# Patient Record
Sex: FEMALE | Race: WHITE | Employment: PART TIME | URBAN - METROPOLITAN AREA
[De-identification: names, ages, dates, MRNs, and addresses within clinical notes are randomized per-mention and may not be internally consistent; named-entity substitution may affect disease eponyms.]

---

## 2017-01-23 ENCOUNTER — MYC MEDICAL ADVICE (OUTPATIENT)
Dept: FAMILY MEDICINE | Facility: CLINIC | Age: 42
End: 2017-01-23

## 2017-01-23 DIAGNOSIS — M54.2 NECK PAIN: Primary | ICD-10-CM

## 2017-01-23 RX ORDER — HYDROCODONE BITARTRATE AND ACETAMINOPHEN 5; 325 MG/1; MG/1
1-2 TABLET ORAL EVERY 4 HOURS PRN
Qty: 40 TABLET | Refills: 0 | Status: SHIPPED | OUTPATIENT
Start: 2017-01-23 | End: 2017-02-27
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
## 2017-02-27 ENCOUNTER — OFFICE VISIT (OUTPATIENT)
Dept: FAMILY MEDICINE | Facility: CLINIC | Age: 42
End: 2017-02-27
Payer: COMMERCIAL

## 2017-02-27 VITALS
WEIGHT: 195.2 LBS | SYSTOLIC BLOOD PRESSURE: 108 MMHG | DIASTOLIC BLOOD PRESSURE: 72 MMHG | BODY MASS INDEX: 31.37 KG/M2 | OXYGEN SATURATION: 98 % | TEMPERATURE: 98.1 F | HEIGHT: 66 IN | HEART RATE: 96 BPM

## 2017-02-27 DIAGNOSIS — M54.2 NECK PAIN: ICD-10-CM

## 2017-02-27 PROCEDURE — 99213 OFFICE O/P EST LOW 20 MIN: CPT | Performed by: FAMILY MEDICINE

## 2017-02-27 RX ORDER — HYDROCODONE BITARTRATE AND ACETAMINOPHEN 5; 325 MG/1; MG/1
1-2 TABLET ORAL EVERY 4 HOURS PRN
Qty: 40 TABLET | Refills: 0 | Status: SHIPPED | OUTPATIENT
Start: 2017-02-27 | End: 2017-02-27

## 2017-02-27 RX ORDER — HYDROCODONE BITARTRATE AND ACETAMINOPHEN 5; 325 MG/1; MG/1
1-2 TABLET ORAL EVERY 4 HOURS PRN
Qty: 40 TABLET | Refills: 0 | Status: SHIPPED | OUTPATIENT
Start: 2017-03-27 | End: 2017-04-25
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
## 2017-02-27 NOTE — MR AVS SNAPSHOT
After Visit Summary   2/27/2017    Noa Mcgill    MRN: [id number]           Patient Information     Date Of Birth          1975        Visit Information        Provider Department      2/27/2017 4:15 PM Sydnie He MD [facility]        Today's Diagnoses     Neck pain           Follow-ups after your visit        Your next 10 appointments already scheduled     Mar 21, 2017  7:45 AM CDT   MA SCREENING BILATERAL W/ ROMMEL with RHBCMA2   [facility]    [address] [facility], [address]  [facility] [id number] [phone]           Do not use any powder, lotion or deodorant under your arms or on your breast. If you do, we will ask you to remove it before your exam.  Wear comfortable, two-piece clothing.  If you have any allergies, tell your care team.  Bring any previous mammograms from other facilities or have them mailed to the breast center.              Who to contact     If you have questions or need follow up information about today's clinic visit or your schedule please contact [facility] directly at [phone].  Normal or non-critical lab and imaging results will be communicated to you by MyChart, letter or phone within 4 business days after the clinic has received the results. If you do not hear from us within 7 days, please contact the clinic through NeuroQuestt or phone. If you have a critical or abnormal lab result, we will notify you by phone as soon as possible.  Submit refill requests through APJeT or call your pharmacy and they will forward the refill request to us. Please allow 3 business days for your refill to be completed.          Additional Information About Your Visit        MyChart Information     APJeT gives you secure access to your electronic health record. If you see a primary care provider, you can also send messages to your care team and make appointments. If you have questions, please call  "your primary care clinic.  If you do not have a primary care provider, please call [phone] and they will assist you.        Care EveryWhere ID     This is your Care EveryWhere ID. This could be used by other organizations to access your [place] medical records  [id number]        Your Vitals Were     Pulse Temperature Height Pulse Oximetry BMI (Body Mass Index)       96 98.1  F (36.7  C) (Oral) 5' 6\" (1.676 m) 98% 31.51 kg/m2        Blood Pressure from Last 3 Encounters:   02/27/17 108/72   12/12/16 116/74   10/31/16 120/76    Weight from Last 3 Encounters:   02/27/17 195 lb 3.2 oz (88.5 kg)   12/12/16 198 lb (89.8 kg)   10/31/16 198 lb 11.2 oz (90.1 kg)              Today, you had the following     No orders found for display         Today's Medication Changes          These changes are accurate as of: 2/27/17  4:47 PM.  If you have any questions, ask your nurse or doctor.               Start taking these medicines.        Dose/Directions    HYDROcodone-acetaminophen 5-325 MG per tablet   Commonly known as:  NORCO   Used for:  Neck pain   Started by:  Sydnie He MD        Dose:  1-2 tablet   Start taking on:  3/27/2017   Take 1-2 tablets by mouth every 4 hours as needed for moderate to severe pain (maximum 2 tablet(s) per day)   Quantity:  40 tablet   Refills:  0            Where to get your medicines      Some of these will need a paper prescription and others can be bought over the counter.  Ask your nurse if you have questions.     Bring a paper prescription for each of these medications     HYDROcodone-acetaminophen 5-325 MG per tablet                Primary Care Provider Office Phone # Fax #    Sydnie He -[phone]       [facility] [address] [facility] [address]        Thank you!     Thank you for choosing [facility]  for your care. Our goal is always to provide you with excellent care. Hearing back from our patients is one way we can " continue to improve our services. Please take a few minutes to complete the written survey that you may receive in the mail after your visit with us. Thank you!             Your Updated Medication List - Protect others around you: Learn how to safely use, store and throw away your medicines at www.disposemymeds.org.          This list is accurate as of: 2/27/17  4:47 PM.  Always use your most recent med list.                   Brand Name Dispense Instructions for use    ALPRAZolam 0.5 MG tablet    XANAX    10 tablet    1 tab 30 minutes prior to air travel       amphetamine-dextroamphetamine 10 MG per tablet    ADDERALL    90 tablet    Take 1 tablet (10 mg) by mouth in AM and take 2 tablets (20 mg) by mouth in PM       aspirin 81 MG tablet      Take 81 mg by mouth daily       butalbital-acetaminophen-caffeine -40 MG per tablet    FIORICET/ESGIC    30 tablet    Take 1 tablet by mouth every 4 hours as needed       cyclobenzaprine 10 MG tablet    FLEXERIL     Take 10 mg by mouth       HYDROcodone-acetaminophen 5-325 MG per tablet   Start taking on:  3/27/2017    NORCO    40 tablet    Take 1-2 tablets by mouth every 4 hours as needed for moderate to severe pain (maximum 2 tablet(s) per day)       NUVARING 0.12-0.015 MG/24HR vaginal ring   Generic drug:  etonogestrel-ethinyl estradiol      Place 1 each vaginally every 28 days       rizatriptan 10 MG tablet    MAXALT     TAKE 1 TABLET BY MOUTH ONCE, MAY REPEAT IN 2 HRS. MAX 30MG/24HRS       VITAMIN D PO      Take 2,000 Units by mouth daily.       zolpidem 5 MG tablet    AMBIEN    30 tablet    Take 1 tablet (5 mg) by mouth nightly as needed for sleep

## 2017-03-08 PROBLEM — G89.4 CHRONIC PAIN SYNDROME: Status: ACTIVE | Noted: 2017-03-08

## 2017-03-21 ENCOUNTER — HOSPITAL ENCOUNTER (OUTPATIENT)
Dept: MAMMOGRAPHY | Facility: CLINIC | Age: 42
Discharge: HOME | End: 2017-03-21
Attending: OBSTETRICS & GYNECOLOGY | Admitting: OBSTETRICS & GYNECOLOGY
Payer: COMMERCIAL

## 2017-03-21 DIAGNOSIS — Z12.31 VISIT FOR SCREENING MAMMOGRAM: ICD-10-CM

## 2017-03-21 PROCEDURE — G0202 SCR MAMMO BI INCL CAD: HCPCS

## 2017-04-25 ENCOUNTER — MYC REFILL (OUTPATIENT)
Dept: FAMILY MEDICINE | Facility: CLINIC | Age: 42
End: 2017-04-25

## 2017-04-25 DIAGNOSIS — M54.2 NECK PAIN: ICD-10-CM

## 2017-04-26 RX ORDER — HYDROCODONE BITARTRATE AND ACETAMINOPHEN 5; 325 MG/1; MG/1
1-2 TABLET ORAL EVERY 4 HOURS PRN
Qty: 40 TABLET | Refills: 0 | Status: SHIPPED | OUTPATIENT
Start: 2017-04-27 | End: 2017-06-07

## 2017-04-26 NOTE — TELEPHONE ENCOUNTER
Message from [employer]:  Original authorizing provider: MD Noa Rodríguez KAROCristi Mcgill would like a refill of the following medications:  HYDROcodone-acetaminophen (NORCO) 5-325 MG per tablet [Sydnie He MD]    Preferred pharmacy: [facility] [id number]    Comment:  I am in the last 45 days of my therpay. My range of motion is much better but pain is a bit worse for a lot of things. My doctor says this is normal for the amount of weights they are using for my neck rotation. hoping to be done with tis soon. If not I will need to look at other options for just coping with what pain I have. I have also been using massage with a person who has worked in more orthopedic therapy hoping to resolve the inflamation. Thank you for all of your help.

## 2017-04-26 NOTE — TELEPHONE ENCOUNTER
States max 2/d Norco so not due for another month yet.  Please advise.  SERG Gustafson      Last Written Prescription Date: 2/27/17  Last Fill Quantity: 40,  # refills: 0   Last Office Visit with [facility] prescribing provider: 2/27/17-see below    Not PSO med.  Sent to provider.  Juanita Wallace RN                                           2/27/17  Assessment:  1. ADD - is starting back on her meds - has been off since concussion because they made her feel sick - just started back on one per day so does not need refill now - will gradually go up on dose as tolerated  2. Headaches and concussion - getting better - using less meds for this     Plan:  1. Refills per [facility]  2. 2 months worth  3. Recheck in 3 months unless off all pain meds then may go 6 months - call for refill of stimulant when back on normal dosing

## 2017-05-25 ENCOUNTER — TELEPHONE (OUTPATIENT)
Dept: FAMILY MEDICINE | Facility: CLINIC | Age: 42
End: 2017-05-25

## 2017-05-25 DIAGNOSIS — A09 TRAVELERS' DIARRHEA: Primary | ICD-10-CM

## 2017-05-25 NOTE — TELEPHONE ENCOUNTER
Pt is traveling to [place] for 2 weeks, would like to have travel medication and an antibiotic for an UTI.  She states she does not need to see travel clinic as she is UTD on vaccines    Lola Tanner/ARTHUR  [place]

## 2017-05-31 RX ORDER — CIPROFLOXACIN 500 MG/1
500 TABLET, FILM COATED ORAL 2 TIMES DAILY
Qty: 6 TABLET | Refills: 0 | Status: SHIPPED | OUTPATIENT
Start: 2017-05-31 | End: 2017-09-27

## 2017-05-31 NOTE — TELEPHONE ENCOUNTER
Pt calls, made appointment for this issue next week, informed staff sent message to [facility], accepts rx, will keep appointment next week for ambien and att def (6 month visit), [facility] TH[facility]ON, rx sent  Prescription approved per [facility] Refill Protocol.  Tanja Gaines RN, BSN  Message handled by Nurse Triage.

## 2017-05-31 NOTE — TELEPHONE ENCOUNTER
L/M to call.  Verify [facility] preferred pharmacies.  Which does she want RX at?  Juanita Wallace RN

## 2017-06-03 ENCOUNTER — HEALTH MAINTENANCE LETTER (OUTPATIENT)
Age: 42
End: 2017-06-03

## 2017-06-07 ENCOUNTER — OFFICE VISIT (OUTPATIENT)
Dept: FAMILY MEDICINE | Facility: CLINIC | Age: 42
End: 2017-06-07
Payer: COMMERCIAL

## 2017-06-07 VITALS
RESPIRATION RATE: 20 BRPM | TEMPERATURE: 98.2 F | SYSTOLIC BLOOD PRESSURE: 119 MMHG | BODY MASS INDEX: 32.17 KG/M2 | DIASTOLIC BLOOD PRESSURE: 78 MMHG | WEIGHT: 199.3 LBS | HEART RATE: 93 BPM

## 2017-06-07 DIAGNOSIS — F98.8 ATTENTION DEFICIT DISORDER: ICD-10-CM

## 2017-06-07 DIAGNOSIS — F40.243 PHOBIA, FLYING: ICD-10-CM

## 2017-06-07 DIAGNOSIS — G47.00 INSOMNIA, UNSPECIFIED TYPE: Primary | ICD-10-CM

## 2017-06-07 PROCEDURE — 99213 OFFICE O/P EST LOW 20 MIN: CPT | Performed by: FAMILY MEDICINE

## 2017-06-07 RX ORDER — ZOLPIDEM TARTRATE 5 MG/1
5 TABLET ORAL
Qty: 30 TABLET | Refills: 1 | Status: SHIPPED | OUTPATIENT
Start: 2017-06-07 | End: 2017-09-27

## 2017-06-07 RX ORDER — ALPRAZOLAM 0.5 MG
TABLET ORAL
Qty: 10 TABLET | Refills: 0 | Status: SHIPPED | OUTPATIENT
Start: 2017-06-07 | End: 2018-03-22

## 2017-06-07 RX ORDER — DEXTROAMPHETAMINE SACCHARATE, AMPHETAMINE ASPARTATE, DEXTROAMPHETAMINE SULFATE AND AMPHETAMINE SULFATE 2.5; 2.5; 2.5; 2.5 MG/1; MG/1; MG/1; MG/1
TABLET ORAL
Qty: 90 TABLET | Refills: 0 | Status: SHIPPED | OUTPATIENT
Start: 2017-06-07 | End: 2017-09-27

## 2017-06-07 NOTE — MR AVS SNAPSHOT
After Visit Summary   6/7/2017    Noa Mcgill    MRN: [id number]           Patient Information     Date Of Birth          1975        Visit Information        Provider Department      6/7/2017 3:00 PM Sydnie He MD [facility]        Today's Diagnoses     Insomnia, unspecified type    -  1    Attention deficit disorder        Phobia, flying           Follow-ups after your visit        Who to contact     If you have questions or need follow up information about today's clinic visit or your schedule please contact [facility] directly at [phone].  Normal or non-critical lab and imaging results will be communicated to you by Sienhart, letter or phone within 4 business days after the clinic has received the results. If you do not hear from us within 7 days, please contact the clinic through Sienhart or phone. If you have a critical or abnormal lab result, we will notify you by phone as soon as possible.  Submit refill requests through Veritract or call your pharmacy and they will forward the refill request to us. Please allow 3 business days for your refill to be completed.          Additional Information About Your Visit        MyChart Information     Veritract gives you secure access to your electronic health record. If you see a primary care provider, you can also send messages to your care team and make appointments. If you have questions, please call your primary care clinic.  If you do not have a primary care provider, please call [phone] and they will assist you.        Care EveryWhere ID     This is your Care EveryWhere ID. This could be used by other organizations to access your [place] medical records  [id number]        Your Vitals Were     Pulse Temperature Respirations Breastfeeding? BMI (Body Mass Index)       93 98.2  F (36.8  C) (Oral) 20 No 32.17 kg/m2        Blood Pressure from Last 3 Encounters:   06/07/17 119/78   02/27/17 108/72    12/12/16 116/74    Weight from Last 3 Encounters:   06/07/17 199 lb 4.8 oz (90.4 kg)   02/27/17 195 lb 3.2 oz (88.5 kg)   12/12/16 198 lb (89.8 kg)              Today, you had the following     No orders found for display         Where to get your medicines      Some of these will need a paper prescription and others can be bought over the counter.  Ask your nurse if you have questions.     Bring a paper prescription for each of these medications     ALPRAZolam 0.5 MG tablet    amphetamine-dextroamphetamine 10 MG per tablet    zolpidem 5 MG tablet          Primary Care Provider Office Phone # Fax #    Sydnie He -[phone]       [facility] [address] [facility] [address]        Thank you!     Thank you for choosing [facility]  for your care. Our goal is always to provide you with excellent care. Hearing back from our patients is one way we can continue to improve our services. Please take a few minutes to complete the written survey that you may receive in the mail after your visit with us. Thank you!             Your Updated Medication List - Protect others around you: Learn how to safely use, store and throw away your medicines at www.disposemymeds.org.          This list is accurate as of: 6/7/17  3:44 PM.  Always use your most recent med list.                   Brand Name Dispense Instructions for use    ALPRAZolam 0.5 MG tablet    XANAX    10 tablet    1 tab 30 minutes prior to air travel       amphetamine-dextroamphetamine 10 MG per tablet    ADDERALL    90 tablet    Take 1 tablet (10 mg) by mouth in AM and take 2 tablets (20 mg) by mouth in PM       aspirin 81 MG tablet      Take 81 mg by mouth daily       butalbital-acetaminophen-caffeine -40 MG per tablet    FIORICET/ESGIC    30 tablet    Take 1 tablet by mouth every 4 hours as needed       ciprofloxacin 500 MG tablet    CIPRO    6 tablet    Take 1 tablet (500 mg) by mouth 2 times daily        cyclobenzaprine 10 MG tablet    FLEXERIL     Take 10 mg by mouth       NUVARING 0.12-0.015 MG/24HR vaginal ring   Generic drug:  etonogestrel-ethinyl estradiol      Place 1 each vaginally every 28 days       rizatriptan 10 MG tablet    MAXALT     TAKE 1 TABLET BY MOUTH ONCE, MAY REPEAT IN 2 HRS. MAX 30MG/24HRS       VITAMIN D PO      Take 2,000 Units by mouth daily.       zolpidem 5 MG tablet    AMBIEN    30 tablet    Take 1 tablet (5 mg) by mouth nightly as needed for sleep

## 2017-06-07 NOTE — PROGRESS NOTES
SUBJECTIVE:                                                    Noa Mcgill is a 42 year old female who presents to clinic today for the following health issues:    She leaves Monday night and is gone for 2.5 weeks to [place] and [place].        ADHD - refill Adderall.  Had discontinued due to concussion.  Insomnia - wants refill on Ambien.  Will be traveling out of the country  She has flight anxiety and needs some xanax.   Last time given was last fall.        Amount of exercise or physical activity: 4-5 days/week for an average of 30-45 minutes    Problems taking medications regularly: No    Medication side effects: none    Diet: regular (no restrictions)        Past Medical History:   Diagnosis Date     Allergic rhinitis, cause unspecified      ASD (atrial septal defect) 2/15/13    ASD dx by echo, recheck and repair after done having children     Attention deficit disorder without mention of hyperactivity 2014     BRCA positive     1     Endometriosis of uterus      HERPES SIMPLEX NOS 2007    cold sores on remicade     Insomnia, unspecified 2/10/2005     Migraine headaches      Other chronic sinusitis      Psoriatic arthropathy (H) 2007     Recurrent UTI      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       Past Surgical History:   Procedure Laterality Date     APPENDECTOMY  2006      SECTION  2013    Procedure:  SECTION;  Primary  Section;  Surgeon: Chad Hughes MD;  Location: [facility] L+D     DILATION AND CURETTAGE SUCTION N/A 2014    Procedure: DILATION AND CURETTAGE SUCTION;  Surgeon: Srini Martinez MD;  Location: [facility] OR     DILATION AND CURETTAGE SUCTION N/A 2014    Procedure: DILATION AND CURETTAGE SUCTION;  Surgeon: Connor Kang MD;  Location: [facility] OR     HC DILATION/CURETTAGE DIAG/THER NON OB       HC DILATION/CURETTAGE DIAG/THER NON OB  2006     HYSTERECTOMY, CARLOS  2016    [facility]     SURGICAL HISTORY OF -   2006    left thumb fusion due to arthritis       MEDICATIONS:  Current Outpatient Prescriptions   Medication     rizatriptan (MAXALT) 10 MG tablet     cyclobenzaprine (FLEXERIL) 10 MG tablet     amphetamine-dextroamphetamine (ADDERALL) 10 MG tablet     butalbital-acetaminophen-caffeine (FIORICET, ESGIC) -40 MG per tablet     ALPRAZolam (XANAX) 0.5 MG tablet     etonogestrel-ethinyl estradiol (NUVARING) 0.12-0.015 MG/24HR vaginal ring     zolpidem (AMBIEN) 5 MG tablet     aspirin 81 MG tablet     VITAMIN D PO     ciprofloxacin (CIPRO) 500 MG tablet     No current facility-administered medications for this visit.        SOCIAL HISTORY:  Social History   Substance Use Topics     Smoking status: Never Smoker     Smokeless tobacco: Never Used     Alcohol use 0.0 oz/week     0 Standard drinks or equivalent per week      Comment: occasional       Family History   Problem Relation Age of Onset     C.A.D. Maternal Grandmother      Hypertension Maternal Grandmother      Psychotic Disorder Mother      Depression, alcoholism     DIABETES Mother      Breast Cancer Maternal Aunt      Breast Cancer Maternal Aunt      Breast Cancer Maternal Aunt      CANCER Maternal Aunt      ovary     CANCER Maternal Aunt      thyroid cancer       Objective:  Blood pressure 119/78, pulse 93, temperature 98.2  F (36.8  C), temperature source Oral, resp. rate 20, weight 199 lb 4.8 oz (90.4 kg), not currently breastfeeding.  HEENT:  TM's are clear bilaterally.  Oropharynx is clear without tonsillar hypertrophy or exudate.  Conjuctiva are clear.  Neck:  There is no lymphadenopathy or thyroid tenderness or enlargement  Chest: Clear to auscultation bilaterally.  No wheezes, rales or retractions.  CV: Regular rate and rhythm without murmurs, rubs or gallops.  ABDOMEN:  Positive bowel sounds, soft, nondistended and nontender.  No masses are palpated and there is no organomegaly or inguinal  lymphadenopathy.  Extremities: There is no clubbing, cyanosis or edema.  Peripheral pulses are present bilaterally in the radial and dorsalis pedis arteries.  Reflexes 1+ at patella    Assessment:  1. ADD - doing well now back on meds for a few weeks  2. Phobia of flying   3. Insomnia - stable - rarely uses ambien    Plan:  1. Refills per [facility]  2. Follow up in 3 months and as needed

## 2017-07-17 ENCOUNTER — MYC REFILL (OUTPATIENT)
Dept: FAMILY MEDICINE | Facility: CLINIC | Age: 42
End: 2017-07-17

## 2017-07-17 DIAGNOSIS — G43.909 MIGRAINE WITHOUT STATUS MIGRAINOSUS, NOT INTRACTABLE, UNSPECIFIED MIGRAINE TYPE: ICD-10-CM

## 2017-07-17 RX ORDER — BUTALBITAL, ACETAMINOPHEN AND CAFFEINE 50; 325; 40 MG/1; MG/1; MG/1
1 TABLET ORAL EVERY 4 HOURS PRN
Qty: 30 TABLET | Refills: 1 | Status: SHIPPED | OUTPATIENT
Start: 2017-07-17 | End: 2018-05-21

## 2017-07-17 NOTE — TELEPHONE ENCOUNTER
Disp Refills Start End MARYAM   butalbital-acetaminophen-caffeine (FIORICET, ESGIC) -40 MG per tablet 30 tablet 1 8/31/2016  No   Sig: Take 1 tablet by mouth every 4 hours as needed           Last Written Prescription Date:  8.31.16  Last Fill Quantity: 30,   # refills: 0  Last Office Visit with [facility] or [facility] prescribing provider: 6.7.17  Future Office visit:       Routing refill request to provider for review/approval because:  Drug not on the [facility], [facility] or [facility] refill protocol or controlled substance    .Nohemy Morris RN, BS  Clinical Nurse Triage.

## 2017-07-17 NOTE — TELEPHONE ENCOUNTER
Message from [employer]:  Original authorizing provider: MD Noa Rodríguez would like a refill of the following medications:  butalbital-acetaminophen-caffeine (FIORICET, ESGIC) -40 MG per tablet [Sydnie He MD]    Preferred pharmacy: [id number] - [facility] [address]    Comment:

## 2017-09-27 ENCOUNTER — OFFICE VISIT (OUTPATIENT)
Dept: FAMILY MEDICINE | Facility: CLINIC | Age: 42
End: 2017-09-27
Payer: COMMERCIAL

## 2017-09-27 VITALS
WEIGHT: 197.6 LBS | BODY MASS INDEX: 31.89 KG/M2 | DIASTOLIC BLOOD PRESSURE: 80 MMHG | TEMPERATURE: 98.5 F | RESPIRATION RATE: 14 BRPM | SYSTOLIC BLOOD PRESSURE: 112 MMHG | HEART RATE: 102 BPM

## 2017-09-27 DIAGNOSIS — F32.0 MILD MAJOR DEPRESSION (H): ICD-10-CM

## 2017-09-27 DIAGNOSIS — G47.00 INSOMNIA, UNSPECIFIED TYPE: ICD-10-CM

## 2017-09-27 DIAGNOSIS — F98.8 ATTENTION DEFICIT DISORDER (ADD) WITHOUT HYPERACTIVITY: ICD-10-CM

## 2017-09-27 PROCEDURE — 99213 OFFICE O/P EST LOW 20 MIN: CPT | Performed by: FAMILY MEDICINE

## 2017-09-27 RX ORDER — ZOLPIDEM TARTRATE 5 MG/1
5 TABLET ORAL
Qty: 30 TABLET | Refills: 3 | Status: SHIPPED | OUTPATIENT
Start: 2017-09-27 | End: 2017-11-06 | Stop reason: DRUGHIGH

## 2017-09-27 RX ORDER — TOPIRAMATE 25 MG/1
TABLET, FILM COATED ORAL
COMMUNITY
Start: 2017-09-26 | End: 2017-11-06

## 2017-09-27 RX ORDER — DEXTROAMPHETAMINE SACCHARATE, AMPHETAMINE ASPARTATE, DEXTROAMPHETAMINE SULFATE AND AMPHETAMINE SULFATE 2.5; 2.5; 2.5; 2.5 MG/1; MG/1; MG/1; MG/1
TABLET ORAL
Qty: 90 TABLET | Refills: 0 | Status: SHIPPED | OUTPATIENT
Start: 2017-09-27 | End: 2017-11-06

## 2017-09-27 RX ORDER — BUPROPION HYDROCHLORIDE 150 MG/1
150 TABLET ORAL EVERY MORNING
Qty: 30 TABLET | Refills: 6 | Status: SHIPPED | OUTPATIENT
Start: 2017-09-27 | End: 2017-11-06

## 2017-09-27 NOTE — MR AVS SNAPSHOT
After Visit Summary   9/27/2017    Noa Mcgill    MRN: [id number]           Patient Information     Date Of Birth          1975        Visit Information        Provider Department      9/27/2017 3:00 PM Sydnie He MD [facility]        Today's Diagnoses     Mild major depression (H)        Insomnia, unspecified type           Follow-ups after your visit        Who to contact     If you have questions or need follow up information about today's clinic visit or your schedule please contact [facility] directly at [phone].  Normal or non-critical lab and imaging results will be communicated to you by Active DSPhart, letter or phone within 4 business days after the clinic has received the results. If you do not hear from us within 7 days, please contact the clinic through [employer] or phone. If you have a critical or abnormal lab result, we will notify you by phone as soon as possible.  Submit refill requests through [employer] or call your pharmacy and they will forward the refill request to us. Please allow 3 business days for your refill to be completed.          Additional Information About Your Visit        MyChart Information     [employer] gives you secure access to your electronic health record. If you see a primary care provider, you can also send messages to your care team and make appointments. If you have questions, please call your primary care clinic.  If you do not have a primary care provider, please call [phone] and they will assist you.        Care EveryWhere ID     This is your Care EveryWhere ID. This could be used by other organizations to access your [place] medical records  [id number]        Your Vitals Were     Pulse Temperature Respirations BMI (Body Mass Index)          102 98.5  F (36.9  C) (Oral) 14 31.89 kg/m2         Blood Pressure from Last 3 Encounters:   09/27/17 112/80   06/07/17 119/78   02/27/17 108/72    Weight from Last 3  Encounters:   09/27/17 197 lb 9.6 oz (89.6 kg)   06/07/17 199 lb 4.8 oz (90.4 kg)   02/27/17 195 lb 3.2 oz (88.5 kg)              Today, you had the following     No orders found for display         Today's Medication Changes          These changes are accurate as of: 9/27/17  4:01 PM.  If you have any questions, ask your nurse or doctor.               Start taking these medicines.        Dose/Directions    buPROPion 150 MG 24 hr tablet   Commonly known as:  WELLBUTRIN XL   Used for:  Mild major depression (H)   Started by:  Sydnie He MD        Dose:  150 mg   Take 1 tablet (150 mg) by mouth every morning   Quantity:  30 tablet   Refills:  6            Where to get your medicines      These medications were sent to [facility] [id number] [place] [address]  [id number] [place] [id number]     Phone:  [phone]     buPROPion 150 MG 24 hr tablet         Some of these will need a paper prescription and others can be bought over the counter.  Ask your nurse if you have questions.     Bring a paper prescription for each of these medications     zolpidem 5 MG tablet                Primary Care Provider Office Phone # Fax #    Sydnie He -[phone] [address] [facility] [address]        Equal Access to Services     KARMEN LEMUS AH: Johan kirkland hadasho Sojosephali, waaxda luqadaha, qaybta kaalmada adeegyada, lisa cruz. So [facility] [phone].    ATENCIÓN: Si habla español, tiene a horton disposición servicios gratuitos de asistencia lingüística. Llame al [phone].    We comply with applicable federal civil rights laws and [place] laws. We do not discriminate on the basis of race, color, national origin, age, disability sex, sexual orientation or gender identity.            Thank you!     Thank you for choosing [facility]  for your care. Our goal is always to provide you with excellent care. Hearing back  from our patients is one way we can continue to improve our services. Please take a few minutes to complete the written survey that you may receive in the mail after your visit with us. Thank you!             Your Updated Medication List - Protect others around you: Learn how to safely use, store and throw away your medicines at www.disposemymeds.org.          This list is accurate as of: 9/27/17  4:01 PM.  Always use your most recent med list.                   Brand Name Dispense Instructions for use Diagnosis    ALPRAZolam 0.5 MG tablet    XANAX    10 tablet    1 tab 30 minutes prior to air travel    Phobia, flying       amphetamine-dextroamphetamine 10 MG per tablet    ADDERALL    90 tablet    Take 1 tablet (10 mg) by mouth in AM and take 2 tablets (20 mg) by mouth in PM    Attention deficit disorder       aspirin 81 MG tablet      Take 81 mg by mouth daily        buPROPion 150 MG 24 hr tablet    WELLBUTRIN XL    30 tablet    Take 1 tablet (150 mg) by mouth every morning    Mild major depression (H)       butalbital-acetaminophen-caffeine -40 MG per tablet    FIORICET/ESGIC    30 tablet    Take 1 tablet by mouth every 4 hours as needed    Migraine without status migrainosus, not intractable, unspecified migraine type       NUVARING 0.12-0.015 MG/24HR vaginal ring   Generic drug:  etonogestrel-ethinyl estradiol      Place 1 each vaginally every 28 days        rizatriptan 10 MG tablet    MAXALT     TAKE 1 TABLET BY MOUTH ONCE, MAY REPEAT IN 2 HRS. MAX 30MG/24HRS        topiramate 25 MG tablet    TOPAMAX          VITAMIN D PO      Take 2,000 Units by mouth daily.        zolpidem 5 MG tablet    AMBIEN    30 tablet    Take 1 tablet (5 mg) by mouth nightly as needed for sleep    Insomnia, unspecified type

## 2017-09-27 NOTE — NURSING NOTE
"Chief Complaint   Patient presents with     Depression     X 2 months     Anxiety       Initial /80 (BP Location: Right arm, Patient Position: Chair, Cuff Size: Adult Large)  Pulse 102  Temp 98.5  F (36.9  C) (Oral)  Resp 14  Wt 197 lb 9.6 oz (89.6 kg)  BMI 31.89 kg/m2 Estimated body mass index is 31.89 kg/(m^2) as calculated from the following:    Height as of 2/27/17: 5' 6\" (1.676 m).    Weight as of this encounter: 197 lb 9.6 oz (89.6 kg).  Medication Reconciliation: complete   Aly Arvizu MA      "
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
## 2017-09-28 ENCOUNTER — TRANSFERRED RECORDS (OUTPATIENT)
Dept: HEALTH INFORMATION MANAGEMENT | Facility: CLINIC | Age: 42
End: 2017-09-28

## 2017-11-06 ENCOUNTER — OFFICE VISIT (OUTPATIENT)
Dept: FAMILY MEDICINE | Facility: CLINIC | Age: 42
End: 2017-11-06
Payer: COMMERCIAL

## 2017-11-06 VITALS
DIASTOLIC BLOOD PRESSURE: 85 MMHG | OXYGEN SATURATION: 100 % | BODY MASS INDEX: 31.96 KG/M2 | WEIGHT: 198 LBS | SYSTOLIC BLOOD PRESSURE: 130 MMHG | TEMPERATURE: 98.5 F | HEART RATE: 104 BPM | RESPIRATION RATE: 14 BRPM

## 2017-11-06 DIAGNOSIS — G89.4 CHRONIC PAIN SYNDROME: ICD-10-CM

## 2017-11-06 DIAGNOSIS — G43.909 MIGRAINE WITHOUT STATUS MIGRAINOSUS, NOT INTRACTABLE, UNSPECIFIED MIGRAINE TYPE: ICD-10-CM

## 2017-11-06 DIAGNOSIS — E66.09 CLASS 1 OBESITY DUE TO EXCESS CALORIES WITHOUT SERIOUS COMORBIDITY WITH BODY MASS INDEX (BMI) OF 31.0 TO 31.9 IN ADULT: ICD-10-CM

## 2017-11-06 DIAGNOSIS — M54.2 NECK PAIN: ICD-10-CM

## 2017-11-06 DIAGNOSIS — F98.8 ATTENTION DEFICIT DISORDER (ADD) WITHOUT HYPERACTIVITY: ICD-10-CM

## 2017-11-06 DIAGNOSIS — G47.00 INSOMNIA, UNSPECIFIED TYPE: Primary | ICD-10-CM

## 2017-11-06 DIAGNOSIS — E66.811 CLASS 1 OBESITY DUE TO EXCESS CALORIES WITHOUT SERIOUS COMORBIDITY WITH BODY MASS INDEX (BMI) OF 31.0 TO 31.9 IN ADULT: ICD-10-CM

## 2017-11-06 PROCEDURE — 99214 OFFICE O/P EST MOD 30 MIN: CPT | Performed by: FAMILY MEDICINE

## 2017-11-06 RX ORDER — DULOXETIN HYDROCHLORIDE 30 MG/1
30 CAPSULE, DELAYED RELEASE ORAL DAILY
COMMUNITY
Start: 2017-09-28 | End: 2018-02-08

## 2017-11-06 RX ORDER — GABAPENTIN 300 MG/1
2 CAPSULE ORAL AT BEDTIME
COMMUNITY
Start: 2017-09-28 | End: 2018-05-21

## 2017-11-06 RX ORDER — ZOLPIDEM TARTRATE 10 MG/1
10 TABLET ORAL
Qty: 30 TABLET | Refills: 1 | Status: SHIPPED | OUTPATIENT
Start: 2017-11-06 | End: 2018-05-21

## 2017-11-06 RX ORDER — DEXTROAMPHETAMINE SACCHARATE, AMPHETAMINE ASPARTATE, DEXTROAMPHETAMINE SULFATE AND AMPHETAMINE SULFATE 2.5; 2.5; 2.5; 2.5 MG/1; MG/1; MG/1; MG/1
TABLET ORAL
Qty: 90 TABLET | Refills: 0 | Status: SHIPPED | OUTPATIENT
Start: 2017-11-06 | End: 2018-05-21

## 2017-11-06 ASSESSMENT — ANXIETY QUESTIONNAIRES
5. BEING SO RESTLESS THAT IT IS HARD TO SIT STILL: SEVERAL DAYS
7. FEELING AFRAID AS IF SOMETHING AWFUL MIGHT HAPPEN: NOT AT ALL
IF YOU CHECKED OFF ANY PROBLEMS ON THIS QUESTIONNAIRE, HOW DIFFICULT HAVE THESE PROBLEMS MADE IT FOR YOU TO DO YOUR WORK, TAKE CARE OF THINGS AT HOME, OR GET ALONG WITH OTHER PEOPLE: NOT DIFFICULT AT ALL
3. WORRYING TOO MUCH ABOUT DIFFERENT THINGS: NOT AT ALL
1. FEELING NERVOUS, ANXIOUS, OR ON EDGE: NOT AT ALL
2. NOT BEING ABLE TO STOP OR CONTROL WORRYING: NOT AT ALL
GAD7 TOTAL SCORE: 3
6. BECOMING EASILY ANNOYED OR IRRITABLE: SEVERAL DAYS

## 2017-11-06 ASSESSMENT — PATIENT HEALTH QUESTIONNAIRE - PHQ9
SUM OF ALL RESPONSES TO PHQ QUESTIONS 1-9: 11
5. POOR APPETITE OR OVEREATING: SEVERAL DAYS

## 2017-11-06 NOTE — MR AVS SNAPSHOT
After Visit Summary   11/6/2017    Noa Mcgill    MRN: [id number]           Patient Information     Date Of Birth          1975        Visit Information        Provider Department      11/6/2017 5:30 PM Sydnie He MD [facility]        Today's Diagnoses     Insomnia, unspecified type    -  1    Attention deficit disorder (ADD) without hyperactivity           Follow-ups after your visit        Who to contact     If you have questions or need follow up information about today's clinic visit or your schedule please contact [facility] directly at [phone].  Normal or non-critical lab and imaging results will be communicated to you by Pronutri[employer]art, letter or phone within 4 business days after the clinic has received the results. If you do not hear from us within 7 days, please contact the clinic through [employer] or phone. If you have a critical or abnormal lab result, we will notify you by phone as soon as possible.  Submit refill requests through [employer] or call your pharmacy and they will forward the refill request to us. Please allow 3 business days for your refill to be completed.          Additional Information About Your Visit        MyChart Information     [employer] gives you secure access to your electronic health record. If you see a primary care provider, you can also send messages to your care team and make appointments. If you have questions, please call your primary care clinic.  If you do not have a primary care provider, please call [phone] and they will assist you.        Care EveryWhere ID     This is your Care EveryWhere ID. This could be used by other organizations to access your [place] medical records  [id number]        Your Vitals Were     Pulse Temperature Respirations Pulse Oximetry BMI (Body Mass Index)       104 98.5  F (36.9  C) (Oral) 14 100% 31.96 kg/m2        Blood Pressure from Last 3 Encounters:   11/06/17 130/85   09/27/17  112/80   06/07/17 119/78    Weight from Last 3 Encounters:   11/06/17 198 lb (89.8 kg)   09/27/17 197 lb 9.6 oz (89.6 kg)   06/07/17 199 lb 4.8 oz (90.4 kg)              Today, you had the following     No orders found for display         Today's Medication Changes          These changes are accurate as of: 11/6/17  6:16 PM.  If you have any questions, ask your nurse or doctor.               These medicines have changed or have updated prescriptions.        Dose/Directions    zolpidem 10 MG tablet   Commonly known as:  AMBIEN   This may have changed:    - medication strength  - how much to take   Used for:  Insomnia, unspecified type   Changed by:  Sydnie He MD        Dose:  10 mg   Take 1 tablet (10 mg) by mouth nightly as needed for sleep   Quantity:  30 tablet   Refills:  1            Where to get your medicines      Some of these will need a paper prescription and others can be bought over the counter.  Ask your nurse if you have questions.     Bring a paper prescription for each of these medications     amphetamine-dextroamphetamine 10 MG per tablet    zolpidem 10 MG tablet                Primary Care Provider Office Phone # Fax #    Sydnie He -[phone] [address] [facility] [address]        Equal Access to Services     KARMEN LEMUS AH: Hadii marshall kirkland hadasho Soomaali, waaxda luqadaha, qaybta kaalmada adeegyada, waxay jesus cruz. So [facility] [phone].    ATENCIÓN: Si habla español, tiene a horton disposición servicios gratuitos de asistencia lingüística. Llame al [phone].    We comply with applicable federal civil rights laws and [place] laws. We do not discriminate on the basis of race, color, national origin, age, disability, sex, sexual orientation, or gender identity.            Thank you!     Thank you for choosing [facility]  for your care. Our goal is always to provide you with excellent care. Hearing back from our  patients is one way we can continue to improve our services. Please take a few minutes to complete the written survey that you may receive in the mail after your visit with us. Thank you!             Your Updated Medication List - Protect others around you: Learn how to safely use, store and throw away your medicines at www.disposemymeds.org.          This list is accurate as of: 11/6/17  6:16 PM.  Always use your most recent med list.                   Brand Name Dispense Instructions for use Diagnosis    ALPRAZolam 0.5 MG tablet    XANAX    10 tablet    1 tab 30 minutes prior to air travel    Phobia, flying       amphetamine-dextroamphetamine 10 MG per tablet    ADDERALL    90 tablet    Take 1 tablet (10 mg) by mouth in AM and take 2 tablets (20 mg) by mouth in PM    Attention deficit disorder (ADD) without hyperactivity       aspirin 81 MG tablet      Take 81 mg by mouth daily        butalbital-acetaminophen-caffeine -40 MG per tablet    FIORICET/ESGIC    30 tablet    Take 1 tablet by mouth every 4 hours as needed    Migraine without status migrainosus, not intractable, unspecified migraine type       DULoxetine 30 MG EC capsule    CYMBALTA     Take 30 mg by mouth daily        gabapentin 300 MG capsule    NEURONTIN     Take 2 capsules by mouth At Bedtime        NUVARING 0.12-0.015 MG/24HR vaginal ring   Generic drug:  etonogestrel-ethinyl estradiol      Place 1 each vaginally every 28 days        rizatriptan 10 MG tablet    MAXALT     TAKE 1 TABLET BY MOUTH ONCE, MAY REPEAT IN 2 HRS. MAX 30MG/24HRS        VITAMIN D PO      Take 2,000 Units by mouth daily.        zolpidem 10 MG tablet    AMBIEN    30 tablet    Take 1 tablet (10 mg) by mouth nightly as needed for sleep    Insomnia, unspecified type

## 2017-11-06 NOTE — PROGRESS NOTES
SUBJECTIVE:   Noa Mcgill is a 42 year old female who presents to clinic today for the following health issues:    About a month ago she switched from topamax to cymbalta and neurontin for her neck pain.   She is doing better but her sleeping is not doing well.   So she switched to 10 mg of ambien for sleep and it does work.    She has her diet changed due to recommendations as certain foods trigger migraines.        Anxiety Follow-Up    Status since last visit: No change    Other associated symptoms:None    Complicating factors:   Significant life event: No   Current substance abuse: None  Depression symptoms: Yes-  Will complete PHQ questionnaire   BASSAM-7 SCORE 3/26/2015   Total Score 5       GAD7          Amount of exercise or physical activity: 1 day/week for an average of 45-60 minutes    Problems taking medications regularly: No    Medication side effects: none    Diet: regular (no restrictions)      Past Medical History:   Diagnosis Date     Allergic rhinitis, cause unspecified      ASD (atrial septal defect) 2/15/13    ASD dx by echo, recheck and repair after done having children     Attention deficit disorder without mention of hyperactivity 2014     BRCA positive     1     Endometriosis of uterus      HERPES SIMPLEX NOS 2007    cold sores on remicade     Insomnia, unspecified 2/10/2005     Migraine headaches      Other chronic sinusitis      Psoriatic arthropathy (H) 2007     Recurrent UTI      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       Past Surgical History:   Procedure Laterality Date     APPENDECTOMY  2006      SECTION  2013    Procedure:  SECTION;  Primary  Section;  Surgeon: Chad Hughes MD;  Location: [facility] L+D     DILATION AND CURETTAGE SUCTION N/A 2014    Procedure: DILATION AND CURETTAGE SUCTION;  Surgeon: Srini Martinez MD;  Location:  OR     DILATION AND CURETTAGE SUCTION  N/A 12/20/2014    Procedure: DILATION AND CURETTAGE SUCTION;  Surgeon: Connor Kang MD;  Location: [facility] OR     HC DILATION/CURETTAGE DIAG/THER NON OB  1998     HC DILATION/CURETTAGE DIAG/THER NON OB  2006     HYSTERECTOMY, CARLOS  04/18/2016    [facility]     SURGICAL HISTORY OF -   2006    left thumb fusion due to arthritis       MEDICATIONS:  Current Outpatient Prescriptions   Medication     DULoxetine (CYMBALTA) 30 MG EC capsule     gabapentin (NEURONTIN) 300 MG capsule     zolpidem (AMBIEN) 5 MG tablet     amphetamine-dextroamphetamine (ADDERALL) 10 MG per tablet     butalbital-acetaminophen-caffeine (FIORICET/ESGIC) -40 MG per tablet     rizatriptan (MAXALT) 10 MG tablet     etonogestrel-ethinyl estradiol (NUVARING) 0.12-0.015 MG/24HR vaginal ring     aspirin 81 MG tablet     VITAMIN D PO     ALPRAZolam (XANAX) 0.5 MG tablet     No current facility-administered medications for this visit.        SOCIAL HISTORY:  Social History   Substance Use Topics     Smoking status: Never Smoker     Smokeless tobacco: Never Used     Alcohol use No      Comment: none       Family History   Problem Relation Age of Onset     C.A.D. Maternal Grandmother      Hypertension Maternal Grandmother      Psychotic Disorder Mother      Depression, alcoholism     DIABETES Mother      Hereditary Breast and Ovarian Cancer Syndrome Sister      Hereditary Breast and Ovarian Cancer Syndrome Daughter      Breast Cancer Maternal Aunt      Breast Cancer Maternal Aunt      Breast Cancer Maternal Aunt      CANCER Maternal Aunt      ovary     CANCER Maternal Aunt      thyroid cancer     Ovarian Cancer Maternal Aunt 69       Objective:  Blood pressure 130/85, pulse 104, temperature 98.5  F (36.9  C), temperature source Oral, resp. rate 14, weight 198 lb (89.8 kg), SpO2 100 %, not currently breastfeeding. Body mass index is 31.96 kg/(m^2).   Neck:  There is no lymphadenopathy or thyroid tenderness or enlargement  Chest: Clear to  auscultation bilaterally.  No wheezes, rales or retractions.  CV: Regular rate and rhythm without murmurs, rubs or gallops.  Affect:  full  Mood: good  The patient denies suidal thought    PHQ-9: 11    Assessment:  1. Neck pain and chronic pain - much better with cymbalta and neurontin  2. Migraines - still ok off topamax - other meds doing well  3. Obesity - weight stable  4. Insomnia - worse off topamax - did ok with 10 mg ambien  5. ADD - stable        Plan:  1. Refills per [facility]  2. Will increase ambien to 10 mg per day  3. Follow up with neurology   4. May end up taking off med management soon  5. Recheck in 3 months

## 2017-11-06 NOTE — NURSING NOTE
"Chief Complaint   Patient presents with     RECHECK     Anxiety and sleep        Initial /85 (BP Location: Right arm, Patient Position: Chair, Cuff Size: Adult Large)  Pulse 104  Temp 98.5  F (36.9  C) (Oral)  Resp 14  Wt 198 lb (89.8 kg)  SpO2 100%  BMI 31.96 kg/m2 Estimated body mass index is 31.96 kg/(m^2) as calculated from the following:    Height as of 2/27/17: 5' 6\" (1.676 m).    Weight as of this encounter: 198 lb (89.8 kg).  Medication Reconciliation: complete   Aly Arvizu MA      "

## 2017-11-07 ASSESSMENT — ANXIETY QUESTIONNAIRES: GAD7 TOTAL SCORE: 3

## 2017-11-09 ENCOUNTER — TRANSFERRED RECORDS (OUTPATIENT)
Dept: HEALTH INFORMATION MANAGEMENT | Facility: CLINIC | Age: 42
End: 2017-11-09

## 2018-01-09 ENCOUNTER — TRANSFERRED RECORDS (OUTPATIENT)
Dept: HEALTH INFORMATION MANAGEMENT | Facility: CLINIC | Age: 43
End: 2018-01-09

## 2018-01-23 ENCOUNTER — OFFICE VISIT (OUTPATIENT)
Dept: ONCOLOGY | Facility: CLINIC | Age: 43
End: 2018-01-23
Attending: GENETIC COUNSELOR, MS
Payer: COMMERCIAL

## 2018-01-23 DIAGNOSIS — Z80.3 FAMILY HISTORY OF MALIGNANT NEOPLASM OF BREAST: ICD-10-CM

## 2018-01-23 DIAGNOSIS — Z15.01 BRCA1 GENE MUTATION POSITIVE: Primary | ICD-10-CM

## 2018-01-23 DIAGNOSIS — Z80.41 FAMILY HISTORY OF MALIGNANT NEOPLASM OF OVARY: ICD-10-CM

## 2018-01-23 DIAGNOSIS — Z15.09 BRCA1 GENE MUTATION POSITIVE: Primary | ICD-10-CM

## 2018-01-23 PROCEDURE — 96040 ZZH GENETIC COUNSELING, EACH 30 MINUTES: CPT | Mod: ZF | Performed by: GENETIC COUNSELOR, MS

## 2018-01-23 NOTE — LETTER
1/23/2018       RE: Noa Mcgill  [address] [facility] [id number]     Dear Colleague,    Thank you for referring your patient, Noa Mcgill, to the [facility]. Please see a copy of my visit note below.    1/23/2018    Referring Provider: self-referred    Presenting Information:   I met with Noa Mcgill today for genetic counseling at the Cancer Risk Management Program at the [facility] to discuss her personal and family history of a known BRCA1 mutation. She is here today to review this history, cancer screening recommendations, and any additional genetic testing options.    Personal History:  Noa is a 42 year old female. She does not have any personal history of cancer.      She had her first menstrual period at age 15 and her first child at age 19. Noa pursued prophylactic surgery to remove her uterus, ovaries, and fallopian tubes at age 40. She reports using oral contraceptive pills for approximately 20 years and that she has used hormone replacement therapy for approximately two years.      Her most recent OB-GYN exam and Pap smear in January 2016 were normal. She has annual clinical breast exams and mammograms; her most recent mammogram in 2017 was normal. She has not had a breast MRI. She has not had a colonoscopy and she does not regularly do any other cancer screening at this time. Noa reported no current tobacco use and occasional alcohol use.    Of note, Noa reported that she pursued genetic testing approximately 17 years ago that identified a BRCA1 mutation. Noa stated that she does not have a copy of the report and can not recall the laboratory that completed her testing.    Family History: (Please see scanned pedigree for detailed family history information)    Noa has one daughter and two sons ranging in age from 4-23 years. Her daughter is 23 and reportedly carries a BRCA1 mutation.    Noa's mother is 61 and does not have a history of  cancer. She reportedly carries a BRCA1 mutation and recently pursued prophylactic surgery to remove her breasts, ovaries, and uterus.    Noa's maternal half sister is 35 and has not had cancer. She reportedly carries a BRCA1 mutation and had surgery to remove her fallopian tubes.     One maternal uncle is 68 and was diagnosed with papillary thyroid cancer at age 66.    One maternal aunt is 70 and was diagnosed with ovarian cancer at age 70; treatment has included surgery. She has also been diagnosed with medullarly thyroid cancer. She reportedly carries a BRCA1 mutation.    One maternal aunt is 67 and was diagnosed with breast cancer at age 40; treatment included a lumpectomy. She was then diagnosed with a separate primary breast cancer later in her 40's; treatment included a mastectomy. She reportedly carries a BRCA1 mutation.    One maternal great aunt (grandmother's sister) was diagnosed with breast cancer at age 41, ovarian cancer at age 45, and passed away in her 40's. Her daughter was diagnosed with breast cancer at age 45 and reportedly carries a BRCA1 mutation.    One maternal great aunt (grandmother's sister) was diagnosed with breast cancer at age 43.    Noa reports that she has limited information regarding her paternal family history, but she knows of no relatives with cancer.    Her maternal ethnicity is [place]ian and possibly Ashkenazi Confucianist. Her paternal ethnicity is . There is no reported consanguinity.    Discussion:    We very briefly reviewed the features of sporadic, familial, and hereditary cancers. Noa's maternal family history of cancer presents with multiple features consistent with a hereditary cancer syndrome, including several relatives in two generations diagnosed with related cancers under age 50. The identification of this BRCA1 mutation further supports this risk assessment. Of note, though there is limited information available regarding her paternal family history,  the available information is not consistent with a hereditary cancer syndrome.    We discussed the natural history and genetics of BRCA1 mutations and Hereditary Breast and Ovarian Cancer (HBOC) syndrome. A detailed handout regarding HBOC syndrome and the information we discussed was provided to Noa at the end of our appointment today and can be found in the after visit summary. Topics included: inheritance pattern, cancer risks, cancer screening recommendations, and also risks, benefits and limitations of testing.    We briefly reviewed the cancer risks associated with a BRCA1 mutation, as Noa expressed familiarity with this information:    50-85% lifetime risk of developing female breast cancer.     20-40% lifetime risk of developing ovarian cancer.     16-20% lifetime risk of developing prostate cancer.      1-2% lifetime risk of developing male breast cancer.     There may also be increased risks for pancreatic cancer and melanoma, as well as other cancers. Exact lifetime risks are not available at this time.    We also reviewed the cancer screening/management that is recommended for individuals who carry BRCA1 mutations, per current National Comprehensive Cancer Network (NCCN) guidelines:    Women:    Breast awareness starting at age 18    Clinical breast exams starting at age 25; repeated every 6-12 months    Annual breast MRI from age 25-29    Annual mammograms and breast MRI alternating every 6 months starting at age 30    Prophylactic mastectomy is a surgical option, which reduces breast cancer risk by 90%     Prophylactic removal of the ovaries and fallopian tubes is typically recommended between ages 35-40, which reduces ovarian cancer risk by 80%      Men:    Breast self-exam starting at age 35    Annual clinical breast exams starting at age 35    Consider annual prostate exams and PSA testing starting at age 45    Screening for pancreatic cancer is not offered on a routine basis, but may be  considered based on family history. Screening for melanoma may also be considered.    Of note, some chemotherapies for certain cancers may be more effective in individuals with BRCA mutations.    Noa explained that she would be interested in pursing a bilateral mastectomy in the near future. As such, we discussed having Noa meet with SOURAV Rios to discuss the screening and surgery recommendations associated with a BRCA1 mutation. A referral was placed.    We briefly reviewed the inheritance of this BRCA1 mutation. Each of her children have a 50% chance to carry the BRCA1 mutation; of note, her daughter is already known to carry the BRCA1 mutation. We reviewed that her sons, when over the age of 18, should consider meeting with a genetic counselor to discuss genetic testing. We also discussed the association between BRCA1 mutations and the rare autosomal recessive childhood condition Fanconi anemia (FA). For individuals of childbearing age with BRCA1 mutations, genetic counseling and genetic testing may be advised for their partners. Of note, Noa denied having any concerns regarding her children's health and the chance for FA at this time.     We then discussed the importance of obtaining a copy of Noa's genetic testing report documenting the BRCA1 mutation. Given the timeline, it was likely Noa had her testing completed at [facility]. As such, Noa signed a release of information form allowing me to request her test report from [facility].    Noa had several questions regarding any additional genetic testing that may be indicated for her. We discussed that it will be important to obtain a copy of her mother's test report, as well. This report would help us better understand the extent of the testing her mother pursued, particularly as to whether she had testing that included other genes associated with hereditary breast and ovarian cancer, as well as medullarly thyroid  cancer. This would allow us to feel more confident that Noa would only be expected to carry the BRCA1 mutation, given that her paternal family history is not currently suggestive of a hereditary cancer syndrome. Noa verbalized understanding.    We briefly reviewed that in the event we are not able to find a copy of her prior test report at [facility], we can readdress testing her again for the known BRCA1 mutation identified in her mother and daughter. Noa verbalized agreement with this plan.    Plan:  1) Today we reviewed the cancer risks and screening recommendations associated with a BRCA1 mutation.  2) A referral was made for Noa to meet with SOURAV Rios to discuss screening associated with a BRCA1 mutation, particularly high risk breast screening and prophylactic bilateral mastectomy.  3) Noa signed a release form allowing me to collect her test report at [facility]; she was also provided a release of information form to give to her mother so that I can access her test report.  4) Noa will be contacted once we collect these records, in the event any additional genetic testing is indicated. She was provided my contact information.    Face to face time: 45 minutes    Roseann Ridley MS, [facility]  Licensed Genetic Counselor  Office: [phone]  Pager: [phone]

## 2018-01-23 NOTE — PROGRESS NOTES
1/23/2018    Referring Provider: self-referred    Presenting Information:   I met with Noa Mcgill today for genetic counseling at the Cancer Risk Management Program at the [facility] to discuss her personal and family history of a known BRCA1 mutation. She is here today to review this history, cancer screening recommendations, and any additional genetic testing options.    Personal History:  Noa is a 42 year old female. She does not have any personal history of cancer.      She had her first menstrual period at age 15 and her first child at age 19. Noa pursued prophylactic surgery to remove her uterus, ovaries, and fallopian tubes at age 40. She reports using oral contraceptive pills for approximately 20 years and that she has used hormone replacement therapy for approximately two years.      Her most recent OB-GYN exam and Pap smear in January 2016 were normal. She has annual clinical breast exams and mammograms; her most recent mammogram in 2017 was normal. She has not had a breast MRI. She has not had a colonoscopy and she does not regularly do any other cancer screening at this time. Noa reported no current tobacco use and occasional alcohol use.    Of note, Noa reported that she pursued genetic testing approximately 17 years ago that identified a BRCA1 mutation. Noa stated that she does not have a copy of the report and can not recall the laboratory that completed her testing.    Family History: (Please see scanned pedigree for detailed family history information)    Noa has one daughter and two sons ranging in age from 4-23 years. Her daughter is 23 and reportedly carries a BRCA1 mutation.    Noa's mother is 61 and does not have a history of cancer. She reportedly carries a BRCA1 mutation and recently pursued prophylactic surgery to remove her breasts, ovaries, and uterus.    Noa's maternal half sister is 35 and has not had cancer. She reportedly carries a BRCA1 mutation  and had surgery to remove her fallopian tubes.     One maternal uncle is 68 and was diagnosed with papillary thyroid cancer at age 66.    One maternal aunt is 70 and was diagnosed with ovarian cancer at age 70; treatment has included surgery. She has also been diagnosed with medullarly thyroid cancer. She reportedly carries a BRCA1 mutation.    One maternal aunt is 67 and was diagnosed with breast cancer at age 40; treatment included a lumpectomy. She was then diagnosed with a separate primary breast cancer later in her 40's; treatment included a mastectomy. She reportedly carries a BRCA1 mutation.    One maternal great aunt (grandmother's sister) was diagnosed with breast cancer at age 41, ovarian cancer at age 45, and passed away in her 40's. Her daughter was diagnosed with breast cancer at age 45 and reportedly carries a BRCA1 mutation.    One maternal great aunt (grandmother's sister) was diagnosed with breast cancer at age 43.    Noa reports that she has limited information regarding her paternal family history, but she knows of no relatives with cancer.    Her maternal ethnicity is [place]ian and possibly Ashkenazi Bahai. Her paternal ethnicity is . There is no reported consanguinity.    Discussion:    We very briefly reviewed the features of sporadic, familial, and hereditary cancers. Noa's maternal family history of cancer presents with multiple features consistent with a hereditary cancer syndrome, including several relatives in two generations diagnosed with related cancers under age 50. The identification of this BRCA1 mutation further supports this risk assessment. Of note, though there is limited information available regarding her paternal family history, the available information is not consistent with a hereditary cancer syndrome.    We discussed the natural history and genetics of BRCA1 mutations and Hereditary Breast and Ovarian Cancer (HBOC) syndrome. A detailed handout regarding  HBOC syndrome and the information we discussed was provided to Noa at the end of our appointment today and can be found in the after visit summary. Topics included: inheritance pattern, cancer risks, cancer screening recommendations, and also risks, benefits and limitations of testing.    We briefly reviewed the cancer risks associated with a BRCA1 mutation, as Noa expressed familiarity with this information:    50-85% lifetime risk of developing female breast cancer.     20-40% lifetime risk of developing ovarian cancer.     16-20% lifetime risk of developing prostate cancer.      1-2% lifetime risk of developing male breast cancer.     There may also be increased risks for pancreatic cancer and melanoma, as well as other cancers. Exact lifetime risks are not available at this time.    We also reviewed the cancer screening/management that is recommended for individuals who carry BRCA1 mutations, per current National Comprehensive Cancer Network (NCCN) guidelines:    Women:    Breast awareness starting at age 18    Clinical breast exams starting at age 25; repeated every 6-12 months    Annual breast MRI from age 25-29    Annual mammograms and breast MRI alternating every 6 months starting at age 30    Prophylactic mastectomy is a surgical option, which reduces breast cancer risk by 90%     Prophylactic removal of the ovaries and fallopian tubes is typically recommended between ages 35-40, which reduces ovarian cancer risk by 80%      Men:    Breast self-exam starting at age 35    Annual clinical breast exams starting at age 35    Consider annual prostate exams and PSA testing starting at age 45    Screening for pancreatic cancer is not offered on a routine basis, but may be considered based on family history. Screening for melanoma may also be considered.    Of note, some chemotherapies for certain cancers may be more effective in individuals with BRCA mutations.    Noa explained that she would be  interested in pursing a bilateral mastectomy in the near future. As such, we discussed having Noa meet with SOURAV Rios to discuss the screening and surgery recommendations associated with a BRCA1 mutation. A referral was placed.    We briefly reviewed the inheritance of this BRCA1 mutation. Each of her children have a 50% chance to carry the BRCA1 mutation; of note, her daughter is already known to carry the BRCA1 mutation. We reviewed that her sons, when over the age of 18, should consider meeting with a genetic counselor to discuss genetic testing. We also discussed the association between BRCA1 mutations and the rare autosomal recessive childhood condition Fanconi anemia (FA). For individuals of childbearing age with BRCA1 mutations, genetic counseling and genetic testing may be advised for their partners. Of note, Noa denied having any concerns regarding her children's health and the chance for FA at this time.     We then discussed the importance of obtaining a copy of Noa's genetic testing report documenting the BRCA1 mutation. Given the timeline, it was likely Noa had her testing completed at [facility]. As such, Noa signed a release of information form allowing me to request her test report from [facility].    Noa had several questions regarding any additional genetic testing that may be indicated for her. We discussed that it will be important to obtain a copy of her mother's test report, as well. This report would help us better understand the extent of the testing her mother pursued, particularly as to whether she had testing that included other genes associated with hereditary breast and ovarian cancer, as well as medullarly thyroid cancer. This would allow us to feel more confident that Noa would only be expected to carry the BRCA1 mutation, given that her paternal family history is not currently suggestive of a hereditary cancer syndrome. Noa verbalized  understanding.    We briefly reviewed that in the event we are not able to find a copy of her prior test report at [facility], we can readdress testing her again for the known BRCA1 mutation identified in her mother and daughter. Noa verbalized agreement with this plan.    Plan:  1) Today we reviewed the cancer risks and screening recommendations associated with a BRCA1 mutation.  2) A referral was made for Noa to meet with SOURAV Rios to discuss screening associated with a BRCA1 mutation, particularly high risk breast screening and prophylactic bilateral mastectomy.  3) Noa signed a release form allowing me to collect her test report at [facility]; she was also provided a release of information form to give to her mother so that I can access her test report.  4) Noa will be contacted once we collect these records, in the event any additional genetic testing is indicated. She was provided my contact information.    Face to face time: 45 minutes    Roseann Ridley MS, [facility]  Licensed Genetic Counselor  Office: [phone]  Pager: [phone]

## 2018-01-23 NOTE — LETTER
Cancer Risk Management  Program Locations    [facility]  [facility]  [facility]  [facility]  Mailing Address  Cancer Risk Management Program  [facility]  [address]  [facility] [address]  [place] [address]    New patient appointments  [phone]  January 24, 2018    Noa Mcgill  [address]  [facility] [address]      Dear Noa,    It was a pleasure meeting with you at the [facility] on January 23, 2018. Here is a copy of the progress note from your recent genetic counseling visit to the Cancer Risk Management Program. If you have any additional questions, please feel free to call.    1/23/2018    Referring Provider: self-referred    Presenting Information:   I met with Onashonna Mcgill today for genetic counseling at the Cancer Risk Management Program at the [facility] to discuss her personal and family history of a known BRCA1 mutation. She is here today to review this history, cancer screening recommendations, and any additional genetic testing options.    Personal History:  Noa is a 42 year old female. She does not have any personal history of cancer.      She had her first menstrual period at age 15 and her first child at age 19. Noa pursued prophylactic surgery to remove her uterus, ovaries, and fallopian tubes at age 40. She reports using oral contraceptive pills for approximately 20 years and that she has used hormone replacement therapy for approximately two years.      Her most recent OB-GYN exam and Pap smear in January 2016 were normal. She has annual clinical breast exams and mammograms; her most recent mammogram in 2017 was normal. She has not had a breast MRI. She has not had a colonoscopy and she does not regularly do any other cancer screening at this time. Noa reported no current tobacco use and  occasional alcohol use.    Of note, Noa reported that she pursued genetic testing approximately 17 years ago that identified a BRCA1 mutation. Noa stated that she does not have a copy of the report and can not recall the laboratory that completed her testing.    Family History: (Please see scanned pedigree for detailed family history information)    Noa has one daughter and two sons ranging in age from 4-23 years. Her daughter is 23 and reportedly carries a BRCA1 mutation.    Noa's mother is 61 and does not have a history of cancer. She reportedly carries a BRCA1 mutation and recently pursued prophylactic surgery to remove her breasts, ovaries, and uterus.    Noa's maternal half sister is 35 and has not had cancer. She reportedly carries a BRCA1 mutation and had surgery to remove her fallopian tubes.     One maternal uncle is 68 and was diagnosed with papillary thyroid cancer at age 66.    One maternal aunt is 70 and was diagnosed with ovarian cancer at age 70; treatment has included surgery. She has also been diagnosed with medullarly thyroid cancer. She reportedly carries a BRCA1 mutation.    One maternal aunt is 67 and was diagnosed with breast cancer at age 40; treatment included a lumpectomy. She was then diagnosed with a separate primary breast cancer later in her 40's; treatment included a mastectomy. She reportedly carries a BRCA1 mutation.    One maternal great aunt (grandmother's sister) was diagnosed with breast cancer at age 41, ovarian cancer at age 45, and passed away in her 40's. Her daughter was diagnosed with breast cancer at age 45 and reportedly carries a BRCA1 mutation.    One maternal great aunt (grandmother's sister) was diagnosed with breast cancer at age 43.    Noa reports that she has limited information regarding her paternal family history, but she knows of no relatives with cancer.    Her maternal ethnicity is Slov[place]ian and possibly Ashkenazi Islam. Her paternal  ethnicity is . There is no reported consanguinity.    Discussion:    We very briefly reviewed the features of sporadic, familial, and hereditary cancers. Noa's maternal family history of cancer presents with multiple features consistent with a hereditary cancer syndrome, including several relatives in two generations diagnosed with related cancers under age 50. The identification of this BRCA1 mutation further supports this risk assessment. Of note, though there is limited information available regarding her paternal family history, the available information is not consistent with a hereditary cancer syndrome.    We discussed the natural history and genetics of BRCA1 mutations and Hereditary Breast and Ovarian Cancer (HBOC) syndrome. A detailed handout regarding HBOC syndrome and the information we discussed was provided to Noa at the end of our appointment today and can be found in the after visit summary. Topics included: inheritance pattern, cancer risks, cancer screening recommendations, and also risks, benefits and limitations of testing.    We briefly reviewed the cancer risks associated with a BRCA1 mutation, as Noa expressed familiarity with this information:    50-85% lifetime risk of developing female breast cancer.     20-40% lifetime risk of developing ovarian cancer.     16-20% lifetime risk of developing prostate cancer.      1-2% lifetime risk of developing male breast cancer.     There may also be increased risks for pancreatic cancer and melanoma, as well as other cancers. Exact lifetime risks are not available at this time.    We also reviewed the cancer screening/management that is recommended for individuals who carry BRCA1 mutations, per current National Comprehensive Cancer Network (NCCN) guidelines:    Women:    Breast awareness starting at age 18    Clinical breast exams starting at age 25; repeated every 6-12 months    Annual breast MRI from age 25-29    Annual mammograms  and breast MRI alternating every 6 months starting at age 30    Prophylactic mastectomy is a surgical option, which reduces breast cancer risk by 90%     Prophylactic removal of the ovaries and fallopian tubes is typically recommended between ages 35-40, which reduces ovarian cancer risk by 80%      Men:    Breast self-exam starting at age 35    Annual clinical breast exams starting at age 35    Consider annual prostate exams and PSA testing starting at age 45    Screening for pancreatic cancer is not offered on a routine basis, but may be considered based on family history. Screening for melanoma may also be considered.    Of note, some chemotherapies for certain cancers may be more effective in individuals with BRCA mutations.    Noa explained that she would be interested in pursing a bilateral mastectomy in the near future. As such, we discussed having Noa meet with SOURAV Rios to discuss the screening and surgery recommendations associated with a BRCA1 mutation. A referral was placed.    We briefly reviewed the inheritance of this BRCA1 mutation. Each of her children have a 50% chance to carry the BRCA1 mutation; of note, her daughter is already known to carry the BRCA1 mutation. We reviewed that her sons, when over the age of 18, should consider meeting with a genetic counselor to discuss genetic testing. We also discussed the association between BRCA1 mutations and the rare autosomal recessive childhood condition Fanconi anemia (FA). For individuals of childbearing age with BRCA1 mutations, genetic counseling and genetic testing may be advised for their partners. Of note, Noa denied having any concerns regarding her children's health and the chance for FA at this time.     We then discussed the importance of obtaining a copy of Noa's genetic testing report documenting the BRCA1 mutation. Given the timeline, it was likely Noa had her testing completed at [facility]. As such,  Noa signed a release of information form allowing me to request her test report from [facility].    Noa had several questions regarding any additional genetic testing that may be indicated for her. We discussed that it will be important to obtain a copy of her mother's test report, as well. This report would help us better understand the extent of the testing her mother pursued, particularly as to whether she had testing that included other genes associated with hereditary breast and ovarian cancer, as well as medullarly thyroid cancer. This would allow us to feel more confident that Noa would only be expected to carry the BRCA1 mutation, given that her paternal family history is not currently suggestive of a hereditary cancer syndrome. Noa verbalized understanding.    We briefly reviewed that in the event we are not able to find a copy of her prior test report at [facility], we can readdress testing her again for the known BRCA1 mutation identified in her mother and daughter. Noa verbalized agreement with this plan.    Plan:  1) Today we reviewed the cancer risks and screening recommendations associated with a BRCA1 mutation.  2) A referral was made for Noa to meet with SOURAV Rios to discuss screening associated with a BRCA1 mutation, particularly high risk breast screening and prophylactic bilateral mastectomy.  3) Noa signed a release form allowing me to collect her test report at [facility]; she was also provided a release of information form to give to her mother so that I can access her test report.  4) Noa will be contacted once we collect these records, in the event any additional genetic testing is indicated. She was provided my contact information.    Face to face time: 45 minutes    Roseann Ridley MS, [facility]  Licensed Genetic Counselor  Office: [phone]  Pager: [phone]

## 2018-01-23 NOTE — MR AVS SNAPSHOT
After Visit Summary   1/23/2018    Noa Mcgill    MRN: [id number]           Patient Information     Date Of Birth          1975        Visit Information        Provider Department      1/23/2018 12:00 PM Roseann Ridley GC;  [address] [facility] [facility]        Care Instructions        Assessing Cancer Risk  Only about 5-10% of cancers are thought to be due to an inherited cancer susceptibility gene.    These families often have:    Several people with the same or related types of cancer    Cancers diagnosed at a young age (before age 50)    Individuals with more than one primary cancer    Multiple generations of the family affected with cancer    BRCA1 and BRCA2 Genes  We each inherit two copies of every gene in our bodies: one from our mother, and one from our father.  Each gene has a specific job to do.  When a gene has a mistake or  mutation  in it, it does not work like it should.  Everyone has two copies of BRCA1 and two copies of BRCA2.  A single mutation in one of the copies of BRCA1 or BRCA2 increases the risk for breast and ovarian cancer, among others.  The risk for pancreatic cancer and melanoma may also be slightly increased in some families.  The tables below list the chance that someone with a BRCA mutation would develop cancer in his or her lifetime1,2,3,4.      Women   Men    General Population BRCA +   General Population BRCA +   Breast 12% 40-85%  Breast <1% ~7%   Ovarian 1-2% 10-40%  Prostate 16% 20%     Inheriting a mutation does not mean a person will develop cancer, but it does significantly increase his or her risk above the general population risk.    A person s ethnic background is also important to consider, as individuals of Ashkenazi Synagogue ancestry have a higher chance of having a BRCA gene mutation.  There are three BRCA mutations that occur more frequently in this population.     Genetic Testing  Genetic testing involves a simple  blood test and will look at the genetic information in the BRCA1 and BRCA2 genes for any harmful mutations that are associated with increased cancer risk.  If possible, it is recommended that the person(s) who has had cancer be tested before other family members.  That person will give us the most useful information about whether or not a specific gene is associated with the cancer in the family.     Results  There are three possible results of BRCA1 and BRCA2 genetic testing:    Positive--a harmful mutation was identified    Negative--no mutation was identified    Variant of unknown significance--a variation in one of the genes was identified, but it is unclear how this impacts cancer risk in the family  Advantages and Disadvantages  There are advantages and disadvantages to genetic testing of these genes.    Advantages    May clarify your cancer risk    Can help you make medical decisions    May explain the cancers in your family    May give useful information to your family members (if you share your results)    Disadvantages    Possible negative emotional impact of learning about inherited cancer risk    Uncertainty in interpreting a negative test result in some situations    Possible genetic discrimination concerns (see below)    Inheritance   BRCA1 and BRCA2 mutations are inherited in an autosomal dominant pattern.  This means that if a parent has a mutation, each of his or her children will have a 50% chance of inheriting that same mutation.  Therefore, each child--male or female--would have a 50% chance of being at increased risk for developing cancer.                                              Image obtained from Genetics Home Reference, 2013     Genetic Information Nondiscrimination Act (DYAN)  DYAN is a federal law that protects individuals from health insurance or employment discrimination based on a genetic test result alone.  Although rare, there are currently no legal protections in terms of life  insurance, long term care, or disability insurances.  Visit the National Human Genome Research [place] at Genome.gov/88211332 to learn more.    Reducing Cancer Risk  Current screening recommendations for women with a BRCA mutation include1:    Breast:  o Breast awareness starting at age 18  o Clinical breast exams starting at age 25; repeated every 6-12 months  o Annual breast MRI starting at age 25 (or possibly younger)  o Annual mammogram and breast MRI at age 30 (for women with and without a breast cancer history)  o Consideration of preventative mastectomy    Ovarian:   o Consideration of transvaginal ultrasound and CA-125 blood test starting at age 30 (or possibly younger); repeated every 6 months  o Recommendation for surgery to remove the ovaries and fallopian tubes after child bearing or by age 35-40     Current screening recommendations for men with a BRCA mutation include1:    Breast:  o Self-breast exams starting at age 35  o Annual clinical breast exams starting at age 35    Prostate:   o Recommend prostate cancer screening starting at age 45 for BRCA2 carriers  o Consider prostate cancer screening starting at age 45 for BRCA1 carriers    Both men and women with BRCA mutations should talk to their doctor or genetic counselor about screening for pancreatic cancer and melanoma.  In addition, the age at which to start screening may be modified based on family history of young cancer.    Questions to Think About Regarding Genetic Testing    What effect will the test result have on me and my relationship with my family members if I have an inherited gene mutation?  If I don t have a gene mutation?    Should I share my test results, and how will my family react to this news, which may also affect them?    Are my children ready to learn new information that may one day affect their own health?    Resources  FORCE: Facing Our Risk of Cancer Empowered facingMary Bird Perkins Cancer Centerrisk.org   Bright Pink bebrightpink.org   American  Cancer Society (ACS) cancer.org   National Cancer [place] (NCI) cancer.gov     Please call us if you have any questions or concerns.     Cancer Risk Management Program [phone] ([phone]  ? Tanja Aceves, MS, [facility] [place] [place]  [phone]  ? Bridget Squires, MS [facility] [place] [place]          [phone]  ? Roseann Ridley, MS, [facility] [place] [place]  [phone]  ? Dorys De Santiago, MS, [facility] [place] [place]  [phone]  ? Kristen Garcia, MS, [facility] [place] [place]  [phone]    References:  1. National Comprehensive Cancer Network. Clinical practice guidelines in oncology, colorectal cancer screening. Available online (registration required). 2013.    Resources for Hereditary Breast and Ovarian Cancer  Bright Pink    www.[employer].org  Bright Pink is the only national non-profit organization focused on prevention and early detection of breast and ovarian cancer in young women.  Bright Pink aims to reach the 52 million young women in the [place] between the ages of 18 and 45 with innovative, life-saving breast and ovarian health programs, thereby empowering this and future generations of women to live healthier, happier, and longer lives.  FORCE: Facing Our Risk of Cancer Empowered  www.[employer] Research.org  FORCE s mission is to improve the lives of individuals and families affected by hereditary breast, ovarian, and related cancers by creating awareness, supplying information and support to the community, advocating for and supporting research, and working with the research and medical communities.     Helpline: 1-102.351.2102    Message boards    Peer Navigation and local support groups  Talking About BRCA in Your Family Tree  http://www.facing[employer]araire [employer].org/understanding-brca-and-hboc/publications/documents/ghjvmvn-xwatjyf-mudan-brca-family.pdf  MOCA: [place] Ovarian Cancer [place] http://mnovarian.org/  MOCA was started in 1999 by a small group of ovarian cancer survivors who came together to fund ovarian cancer research, raise awareness of the disease, and provide support to women  with ovarian cancer and their families.  Firefly Sisterhood   www.fireflysisterhood.org  Based in the [facility], the Firefly Sisterhood connects women diagnosed with breast cancer with trained breast cancer survivors to offer support, guidance and hope.  [facility] [place] www.manishsclubtwincities.org  [facility] [place] is a 501(c)3 nonprofit and the local affiliate of the Cancer Support Community, a network of more than 54 supportive, free and welcoming  clubhouses  where everyone living with cancer can come for social, emotional and psychological support. Clubs are healing environments where individuals learn from each other with guidance from licensed professionals.  BRCA Information Support Group  People with BRCA1 or BRAC2 mutations face complex emotional challenges and complicated medical decisions due to high cancer risks and their impact on individuals and families. This group brings people with a BRAC1 or BRAC2 genetic diagnosis together with others facing similar challenges. The group meets nine times per year. For information, please contact Phu@[employer] or call [phone].  Books:  The Breast Reconstruction Guidebook: Issues and Answers from Research to John, Adriana Duron  Confronting Hereditary Breast and Ovarian Cancer: Identify Your Risk, Understand Your Options, Change Your [place], Galina Silva          Follow-ups after your visit        Your next 10 appointments already scheduled     Jan 23, 2018  1:15 PM [facility]   [facility] Lab Draw with  [facility] LAB DRAW   [facility] ([facility])    [address]  [address]  [facility] [address] [phone]              Who to contact     If you have questions or need follow up information about today's clinic visit or your schedule please contact [facility] directly at [phone].  Normal or non-critical lab and imaging results will be communicated  to you by YouScanhart, letter or phone within 4 business days after the clinic has received the results. If you do not hear from us within 7 days, please contact the clinic through [employer] or phone. If you have a critical or abnormal lab result, we will notify you by phone as soon as possible.  Submit refill requests through [employer] or call your pharmacy and they will forward the refill request to us. Please allow 3 business days for your refill to be completed.          Additional Information About Your Visit        YouScanharLolay Information     [employer] gives you secure access to your electronic health record. If you see a primary care provider, you can also send messages to your care team and make appointments. If you have questions, please call your primary care clinic.  If you do not have a primary care provider, please call [phone] and they will assist you.        Care EveryWhere ID     This is your Care EveryWhere ID. This could be used by other organizations to access your [facility] medical records  [id number]         Blood Pressure from Last 3 Encounters:   11/06/17 130/85   09/27/17 112/80   06/07/17 119/78    Weight from Last 3 Encounters:   11/06/17 89.8 kg (198 lb)   09/27/17 89.6 kg (197 lb 9.6 oz)   06/07/17 90.4 kg (199 lb 4.8 oz)              Today, you had the following     No orders found for display       Primary Care Provider Office Phone # Fax #    Sydnie He [phone] [address] [facility] [address]        Equal Access to Services     TIERNEY [facility] Hospital[facility] : Hadii aad ku hadasho Soomaali, waaxda luqadaha, qaybta kaalmada adeegyada, lisa rivera . So [facility] [phone].    ATENCIÓN: Si habla español, tiene a horton disposición servicios gratuitos de asistencia lingüística. Llame al [phone].    We comply with applicable federal civil rights laws and [place] laws. We do not discriminate on the basis of race, color, national origin, age,  disability, sex, sexual orientation, or gender identity.            Thank you!     Thank you for choosing [facility]  for your care. Our goal is always to provide you with excellent care. Hearing back from our patients is one way we can continue to improve our services. Please take a few minutes to complete the written survey that you may receive in the mail after your visit with us. Thank you!             Your Updated Medication List - Protect others around you: Learn how to safely use, store and throw away your medicines at www.disposemymeds.org.          This list is accurate as of: 1/23/18 12:47 PM.  Always use your most recent med list.                   Brand Name Dispense Instructions for use Diagnosis    ALPRAZolam 0.5 MG tablet    XANAX    10 tablet    1 tab 30 minutes prior to air travel    Phobia, flying       amphetamine-dextroamphetamine 10 MG per tablet    ADDERALL    90 tablet    Take 1 tablet (10 mg) by mouth in AM and take 2 tablets (20 mg) by mouth in PM    Attention deficit disorder (ADD) without hyperactivity       aspirin 81 MG tablet      Take 81 mg by mouth daily        butalbital-acetaminophen-caffeine -40 MG per tablet    FIORICET/ESGIC    30 tablet    Take 1 tablet by mouth every 4 hours as needed    Migraine without status migrainosus, not intractable, unspecified migraine type       DULoxetine 30 MG EC capsule    CYMBALTA     Take 30 mg by mouth daily        gabapentin 300 MG capsule    NEURONTIN     Take 2 capsules by mouth At Bedtime        NUVARING 0.12-0.015 MG/24HR vaginal ring   Generic drug:  etonogestrel-ethinyl estradiol      Place 1 each vaginally every 28 days        rizatriptan 10 MG tablet    MAXALT     TAKE 1 TABLET BY MOUTH ONCE, MAY REPEAT IN 2 HRS. MAX 30MG/24HRS        VITAMIN D PO      Take 2,000 Units by mouth daily.        zolpidem 10 MG tablet    AMBIEN    30 tablet    Take 1 tablet (10 mg) by mouth nightly as needed for sleep    Insomnia,  unspecified type

## 2018-01-23 NOTE — PATIENT INSTRUCTIONS
Assessing Cancer Risk  Only about 5-10% of cancers are thought to be due to an inherited cancer susceptibility gene.    These families often have:    Several people with the same or related types of cancer    Cancers diagnosed at a young age (before age 50)    Individuals with more than one primary cancer    Multiple generations of the family affected with cancer    BRCA1 and BRCA2 Genes  We each inherit two copies of every gene in our bodies: one from our mother, and one from our father.  Each gene has a specific job to do.  When a gene has a mistake or  mutation  in it, it does not work like it should.  Everyone has two copies of BRCA1 and two copies of BRCA2.  A single mutation in one of the copies of BRCA1 or BRCA2 increases the risk for breast and ovarian cancer, among others.  The risk for pancreatic cancer and melanoma may also be slightly increased in some families.  The tables below list the chance that someone with a BRCA mutation would develop cancer in his or her lifetime1,2,3,4.      Women   Men    General Population BRCA +   General Population BRCA +   Breast 12% 40-85%  Breast <1% ~7%   Ovarian 1-2% 10-40%  Prostate 16% 20%     Inheriting a mutation does not mean a person will develop cancer, but it does significantly increase his or her risk above the general population risk.    A person s ethnic background is also important to consider, as individuals of Ashkenazi [employer]regational ancestry have a higher chance of having a BRCA gene mutation.  There are three BRCA mutations that occur more frequently in this population.     Genetic Testing  Genetic testing involves a simple blood test and will look at the genetic information in the BRCA1 and BRCA2 genes for any harmful mutations that are associated with increased cancer risk.  If possible, it is recommended that the person(s) who has had cancer be tested before other family members.  That person will give us the most useful information about whether or not  a specific gene is associated with the cancer in the family.     Results  There are three possible results of BRCA1 and BRCA2 genetic testing:    Positive--a harmful mutation was identified    Negative--no mutation was identified    Variant of unknown significance--a variation in one of the genes was identified, but it is unclear how this impacts cancer risk in the family  Advantages and Disadvantages  There are advantages and disadvantages to genetic testing of these genes.    Advantages    May clarify your cancer risk    Can help you make medical decisions    May explain the cancers in your family    May give useful information to your family members (if you share your results)    Disadvantages    Possible negative emotional impact of learning about inherited cancer risk    Uncertainty in interpreting a negative test result in some situations    Possible genetic discrimination concerns (see below)    Inheritance   BRCA1 and BRCA2 mutations are inherited in an autosomal dominant pattern.  This means that if a parent has a mutation, each of his or her children will have a 50% chance of inheriting that same mutation.  Therefore, each child--male or female--would have a 50% chance of being at increased risk for developing cancer.                                              Image obtained from Genetics Home Reference, 2013     Genetic Information Nondiscrimination Act (DYAN)  DYAN is a federal law that protects individuals from health insurance or employment discrimination based on a genetic test result alone.  Although rare, there are currently no legal protections in terms of life insurance, long term care, or disability insurances.  Visit the National Human Genome Research [place] at Genome.gov/00837270 to learn more.    Reducing Cancer Risk  Current screening recommendations for women with a BRCA mutation include1:    Breast:  o Breast awareness starting at age 18  o Clinical breast exams starting at age 25;  repeated every 6-12 months  o Annual breast MRI starting at age 25 (or possibly younger)  o Annual mammogram and breast MRI at age 30 (for women with and without a breast cancer history)  o Consideration of preventative mastectomy    Ovarian:   o Consideration of transvaginal ultrasound and CA-125 blood test starting at age 30 (or possibly younger); repeated every 6 months  o Recommendation for surgery to remove the ovaries and fallopian tubes after child bearing or by age 35-40     Current screening recommendations for men with a BRCA mutation include1:    Breast:  o Self-breast exams starting at age 35  o Annual clinical breast exams starting at age 35    Prostate:   o Recommend prostate cancer screening starting at age 45 for BRCA2 carriers  o Consider prostate cancer screening starting at age 45 for BRCA1 carriers    Both men and women with BRCA mutations should talk to their doctor or genetic counselor about screening for pancreatic cancer and melanoma.  In addition, the age at which to start screening may be modified based on family history of young cancer.    Questions to Think About Regarding Genetic Testing    What effect will the test result have on me and my relationship with my family members if I have an inherited gene mutation?  If I don t have a gene mutation?    Should I share my test results, and how will my family react to this news, which may also affect them?    Are my children ready to learn new information that may one day affect their own health?    Resources  FORCE: Facing Our Risk of Cancer Empowered facingourrisk.org   Bright Pink bebrightpink.org   American Cancer Society (ACS) cancer.org   National Cancer [place] (NCI) cancer.gov     Please call us if you have any questions or concerns.     Cancer Risk Management Program [phone]  ? Tanja Aceves, MS, [facility] [place] [place]  [phone]  ? Bridget Squires, MS [facility] [place] [place]          [phone]  ? Roseann Ridley, MS, [facility] [place] [place]  [phone]  ? Dorys  MS Anyi, [facility] [place]  [phone]  ? Kristen Garcia MS, [facility] [place]  [phone]    References:  1. National Comprehensive Cancer Network. Clinical practice guidelines in oncology, colorectal cancer screening. Available online (registration required). 2013.    Resources for Hereditary Breast and Ovarian Cancer  Bright Pink    www.Lifeproof.org  Bright Pink is the only national non-profit organization focused on prevention and early detection of breast and ovarian cancer in young women.  Bright Pink aims to reach the 52 million young women in the [place] between the ages of 18 and 45 with innovative, life-saving breast and ovarian health programs, thereby empowering this and future generations of women to live healthier, happier, and longer lives.  FORCE: Facing Our Risk of Cancer Empowered  www.Forensic Logic.[employer]  FORCE s mission is to improve the lives of individuals and families affected by hereditary breast, ovarian, and related cancers by creating awareness, supplying information and support to the community, advocating for and supporting research, and working with the research and medical communities.     Helpline: 1-363.855.7671    Message boards    Peer Navigation and local support groups  Talking About BRCA in Your Family Tree  http://www.Forensic Logic.org/understanding-brca-and-hboc/publications/documents/nzgjypq-gccdmhc-uicie-brca-family.pdf  MOCA: [place] Ovarian Cancer [employer] http://mnovarian.org/  MOCA was started in 1999 by a small group of ovarian cancer survivors who came together to fund ovarian cancer research, raise awareness of the disease, and provide support to women with ovarian cancer and their families.  Firefly Sisterhood   www.fireflysisterhood.org  Based in the [facility], the Firefly Sisterhood connects women diagnosed with breast cancer with trained breast cancer survivors to offer support, guidance and hope.  [employer] [place] www.Talking Datadasclubtwincities.org  [employer] [facility] is  a 501(c)3 nonprofit and the local affiliate of the Cancer Support Community, a network of more than 54 supportive, free and welcoming  clubhouses  where everyone living with cancer can come for social, emotional and psychological support. Clubs are healing environments where individuals learn from each other with guidance from licensed professionals.  BRCA Information Support Group  People with BRCA1 or BRAC2 mutations face complex emotional challenges and complicated medical decisions due to high cancer risks and their impact on individuals and families. This group brings people with a BRAC1 or BRAC2 genetic diagnosis together with others facing similar challenges. The group meets nine times per year. For information, please contact [email or web address] or call [phone].  Books:  The Breast Reconstruction Guidebook: Issues and Answers from Research to Recovery, Adriana Duron  Confronting Hereditary Breast and Ovarian Cancer: Identify Your Risk, Understand Your Options, Change Your [place], Galina Silva

## 2018-01-24 PROBLEM — Z15.01 BRCA1 GENE MUTATION POSITIVE: Status: ACTIVE | Noted: 2018-01-24

## 2018-01-24 PROBLEM — Z15.09 BRCA1 GENE MUTATION POSITIVE: Status: ACTIVE | Noted: 2018-01-24

## 2018-02-01 ENCOUNTER — TELEPHONE (OUTPATIENT)
Dept: ONCOLOGY | Facility: CLINIC | Age: 43
End: 2018-02-01

## 2018-02-01 NOTE — TELEPHONE ENCOUNTER
2/1/2018    I called Noa today to discuss the new information I received from [employer] and the release of information form I received from her mother, but was unable to reach her. I left a non-detailed voicemail with my name and phone number.    Roseann Ridley MS, formerly [facility]  Licensed Genetic Counselor  Office: [phone]  Pager: [phone]

## 2018-02-01 NOTE — TELEPHONE ENCOUNTER
"2/1/2018    Noa returned my phone call today to review the information we received from [employer] and her mother. Per [employer], \"our records indicate that any testing on the above named patient [Noa] has been cancelled or was never ordered.\" Noa could not recall if she was tested at another laboratory, through a research study, anonymously, etc. As such, we discussed that it would be reasonable to consider repeating her testing of the BRCA1 gene. She was in agreement with this plan.    We then discussed the information I received regarding her mother, as she signed a release of information form for me. Her mother pursued genetic testing through [facility] of 25 genes associated with hereditary breast, ovarian, and medullarly thyroid cancer. This testing identified only one mutation in the BRCA1 gene called [id number]; the accession # is [id number]. This means no genetic testing for Noa is indicated beyond the one BRCA1 mutation, as Noa does not have a paternal family history of cancer suggestive of hereditary cancer. Noa verbalized understanding.     Noa explained that she is scheduled to meet with SOURAV Rios on 2/8 at 10am to review screening associated with a BRCA1 mutation. We discussed having her meet with one of my colleagues, Tanja Aceves or Dorys De Santiago, on 2/8 to sign the final paperwork and have her blood drawn for this testing. She expressed interest in this option and requested that I leave her a detailed voicemail message regarding the new appointment time. She confirmed that she has my contact information and denied having any other questions at this time.    Roseann Ridley MS, [facility]  Licensed Genetic Counselor  Office: [phone]  Pager: [phone]    Addendum: I called Noa again and left a detailed voicemail explaining that I spoke with Tanja and Dorys and one of them will be able to meet with her briefly after the 10am appointment with " Stacia to coordinate the paperwork and blood draw. I encouraged her to contact me if she has any questions in the meantime.

## 2018-02-08 ENCOUNTER — ONCOLOGY VISIT (OUTPATIENT)
Dept: ONCOLOGY | Facility: CLINIC | Age: 43
End: 2018-02-08
Attending: CLINICAL NURSE SPECIALIST
Payer: COMMERCIAL

## 2018-02-08 ENCOUNTER — ONCOLOGY VISIT (OUTPATIENT)
Dept: ONCOLOGY | Facility: CLINIC | Age: 43
End: 2018-02-08
Attending: GENETIC COUNSELOR, MS
Payer: COMMERCIAL

## 2018-02-08 VITALS
RESPIRATION RATE: 12 BRPM | DIASTOLIC BLOOD PRESSURE: 78 MMHG | BODY MASS INDEX: 33.38 KG/M2 | HEART RATE: 90 BPM | WEIGHT: 206.8 LBS | SYSTOLIC BLOOD PRESSURE: 112 MMHG | TEMPERATURE: 98.2 F

## 2018-02-08 DIAGNOSIS — F41.9 ANXIETY: ICD-10-CM

## 2018-02-08 DIAGNOSIS — Z80.3 FAMILY HISTORY OF MALIGNANT NEOPLASM OF BREAST: Primary | ICD-10-CM

## 2018-02-08 DIAGNOSIS — Z84.81 FAMILY HISTORY OF BRCA1 GENE POSITIVE: Primary | ICD-10-CM

## 2018-02-08 DIAGNOSIS — Z12.39 ENCOUNTER FOR BREAST CANCER SCREENING OTHER THAN MAMMOGRAM: ICD-10-CM

## 2018-02-08 DIAGNOSIS — Z12.31 ENCOUNTER FOR SCREENING MAMMOGRAM FOR HIGH-RISK PATIENT: ICD-10-CM

## 2018-02-08 LAB — MISCELLANEOUS TEST: NORMAL

## 2018-02-08 PROCEDURE — 99215 OFFICE O/P EST HI 40 MIN: CPT | Performed by: CLINICAL NURSE SPECIALIST

## 2018-02-08 PROCEDURE — 40000072 ZZH STATISTIC GENETIC COUNSELING, < 16 MIN: Performed by: GENETIC COUNSELOR, MS

## 2018-02-08 RX ORDER — BUPROPION HYDROCHLORIDE 150 MG/1
150 TABLET ORAL EVERY MORNING
COMMUNITY
End: 2018-05-21

## 2018-02-08 RX ORDER — FLUCONAZOLE 150 MG/1
TABLET ORAL
Refills: 1 | COMMUNITY
Start: 2018-02-01 | End: 2018-05-21

## 2018-02-08 RX ORDER — AMOXICILLIN 500 MG/1
CAPSULE ORAL
Refills: 0 | COMMUNITY
Start: 2018-02-01 | End: 2018-05-21

## 2018-02-08 RX ORDER — LORAZEPAM 1 MG/1
0.5-1 TABLET ORAL EVERY 8 HOURS PRN
Qty: 2 TABLET | Refills: 0 | Status: SHIPPED | OUTPATIENT
Start: 2018-02-08 | End: 2018-05-21

## 2018-02-08 ASSESSMENT — PAIN SCALES - GENERAL: PAINLEVEL: NO PAIN (0)

## 2018-02-08 NOTE — PROGRESS NOTES
Oncology Risk Management Consultation:  Date on this visit: 2/8/2018    Noa Mcgill  is referred by.Roseann Ridley, Licensed Genetic Counselor,  for an oncology risk management consultation and genetic testing. She requires evaluation for her risk of cancer secondary to having a family history of breast and ovarian cancer.    Primary Physician: Sydnie He MD    History Of Present Illness:  Ms. Mcgill is a very pleasant, healthy 42 year old female who presents with family history of breast and ovarian cancer.    Genetic Testing:  Single site testing for BRCA1 (known familial mutation in mother and daughter) pending, sent to [facility] lab today. She gave consent to Tanja Aceves [facility], during the course of the visit and her blood was drawn and sent to [facility] lab. See Tanja Aceves' note for details.   Reportedly, Noa had genetic testing 17 years ago and tested BRCA1+ but information is not available (see Roseann Ridley's note for details.)    Pertinent history:  Menarche at 15.  First child at 19.  Prophylactic THBSO at age 40, pathology benign.  Hx of OCPs x20 years.  Hx of HRT with Climara patches for a short time, currently using Nuvaring.  Hx of breastfeeding 2 years and 3 months (total)  No hx of breast biopsies.  No hx of dysplasia, atypia or malignancy.    Pertinent screening history:  8/7/2009 - Bilateral Diagnostic mammogram for palpable lump in R breast (palpable by MD) in lower outer quadrant and R breast US, normal.  2/1/2016 - Screening tomosynthesis mammogram, BiRads1.  3/21/2017 - Screening tomosynthesis mammogram, BiRads1.  No hx of breast MRI.    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 She states she has not noticed any issues with lumps, changes in symmetry, skin, color, nipples of her breasts, pain or nipple discharge.    Past Medical/Surgical History:  Past Medical History:    Diagnosis Date     Allergic rhinitis, cause unspecified      ASD (atrial septal defect) 2/15/13    ASD dx by echo, recheck and repair after done having children     Attention deficit disorder without mention of hyperactivity 2014     BRCA1 gene mutation positive 2018    Reported by patient, no report available at this time Records requested from [employer] [facility]     Endometriosis of uterus      HERPES SIMPLEX NOS 2007    cold sores on remicade     Insomnia, unspecified 2/10/2005     Migraine headaches      Other chronic sinusitis      Psoriatic arthropathy (H) 2007     Recurrent UTI      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     Past Surgical History:   Procedure Laterality Date     APPENDECTOMY  2006      SECTION  2013    Procedure:  SECTION;  Primary  Section;  Surgeon: Chad Hughes MD;  Location: [facility] L+D     DILATION AND CURETTAGE SUCTION N/A 2014    Procedure: DILATION AND CURETTAGE SUCTION;  Surgeon: Srini Martinez MD;  Location: [facility] OR     DILATION AND CURETTAGE SUCTION N/A 2014    Procedure: DILATION AND CURETTAGE SUCTION;  Surgeon: Connor Kang MD;  Location: [facility] OR     HC DILATION/CURETTAGE DIAG/THER NON OB       HC DILATION/CURETTAGE DIAG/THER NON OB       HYSTERECTOMY, CARLOS  2016    [facility]     SURGICAL HISTORY OF -       left thumb fusion due to arthritis       Allergies:  Allergies as of 2018 - Don as Reviewed 2018   Allergen Reaction Noted     Compazine [prochlorperazine]  10/21/2010     Enbrel Hives 01/10/2008     Humira [adalimumab]  10/21/2010     Prednisone  2006       Current Medications:  Current Outpatient Prescriptions   Medication Sig Dispense Refill     buPROPion (WELLBUTRIN XL) 150 MG 24 hr tablet Take 150 mg by mouth every morning       LORazepam (ATIVAN) 1 MG tablet Take 0.5-1 tablets (0.5-1 mg) by mouth  every 8 hours as needed for anxiety or other (prior to breast MRI) Take 30 minutes prior to departure.  Do not operate a vehicle after taking this medication 2 tablet 0     gabapentin (NEURONTIN) 300 MG capsule Take 2 capsules by mouth At Bedtime       zolpidem (AMBIEN) 10 MG tablet Take 1 tablet (10 mg) by mouth nightly as needed for sleep 30 tablet 1     butalbital-acetaminophen-caffeine (FIORICET/ESGIC) -40 MG per tablet Take 1 tablet by mouth every 4 hours as needed 30 tablet 1     rizatriptan (MAXALT) 10 MG tablet TAKE 1 TABLET BY MOUTH ONCE, MAY REPEAT IN 2 HRS. MAX 30MG/24HRS  3     etonogestrel-ethinyl estradiol (NUVARING) 0.12-0.015 MG/24HR vaginal ring Place 1 each vaginally every 28 days       aspirin 81 MG tablet Take 81 mg by mouth daily       VITAMIN D PO Take 2,000 Units by mouth daily.       amoxicillin (AMOXIL) 500 MG capsule TAKE 2 CAPSULE BY MOUTH THREE TIMES A DAY AS DIRECTED  0     fluconazole (DIFLUCAN) 150 MG tablet TAKE 1 TABLET BY MOUTH FOR A SINGLE DOSE  1     amphetamine-dextroamphetamine (ADDERALL) 10 MG per tablet Take 1 tablet (10 mg) by mouth in AM and take 2 tablets (20 mg) by mouth in PM (Patient not taking: Reported on 2/8/2018) 90 tablet 0     ALPRAZolam (XANAX) 0.5 MG tablet 1 tab 30 minutes prior to air travel (Patient not taking: Reported on 11/6/2017) 10 tablet 0        Family History:  Family History   Problem Relation Age of Onset     C.A.D. Maternal Grandmother      Hypertension Maternal Grandmother      Psychotic Disorder Mother      Depression, alcoholism     DIABETES Mother      Hereditary Breast and Ovarian Cancer Syndrome Mother      BRCA1+, no hx of cancer, s/p prophylactic surgery to remove breast tissue, ovaries, fallopian tubes     Hereditary Breast and Ovarian Cancer Syndrome Sister      matrnal half sister, BRCA1+     Hereditary Breast and Ovarian Cancer Syndrome Daughter 23     BRCA1+     Breast Cancer Maternal Aunt 40     lumpectomy, then 2nd primary  breast cancer later in 40s, treated with mastectomy     Hereditary Breast and Ovarian Cancer Syndrome Maternal Aunt      BRCA1+     Ovarian Cancer Maternal Aunt 70     surgry     Thyroid Cancer Maternal Aunt      medullary thyroid cancer     Hereditary Breast and Ovarian Cancer Syndrome Maternal Aunt      BRCA1+     Breast Cancer Other 41     maternal great aunt     Ovarian Cancer Other 45      in 40s     Thyroid Cancer Maternal Uncle 66     papillary thyroid cancer     Breast Cancer Cousin 45     Hereditary Breast and Ovarian Cancer Syndrome Cousin      BRCA1+     Breast Cancer Other 43     maternal great aunt       Social History:  Social History     Social History     Marital status:      Spouse name: Ciro     Number of children: 3     Years of education: 14     Occupational History     [employer]      [employer]     Social History Main Topics     Smoking status: Never Smoker     Smokeless tobacco: Never Used     Alcohol use No      Comment: none     Drug use: No     Sexual activity: Yes     Partners: Male     Birth control/ protection: Female Surgical     Other Topics Concern     Parent/Sibling W/ Cabg, Mi Or Angioplasty Before 65f 55m? No     Social History Narrative       Physical Exam:  /78 (BP Location: Left arm, Patient Position: Chair, Cuff Size: Adult Large)  Pulse 90  Temp 98.2  F (36.8  C) (Oral)  Resp 12  Wt 93.8 kg (206 lb 12.8 oz)  BMI 33.38 kg/m2    GENERAL APPEARANCE: healthy, alert and no distress     NECK: Mild adenopathy bilaterally at base of neck, no nodules palpated, no asymmetry or masses     RESP: lungs clear to auscultation - no rales, rhonchi or wheezes    BREAST: A multi positional, bilateral breast exam was performed.  Very symmetrical full breasts. Right breast: no palpable dominant masses, no nipple discharge, no skin changes. Soft to fibrocystic tissue.  Right axilla: no palpable adenopathy. Left breast: no palpable dominant masses, no  nipple discharge, no skin changes. Left axilla: no palpable adenopathy. Soft tissue.    LYMPHATICS: No cervical, supraclavicular or  Axillary lymphadenopathy     CARDIOVASCULAR: regular rate and rhythm, normal S1 S2, no S3 or S4 and no murmur.        SKIN: no suspicious lesions or rashes on anterior or posterior torso, upper extremities or face.    Laboratory/Imaging Studies  Results for orders placed or performed during the hospital encounter of 03/21/17   MA Screen Bilateral w/Alvin    Narrative    SCREENING MAMMOGRAM, BILATERAL, DIGITAL w/CAD w/TOMOSYNTHESIS -  3/21/2017 7:58 AM.    BREAST SYMPTOMS: No current breast complaints.     COMPARISON:  02/01/2016, 08/07/2009.    BREAST DENSITY: Scattered fibroglandular densities.    COMMENTS: No findings of suspicion for malignancy.       Impression    IMPRESSION: BI-RADS CATEGORY: 1 -  NEGATIVE.    RECOMMENDED FOLLOW-UP: Annual Mammography    Exam results letter mailed to patient.    KAT CORTÉS MD       ASSESSMENT  We discussed the differences between cancer risk for the general population and those with BRCA mutations. Women with BRCA mutations have a 40-80% lifetime risk of breast cancer, compared to the general population risk of 12%. Those with a BRCA mutation also bear a 10-40% lifetime risk of ovarian cancer, compared to the 1-2% lifetime risk within the general population.      We also discussed that inheriting a mutation does not mean that a person will develop cancer, but rather that they are at increased risk.     We also discussed the risks and benefits of risk reducing mastectomy, which could decrease her risk of breast cancer by 90%.   She has already reduced her risk by up to 50% by removing her ovaries at age 40 as well as breastfeeding for more than 2 years, which could have reduced her risk by up to 25%.     Noa is considering when to try to have a prophylactic mastectomy. This may be an option for her as early as May, but the timing depends on  her school schedule vs. Recovery schedule.  We discussed that she may benefit from going to the [employer] network meetings and she was given the contact information for the group; I would also recommend The Breast Reconstruction Guidebook by Adriana Duron to her.  She is looking for a breast surgeon, possibly in the [facility] area.  I will consult with Dr. Fiasal Nascimento and get back to her with recommendations.    We also discussed following  a healthy lifestyle plan recommended by both NCCN and the American Cancer Society that can reduce the risk of  cancer:  1 Limit alcohol consumption to less than 1 drink per day (1 drink=5 oz.wine, 12 oz. Beer or 1.5 oz. 80-proof liquor). She is well within this guideline.  2. Exercise per American Cancer Society guidelines of at least 150 minutes of moderate-intensity activity or 75 minutes of vigorous activity each week. (Or a combination of both.) Exercise should be spread  out over the week. She does use her elliptical and walks her dog as much as she can; her issue now is juggling her school/work schedule and finding time to exercise. We discussed diet and exercise at length and she is currently taking a nutrition course in college.  3. Maintain a healthy weight with a Body Mass Index between 19-24.9. Her BMI is 33.3. We discussed that bringing that closer to 25, would be ideal in reducing her risk for breast and at least 10 other cancers.  4. Do not use tobacco products and limit exposure to passive smoke. She does not smoke.    INDIVIDUALIZED CANCER RISK MANAGEMENT PLAN:  Individualized Surveillance Plan for women  Hereditary Breast and/or Ovarian Cancer Syndrome   Per NCCN Guidelines Version 1.2018   Recommended screening Test or procedure Last done Next Scheduled    Breast self awareness- starting at age 18. Women should be familiar with their breasts and promptly report changes to their care provider. Periodic, consistent self exam may facilitate breast self  awareness.    2/8/2018   ongoing   Breast screening, starting at age 25 Clinical breast exams every 6 -12 months 2/8/2018 Discuss - if no surgery, will return to clinic in August 2018   Breast screening   Age 25-29 Annual breast MRI screening with contrast (preferred) or mammogram if MRI is unavailable or individualized based on family history if a breast cancer diagnosis before age 30 is present.       Breast MRI is performed preferably on day 7-15 of menstrual cycle for premenopausal women.   NA   NA   Breast screening   Age >30-75 years     Annual mammogram and   annual breast MRI, preferably on day 7-15 of menstrual cycle for premenopausal women.    Age>75 years, management should be considered on an individual basis. 3/21/2017 - Screening tomosynthesis mammogram, BiRads1    No history of breast MRI NEXT: Breast MRI soon    Next exam: if no mastectomy by then, return for an exam in Aug. Followed by tomosynthesis mammogram   Ovarian cancer screening, starting at age 30-35 Consider transvaginal ultrasound and  tests. NA - THBSO NA   Recommendation: risk-reducing salpingo-oophorectomy, typically between 35 and 40 years old and  upon completion of child bearing.     Because ovarian cancer onset in patients with BRCA2 mutations is on average of 8-10 years later than in patients with BRCA1 mutations, it is reasonable to delay risk-reducing salpingo-oophorectomy until 40-45 years in patients with BRCA2 mutations who have already maximized their breast cancer prevention (i.e., undergone bilateral mastectomy).    Salpingectomy alone is not the standard of care for risk reduction although clinical trials are ongoing.     Discuss option of risk-reducing mastectomy.    Consider risks and benefits of risk reducing agents, such as tamoxifen and raloxifene for breast and ovarian cancer.    Consider a full body skin and eye exam for melanoma screening for both BRCA1+ and BRCA2+.     NOTE: Women with BRCA mutation who are  treated for breast cancer should have screening of the remaining breast tissue with annual mammography and breast MRI.    The International Cancer of the Pancreas Screening (CAPS) Consortium recommends that individuals with a BRCA2 mutation who have at least one first-degree relative with pancreatic cancer should undergo initial screening with endoscopic ultrasonography and/or MRI/magnetic resonance cholangiopancreatography.     I will be happy to work with her to manage her cancer risk, will follow up on her screenings and see her in the Cancer Risk Management program, likely in six months unless she has a prophylactic mastectomy by then.    I spent 45 minutes with the patient with greater that 50% of it in counseling and coordinating care as documented above.      Stacia Barton, APRN-CNS, OCN, ANG-BC  Clinical Nurse Specialist  Cancer Risk Management Program  [address]  [place] [address]    phone:  [phone]  Pager: [phone]  fax: [phone]    Cc: MD Roseann Morales [facility]

## 2018-02-08 NOTE — LETTER
Cancer Risk Management  Program Locations    [facility]  [facility]  [facility]  [facility]  [facility]  Mailing Address  Cancer Risk Management Program  [facility]  [address]  [facility] [address]  [place] [address]    New patient appointments  [phone]  February 8, 2018    Noa Mcgill  [address]  [facility] [address]      Dear Noa,    It was a pleasure meeting with you today.  Below is a copy of my note from our visit, outlining your surveillance plan.      I look forward to seeing you in the future to coordinate your care and reduce your health risks. Please feel free to contact me if you have any questions or concerns.      Oncology Risk Management Consultation:  Date on this visit: 2/8/2018    Noa Mcgill  is referred by.Roseann Ridley, Licensed Genetic Counselor,  for an oncology risk management consultation and genetic testing. She requires evaluation for her risk of cancer secondary to having a family history of breast and ovarian cancer.    Primary Physician: Sydnie He MD    History Of Present Illness:  Ms. Mcgill is a very pleasant, healthy 42 year old female who presents with family history of breast and ovarian cancer.    Genetic Testing:  Single site testing for BRCA1 (known familial mutation in mother and daughter) pending, sent to [facility] lab today. She gave consent to Tanja Aceves [facility], during the course of the visit and her blood was drawn and sent to [facility] lab. See Tanja Aceves' note for details.   Reportedly, Noa had genetic testing 17 years ago and tested BRCA1+ but information is not available (see Roseann Ridley's note for details.)    Pertinent history:  Menarche at 15.  First child at 19.  Prophylactic THBSO at age 40, pathology benign.  Hx of OCPs x20 years.  Hx of HRT with Climara patches for a  short time, currently using Nuvaring.  Hx of breastfeeding 2 years and 3 months (total)  No hx of breast biopsies.  No hx of dysplasia, atypia or malignancy.    Pertinent screening history:  2009 - Bilateral Diagnostic mammogram for palpable lump in R breast (palpable by MD) in lower outer quadrant and R breast US, normal.  2016 - Screening tomosynthesis mammogram, BiRads1.  3/21/2017 - Screening tomosynthesis mammogram, BiRads1.  No hx of breast MRI.    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 She states she has not noticed any issues with lumps, changes in symmetry, skin, color, nipples of her breasts, pain or nipple discharge.    Past Medical/Surgical History:  Past Medical History:   Diagnosis Date     Allergic rhinitis, cause unspecified      ASD (atrial septal defect) 2/15/13    ASD dx by echo, recheck and repair after done having children     Attention deficit disorder without mention of hyperactivity 2014     BRCA1 gene mutation positive 2018    Reported by patient, no report available at this time Records requested from [facility]     Endometriosis of uterus      HERPES SIMPLEX NOS 2007    cold sores on remicade     Insomnia, unspecified 2/10/2005     Migraine headaches      Other chronic sinusitis      Psoriatic arthropathy (H) 2007     Recurrent UTI 2008     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     SURGICAL HISTORY OF -   2006    left thumb fusion due to arthritis       Allergies:  Allergies as of 02/08/2018 - Don as Reviewed 02/08/2018   Allergen Reaction Noted     Compazine [prochlorperazine]  10/21/2010     Enbrel Hives 01/10/2008     Humira [adalimumab]  10/21/2010     Prednisone  09/25/2006       Current Medications:  Current Outpatient Prescriptions   Medication Sig Dispense Refill     buPROPion (WELLBUTRIN XL) 150 MG 24 hr tablet Take 150 mg by mouth every morning       LORazepam (ATIVAN) 1 MG tablet Take 0.5-1 tablets (0.5-1 mg) by mouth every 8 hours as needed for anxiety or other (prior to breast MRI) Take 30 minutes prior to departure.  Do not operate a vehicle after taking this medication 2 tablet 0     gabapentin (NEURONTIN) 300 MG capsule Take 2 capsules by mouth At Bedtime       zolpidem (AMBIEN) 10 MG tablet Take 1 tablet (10 mg) by mouth nightly as needed for sleep 30 tablet 1     butalbital-acetaminophen-caffeine (FIORICET/ESGIC) -40 MG per tablet Take 1 tablet by mouth every 4 hours as needed 30 tablet 1     rizatriptan (MAXALT) 10 MG tablet TAKE 1 TABLET BY MOUTH ONCE, MAY REPEAT IN 2 HRS. MAX 30MG/24HRS  3     etonogestrel-ethinyl estradiol (NUVARING) 0.12-0.015 MG/24HR vaginal ring Place 1 each vaginally every 28 days       aspirin 81 MG tablet Take 81 mg by mouth daily       VITAMIN D PO Take 2,000 Units by mouth daily.       amoxicillin (AMOXIL) 500 MG capsule TAKE 2 CAPSULE BY MOUTH THREE TIMES A DAY AS DIRECTED  0     fluconazole (DIFLUCAN) 150 MG tablet TAKE 1 TABLET BY MOUTH FOR A SINGLE DOSE  1     amphetamine-dextroamphetamine (ADDERALL) 10 MG per tablet Take 1 tablet (10 mg) by mouth in AM and take 2 tablets (20 mg) by mouth in  PM (Patient not taking: Reported on 2018) 90 tablet 0     ALPRAZolam (XANAX) 0.5 MG tablet 1 tab 30 minutes prior to air travel (Patient not taking: Reported on 2017) 10 tablet 0        Family History:  Family History   Problem Relation Age of Onset     C.A.D. Maternal Grandmother      Hypertension Maternal Grandmother      Psychotic Disorder Mother      Depression, alcoholism     DIABETES Mother      Hereditary Breast and Ovarian Cancer Syndrome Mother      BRCA1+, no hx of cancer, s/p prophylactic surgery to remove breast tissue, ovaries, fallopian tubes     Hereditary Breast and Ovarian Cancer Syndrome Sister      matrnal half sister, BRCA1+     Hereditary Breast and Ovarian Cancer Syndrome Daughter 23     BRCA1+     Breast Cancer Maternal Aunt 40     lumpectomy, then 2nd primary breast cancer later in 40s, treated with mastectomy     Hereditary Breast and Ovarian Cancer Syndrome Maternal Aunt      BRCA1+     Ovarian Cancer Maternal Aunt 70     surgry     Thyroid Cancer Maternal Aunt      medullary thyroid cancer     Hereditary Breast and Ovarian Cancer Syndrome Maternal Aunt      BRCA1+     Breast Cancer Other 41     maternal great aunt     Ovarian Cancer Other 45      in 40s     Thyroid Cancer Maternal Uncle 66     papillary thyroid cancer     Breast Cancer Cousin 45     Hereditary Breast and Ovarian Cancer Syndrome Cousin      BRCA1+     Breast Cancer Other 43     maternal great aunt       Social History:  Social History     Social History     Marital status:      Spouse name: Ciro     Number of children: 3     Years of education: 14     Occupational History     [employer]     Social History Main Topics     Smoking status: Never Smoker     Smokeless tobacco: Never Used     Alcohol use No      Comment: none     Drug use: No     Sexual activity: Yes     Partners: Male     Birth control/ protection: Female Surgical     Other Topics Concern      Parent/Sibling W/ Cabg, Mi Or Angioplasty Before 65f 55m? No     Social History Narrative       Physical Exam:  /78 (BP Location: Left arm, Patient Position: Chair, Cuff Size: Adult Large)  Pulse 90  Temp 98.2  F (36.8  C) (Oral)  Resp 12  Wt 93.8 kg (206 lb 12.8 oz)  BMI 33.38 kg/m2    GENERAL APPEARANCE: healthy, alert and no distress     NECK: Mild adenopathy bilaterally at base of neck, no nodules palpated, no asymmetry or masses     RESP: lungs clear to auscultation - no rales, rhonchi or wheezes    BREAST: A multi positional, bilateral breast exam was performed.  Very symmetrical full breasts. Right breast: no palpable dominant masses, no nipple discharge, no skin changes. Soft to fibrocystic tissue.  Right axilla: no palpable adenopathy. Left breast: no palpable dominant masses, no nipple discharge, no skin changes. Left axilla: no palpable adenopathy. Soft tissue.    LYMPHATICS: No cervical, supraclavicular or  Axillary lymphadenopathy     CARDIOVASCULAR: regular rate and rhythm, normal S1 S2, no S3 or S4 and no murmur.        SKIN: no suspicious lesions or rashes on anterior or posterior torso, upper extremities or face.    Laboratory/Imaging Studies  Results for orders placed or performed during the hospital encounter of 03/21/17   MA Screen Bilateral w/Alvin    Narrative    SCREENING MAMMOGRAM, BILATERAL, DIGITAL w/CAD w/TOMOSYNTHESIS -  3/21/2017 7:58 AM.    BREAST SYMPTOMS: No current breast complaints.     COMPARISON:  02/01/2016, 08/07/2009.    BREAST DENSITY: Scattered fibroglandular densities.    COMMENTS: No findings of suspicion for malignancy.       Impression    IMPRESSION: BI-RADS CATEGORY: 1 -  NEGATIVE.    RECOMMENDED FOLLOW-UP: Annual Mammography    Exam results letter mailed to patient.    KAT CORTÉS MD       ASSESSMENT  We discussed the differences between cancer risk for the general population and those with BRCA mutations. Women with BRCA mutations have a 40-80% lifetime  risk of breast cancer, compared to the general population risk of 12%. Those with a BRCA mutation also bear a 10-40% lifetime risk of ovarian cancer, compared to the 1-2% lifetime risk within the general population.      We also discussed that inheriting a mutation does not mean that a person will develop cancer, but rather that they are at increased risk.     We also discussed the risks and benefits of risk reducing mastectomy, which could decrease her risk of breast cancer by 90%.   She has already reduced her risk by up to 50% by removing her ovaries at age 40 as well as breastfeeding for more than 2 years, which could have reduced her risk by up to 25%.     Noa is considering when to try to have a prophylactic mastectomy. This may be an option for her as early as May, but the timing depends on her school schedule vs. Recovery schedule.  We discussed that she may benefit from going to the [employer] network meetings and she was given the contact information for the group; I would also recommend The Breast Reconstruction Guidebook by Adriana Duron to her.  She is looking for a breast surgeon, possibly in the [facility] area.  I will consult with Dr. Faisal Nascimento and get back to her with recommendations.    We also discussed following  a healthy lifestyle plan recommended by both NCCN and the American Cancer Society that can reduce the risk of  cancer:  1 Limit alcohol consumption to less than 1 drink per day (1 drink=5 oz.wine, 12 oz. Beer or 1.5 oz. 80-proof liquor). She is well within this guideline.  2. Exercise per American Cancer Society guidelines of at least 150 minutes of moderate-intensity activity or 75 minutes of vigorous activity each week. (Or a combination of both.) Exercise should be spread  out over the week. She does use her elliptical and walks her dog as much as she can; her issue now is juggling her school/work schedule and finding time to exercise. We discussed diet and exercise at  length and she is currently taking a nutrition course in college.  3. Maintain a healthy weight with a Body Mass Index between 19-24.9. Her BMI is 33.3. We discussed that bringing that closer to 25, would be ideal in reducing her risk for breast and at least 10 other cancers.  4. Do not use tobacco products and limit exposure to passive smoke. She does not smoke.    INDIVIDUALIZED CANCER RISK MANAGEMENT PLAN:  Individualized Surveillance Plan for women  Hereditary Breast and/or Ovarian Cancer Syndrome   Per NCCN Guidelines Version 1.2018   Recommended screening Test or procedure Last done Next Scheduled    Breast self awareness- starting at age 18. Women should be familiar with their breasts and promptly report changes to their care provider. Periodic, consistent self exam may facilitate breast self awareness.    2/8/2018   ongoing   Breast screening, starting at age 25 Clinical breast exams every 6 -12 months 2/8/2018 Discuss - if no surgery, will return to clinic in August 2018   Breast screening   Age 25-29 Annual breast MRI screening with contrast (preferred) or mammogram if MRI is unavailable or individualized based on family history if a breast cancer diagnosis before age 30 is present.       Breast MRI is performed preferably on day 7-15 of menstrual cycle for premenopausal women.   NA   NA   Breast screening   Age >30-75 years     Annual mammogram and   annual breast MRI, preferably on day 7-15 of menstrual cycle for premenopausal women.    Age>75 years, management should be considered on an individual basis. 3/21/2017 - Screening tomosynthesis mammogram, BiRads1    No history of breast MRI NEXT: Breast MRI soon    Next exam: if no mastectomy by then, return for an exam in Aug. Followed by tomosynthesis mammogram   Ovarian cancer screening, starting at age 30-35 Consider transvaginal ultrasound and  tests. NA - THBSO NA   Recommendation: risk-reducing salpingo-oophorectomy, typically between 35 and 40  years old and  upon completion of child bearing.     Because ovarian cancer onset in patients with BRCA2 mutations is on average of 8-10 years later than in patients with BRCA1 mutations, it is reasonable to delay risk-reducing salpingo-oophorectomy until 40-45 years in patients with BRCA2 mutations who have already maximized their breast cancer prevention (i.e., undergone bilateral mastectomy).    Salpingectomy alone is not the standard of care for risk reduction although clinical trials are ongoing.     Discuss option of risk-reducing mastectomy.    Consider risks and benefits of risk reducing agents, such as tamoxifen and raloxifene for breast and ovarian cancer.    Consider a full body skin and eye exam for melanoma screening for both BRCA1+ and BRCA2+.     NOTE: Women with BRCA mutation who are treated for breast cancer should have screening of the remaining breast tissue with annual mammography and breast MRI.    The International Cancer of the Pancreas Screening (CAPS) Consortium recommends that individuals with a BRCA2 mutation who have at least one first-degree relative with pancreatic cancer should undergo initial screening with endoscopic ultrasonography and/or MRI/magnetic resonance cholangiopancreatography.     I will be happy to work with her to manage her cancer risk, will follow up on her screenings and see her in the Cancer Risk Management program, likely in six months unless she has a prophylactic mastectomy by then.    I spent 45 minutes with the patient with greater that 50% of it in counseling and coordinating care as documented above.      Stacia Barton, ZULEMA-CNS, OCN, ANG-BC  Clinical Nurse Specialist  Cancer Risk Management Program  [address]  [place] [address]    phone:  [phone]  Pager: [phone]  fax: [phone]    Cc: MD Roseann Morales, [facility]                          Oncology Rooming  "Note    February 8, 2018 10:14 AM   Noa Mcgill is a 42 year old female who presents for:    Chief Complaint   Patient presents with     Oncology Clinic Visit     Initial Vitals: /78 (BP Location: Left arm, Patient Position: Chair, Cuff Size: Adult Large)  Pulse 90  Temp 98.2  F (36.8  C) (Oral)  Resp 12  Wt 93.8 kg (206 lb 12.8 oz)  BMI 33.38 kg/m2 Estimated body mass index is 33.38 kg/(m^2) as calculated from the following:    Height as of 2/27/17: 1.676 m (5' 6\").    Weight as of this encounter: 93.8 kg (206 lb 12.8 oz). Body surface area is 2.09 meters squared.  No Pain (0) Comment: Data Unavailable   No LMP recorded.  Allergies reviewed: Yes  Medications reviewed: Yes    Medications: Medication refills not needed today.  Pharmacy name entered into [employer]:    [id number] - [place], [place] - [address]  [facility] [place], [place] - [address]  [facility] [address] [facility]   [facility] DRUG STORE [address] [facility] [address] AT [facility] & [address] DRUG STORE [address] [facility] [address] [facility]  AT [facility] [address] PHARMACY - [facility] [address]    Clinical concerns: None     5 minutes for nursing intake (face to face time)     Violette Gould CMA                                    "

## 2018-02-08 NOTE — LETTER
2/8/2018         RE: Noa Mcgill  [address] [facility] [address]        Dear Colleague,    Thank you for referring your patient, Noa Mcgill, to the CANCER RISK MANAGEMENT PROGRAM. Please see a copy of my visit note below.    2/11/2018    Referring Provider: Roseann Ridley MS, [facility]    Presenting Information:   Noa Mcgill came in to clinic for a follow-up visit today. She had previously been seen in the Cancer Risk Management Program at the [facility]  on 1/23/18 by Roseann Ridley, Genetic Counselor. Noa came to clinic today to have her blood drawn for the Specific Site Analysis of the BRCA1 gene which is  associated with hereditary breast and ovarian  cancer.    Discussion:    We previously reviewed the details of Noa's family and personal history. See note from previous appointment on 1/23/18 for details..     Consent was obtained and genetic testing for the single BRCA1 mutation in the family was sent to [facility].     The information from this test may determine cancer screening recommendations as well as options for risk reducing surgeries for Noa and family members.  These recommendations will be discussed in detail once genetic testing is completed.    Plan:  1. Noa signed a consent form at the conclusion of this visit and had her blood drawn for the single BRCA1 mutation in her family. The blood was sent to [facility].   2. Noa will receive these test results from  Roseann Ridley, Genetic Counselor.   3. Turn around time: 4 weeks.     Face to face time 2 minutes.    Tanja Aceves MS [facility]  Genetic Counselor  [phone]    Again, thank you for allowing me to participate in the care of your patient.        Sincerely,        Tanja Aceves GC

## 2018-02-08 NOTE — PROGRESS NOTES
"Oncology Rooming Note    February 8, 2018 10:14 AM   Noa Mcgill is a 42 year old female who presents for:    Chief Complaint   Patient presents with     Oncology Clinic Visit     Initial Vitals: /78 (BP Location: Left arm, Patient Position: Chair, Cuff Size: Adult Large)  Pulse 90  Temp 98.2  F (36.8  C) (Oral)  Resp 12  Wt 93.8 kg (206 lb 12.8 oz)  BMI 33.38 kg/m2 Estimated body mass index is 33.38 kg/(m^2) as calculated from the following:    Height as of 2/27/17: 1.676 m (5' 6\").    Weight as of this encounter: 93.8 kg (206 lb 12.8 oz). Body surface area is 2.09 meters squared.  No Pain (0) Comment: Data Unavailable   No LMP recorded.  Allergies reviewed: Yes  Medications reviewed: Yes    Medications: Medication refills not needed today.  Pharmacy name entered into [employer]:    [id number] - [place], [place] - [address]  [facility] [place], [place] - [address]  [facility] [address] [facility] [address] [place] [facility]UG STORE [address] [facility] [address] AT [facility] & [address] DRUG STORE [address] [facility] [address] [facility]  AT [facility] [address] PHARMACY - [facility] [address]    Clinical concerns: None     5 minutes for nursing intake (face to face time)     Violette Gould CMA              "

## 2018-02-08 NOTE — MR AVS SNAPSHOT
After Visit Summary   2/8/2018    Noa Mcgill    MRN: [id number]           Patient Information     Date Of Birth          1975        Visit Information        Provider Department      2/8/2018 10:00 AM Stacia Barton APRN [facility]        Today's Diagnoses     Family history of malignant neoplasm of breast    -  1    Encounter for breast cancer screening other than mammogram        Encounter for screening mammogram for high-risk patient        Anxiety          Care Instructions    Individualized Surveillance Plan for women  Hereditary Breast and/or Ovarian Cancer Syndrome   Per NCCN Guidelines Version 1.2018   Recommended screening Test or procedure Last done Next Scheduled    Breast self awareness- starting at age 18. Women should be familiar with their breasts and promptly report changes to their care provider. Periodic, consistent self exam may facilitate breast self awareness.    2/8/2018   ongoing   Breast screening, starting at age 25 Clinical breast exams every 6 -12 months 2/8/2018 August 2018   Breast screening   Age 25-29 Annual breast MRI screening with contrast (preferred) or mammogram if MRI is unavailable or individualized based on family history if a breast cancer diagnosis before age 30 is present.       Breast MRI is performed preferably on day 7-15 of menstrual cycle for premenopausal women.   NA   NA   Breast screening   Age >30-75 years     Annual mammogram and   annual breast MRI, preferably on day 7-15 of menstrual cycle for premenopausal women.    Age>75 years, management should be considered on an individual basis. 3/21/2017 - Screening tomosynthesis mammogram, BiRads1    No history of breast MRI NEXT: Breast MRI soon    Next exam: Aug. Followed by gustavo mammogram   Ovarian cancer screening, starting at age 30-35 Consider transvaginal ultrasound and  tests. NA - THBSO NA   Recommendation: risk-reducing salpingo-oophorectomy,  typically between 35 and 40 years old and  upon completion of child bearing.     Because ovarian cancer onset in patients with BRCA2 mutations is on average of 8-10 years later than in patients with BRCA1 mutations, it is reasonable to delay risk-reducing salpingo-oophorectomy until 40-45 years in patients with BRCA2 mutations who have already maximized their breast cancer prevention (i.e., undergone bilateral mastectomy).    Salpingectomy alone is not the standard of care for risk reduction although clinical trials are ongoing.     Discuss option of risk-reducing mastectomy.    Consider risks and benefits of risk reducing agents, such as tamoxifen and raloxifene for breast and ovarian cancer.    Consider a full body skin and eye exam for melanoma screening for both BRCA1+ and BRCA2+.     NOTE: Women with BRCA mutation who are treated for breast cancer should have screening of the remaining breast tissue with annual mammography and breast MRI.    The International Cancer of the Pancreas Screening (CAPS) Consortium recommends that individuals with a BRCA2 mutation who have at least one first-degree relative with pancreatic cancer should undergo initial screening with endoscopic ultrasonography and/or MRI/magnetic resonance cholangiopancreatography.         Magnetic Resonance Imaging (MRI) of the Breast  Magnetic resonance imaging (MRI) of the breast is an imaging test that uses strong magnets and radio waves to form pictures of the inside of the breast. It also creates images of the tissues that surround the breast. Breast MRI is used to check for problems, such as a leaking breast implant or a suspicious lump or mass. It can also be used to help determine if breast cancer is present and aid in diagnosis and management. Most MRI tests take 30 to 60 minutes. Depending on the type of MRI you are having, the test may take longer. Give yourself extra time to check in.      During a breast MRI, you lie face down on a  platform that slides into a tubelike machine called a scanner.   Before your test    Follow any directions you are given for taking medicines and for not eating or drinking before the test.    Follow your normal daily routine unless your provider tells you otherwise.    You ll be asked to remove your watch, hearing aids, credit cards, pens, eyeglasses, and other metal objects.    You may be asked to remove your makeup. Makeup may contain some metal.  MRI uses strong magnets. Metal is affected by magnets and can distort the image. The magnet used in MRI can cause metal objects in your body to move. If you have a metal implant, you may not be able to have an MRI unless the implant is certified as MRI safe. People with these implants should not have an MRI:    Ear (cochlear) implants    Certain clips used for brain aneurysms    Certain metal coils put in blood vessels    Most defibrillators    Most pacemakers  The radiologist or technologist may ask you if you:    Have had stereotactic breast biopsy    Have a pacemaker or implanted cardiac defibrillator    Have an artificial body part (prosthesis)    Have metal rods, screws, plates, or splinters in your body    Wear a medicated adhesive patch    Have tattoos or body piercings. Some tattoo inks contain metal.    Have implanted nerve stimulators or drug-infusion ports    Work with metal    Have braces    Have a bullet or other metal in your body  Tell the radiologist or technologist if you:    Are allergic to any medicines    Are pregnant or think you may be pregnant    Are afraid of small, enclosed spaces (claustrophobic)    Have any serious health problems (If you have kidney disease or a liver transplant  you may not be able to have the contrast material used for MRI)    Have had previous surgeries    Are breastfeeding   During your test    You may be asked to wear a hospital gown.    You may be given earplugs to wear if you desire.    You may be injected with  contrast through an intravenous (IV) line in your hand or arm. This is a special dye that makes the MRI image sharp that may be needed depending on the reason for your exam.    You ll lie on a platform that slides into a tube-like machine called a scanner. You ll be on your stomach with your breasts placed through openings in the platform.    Remain as still as you can while the camera takes the pictures. This will ensure the best images.  After your test    You can get back to normal activities right away.    If you were given contrast, it will pass naturally through your body within a day.    Drink lots of water so that the dye passes quickly out of your body.  Getting your results  Your healthcare provider will discuss the test results with you during a follow-up appointment or over the phone.  Date Last Reviewed: 2/1/2017 2000-2017 [employer]. [address] [place] [address]. All rights reserved. This information is not intended as a substitute for professional medical care. Always follow your healthcare professional's instructions.      Understanding Breast Density  What is breast density?  Breasts are made of connective tissue, glandular tissue and fatty tissue. Dense breasts have a lot of the first two types of tissue, but not much fat.  Why is it important?  Having dense breasts means you have a slightly higher risk of getting breast cancer. It also means your mammograms will be harder to read. This is because both dense tissue and lumps look white on a mammogram. Fatty tissue looks black. The pictures below show the 4 levels of breast density:     How do I know if I have dense breasts?  Only a mammogram can tell you if you have dense breasts. The person who reviews your mammogram (radiologist) will give your breasts a density score based on the levels shown above.  What should I do?  Talk to your doctor about your options. But know that mammograms are proven to reduce cancer  deaths. Plus, a yearly mammogram is even more important for women who have a higher risk of breast cancer. Your doctor may order other tests as well.  You should also know how your breasts look and feel. Any time you feel or see something that isn't normal, tell your doctor.  For informational purposes only. Not to replace the advice of your health care provider. Images courtesy American College of Radiology (ACR)   and Society of Breast Imaging (SBI). Used with permission. Text copyright   2014 [facility]. All rights reserved. [employer] [id number] - 08/14.            Follow-ups after your visit        Follow-up notes from your care team     Return in about 6 months (around 8/8/2018) for Physical Exam.      Your next 10 appointments already scheduled     Feb 10, 2018 10:00 AM CST   MyChart Long with Sydnie He MD   [facility]    [address] [place] [id number] [phone]              Who to contact     If you have questions or need follow up information about today's clinic visit or your schedule please contact [facility] directly at [phone].  Normal or non-critical lab and imaging results will be communicated to you by MyChart, letter or phone within 4 business days after the clinic has received the results. If you do not hear from us within 7 days, please contact the clinic through [employer] or phone. If you have a critical or abnormal lab result, we will notify you by phone as soon as possible.  Submit refill requests through [employer] or call your pharmacy and they will forward the refill request to us. Please allow 3 business days for your refill to be completed.          Additional Information About Your Visit        MyChart Information     [employer] gives you secure access to your electronic health record. If you see a primary care provider, you can also send messages to your care team and make  appointments. If you have questions, please call your primary care clinic.  If you do not have a primary care provider, please call [phone] and they will assist you.        Care EveryWhere ID     This is your Care EveryWhere ID. This could be used by other organizations to access your [place] medical records  [id number]        Your Vitals Were     Pulse Temperature Respirations Last Period BMI (Body Mass Index)       90 98.2  F (36.8  C) (Oral) 12 02/08/2015 (Within Months) 33.38 kg/m2        Blood Pressure from Last 3 Encounters:   02/08/18 112/78   11/06/17 130/85   09/27/17 112/80    Weight from Last 3 Encounters:   02/08/18 93.8 kg (206 lb 12.8 oz)   11/06/17 89.8 kg (198 lb)   09/27/17 89.6 kg (197 lb 9.6 oz)              Today, you had the following     No orders found for display         Today's Medication Changes          These changes are accurate as of 2/8/18  5:26 PM.  If you have any questions, ask your nurse or doctor.               Start taking these medicines.        Dose/Directions    LORazepam 1 MG tablet   Commonly known as:  ATIVAN   Used for:  Anxiety   Started by:  Stacia Barton APRN CNS        Dose:  0.5-1 mg   Take 0.5-1 tablets (0.5-1 mg) by mouth every 8 hours as needed for anxiety or other (prior to breast MRI) Take 30 minutes prior to departure.  Do not operate a vehicle after taking this medication   Quantity:  2 tablet   Refills:  0         Stop taking these medicines if you haven't already. Please contact your care team if you have questions.     DULoxetine 30 MG EC capsule   Commonly known as:  CYMBALTA   Stopped by:  Stacia Barton APRN CNS                Where to get your medicines      Some of these will need a paper prescription and others can be bought over the counter.  Ask your nurse if you have questions.     Bring a paper prescription for each of these medications     LORazepam 1 MG tablet                Primary Care Provider Office Phone # Fax #     Sydnie He [phone]       [address] [facility] [address]        Equal Access to Services     KARMEN LEMUS : Johan marshall kirkland jenna Walden, lorenada aidavid, joannta kakaronda deny, lisa nashsusan nancy. So [facility] [phone].    ATENCIÓN: Si habla español, tiene a horton disposición servicios gratuitos de asistencia lingüística. Llame al [phone].    We comply with applicable federal civil rights laws and [place] laws. We do not discriminate on the basis of race, color, national origin, age, disability, sex, sexual orientation, or gender identity.            Thank you!     Thank you for choosing [facility]  for your care. Our goal is always to provide you with excellent care. Hearing back from our patients is one way we can continue to improve our services. Please take a few minutes to complete the written survey that you may receive in the mail after your visit with us. Thank you!             Your Updated Medication List - Protect others around you: Learn how to safely use, store and throw away your medicines at www.disposemymeds.org.          This list is accurate as of 2/8/18  5:26 PM.  Always use your most recent med list.                   Brand Name Dispense Instructions for use Diagnosis    ALPRAZolam 0.5 MG tablet    XANAX    10 tablet    1 tab 30 minutes prior to air travel    Phobia, flying       amoxicillin 500 MG capsule    AMOXIL     TAKE 2 CAPSULE BY MOUTH THREE TIMES A DAY AS DIRECTED        amphetamine-dextroamphetamine 10 MG per tablet    ADDERALL    90 tablet    Take 1 tablet (10 mg) by mouth in AM and take 2 tablets (20 mg) by mouth in PM    Attention deficit disorder (ADD) without hyperactivity       aspirin 81 MG tablet      Take 81 mg by mouth daily        butalbital-acetaminophen-caffeine -40 MG per tablet    FIORICET/ESGIC    30 tablet    Take 1 tablet by mouth every 4 hours as needed    Migraine without  status migrainosus, not intractable, unspecified migraine type       fluconazole 150 MG tablet    DIFLUCAN     TAKE 1 TABLET BY MOUTH FOR A SINGLE DOSE        gabapentin 300 MG capsule    NEURONTIN     Take 2 capsules by mouth At Bedtime        LORazepam 1 MG tablet    ATIVAN    2 tablet    Take 0.5-1 tablets (0.5-1 mg) by mouth every 8 hours as needed for anxiety or other (prior to breast MRI) Take 30 minutes prior to departure.  Do not operate a vehicle after taking this medication    Anxiety       NUVARING 0.12-0.015 MG/24HR vaginal ring   Generic drug:  etonogestrel-ethinyl estradiol      Place 1 each vaginally every 28 days        rizatriptan 10 MG tablet    MAXALT     TAKE 1 TABLET BY MOUTH ONCE, MAY REPEAT IN 2 HRS. MAX 30MG/24HRS        VITAMIN D PO      Take 2,000 Units by mouth daily.        WELLBUTRIN  MG 24 hr tablet   Generic drug:  buPROPion      Take 150 mg by mouth every morning        zolpidem 10 MG tablet    AMBIEN    30 tablet    Take 1 tablet (10 mg) by mouth nightly as needed for sleep    Insomnia, unspecified type

## 2018-02-08 NOTE — MR AVS SNAPSHOT
After Visit Summary   2/8/2018    Noa Mcgill    MRN: [id number]           Patient Information     Date Of Birth          1975        Visit Information        Provider Department      2/8/2018 12:30 PM Tanja Aceves [facility]        Today's Diagnoses     Family history of BRCA1 gene positive    -  1       Follow-ups after your visit        Who to contact     If you have questions or need follow up information about today's clinic visit or your schedule please contact [facility] directly at [phone].  Normal or non-critical lab and imaging results will be communicated to you by [employer], letter or phone within 4 business days after the clinic has received the results. If you do not hear from us within 7 days, please contact the clinic through [employer] or phone. If you have a critical or abnormal lab result, we will notify you by phone as soon as possible.  Submit refill requests through [employer] or call your pharmacy and they will forward the refill request to us. Please allow 3 business days for your refill to be completed.          Additional Information About Your Visit        MyChart Information     [employer] gives you secure access to your electronic health record. If you see a primary care provider, you can also send messages to your care team and make appointments. If you have questions, please call your primary care clinic.  If you do not have a primary care provider, please call [phone] and they will assist you.        Care EveryWhere ID     This is your Care EveryWhere ID. This could be used by other organizations to access your [place] medical records  [id number]         Blood Pressure from Last 3 Encounters:   02/10/18 119/81   02/08/18 112/78   11/06/17 130/85    Weight from Last 3 Encounters:   02/10/18 94.3 kg (208 lb)   02/08/18 93.8 kg (206 lb 12.8 oz)   11/06/17 89.8 kg (198 lb)              We Performed the Following     [place]  Genetics Specific site Analysis of BRCA1: Laboratory Miscellaneous Order     Send outs misc test          Today's Medication Changes          These changes are accurate as of 2/8/18 11:59 PM.  If you have any questions, ask your nurse or doctor.               Start taking these medicines.        Dose/Directions    LORazepam 1 MG tablet   Commonly known as:  ATIVAN   Used for:  Anxiety   Started by:  Stacia Barton APRN CNS        Dose:  0.5-1 mg   Take 0.5-1 tablets (0.5-1 mg) by mouth every 8 hours as needed for anxiety or other (prior to breast MRI) Take 30 minutes prior to departure.  Do not operate a vehicle after taking this medication   Quantity:  2 tablet   Refills:  0         Stop taking these medicines if you haven't already. Please contact your care team if you have questions.     DULoxetine 30 MG EC capsule   Commonly known as:  CYMBALTA   Stopped by:  Stacia Barton APRN CNS                Where to get your medicines      Some of these will need a paper prescription and others can be bought over the counter.  Ask your nurse if you have questions.     Bring a paper prescription for each of these medications     LORazepam 1 MG tablet                Primary Care Provider Office Phone # Fax #    Sydnie He [phone] [address] [phone]        Equal Access to Services     KARMEN LEMUS AH: Hadii marshall terrello Sojosephali, waaxda luqadaha, qaybta kaalmada adeegyada, lisa cruz. So [facility] [phone].    ATENCIÓN: Si habla español, tiene a horton disposición servicios gratuitos de asistencia lingüística. Llame al [phone].    We comply with applicable federal civil rights laws and [place] laws. We do not discriminate on the basis of race, color, national origin, age, disability, sex, sexual orientation, or gender identity.            Thank you!     Thank you for choosing [facility]  for your care.  Our goal is always to provide you with excellent care. Hearing back from our patients is one way we can continue to improve our services. Please take a few minutes to complete the written survey that you may receive in the mail after your visit with us. Thank you!             Your Updated Medication List - Protect others around you: Learn how to safely use, store and throw away your medicines at www.disposemymeds.org.          This list is accurate as of 2/8/18 11:59 PM.  Always use your most recent med list.                   Brand Name Dispense Instructions for use Diagnosis    ALPRAZolam 0.5 MG tablet    XANAX    10 tablet    1 tab 30 minutes prior to air travel    Phobia, flying       amoxicillin 500 MG capsule    AMOXIL     TAKE 2 CAPSULE BY MOUTH THREE TIMES A DAY AS DIRECTED        amphetamine-dextroamphetamine 10 MG per tablet    ADDERALL    90 tablet    Take 1 tablet (10 mg) by mouth in AM and take 2 tablets (20 mg) by mouth in PM    Attention deficit disorder (ADD) without hyperactivity       aspirin 81 MG tablet      Take 81 mg by mouth daily        butalbital-acetaminophen-caffeine -40 MG per tablet    FIORICET/ESGIC    30 tablet    Take 1 tablet by mouth every 4 hours as needed    Migraine without status migrainosus, not intractable, unspecified migraine type       fluconazole 150 MG tablet    DIFLUCAN     TAKE 1 TABLET BY MOUTH FOR A SINGLE DOSE        gabapentin 300 MG capsule    NEURONTIN     Take 2 capsules by mouth At Bedtime        LORazepam 1 MG tablet    ATIVAN    2 tablet    Take 0.5-1 tablets (0.5-1 mg) by mouth every 8 hours as needed for anxiety or other (prior to breast MRI) Take 30 minutes prior to departure.  Do not operate a vehicle after taking this medication    Anxiety       NUVARING 0.12-0.015 MG/24HR vaginal ring   Generic drug:  etonogestrel-ethinyl estradiol      Place 1 each vaginally every 28 days        rizatriptan 10 MG tablet    MAXALT     TAKE 1 TABLET BY MOUTH ONCE,  MAY REPEAT IN 2 HRS. MAX 30MG/24HRS        VITAMIN D PO      Take 2,000 Units by mouth daily.        WELLBUTRIN  MG 24 hr tablet   Generic drug:  buPROPion      Take 150 mg by mouth every morning        zolpidem 10 MG tablet    AMBIEN    30 tablet    Take 1 tablet (10 mg) by mouth nightly as needed for sleep    Insomnia, unspecified type

## 2018-02-08 NOTE — PATIENT INSTRUCTIONS
Individualized Surveillance Plan for women  Hereditary Breast and/or Ovarian Cancer Syndrome   Per NCCN Guidelines Version 1.2018   Recommended screening Test or procedure Last done Next Scheduled    Breast self awareness- starting at age 18. Women should be familiar with their breasts and promptly report changes to their care provider. Periodic, consistent self exam may facilitate breast self awareness.    2/8/2018   ongoing   Breast screening, starting at age 25 Clinical breast exams every 6 -12 months 2/8/2018 August 2018   Breast screening   Age 25-29 Annual breast MRI screening with contrast (preferred) or mammogram if MRI is unavailable or individualized based on family history if a breast cancer diagnosis before age 30 is present.       Breast MRI is performed preferably on day 7-15 of menstrual cycle for premenopausal women.   NA   NA   Breast screening   Age >30-75 years     Annual mammogram and   annual breast MRI, preferably on day 7-15 of menstrual cycle for premenopausal women.    Age>75 years, management should be considered on an individual basis. 3/21/2017 - Screening tomosynthesis mammogram, BiRads1    No history of breast MRI NEXT: Breast MRI soon    Next exam: Aug. Followed by gustavo mammogram   Ovarian cancer screening, starting at age 30-35 Consider transvaginal ultrasound and  tests. NA - THBSO NA   Recommendation: risk-reducing salpingo-oophorectomy, typically between 35 and 40 years old and  upon completion of child bearing.     Because ovarian cancer onset in patients with BRCA2 mutations is on average of 8-10 years later than in patients with BRCA1 mutations, it is reasonable to delay risk-reducing salpingo-oophorectomy until 40-45 years in patients with BRCA2 mutations who have already maximized their breast cancer prevention (i.e., undergone bilateral mastectomy).    Salpingectomy alone is not the standard of care for risk reduction although clinical trials are ongoing.     Discuss  option of risk-reducing mastectomy.    Consider risks and benefits of risk reducing agents, such as tamoxifen and raloxifene for breast and ovarian cancer.    Consider a full body skin and eye exam for melanoma screening for both BRCA1+ and BRCA2+.     NOTE: Women with BRCA mutation who are treated for breast cancer should have screening of the remaining breast tissue with annual mammography and breast MRI.    The International Cancer of the Pancreas Screening (CAPS) Consortium recommends that individuals with a BRCA2 mutation who have at least one first-degree relative with pancreatic cancer should undergo initial screening with endoscopic ultrasonography and/or MRI/magnetic resonance cholangiopancreatography.         Magnetic Resonance Imaging (MRI) of the Breast  Magnetic resonance imaging (MRI) of the breast is an imaging test that uses strong magnets and radio waves to form pictures of the inside of the breast. It also creates images of the tissues that surround the breast. Breast MRI is used to check for problems, such as a leaking breast implant or a suspicious lump or mass. It can also be used to help determine if breast cancer is present and aid in diagnosis and management. Most MRI tests take 30 to 60 minutes. Depending on the type of MRI you are having, the test may take longer. Give yourself extra time to check in.      During a breast MRI, you lie face down on a platform that slides into a tubelike machine called a scanner.   Before your test    Follow any directions you are given for taking medicines and for not eating or drinking before the test.    Follow your normal daily routine unless your provider tells you otherwise.    You ll be asked to remove your watch, hearing aids, credit cards, pens, eyeglasses, and other metal objects.    You may be asked to remove your makeup. Makeup may contain some metal.  MRI uses strong magnets. Metal is affected by magnets and can distort the image. The magnet used  in MRI can cause metal objects in your body to move. If you have a metal implant, you may not be able to have an MRI unless the implant is certified as MRI safe. People with these implants should not have an MRI:    Ear (cochlear) implants    Certain clips used for brain aneurysms    Certain metal coils put in blood vessels    Most defibrillators    Most pacemakers  The radiologist or technologist may ask you if you:    Have had stereotactic breast biopsy    Have a pacemaker or implanted cardiac defibrillator    Have an artificial body part (prosthesis)    Have metal rods, screws, plates, or splinters in your body    Wear a medicated adhesive patch    Have tattoos or body piercings. Some tattoo inks contain metal.    Have implanted nerve stimulators or drug-infusion ports    Work with metal    Have braces    Have a bullet or other metal in your body  Tell the radiologist or technologist if you:    Are allergic to any medicines    Are pregnant or think you may be pregnant    Are afraid of small, enclosed spaces (claustrophobic)    Have any serious health problems (If you have kidney disease or a liver transplant  you may not be able to have the contrast material used for MRI)    Have had previous surgeries    Are breastfeeding   During your test    You may be asked to wear a hospital gown.    You may be given earplugs to wear if you desire.    You may be injected with contrast through an intravenous (IV) line in your hand or arm. This is a special dye that makes the MRI image sharp that may be needed depending on the reason for your exam.    You ll lie on a platform that slides into a tube-like machine called a scanner. You ll be on your stomach with your breasts placed through openings in the platform.    Remain as still as you can while the camera takes the pictures. This will ensure the best images.  After your test    You can get back to normal activities right away.    If you were given contrast, it will pass  naturally through your body within a day.    Drink lots of water so that the dye passes quickly out of your body.  Getting your results  Your healthcare provider will discuss the test results with you during a follow-up appointment or over the phone.  Date Last Reviewed: 2/1/2017 2000-2017 [employer]. [address] [place] [id number]. All rights reserved. This information is not intended as a substitute for professional medical care. Always follow your healthcare professional's instructions.      Understanding Breast Density  What is breast density?  Breasts are made of connective tissue, glandular tissue and fatty tissue. Dense breasts have a lot of the first two types of tissue, but not much fat.  Why is it important?  Having dense breasts means you have a slightly higher risk of getting breast cancer. It also means your mammograms will be harder to read. This is because both dense tissue and lumps look white on a mammogram. Fatty tissue looks black. The pictures below show the 4 levels of breast density:     How do I know if I have dense breasts?  Only a mammogram can tell you if you have dense breasts. The person who reviews your mammogram (radiologist) will give your breasts a density score based on the levels shown above.  What should I do?  Talk to your doctor about your options. But know that mammograms are proven to reduce cancer deaths. Plus, a yearly mammogram is even more important for women who have a higher risk of breast cancer. Your doctor may order other tests as well.  You should also know how your breasts look and feel. Any time you feel or see something that isn't normal, tell your doctor.  For informational purposes only. Not to replace the advice of your health care provider. Images courtesy American College of Radiology (ACR)   and Society of Breast Imaging (SBI). Used with permission. Text copyright   2014 [employer]. All rights reserved. [employer]  628709 - 08/14.

## 2018-02-10 ENCOUNTER — OFFICE VISIT (OUTPATIENT)
Dept: FAMILY MEDICINE | Facility: CLINIC | Age: 43
End: 2018-02-10
Payer: COMMERCIAL

## 2018-02-10 VITALS
RESPIRATION RATE: 14 BRPM | OXYGEN SATURATION: 100 % | BODY MASS INDEX: 33.43 KG/M2 | TEMPERATURE: 97.3 F | WEIGHT: 208 LBS | SYSTOLIC BLOOD PRESSURE: 119 MMHG | HEIGHT: 66 IN | DIASTOLIC BLOOD PRESSURE: 81 MMHG | HEART RATE: 94 BPM

## 2018-02-10 DIAGNOSIS — G89.4 CHRONIC PAIN SYNDROME: ICD-10-CM

## 2018-02-10 DIAGNOSIS — E04.9 THYROID ENLARGED: ICD-10-CM

## 2018-02-10 DIAGNOSIS — G43.909 MIGRAINE WITHOUT STATUS MIGRAINOSUS, NOT INTRACTABLE, UNSPECIFIED MIGRAINE TYPE: Primary | ICD-10-CM

## 2018-02-10 DIAGNOSIS — Z15.09 BRCA1 GENE MUTATION POSITIVE: ICD-10-CM

## 2018-02-10 DIAGNOSIS — Z13.6 CARDIOVASCULAR SCREENING; LDL GOAL LESS THAN 160: ICD-10-CM

## 2018-02-10 DIAGNOSIS — Z15.01 BRCA1 GENE MUTATION POSITIVE: ICD-10-CM

## 2018-02-10 DIAGNOSIS — M54.2 NECK PAIN: ICD-10-CM

## 2018-02-10 LAB
CHOLEST SERPL-MCNC: 188 MG/DL
HDLC SERPL-MCNC: 58 MG/DL
LDLC SERPL CALC-MCNC: 84 MG/DL
NONHDLC SERPL-MCNC: 130 MG/DL
TRIGL SERPL-MCNC: 231 MG/DL
TSH SERPL DL<=0.005 MIU/L-ACNC: 2.86 MU/L (ref 0.4–4)

## 2018-02-10 PROCEDURE — 99213 OFFICE O/P EST LOW 20 MIN: CPT | Performed by: FAMILY MEDICINE

## 2018-02-10 PROCEDURE — 80061 LIPID PANEL: CPT | Performed by: FAMILY MEDICINE

## 2018-02-10 PROCEDURE — 84443 ASSAY THYROID STIM HORMONE: CPT | Performed by: FAMILY MEDICINE

## 2018-02-10 PROCEDURE — 36415 COLL VENOUS BLD VENIPUNCTURE: CPT | Performed by: FAMILY MEDICINE

## 2018-02-10 RX ORDER — TOPIRAMATE 25 MG/1
25 TABLET, FILM COATED ORAL 2 TIMES DAILY
Qty: 60 TABLET | Refills: 1 | Status: SHIPPED | OUTPATIENT
Start: 2018-02-10 | End: 2018-05-21

## 2018-02-10 NOTE — MR AVS SNAPSHOT
"              After Visit Summary   2/10/2018    Noa Mcgill    MRN: [id number]           Patient Information     Date Of Birth          1975        Visit Information        Provider Department      2/10/2018 10:00 AM Sydnie He MD [facility]        Today's Diagnoses     Migraine without status migrainosus, not intractable, unspecified migraine type    -  1       Follow-ups after your visit        Who to contact     If you have questions or need follow up information about today's clinic visit or your schedule please contact [facility] directly at [phone].  Normal or non-critical lab and imaging results will be communicated to you by MyChart, letter or phone within 4 business days after the clinic has received the results. If you do not hear from us within 7 days, please contact the clinic through [employer] or phone. If you have a critical or abnormal lab result, we will notify you by phone as soon as possible.  Submit refill requests through [employer] or call your pharmacy and they will forward the refill request to us. Please allow 3 business days for your refill to be completed.          Additional Information About Your Visit        MyChart Information     [employer] gives you secure access to your electronic health record. If you see a primary care provider, you can also send messages to your care team and make appointments. If you have questions, please call your primary care clinic.  If you do not have a primary care provider, please call [phone] and they will assist you.        Care EveryWhere ID     This is your Care EveryWhere ID. This could be used by other organizations to access your [facility] medical records  [id number]        Your Vitals Were     Pulse Temperature Respirations Height Pulse Oximetry BMI (Body Mass Index)    94 97.3  F (36.3  C) (Oral) 14 5' 6\" (1.676 m) 100% 33.57 kg/m2       Blood Pressure from Last 3 Encounters:   02/10/18 119/81 "   02/08/18 112/78   11/06/17 130/85    Weight from Last 3 Encounters:   02/10/18 208 lb (94.3 kg)   02/08/18 206 lb 12.8 oz (93.8 kg)   11/06/17 198 lb (89.8 kg)              Today, you had the following     No orders found for display         Today's Medication Changes          These changes are accurate as of 2/10/18 10:20 AM.  If you have any questions, ask your nurse or doctor.               Start taking these medicines.        Dose/Directions    topiramate 25 MG tablet   Commonly known as:  TOPAMAX   Used for:  Migraine without status migrainosus, not intractable, unspecified migraine type   Started by:  Sydnie He MD        Dose:  25 mg   Take 1 tablet (25 mg) by mouth 2 times daily   Quantity:  60 tablet   Refills:  1            Where to get your medicines      These medications [place] [facility] [id number] [place] [address]  [id number] [place] [address]     Phone:  [phone]     topiramate 25 MG tablet                Primary Care Provider Office Phone # Fax #    Sydnie He [phone] [address] [facility] [address]        Equal Access to Services     KARMEN LEMUS AH: Hadii marshall kirkland hadasho Soomaali, waaxda luqadaha, qaybta kaalmada adeegyada, lisa cruz. So [facility] [phone].    ATENCIÓN: Si habla español, tiene a horton disposición servicios gratuitos de asistencia lingüística. Llame al [phone].    We comply with applicable federal civil rights laws and [place] laws. We do not discriminate on the basis of race, color, national origin, age, disability, sex, sexual orientation, or gender identity.            Thank you!     Thank you for choosing [facility]  for your care. Our goal is always to provide you with excellent care. Hearing back from our patients is one way we can continue to improve our services. Please take a few minutes to complete the written survey that you may  receive in the mail after your visit with us. Thank you!             Your Updated Medication List - Protect others around you: Learn how to safely use, store and throw away your medicines at www.disposemymeds.org.          This list is accurate as of 2/10/18 10:20 AM.  Always use your most recent med list.                   Brand Name Dispense Instructions for use Diagnosis    ALPRAZolam 0.5 MG tablet    XANAX    10 tablet    1 tab 30 minutes prior to air travel    Phobia, flying       amoxicillin 500 MG capsule    AMOXIL     TAKE 2 CAPSULE BY MOUTH THREE TIMES A DAY AS DIRECTED        amphetamine-dextroamphetamine 10 MG per tablet    ADDERALL    90 tablet    Take 1 tablet (10 mg) by mouth in AM and take 2 tablets (20 mg) by mouth in PM    Attention deficit disorder (ADD) without hyperactivity       aspirin 81 MG tablet      Take 81 mg by mouth daily        butalbital-acetaminophen-caffeine -40 MG per tablet    FIORICET/ESGIC    30 tablet    Take 1 tablet by mouth every 4 hours as needed    Migraine without status migrainosus, not intractable, unspecified migraine type       fluconazole 150 MG tablet    DIFLUCAN     TAKE 1 TABLET BY MOUTH FOR A SINGLE DOSE        gabapentin 300 MG capsule    NEURONTIN     Take 2 capsules by mouth At Bedtime        LORazepam 1 MG tablet    ATIVAN    2 tablet    Take 0.5-1 tablets (0.5-1 mg) by mouth every 8 hours as needed for anxiety or other (prior to breast MRI) Take 30 minutes prior to departure.  Do not operate a vehicle after taking this medication    Anxiety       NUVARING 0.12-0.015 MG/24HR vaginal ring   Generic drug:  etonogestrel-ethinyl estradiol      Place 1 each vaginally every 28 days        rizatriptan 10 MG tablet    MAXALT     TAKE 1 TABLET BY MOUTH ONCE, MAY REPEAT IN 2 HRS. MAX 30MG/24HRS        topiramate 25 MG tablet    TOPAMAX    60 tablet    Take 1 tablet (25 mg) by mouth 2 times daily    Migraine without status migrainosus, not intractable, unspecified  migraine type       VITAMIN D PO      Take 2,000 Units by mouth daily.        WELLBUTRIN  MG 24 hr tablet   Generic drug:  buPROPion      Take 150 mg by mouth every morning        zolpidem 10 MG tablet    AMBIEN    30 tablet    Take 1 tablet (10 mg) by mouth nightly as needed for sleep    Insomnia, unspecified type
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
## 2018-02-10 NOTE — NURSING NOTE
"Chief Complaint   Patient presents with     Recheck Medication       Initial /81 (BP Location: Right arm, Patient Position: Chair, Cuff Size: Adult Regular)  Pulse 94  Temp 97.3  F (36.3  C) (Oral)  Resp 14  Ht 5' 6\" (1.676 m)  Wt 208 lb (94.3 kg)  SpO2 100%  BMI 33.57 kg/m2 Estimated body mass index is 33.57 kg/(m^2) as calculated from the following:    Height as of this encounter: 5' 6\" (1.676 m).    Weight as of this encounter: 208 lb (94.3 kg).  Medication Reconciliation: complete   "

## 2018-02-11 NOTE — PROGRESS NOTES
2/8/2018    Referring Provider: Roseann Ridley MS, [facility]    Presenting Information:   Noa Mcgill came in to clinic for a follow-up visit today. She had previously been seen in the Cancer Risk Management Program at the [facility]  on 1/23/18 by Roseann Ridley, Genetic Counselor. Noa came to clinic today to have her blood drawn for the Specific Site Analysis of the BRCA1 gene which is  associated with hereditary breast and ovarian  cancer.    Discussion:    We previously reviewed the details of Noa's family and personal history. See note from previous appointment on 1/23/18 for details..     Consent was obtained and genetic testing for the single BRCA1 mutation in the family was sent to [facility].     The information from this test may determine cancer screening recommendations as well as options for risk reducing surgeries for Noa and family members.  These recommendations will be discussed in detail once genetic testing is completed.    Plan:  1. Noa signed a consent form at the conclusion of this visit and had her blood drawn for the single BRCA1 mutation in her family. The blood was sent to [facility].   2. Noa will receive these test results from  Roseann Ridley, Genetic Counselor.   3. Turn around time: 4 weeks.     Face to face time 2 minutes.    Tanja Aceves MS [facility]  Genetic Counselor  [phone]

## 2018-03-01 ENCOUNTER — TELEPHONE (OUTPATIENT)
Dept: ONCOLOGY | Facility: CLINIC | Age: 43
End: 2018-03-01

## 2018-03-01 ENCOUNTER — OFFICE VISIT (OUTPATIENT)
Dept: SURGERY | Facility: CLINIC | Age: 43
End: 2018-03-01
Payer: COMMERCIAL

## 2018-03-01 VITALS
SYSTOLIC BLOOD PRESSURE: 100 MMHG | HEIGHT: 66 IN | HEART RATE: 94 BPM | BODY MASS INDEX: 31.5 KG/M2 | WEIGHT: 196 LBS | DIASTOLIC BLOOD PRESSURE: 68 MMHG | RESPIRATION RATE: 16 BRPM | OXYGEN SATURATION: 98 %

## 2018-03-01 DIAGNOSIS — Z15.09 BRCA GENE MUTATION POSITIVE: Primary | ICD-10-CM

## 2018-03-01 DIAGNOSIS — Z15.01 BRCA GENE MUTATION POSITIVE: Primary | ICD-10-CM

## 2018-03-01 DIAGNOSIS — Z15.01 BRCA1 GENE MUTATION POSITIVE: ICD-10-CM

## 2018-03-01 DIAGNOSIS — Z15.09 BRCA1 GENE MUTATION POSITIVE: ICD-10-CM

## 2018-03-01 LAB — LAB SCANNED RESULT: ABNORMAL

## 2018-03-01 PROCEDURE — 99203 OFFICE O/P NEW LOW 30 MIN: CPT | Performed by: SURGERY

## 2018-03-01 NOTE — PROGRESS NOTES
HPI:  BRCA positive, mentation unavailable, test is repeated  Known mutation on mother and daughter  Skin rashes, dimpling or nipple changes:  none  Nipple discharge:   none   Does perform self breast exams.  Previous breast biopsies: No  Previous cyst aspiration: No  Previous other breast surgery: No    Family History   Problem Relation Age of Onset     C.A.D. Maternal Grandmother      Hypertension Maternal Grandmother      Psychotic Disorder Mother      Depression, alcoholism     DIABETES Mother      Hereditary Breast and Ovarian Cancer Syndrome Mother      BRCA1+, no hx of cancer, s/p prophylactic surgery to remove breast tissue, ovaries, fallopian tubes     Hereditary Breast and Ovarian Cancer Syndrome Sister      matrnal half sister, BRCA1+     Hereditary Breast and Ovarian Cancer Syndrome Daughter 23     BRCA1+     Breast Cancer Maternal Aunt 40     lumpectomy, then 2nd primary breast cancer later in 40s, treated with mastectomy     Hereditary Breast and Ovarian Cancer Syndrome Maternal Aunt      BRCA1+     Ovarian Cancer Maternal Aunt 70     surgry     Thyroid Cancer Maternal Aunt      medullary thyroid cancer     Hereditary Breast and Ovarian Cancer Syndrome Maternal Aunt      BRCA1+     Breast Cancer Other 41     maternal great aunt     Ovarian Cancer Other 45      in 40s     Thyroid Cancer Maternal Uncle 66     papillary thyroid cancer     Breast Cancer Cousin 45     Hereditary Breast and Ovarian Cancer Syndrome Cousin      BRCA1+     Breast Cancer Other 43     maternal great aunt     Family history of breast cancer: Yes - see above  Family history of colon cancer: No  Family history of pancreatic cancer: No  Family history of prostate cancer: No    Mammography reveals: BIRADS 1       Past Medical History:   has a past medical history of Allergic rhinitis, cause unspecified; ASD (atrial septal defect) (2/15/13); Attention deficit disorder without mention of hyperactivity (2014); BRCA1 gene mutation  positive (2018); Endometriosis of uterus; HERPES SIMPLEX NOS (2007); Insomnia, unspecified (2/10/2005); Migraine headaches; Other chronic sinusitis; Psoriatic arthropathy (H) (2007); Recurrent UTI (); and Retinal artery occlusion (2/15/13).    Past Surgical History:  Past Surgical History:   Procedure Laterality Date     APPENDECTOMY  2006      SECTION  2013    Procedure:  SECTION;  Primary  Section;  Surgeon: Chad Hughes MD;  Location: [facility] L[facility]     DILATION AND CURETTAGE SUCTION N/A 2014    Procedure: DILATION AND CURETTAGE SUCTION;  Surgeon: Srini Martinez MD;  Location: [facility] OR     DILATION AND CURETTAGE SUCTION N/A 2014    Procedure: DILATION AND CURETTAGE SUCTION;  Surgeon: Connor Kang MD;  Location: [facility] OR     HC DILATION/CURETTAGE DIAG/THER NON OB       HC DILATION/CURETTAGE DIAG/THER NON OB       HYSTERECTOMY, CARLOS  2016    [facility]     SURGICAL HISTORY OF -       left thumb fusion due to arthritis    Hysterectomy with Dr Martinez 18    Social History:  Social History     Social History     Marital status:      Spouse name: Ciro     Number of children: 3     Years of education: 14     Occupational History     [employer]      [employer]     Social History Main Topics     Smoking status: Never Smoker     Smokeless tobacco: Never Used     Alcohol use No      Comment: none     Drug use: No     Sexual activity: Yes     Partners: Male     Birth control/ protection: Female Surgical     Other Topics Concern     Parent/Sibling W/ Cabg, Mi Or Angioplasty Before 65f 55m? No     Social History Narrative       PE:  Vitals: There were no vitals taken for this visit.      Assessment: BRCA1 positivity with family history of breast and ovarian cancer    PLAN:  Discussed options for mastectomy with or without reconstruction.  We discussed incision, scar, numbness, nipple preservation or  removal and risk of malignancy in the remaining nipple.  We discussed reduction but not elimination of breast cancer risk with his surgery.  We discussed the option of sentinel node biopsy of the axilla, she is not interested in this.  We discussed MRI scanning and she would like to undergo an MRI preoperatively to help assure that sentinel node biopsy is not needed.  She understands that there may be a positive or false positive result that will require further evaluation before proceeding with definitive surgery.  She has not yet talked with plastic surgery.  She has several cards for plastic surgery in the [facility] area and will discuss reconstruction with several of them before making a final decision.  She is hoping to undergo surgery in a few months as this will fit in best with her continuing educational schedule.  She will contact us for scheduling the MRI and surgery when she was made her final decision on reconstruction techniques and plastic surgeon.    Suraj Bergeron MD    Please route or send letter to:  Primary Care Provider (PCP), Referring Provider and Include Progress Note

## 2018-03-01 NOTE — LETTER
2018    Re: Noa Mcgill, : 1975      HPI:  BRCA positive, mentation unavailable, test is repeated  Known mutation on mother and daughter  Skin rashes, dimpling or nipple changes:  none  Nipple discharge:   none   Does perform self breast exams.  Previous breast biopsies: No    Past Medical History:  Has a past medical history of Allergic rhinitis, cause unspecified; ASD (atrial septal defect) (2/15/13); Attention deficit disorder without mention of hyperactivity (2014); BRCA1 gene mutation positive (2018); Endometriosis of uterus; HERPES SIMPLEX NOS (2007); Insomnia, unspecified (2/10/2005); Migraine headaches; Other chronic sinusitis; Psoriatic arthropathy (H) (2007); Recurrent UTI (); and Retinal artery occlusion (2/15/13).     PE:  Vitals: There were no vitals taken for this visit.     Assessment: BRCA1 positivity with family history of breast and ovarian cancer     PLAN:  Discussed options for mastectomy with or without reconstruction.  We discussed incision, scar, numbness, nipple preservation or removal and risk of malignancy in the remaining nipple.  We discussed reduction but not elimination of breast cancer risk with his surgery.  We discussed the option of sentinel node biopsy of the axilla, she is not interested in this.  We discussed MRI scanning and she would like to undergo an MRI preoperatively to help assure that sentinel node biopsy is not needed. She understands that there may be a positive or false positive result that will require further evaluation before proceeding with definitive surgery.  She has not yet talked with plastic surgery.  She has several cards for plastic surgery in the [facility] area and will discuss reconstruction with several of them before making a final decision.  She is hoping to undergo surgery in a few months as this will fit in best with her continuing educational schedule.  She will contact us for scheduling the MRI and surgery  when she was made her final decision on reconstruction techniques and plastic surgeon.     Suraj Bergeron MD

## 2018-03-01 NOTE — Clinical Note
"Please print and send a copy of this letter and the patient's genetic testing report to the patient.    Please enclose test report: \"Send outs misc test [[id number]] (Order [id number])\"  "

## 2018-03-01 NOTE — LETTER
"    Cancer Risk Management  Program [facility]  [facility]  [facility]  [facility]  Mailing Address  Cancer Risk Management Program  [facility]  [address] [place] [address]    New patient appointments  [phone]  March 10, 2018    Noa Mcgill  [address]  [facility] [address]      Dear Noa,    It was a pleasure speaking with you over the phone recently regarding your genetic testing results. Here is a copy of the progress note summarizing our conversation. If you have any additional questions, please feel free to call.    3/1/2018    Referring Provider: self-referred    Presenting Information:   I spoke to Noa by phone today to discuss her genetic testing results. Her blood was drawn on 2/8/2018. Specific Site Analysis of BRCA1 was ordered from [facility]. This testing was done because of Noa's personal and family history of a BRCA1 mutation.    Genetic Testing Result: POSITIVE  As expected, Noa is POSITIVE for the BRCA1 mutation previously identified in her mother and daughter. Specifically her mutation is called [id number]; it is assumed this is the same BRCA1 mutation that was identified by Noa's genetic testing approximately 15 years ago. 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    Of note, this testing only looked for this one mutation in the BRCA1 gene.    A copy of the test report can be found in the Laboratory tab, dated 2/8/2018, and named \"SEND OUTS MISC TEST\". The report is scanned in as a linked document.      Cancer Risks and Cancer Screening/Prevention:  As Noa has been aware of this BRCA1 mutation for approximately 15 years and we reviewed this information again on 1/23/2018, Noa confirmed that " she feels comfortable with the cancer risks and screening recommendations associated with a BRCA1 mutation. She has no additional questions at this time.    Noa also reported that she met with SOURAV Rios on 2/8/2018 to review the screening and surgery recommendations associated with a BRCA1 mutation. She is planning to pursue a prophylactic bilateral mastectomy in the next few months.    Implications for Family Members:  We again reviewed that this BRCA1 mutation is inherited in an autosomal dominant pattern. This means Noa's two sons still have a 50% chance to carry the same BRCA1 mutation identified in Noa. Other extended maternal relatives are at increased risk to carry this BRCA1 mutation, as well. Noa is encouraged to share my contact information with her relatives if they are interested in discussing genetic testing.    We also briefly reviewed again that in rare situations in which both parents have a mutation in the BRCA1 gene, their children may each have a 25% risk for Fanconi anemia. Fanconi anemia is a rare condition with onset in childhood that often results in physical abnormalities, growth retardation, bone marrow failure, and increased risk for cancer. For individuals of childbearing age with BRCA1 mutations, genetic counseling and genetic testing may be advised for their partners.    Plan:  1.  Noa will be mailed a copy of her test results.  2.  She plans to follow up with her medical providers as previously discussed.    If Noa has additional questions, I encouraged her to contact me directly at [phone].     Roseann Ridley MS, [facility]  Licensed Genetic Counselor  Office: [phone]  Pager: [phone]

## 2018-03-01 NOTE — MR AVS SNAPSHOT
"              After Visit Summary   3/1/2018    Noa Mcgill    MRN: [id number]           Patient Information     Date Of Birth          1975        Visit Information        Provider Department      3/1/2018 3:00 PM Suraj Bergeron MD [facility] General Surgery      Today's Diagnoses     BRCA gene mutation positive    -  1       Follow-ups after your visit        Who to contact     If you have questions or need follow up information about today's clinic visit or your schedule please contact [facility] directly at [phone].  Normal or non-critical lab and imaging results will be communicated to you by Bree[employer], letter or phone within 4 business days after the clinic has received the results. If you do not hear from us within 7 days, please contact the clinic through [employer] or phone. If you have a critical or abnormal lab result, we will notify you by phone as soon as possible.  Submit refill requests through [employer] or call your pharmacy and they will forward the refill request to us. Please allow 3 business days for your refill to be completed.          Additional Information About Your Visit        MyChart Information     [employer] gives you secure access to your electronic health record. If you see a primary care provider, you can also send messages to your care team and make appointments. If you have questions, please call your primary care clinic.  If you do not have a primary care provider, please call [phone] and they will assist you.        Care EveryWhere ID     This is your Care EveryWhere ID. This could be used by other organizations to access your [facility] medical records  [id number]        Your Vitals Were     Pulse Respirations Height Pulse Oximetry BMI (Body Mass Index)       94 16 5' 6\" (1.676 m) 98% 31.64 kg/m2        Blood Pressure from Last 3 Encounters:   03/01/18 100/68   02/10/18 119/81   02/08/18 112/78    " Weight from Last 3 Encounters:   03/01/18 196 lb (88.9 kg)   02/10/18 208 lb (94.3 kg)   02/08/18 206 lb 12.8 oz (93.8 kg)              Today, you had the following     No orders found for display       Primary Care Provider Office Phone # Fax #    Sydnie ALDRIDGE MD Neri [phone] [address] [facility] [address]        Equal Access to Services     [facility] : Hadii aad ku hadasho Soomaali, waaxda luqadaha, qaybta kaalmada adeegyada, waxay idiin hayaan adeeg kharash la'edenilsonn . So [facility] [phone].    ATENCIÓN: Si habla español, tiene a horton disposición servicios gratuitos de asistencia lingüística. [facility] [phone].    We comply with applicable federal civil rights laws and [place] laws. We do not discriminate on the basis of race, color, national origin, age, disability, sex, sexual orientation, or gender identity.            Thank you!     Thank you for choosing [facility]  for your care. Our goal is always to provide you with excellent care. Hearing back from our patients is one way we can continue to improve our services. Please take a few minutes to complete the written survey that you may receive in the mail after your visit with us. Thank you!             Your Updated Medication List - Protect others around you: Learn how to safely use, store and throw away your medicines at www.disposemymeds.org.          This list is accurate as of 3/1/18  4:00 PM.  Always use your most recent med list.                   Brand Name Dispense Instructions for use Diagnosis    ALPRAZolam 0.5 MG tablet    XANAX    10 tablet    1 tab 30 minutes prior to air travel    Phobia, flying       amoxicillin 500 MG capsule    AMOXIL     TAKE 2 CAPSULE BY MOUTH THREE TIMES A DAY AS DIRECTED        amphetamine-dextroamphetamine 10 MG per tablet    ADDERALL    90 tablet    Take 1 tablet (10 mg) by mouth in AM and take 2 tablets (20 mg) by mouth in PM    Attention deficit disorder (ADD)  without hyperactivity       aspirin 81 MG tablet      Take 81 mg by mouth daily        butalbital-acetaminophen-caffeine -40 MG per tablet    FIORICET/ESGIC    30 tablet    Take 1 tablet by mouth every 4 hours as needed    Migraine without status migrainosus, not intractable, unspecified migraine type       fluconazole 150 MG tablet    DIFLUCAN     TAKE 1 TABLET BY MOUTH FOR A SINGLE DOSE        gabapentin 300 MG capsule    NEURONTIN     Take 2 capsules by mouth At Bedtime        LORazepam 1 MG tablet    ATIVAN    2 tablet    Take 0.5-1 tablets (0.5-1 mg) by mouth every 8 hours as needed for anxiety or other (prior to breast MRI) Take 30 minutes prior to departure.  Do not operate a vehicle after taking this medication    Anxiety       NUVARING 0.12-0.015 MG/24HR vaginal ring   Generic drug:  etonogestrel-ethinyl estradiol      Place 1 each vaginally every 28 days        rizatriptan 10 MG tablet    MAXALT     TAKE 1 TABLET BY MOUTH ONCE, MAY REPEAT IN 2 HRS. MAX 30MG/24HRS        topiramate 25 MG tablet    TOPAMAX    60 tablet    Take 1 tablet (25 mg) by mouth 2 times daily    Migraine without status migrainosus, not intractable, unspecified migraine type       VITAMIN D PO      Take 2,000 Units by mouth daily.        WELLBUTRIN  MG 24 hr tablet   Generic drug:  buPROPion      Take 150 mg by mouth every morning        zolpidem 10 MG tablet    AMBIEN    30 tablet    Take 1 tablet (10 mg) by mouth nightly as needed for sleep    Insomnia, unspecified type

## 2018-03-01 NOTE — TELEPHONE ENCOUNTER
"3/1/2018    Referring Provider: self-referred    Presenting Information:   I spoke to Noa by phone today to discuss her genetic testing results. Her blood was drawn on 2/8/2018. Specific Site Analysis of BRCA1 was ordered from [facility]. This testing was done because of Noa's personal and family history of a BRCA1 mutation.    Genetic Testing Result: POSITIVE  As expected, Noa is POSITIVE for the BRCA1 mutation previously identified in her mother and daughter. Specifically her mutation is called [id number]; it is assumed this is the same BRCA1 mutation that was identified by Noa's genetic testing approximately 15 years ago. 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    Of note, this testing only looked for this one mutation in the BRCA1 gene.    A copy of the test report can be found in the Laboratory tab, dated 2/8/2018, and named \"SEND OUTS MISC TEST\". The report is scanned in as a linked document.    Cancer Risks and Cancer Screening/Prevention:  As Noa has been aware of this BRCA1 mutation for approximately 15 years and we reviewed this information again on 1/23/2018, Noa confirmed that she feels comfortable with the cancer risks and screening recommendations associated with a BRCA1 mutation. She has no additional questions at this time.    Noa also reported that she met with SOURAV Rios on 2/8/2018 to review the screening and surgery recommendations associated with a BRCA1 mutation. She is planning to pursue a prophylactic bilateral mastectomy in the next few months.    Implications for Family Members:  We again reviewed that this BRCA1 mutation is inherited in an autosomal dominant pattern. This means Noa's two sons still have a 50% chance to carry the same BRCA1 mutation identified in Noa. Other extended maternal relatives are at increased risk to carry this BRCA1 mutation, as well. Noa " is encouraged to share my contact information with her relatives if they are interested in discussing genetic testing.    We also briefly reviewed again that in rare situations in which both parents have a mutation in the BRCA1 gene, their children may each have a 25% risk for Fanconi anemia. Fanconi anemia is a rare condition with onset in childhood that often results in physical abnormalities, growth retardation, bone marrow failure, and increased risk for cancer. For individuals of childbearing age with BRCA1 mutations, genetic counseling and genetic testing may be advised for their partners.    Plan:  1.  Noa will be mailed a copy of her test results.  2.  She plans to follow up with her medical providers as previously discussed.    If Noa has additional questions, I encouraged her to contact me directly at [phone].     Roseann Ridley MS, [facility]  Licensed Genetic Counselor  Office: [phone]  Pager: [phone]

## 2018-03-17 ENCOUNTER — TRANSFERRED RECORDS (OUTPATIENT)
Dept: SURGERY | Facility: CLINIC | Age: 43
End: 2018-03-17

## 2018-03-21 ENCOUNTER — MYC MEDICAL ADVICE (OUTPATIENT)
Dept: FAMILY MEDICINE | Facility: CLINIC | Age: 43
End: 2018-03-21

## 2018-03-21 ENCOUNTER — E-VISIT (OUTPATIENT)
Dept: FAMILY MEDICINE | Facility: CLINIC | Age: 43
End: 2018-03-21
Payer: COMMERCIAL

## 2018-03-21 DIAGNOSIS — F40.243 PHOBIA, FLYING: ICD-10-CM

## 2018-03-21 DIAGNOSIS — Z12.39 ENCOUNTER FOR BREAST CANCER SCREENING OTHER THAN MAMMOGRAM: ICD-10-CM

## 2018-03-21 DIAGNOSIS — Z15.09 BRCA1 POSITIVE: Primary | ICD-10-CM

## 2018-03-21 DIAGNOSIS — Z15.01 BRCA1 POSITIVE: Primary | ICD-10-CM

## 2018-03-21 PROCEDURE — 99444 ZZC PHYSICIAN ONLINE EVALUATION & MANAGEMENT SERVICE: CPT | Performed by: FAMILY MEDICINE

## 2018-03-21 RX ORDER — ALPRAZOLAM 0.5 MG
TABLET ORAL
Refills: 0 | Status: CANCELLED | OUTPATIENT
Start: 2018-03-21

## 2018-03-21 ASSESSMENT — ANXIETY QUESTIONNAIRES
5. BEING SO RESTLESS THAT IT IS HARD TO SIT STILL: NOT AT ALL
7. FEELING AFRAID AS IF SOMETHING AWFUL MIGHT HAPPEN: NOT AT ALL
GAD7 TOTAL SCORE: 4
1. FEELING NERVOUS, ANXIOUS, OR ON EDGE: SEVERAL DAYS
6. BECOMING EASILY ANNOYED OR IRRITABLE: NOT AT ALL
4. TROUBLE RELAXING: SEVERAL DAYS
GAD7 TOTAL SCORE: 4
7. FEELING AFRAID AS IF SOMETHING AWFUL MIGHT HAPPEN: NOT AT ALL
3. WORRYING TOO MUCH ABOUT DIFFERENT THINGS: SEVERAL DAYS
2. NOT BEING ABLE TO STOP OR CONTROL WORRYING: SEVERAL DAYS

## 2018-03-21 NOTE — TELEPHONE ENCOUNTER
Dr. Daley-see [employer] Party message below.  This is one that would be good E-Visit.  Do you want sent as E-Visit?  Juanita Wallace RN

## 2018-03-22 ASSESSMENT — ANXIETY QUESTIONNAIRES: GAD7 TOTAL SCORE: 4

## 2018-03-23 DIAGNOSIS — F40.243 PHOBIA, FLYING: ICD-10-CM

## 2018-03-23 RX ORDER — ALPRAZOLAM 0.5 MG
TABLET ORAL
Qty: 10 TABLET | Refills: 0 | Status: SHIPPED | OUTPATIENT
Start: 2018-03-23 | End: 2018-03-23

## 2018-03-23 RX ORDER — ALPRAZOLAM 0.5 MG
TABLET ORAL
Qty: 10 TABLET | Refills: 0 | Status: SHIPPED | OUTPATIENT
Start: 2018-03-23 | End: 2018-05-21

## 2018-03-23 NOTE — TELEPHONE ENCOUNTER
Partner physician Dr. He wonder if we can print the prescription for xanax for flight phovia here in the clinic for this patient, as she it out of the clinic.  Will print the prescription today.  Brodie Lopez MD  [facility]  [phone]

## 2018-04-03 ENCOUNTER — TRANSFERRED RECORDS (OUTPATIENT)
Dept: SURGERY | Facility: CLINIC | Age: 43
End: 2018-04-03

## 2018-04-10 ENCOUNTER — HOSPITAL ENCOUNTER (OUTPATIENT)
Dept: MRI IMAGING | Facility: CLINIC | Age: 43
Discharge: HOME | End: 2018-04-10
Attending: CLINICAL NURSE SPECIALIST | Admitting: CLINICAL NURSE SPECIALIST
Payer: COMMERCIAL

## 2018-04-10 DIAGNOSIS — Z15.09 BRCA1 POSITIVE: ICD-10-CM

## 2018-04-10 DIAGNOSIS — Z12.39 ENCOUNTER FOR BREAST CANCER SCREENING OTHER THAN MAMMOGRAM: ICD-10-CM

## 2018-04-10 DIAGNOSIS — Z15.01 BRCA1 POSITIVE: ICD-10-CM

## 2018-04-10 PROCEDURE — 25000128 H RX IP 250 OP 636: Performed by: CLINICAL NURSE SPECIALIST

## 2018-04-10 PROCEDURE — A9585 GADOBUTROL INJECTION: HCPCS | Performed by: CLINICAL NURSE SPECIALIST

## 2018-04-10 PROCEDURE — 77059 MR BREAST BILATERAL W/O & W CONTRAST: CPT

## 2018-04-10 RX ORDER — GADOBUTROL 604.72 MG/ML
10 INJECTION INTRAVENOUS ONCE
Status: COMPLETED | OUTPATIENT
Start: 2018-04-10 | End: 2018-04-10

## 2018-04-10 RX ADMIN — GADOBUTROL 9 ML: 604.72 INJECTION INTRAVENOUS at 13:56

## 2018-04-19 ENCOUNTER — TELEPHONE (OUTPATIENT)
Dept: SURGERY | Facility: CLINIC | Age: 43
End: 2018-04-19

## 2018-04-19 NOTE — TELEPHONE ENCOUNTER
Type of surgery: PROPHYLACTIC BILATERAL MASTECTOMY   Location of surgery: [facility]  Date and time of surgery: 5-25-18 AT 1:00 PM   Surgeon: DR. GRIFFITH   Pre-Op Appt Date: PATIENT TO SCHEDULE   Post-Op Appt Date: PATIENT TO SCHEDULE    Packet sent out: YES  Pre-cert/Authorization completed:  Not Applicable  Date: 4-19-18         *coord case* *outpatient overngiht*   PROPHYLACTIC BILATERAL MASTECTOMY (Dr. Perea tf w/ bilat te placement)  GENERAL  PT INST TO HAVE H&P WITH DR. GRAHAM  2 HRS REQ  PA ASSIST [facility]  [facility]

## 2018-05-21 ENCOUNTER — RADIANT APPOINTMENT (OUTPATIENT)
Dept: GENERAL RADIOLOGY | Facility: CLINIC | Age: 43
End: 2018-05-21
Attending: FAMILY MEDICINE
Payer: COMMERCIAL

## 2018-05-21 ENCOUNTER — OFFICE VISIT (OUTPATIENT)
Dept: FAMILY MEDICINE | Facility: CLINIC | Age: 43
End: 2018-05-21
Payer: COMMERCIAL

## 2018-05-21 VITALS
SYSTOLIC BLOOD PRESSURE: 108 MMHG | BODY MASS INDEX: 31.87 KG/M2 | RESPIRATION RATE: 16 BRPM | HEART RATE: 88 BPM | TEMPERATURE: 98.1 F | HEIGHT: 66 IN | WEIGHT: 198.3 LBS | DIASTOLIC BLOOD PRESSURE: 74 MMHG

## 2018-05-21 DIAGNOSIS — Z15.01 BRCA1 GENE MUTATION POSITIVE: ICD-10-CM

## 2018-05-21 DIAGNOSIS — Q21.10 ASD (ATRIAL SEPTAL DEFECT): ICD-10-CM

## 2018-05-21 DIAGNOSIS — Z01.818 PREOP GENERAL PHYSICAL EXAM: Primary | ICD-10-CM

## 2018-05-21 DIAGNOSIS — F41.9 ANXIETY: ICD-10-CM

## 2018-05-21 DIAGNOSIS — G43.909 MIGRAINE WITHOUT STATUS MIGRAINOSUS, NOT INTRACTABLE, UNSPECIFIED MIGRAINE TYPE: ICD-10-CM

## 2018-05-21 DIAGNOSIS — Z15.09 BRCA1 GENE MUTATION POSITIVE: ICD-10-CM

## 2018-05-21 DIAGNOSIS — G47.00 INSOMNIA, UNSPECIFIED TYPE: ICD-10-CM

## 2018-05-21 DIAGNOSIS — F32.0 MILD MAJOR DEPRESSION (H): ICD-10-CM

## 2018-05-21 DIAGNOSIS — L40.50 PSORIATIC ARTHROPATHY (H): ICD-10-CM

## 2018-05-21 DIAGNOSIS — Z01.818 PREOP GENERAL PHYSICAL EXAM: ICD-10-CM

## 2018-05-21 DIAGNOSIS — F98.8 ATTENTION DEFICIT DISORDER (ADD) WITHOUT HYPERACTIVITY: ICD-10-CM

## 2018-05-21 LAB — HGB BLD-MCNC: 13.4 G/DL (ref 11.7–15.7)

## 2018-05-21 PROCEDURE — 99214 OFFICE O/P EST MOD 30 MIN: CPT | Performed by: FAMILY MEDICINE

## 2018-05-21 PROCEDURE — 80048 BASIC METABOLIC PNL TOTAL CA: CPT | Performed by: FAMILY MEDICINE

## 2018-05-21 PROCEDURE — 36415 COLL VENOUS BLD VENIPUNCTURE: CPT | Performed by: FAMILY MEDICINE

## 2018-05-21 PROCEDURE — 85018 HEMOGLOBIN: CPT | Performed by: FAMILY MEDICINE

## 2018-05-21 PROCEDURE — 71046 X-RAY EXAM CHEST 2 VIEWS: CPT

## 2018-05-21 PROCEDURE — 93000 ELECTROCARDIOGRAM COMPLETE: CPT | Performed by: FAMILY MEDICINE

## 2018-05-21 RX ORDER — BUPROPION HYDROCHLORIDE 150 MG/1
150 TABLET ORAL EVERY MORNING
Qty: 30 TABLET | Refills: 6 | Status: SHIPPED | OUTPATIENT
Start: 2018-05-21 | End: 2018-08-21

## 2018-05-21 RX ORDER — TOPIRAMATE 25 MG/1
25 TABLET, FILM COATED ORAL DAILY
Qty: 30 TABLET | Refills: 6 | Status: SHIPPED | OUTPATIENT
Start: 2018-05-21 | End: 2018-08-21

## 2018-05-21 RX ORDER — BUTALBITAL, ACETAMINOPHEN AND CAFFEINE 50; 325; 40 MG/1; MG/1; MG/1
1 TABLET ORAL EVERY 4 HOURS PRN
Qty: 30 TABLET | Refills: 1 | Status: SHIPPED | OUTPATIENT
Start: 2018-05-21 | End: 2019-11-30

## 2018-05-21 RX ORDER — DEXTROAMPHETAMINE SACCHARATE, AMPHETAMINE ASPARTATE, DEXTROAMPHETAMINE SULFATE AND AMPHETAMINE SULFATE 2.5; 2.5; 2.5; 2.5 MG/1; MG/1; MG/1; MG/1
TABLET ORAL
Qty: 90 TABLET | Refills: 0 | Status: ON HOLD | OUTPATIENT
Start: 2018-05-21 | End: 2018-05-25

## 2018-05-21 RX ORDER — ZOLPIDEM TARTRATE 10 MG/1
10 TABLET ORAL
Qty: 30 TABLET | Refills: 3 | Status: SHIPPED | OUTPATIENT
Start: 2018-05-21 | End: 2019-05-28

## 2018-05-21 NOTE — MR AVS SNAPSHOT
After Visit Summary   5/21/2018    Noa Mcgill    MRN: [id number]           Patient Information     Date Of Birth          1975        Visit Information        Provider Department      5/21/2018 5:00 PM Sydnie He MD [facility]        Today's Diagnoses     Preop general physical exam    -  1    Migraine without status migrainosus, not intractable, unspecified migraine type        Attention deficit disorder (ADD) without hyperactivity        Insomnia, unspecified type        ASD (atrial septal defect)        Anxiety        Psoriatic arthropathy (H)        BRCA1 gene mutation positive        Mild major depression (H)          Care Instructions      Before Your Surgery      Call your surgeon if there is any change in your health. This includes signs of a cold or flu (such as a sore throat, runny nose, cough, rash or fever).    Do not smoke, drink alcohol or take over the counter medicine (unless your surgeon or primary care doctor tells you to) for the 24 hours before and after surgery.    If you take prescribed drugs: Follow your doctor s orders about which medicines to take and which to stop until after surgery.    Eating and drinking prior to surgery: follow the instructions from your surgeon    Take a shower or bath the night before surgery. Use the soap your surgeon gave you to gently clean your skin. If you do not have soap from your surgeon, use your regular soap. Do not shave or scrub the surgery site.  Wear clean pajamas and have clean sheets on your bed.           Follow-ups after your visit        Follow-up notes from your care team     Return in about 6 months (around 11/21/2018) for Medication recheck.      Your next 10 appointments already scheduled     May 25, 2018   Procedure with Suraj Bergeron MD   [facility] (--)    [address]  [facility] [address]   [phone]            May 25, 2018  1:00 PM T   [place]  "[facility] Same Day Surgery with Suraj Bergeron MD, Katelin Landeros PA-C   [facility] Surgery [facility]uling ([facility])    [facility] ([facility])   [phone]              Who to contact     If you have questions or need follow up information about today's clinic visit or your schedule please contact [facility] directly at [phone].  Normal or non-critical lab and imaging results will be communicated to you by [employer], letter or phone within 4 business days after the clinic has received the results. If you do not hear from us within 7 days, please contact the clinic through [employer] or phone. If you have a critical or abnormal lab result, we will notify you by phone as soon as possible.  Submit refill requests through [facility] or call your pharmacy and they will forward the refill request to us. Please allow 3 business days for your refill to be completed.          Additional Information About Your Visit        [facility] Information     [facility] gives you secure access to your electronic health record. If you see a primary care provider, you can also send messages to your care team and make appointments. If you have questions, please call your primary care clinic.  If you do not have a primary care provider, please call [phone] and they will assist you.        Care EveryWhere ID     This is your Care EveryWhere ID. This could be used by other organizations to access your [place] medical records  [id number]        Your Vitals Were     Pulse Temperature Respirations Height Last Period Breastfeeding?    88 98.1  F (36.7  C) (Oral) 16 5' 6\" (1.676 m) 02/08/2015 (Within Months) No    BMI (Body Mass Index)                   32.01 kg/m2            Blood Pressure from Last 3 Encounters:   05/21/18 108/74   03/01/18 100/68   02/10/18 119/81    Weight from Last 3 Encounters:   05/21/18 198 lb 4.8 oz (89.9 kg)   03/01/18 196 lb " (88.9 kg)   02/10/18 208 lb (94.3 kg)              We Performed the Following     Basic metabolic panel     EKG 12-lead complete w/read - Clinics     Hemoglobin          Today's Medication Changes          These changes are accurate as of 5/21/18  6:29 PM.  If you have any questions, ask your nurse or doctor.               These medicines have changed or have updated prescriptions.        Dose/Directions    topiramate 25 MG tablet   Commonly known as:  TOPAMAX   This may have changed:  when to take this   Used for:  Migraine without status migrainosus, not intractable, unspecified migraine type   Changed by:  Sydnie He MD        Dose:  25 mg   Take 1 tablet (25 mg) by mouth daily   Quantity:  30 tablet   Refills:  6            Where to get your medicines      [place] [facility] [id number] [place] [address]  [address] [facility] [address]     Phone:  [phone]     buPROPion 150 MG 24 hr tablet    topiramate 25 MG tablet         Some of these will need a paper prescription and others can be bought over the counter.  Ask your nurse if you have questions.     Bring a paper prescription for each of these medications     amphetamine-dextroamphetamine 10 MG per tablet    butalbital-acetaminophen-caffeine -40 MG per tablet    zolpidem 10 MG tablet                Primary Care Provider Office Phone # Fax #    Sydnie He [phone] [address] [facility] [address]        Equal Access to Services     [facility]: Hadii aad ku hadasho Soomaali, waaxda luqadaha, qaybta kaalmada adeegyada, lisa lee haycolumba rivera . So [facility] [phone].    ATENCIÓN: Si habla español, tiene a horton disposición servicios gratuitos de asistencia lingüística. Roge al [phone].    We comply with applicable federal civil rights laws and [place] laws. We do not discriminate on the basis of race, color, national origin, age,  disability, sex, sexual orientation, or gender identity.            Thank you!     Thank you for choosing [facility]  for your care. Our goal is always to provide you with excellent care. Hearing back from our patients is one way we can continue to improve our services. Please take a few minutes to complete the written survey that you may receive in the mail after your visit with us. Thank you!             Your Updated Medication List - Protect others around you: Learn how to safely use, store and throw away your medicines at www.disposemymeds.org.          This list is accurate as of 5/21/18  6:29 PM.  Always use your most recent med list.                   Brand Name Dispense Instructions for use Diagnosis    amphetamine-dextroamphetamine 10 MG per tablet    ADDERALL    90 tablet    Take 1 tablet (10 mg) by mouth in AM and take 2 tablets (20 mg) by mouth in PM    Attention deficit disorder (ADD) without hyperactivity       aspirin 81 MG tablet      Take 81 mg by mouth daily        buPROPion 150 MG 24 hr tablet    WELLBUTRIN XL    30 tablet    Take 1 tablet (150 mg) by mouth every morning    Anxiety, Mild major depression (H)       butalbital-acetaminophen-caffeine -40 MG per tablet    FIORICET/ESGIC    30 tablet    Take 1 tablet by mouth every 4 hours as needed    Migraine without status migrainosus, not intractable, unspecified migraine type       NUVARING 0.12-0.015 MG/24HR vaginal ring   Generic drug:  etonogestrel-ethinyl estradiol      Place 1 each vaginally every 28 days        rizatriptan 10 MG tablet    MAXALT     TAKE 1 TABLET BY MOUTH ONCE, MAY REPEAT IN 2 HRS. MAX 30MG/24HRS        topiramate 25 MG tablet    TOPAMAX    30 tablet    Take 1 tablet (25 mg) by mouth daily    Migraine without status migrainosus, not intractable, unspecified migraine type       VITAMIN D PO      Take 2,000 Units by mouth daily.        zolpidem 10 MG tablet    AMBIEN    30 tablet    Take 1 tablet (10  mg) by mouth nightly as needed for sleep    Insomnia, unspecified type
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
## 2018-05-22 LAB
ANION GAP SERPL CALCULATED.3IONS-SCNC: 13 MMOL/L (ref 3–14)
BUN SERPL-MCNC: 11 MG/DL (ref 7–30)
CALCIUM SERPL-MCNC: 8.9 MG/DL (ref 8.5–10.1)
CHLORIDE SERPL-SCNC: 108 MMOL/L (ref 94–109)
CO2 SERPL-SCNC: 19 MMOL/L (ref 20–32)
CREAT SERPL-MCNC: 0.81 MG/DL (ref 0.52–1.04)
GFR SERPL CREATININE-BSD FRML MDRD: 78 ML/MIN/1.7M2
GLUCOSE SERPL-MCNC: 82 MG/DL (ref 70–99)
POTASSIUM SERPL-SCNC: 3.7 MMOL/L (ref 3.4–5.3)
SODIUM SERPL-SCNC: 140 MMOL/L (ref 133–144)
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
## 2018-05-25 ENCOUNTER — HOSPITAL ENCOUNTER (INPATIENT)
Facility: CLINIC | Age: 43
LOS: 1 days | Discharge: HOME | DRG: 585 | End: 2018-05-26
Attending: SURGERY | Admitting: SURGERY
Payer: COMMERCIAL

## 2018-05-25 ENCOUNTER — ANESTHESIA (OUTPATIENT)
Dept: SURGERY | Facility: CLINIC | Age: 43
DRG: 585 | End: 2018-05-25
Payer: COMMERCIAL

## 2018-05-25 ENCOUNTER — ANESTHESIA EVENT (OUTPATIENT)
Dept: SURGERY | Facility: CLINIC | Age: 43
DRG: 585 | End: 2018-05-25
Payer: COMMERCIAL

## 2018-05-25 ENCOUNTER — APPOINTMENT (OUTPATIENT)
Dept: SURGERY | Facility: PHYSICIAN GROUP | Age: 43
End: 2018-05-25
Payer: COMMERCIAL

## 2018-05-25 DIAGNOSIS — Z90.13 S/P MASTECTOMY, BILATERAL: Primary | ICD-10-CM

## 2018-05-25 LAB
CREAT SERPL-MCNC: 0.82 MG/DL (ref 0.52–1.04)
GFR SERPL CREATININE-BSD FRML MDRD: 77 ML/MIN/1.7M2
POTASSIUM SERPL-SCNC: 3.7 MMOL/L (ref 3.4–5.3)

## 2018-05-25 PROCEDURE — 25000128 H RX IP 250 OP 636: Performed by: ANESTHESIOLOGY

## 2018-05-25 PROCEDURE — 36000058 ZZH SURGERY LEVEL 3 EA 15 ADDTL MIN: Performed by: SURGERY

## 2018-05-25 PROCEDURE — 36415 COLL VENOUS BLD VENIPUNCTURE: CPT | Performed by: ANESTHESIOLOGY

## 2018-05-25 PROCEDURE — 0HHV0NZ INSERTION OF TISSUE EXPANDER INTO BILATERAL BREAST, OPEN APPROACH: ICD-10-PCS | Performed by: PLASTIC SURGERY

## 2018-05-25 PROCEDURE — 84132 ASSAY OF SERUM POTASSIUM: CPT | Performed by: ANESTHESIOLOGY

## 2018-05-25 PROCEDURE — 25000125 ZZHC RX 250

## 2018-05-25 PROCEDURE — 71000012 ZZH RECOVERY PHASE 1 LEVEL 1 FIRST HR: Performed by: SURGERY

## 2018-05-25 PROCEDURE — 0HTV0ZZ RESECTION OF BILATERAL BREAST, OPEN APPROACH: ICD-10-PCS | Performed by: SURGERY

## 2018-05-25 PROCEDURE — 19303 MAST SIMPLE COMPLETE: CPT | Mod: AS | Performed by: PHYSICIAN ASSISTANT

## 2018-05-25 PROCEDURE — 25000128 H RX IP 250 OP 636

## 2018-05-25 PROCEDURE — 25000566 ZZH SEVOFLURANE, EA 15 MIN: Performed by: SURGERY

## 2018-05-25 PROCEDURE — 25800025 ZZH RX 258: Performed by: SURGERY

## 2018-05-25 PROCEDURE — 25800025 ZZH RX 258: Performed by: PLASTIC SURGERY

## 2018-05-25 PROCEDURE — 82565 ASSAY OF CREATININE: CPT | Performed by: ANESTHESIOLOGY

## 2018-05-25 PROCEDURE — 25000125 ZZHC RX 250: Performed by: NURSE ANESTHETIST, CERTIFIED REGISTERED

## 2018-05-25 PROCEDURE — 25000132 ZZH RX MED GY IP 250 OP 250 PS 637: Performed by: PLASTIC SURGERY

## 2018-05-25 PROCEDURE — 27210794 ZZH OR GENERAL SUPPLY STERILE: Performed by: SURGERY

## 2018-05-25 PROCEDURE — 19303 MAST SIMPLE COMPLETE: CPT | Mod: 50 | Performed by: SURGERY

## 2018-05-25 PROCEDURE — 25000125 ZZHC RX 250: Performed by: PLASTIC SURGERY

## 2018-05-25 PROCEDURE — 27810169 ZZH OR IMPLANT GENERAL: Performed by: SURGERY

## 2018-05-25 PROCEDURE — 25000128 H RX IP 250 OP 636: Performed by: SURGERY

## 2018-05-25 PROCEDURE — 25000125 ZZHC RX 250: Performed by: ANESTHESIOLOGY

## 2018-05-25 PROCEDURE — 25000128 H RX IP 250 OP 636: Performed by: PLASTIC SURGERY

## 2018-05-25 PROCEDURE — 37000009 ZZH ANESTHESIA TECHNICAL FEE, EACH ADDTL 15 MIN: Performed by: SURGERY

## 2018-05-25 PROCEDURE — 25000125 ZZHC RX 250: Performed by: SURGERY

## 2018-05-25 PROCEDURE — 37000008 ZZH ANESTHESIA TECHNICAL FEE, 1ST 30 MIN: Performed by: SURGERY

## 2018-05-25 PROCEDURE — 71000013 ZZH RECOVERY PHASE 1 LEVEL 1 EA ADDTL HR: Performed by: SURGERY

## 2018-05-25 PROCEDURE — 40000170 ZZH STATISTIC PRE-PROCEDURE ASSESSMENT II: Performed by: SURGERY

## 2018-05-25 PROCEDURE — 36000056 ZZH SURGERY LEVEL 3 1ST 30 MIN: Performed by: SURGERY

## 2018-05-25 PROCEDURE — 88307 TISSUE EXAM BY PATHOLOGIST: CPT | Performed by: SURGERY

## 2018-05-25 PROCEDURE — 88307 TISSUE EXAM BY PATHOLOGIST: CPT | Mod: 26 | Performed by: SURGERY

## 2018-05-25 PROCEDURE — 12000007 ZZH R&B INTERMEDIATE

## 2018-05-25 PROCEDURE — 25000128 H RX IP 250 OP 636: Performed by: NURSE ANESTHETIST, CERTIFIED REGISTERED

## 2018-05-25 DEVICE — IMPLANTABLE DEVICE: Type: IMPLANTABLE DEVICE | Site: BREAST | Status: FUNCTIONAL

## 2018-05-25 RX ORDER — MORPHINE SULFATE 1 MG/ML
1-4 INJECTION, SOLUTION EPIDURAL; INTRATHECAL; INTRAVENOUS
Status: DISCONTINUED | OUTPATIENT
Start: 2018-05-25 | End: 2018-05-25

## 2018-05-25 RX ORDER — LIDOCAINE HYDROCHLORIDE 20 MG/ML
INJECTION, SOLUTION INFILTRATION; PERINEURAL PRN
Status: DISCONTINUED | OUTPATIENT
Start: 2018-05-25 | End: 2018-05-25

## 2018-05-25 RX ORDER — GLYCOPYRROLATE 0.2 MG/ML
INJECTION, SOLUTION INTRAMUSCULAR; INTRAVENOUS PRN
Status: DISCONTINUED | OUTPATIENT
Start: 2018-05-25 | End: 2018-05-25

## 2018-05-25 RX ORDER — BUPIVACAINE HYDROCHLORIDE AND EPINEPHRINE 2.5; 5 MG/ML; UG/ML
INJECTION, SOLUTION INFILTRATION; PERINEURAL PRN
Status: DISCONTINUED | OUTPATIENT
Start: 2018-05-25 | End: 2018-05-25 | Stop reason: HOSPADM

## 2018-05-25 RX ORDER — ONDANSETRON 4 MG/1
4 TABLET, ORALLY DISINTEGRATING ORAL EVERY 30 MIN PRN
Status: DISCONTINUED | OUTPATIENT
Start: 2018-05-25 | End: 2018-05-25 | Stop reason: HOSPADM

## 2018-05-25 RX ORDER — NALOXONE HYDROCHLORIDE 0.4 MG/ML
.1-.4 INJECTION, SOLUTION INTRAMUSCULAR; INTRAVENOUS; SUBCUTANEOUS
Status: DISCONTINUED | OUTPATIENT
Start: 2018-05-25 | End: 2018-05-26 | Stop reason: HOSPADM

## 2018-05-25 RX ORDER — ACETAMINOPHEN 325 MG/1
325 TABLET ORAL EVERY 4 HOURS PRN
Status: DISCONTINUED | OUTPATIENT
Start: 2018-05-25 | End: 2018-05-26 | Stop reason: HOSPADM

## 2018-05-25 RX ORDER — HYDROMORPHONE HYDROCHLORIDE 1 MG/ML
.3-.5 INJECTION, SOLUTION INTRAMUSCULAR; INTRAVENOUS; SUBCUTANEOUS EVERY 5 MIN PRN
Status: DISCONTINUED | OUTPATIENT
Start: 2018-05-25 | End: 2018-05-25 | Stop reason: HOSPADM

## 2018-05-25 RX ORDER — NALOXONE HYDROCHLORIDE 0.4 MG/ML
.1-.4 INJECTION, SOLUTION INTRAMUSCULAR; INTRAVENOUS; SUBCUTANEOUS
Status: DISCONTINUED | OUTPATIENT
Start: 2018-05-25 | End: 2018-05-25

## 2018-05-25 RX ORDER — PROPOFOL 10 MG/ML
INJECTION, EMULSION INTRAVENOUS PRN
Status: DISCONTINUED | OUTPATIENT
Start: 2018-05-25 | End: 2018-05-25

## 2018-05-25 RX ORDER — FENTANYL CITRATE 50 UG/ML
INJECTION, SOLUTION INTRAMUSCULAR; INTRAVENOUS PRN
Status: DISCONTINUED | OUTPATIENT
Start: 2018-05-25 | End: 2018-05-25

## 2018-05-25 RX ORDER — ONDANSETRON 2 MG/ML
4 INJECTION INTRAMUSCULAR; INTRAVENOUS EVERY 6 HOURS PRN
Status: DISCONTINUED | OUTPATIENT
Start: 2018-05-25 | End: 2018-05-26 | Stop reason: HOSPADM

## 2018-05-25 RX ORDER — NEOSTIGMINE METHYLSULFATE 1 MG/ML
VIAL (ML) INJECTION PRN
Status: DISCONTINUED | OUTPATIENT
Start: 2018-05-25 | End: 2018-05-25

## 2018-05-25 RX ORDER — KETOROLAC TROMETHAMINE 30 MG/ML
30 INJECTION, SOLUTION INTRAMUSCULAR; INTRAVENOUS ONCE
Status: COMPLETED | OUTPATIENT
Start: 2018-05-25 | End: 2018-05-25

## 2018-05-25 RX ORDER — ONDANSETRON 2 MG/ML
4 INJECTION INTRAMUSCULAR; INTRAVENOUS EVERY 30 MIN PRN
Status: DISCONTINUED | OUTPATIENT
Start: 2018-05-25 | End: 2018-05-25 | Stop reason: HOSPADM

## 2018-05-25 RX ORDER — KETOROLAC TROMETHAMINE 30 MG/ML
30 INJECTION, SOLUTION INTRAMUSCULAR; INTRAVENOUS EVERY 6 HOURS PRN
Status: DISCONTINUED | OUTPATIENT
Start: 2018-05-25 | End: 2018-05-26 | Stop reason: HOSPADM

## 2018-05-25 RX ORDER — ONDANSETRON 2 MG/ML
INJECTION INTRAMUSCULAR; INTRAVENOUS PRN
Status: DISCONTINUED | OUTPATIENT
Start: 2018-05-25 | End: 2018-05-25

## 2018-05-25 RX ORDER — PROPOFOL 10 MG/ML
INJECTION, EMULSION INTRAVENOUS CONTINUOUS PRN
Status: DISCONTINUED | OUTPATIENT
Start: 2018-05-25 | End: 2018-05-25

## 2018-05-25 RX ORDER — MORPHINE SULFATE 4 MG/ML
1-2 INJECTION, SOLUTION INTRAMUSCULAR; INTRAVENOUS
Status: DISCONTINUED | OUTPATIENT
Start: 2018-05-25 | End: 2018-05-26 | Stop reason: HOSPADM

## 2018-05-25 RX ORDER — CEFAZOLIN SODIUM 1 G/3ML
1 INJECTION, POWDER, FOR SOLUTION INTRAMUSCULAR; INTRAVENOUS SEE ADMIN INSTRUCTIONS
Status: DISCONTINUED | OUTPATIENT
Start: 2018-05-25 | End: 2018-05-25 | Stop reason: HOSPADM

## 2018-05-25 RX ORDER — CEFAZOLIN SODIUM 2 G/100ML
2 INJECTION, SOLUTION INTRAVENOUS
Status: COMPLETED | OUTPATIENT
Start: 2018-05-25 | End: 2018-05-25

## 2018-05-25 RX ORDER — SODIUM CHLORIDE, SODIUM LACTATE, POTASSIUM CHLORIDE, CALCIUM CHLORIDE 600; 310; 30; 20 MG/100ML; MG/100ML; MG/100ML; MG/100ML
INJECTION, SOLUTION INTRAVENOUS CONTINUOUS
Status: DISCONTINUED | OUTPATIENT
Start: 2018-05-25 | End: 2018-05-25 | Stop reason: HOSPADM

## 2018-05-25 RX ORDER — FENTANYL CITRATE 50 UG/ML
25-50 INJECTION, SOLUTION INTRAMUSCULAR; INTRAVENOUS
Status: DISCONTINUED | OUTPATIENT
Start: 2018-05-25 | End: 2018-05-25 | Stop reason: HOSPADM

## 2018-05-25 RX ORDER — DEXAMETHASONE SODIUM PHOSPHATE 4 MG/ML
INJECTION, SOLUTION INTRA-ARTICULAR; INTRALESIONAL; INTRAMUSCULAR; INTRAVENOUS; SOFT TISSUE PRN
Status: DISCONTINUED | OUTPATIENT
Start: 2018-05-25 | End: 2018-05-25

## 2018-05-25 RX ORDER — DOCUSATE SODIUM 100 MG/1
100 CAPSULE, LIQUID FILLED ORAL 2 TIMES DAILY
Status: DISCONTINUED | OUTPATIENT
Start: 2018-05-25 | End: 2018-05-26 | Stop reason: HOSPADM

## 2018-05-25 RX ORDER — OXYCODONE HYDROCHLORIDE 5 MG/1
5 TABLET ORAL EVERY 4 HOURS PRN
Status: DISCONTINUED | OUTPATIENT
Start: 2018-05-25 | End: 2018-05-26 | Stop reason: HOSPADM

## 2018-05-25 RX ADMIN — KETOROLAC TROMETHAMINE 30 MG: 30 INJECTION, SOLUTION INTRAMUSCULAR at 16:58

## 2018-05-25 RX ADMIN — MORPHINE SULFATE 2 MG: 4 INJECTION INTRAVENOUS at 20:41

## 2018-05-25 RX ADMIN — FENTANYL CITRATE 50 MCG: 50 INJECTION, SOLUTION INTRAMUSCULAR; INTRAVENOUS at 16:13

## 2018-05-25 RX ADMIN — PHENYLEPHRINE HYDROCHLORIDE 100 MCG: 10 INJECTION, SOLUTION INTRAMUSCULAR; INTRAVENOUS; SUBCUTANEOUS at 14:15

## 2018-05-25 RX ADMIN — ONDANSETRON 4 MG: 2 INJECTION INTRAMUSCULAR; INTRAVENOUS at 23:26

## 2018-05-25 RX ADMIN — KETOROLAC TROMETHAMINE 30 MG: 30 INJECTION, SOLUTION INTRAMUSCULAR at 22:27

## 2018-05-25 RX ADMIN — SODIUM CHLORIDE, POTASSIUM CHLORIDE, SODIUM LACTATE AND CALCIUM CHLORIDE: 600; 310; 30; 20 INJECTION, SOLUTION INTRAVENOUS at 17:16

## 2018-05-25 RX ADMIN — ONDANSETRON 4 MG: 2 INJECTION INTRAMUSCULAR; INTRAVENOUS at 17:01

## 2018-05-25 RX ADMIN — DOCUSATE SODIUM 100 MG: 100 CAPSULE, LIQUID FILLED ORAL at 20:51

## 2018-05-25 RX ADMIN — FENTANYL CITRATE 100 MCG: 50 INJECTION, SOLUTION INTRAMUSCULAR; INTRAVENOUS at 13:02

## 2018-05-25 RX ADMIN — LIDOCAINE HYDROCHLORIDE 0.5 ML: 10 INJECTION, SOLUTION EPIDURAL; INFILTRATION; INTRACAUDAL; PERINEURAL at 12:48

## 2018-05-25 RX ADMIN — SODIUM CHLORIDE, POTASSIUM CHLORIDE, SODIUM LACTATE AND CALCIUM CHLORIDE: 600; 310; 30; 20 INJECTION, SOLUTION INTRAVENOUS at 14:38

## 2018-05-25 RX ADMIN — FENTANYL CITRATE 50 MCG: 50 INJECTION, SOLUTION INTRAMUSCULAR; INTRAVENOUS at 17:10

## 2018-05-25 RX ADMIN — MORPHINE SULFATE 2 MG: 4 INJECTION INTRAVENOUS at 23:26

## 2018-05-25 RX ADMIN — PROPOFOL 200 MG: 10 INJECTION, EMULSION INTRAVENOUS at 13:02

## 2018-05-25 RX ADMIN — HYDROMORPHONE HYDROCHLORIDE 0.5 MG: 1 INJECTION, SOLUTION INTRAMUSCULAR; INTRAVENOUS; SUBCUTANEOUS at 13:31

## 2018-05-25 RX ADMIN — PHENYLEPHRINE HYDROCHLORIDE 50 MCG: 10 INJECTION, SOLUTION INTRAMUSCULAR; INTRAVENOUS; SUBCUTANEOUS at 14:36

## 2018-05-25 RX ADMIN — NEOSTIGMINE METHYLSULFATE 4 MG: 1 INJECTION, SOLUTION INTRAVENOUS at 15:52

## 2018-05-25 RX ADMIN — MIDAZOLAM 2 MG: 1 INJECTION INTRAMUSCULAR; INTRAVENOUS at 12:55

## 2018-05-25 RX ADMIN — CEFAZOLIN SODIUM 2 G: 2 INJECTION, SOLUTION INTRAVENOUS at 13:02

## 2018-05-25 RX ADMIN — CEFAZOLIN SODIUM 1 G: 2 INJECTION, SOLUTION INTRAVENOUS at 15:00

## 2018-05-25 RX ADMIN — PROPOFOL 100 MCG/KG/MIN: 10 INJECTION, EMULSION INTRAVENOUS at 13:06

## 2018-05-25 RX ADMIN — SODIUM CHLORIDE, POTASSIUM CHLORIDE, SODIUM LACTATE AND CALCIUM CHLORIDE: 600; 310; 30; 20 INJECTION, SOLUTION INTRAVENOUS at 12:44

## 2018-05-25 RX ADMIN — MIDAZOLAM HYDROCHLORIDE 2 MG: 1 INJECTION, SOLUTION INTRAMUSCULAR; INTRAVENOUS at 12:44

## 2018-05-25 RX ADMIN — LIDOCAINE HYDROCHLORIDE 100 MG: 20 INJECTION, SOLUTION INFILTRATION; PERINEURAL at 13:02

## 2018-05-25 RX ADMIN — DEXMEDETOMIDINE HYDROCHLORIDE 4 MCG: 100 INJECTION, SOLUTION INTRAVENOUS at 14:56

## 2018-05-25 RX ADMIN — ONDANSETRON 4 MG: 2 INJECTION INTRAMUSCULAR; INTRAVENOUS at 15:41

## 2018-05-25 RX ADMIN — DEXMEDETOMIDINE HYDROCHLORIDE 8 MCG: 100 INJECTION, SOLUTION INTRAVENOUS at 14:08

## 2018-05-25 RX ADMIN — DEXAMETHASONE SODIUM PHOSPHATE 4 MG: 4 INJECTION, SOLUTION INTRA-ARTICULAR; INTRALESIONAL; INTRAMUSCULAR; INTRAVENOUS; SOFT TISSUE at 13:15

## 2018-05-25 RX ADMIN — HYDROMORPHONE HYDROCHLORIDE 0.5 MG: 1 INJECTION, SOLUTION INTRAMUSCULAR; INTRAVENOUS; SUBCUTANEOUS at 16:26

## 2018-05-25 RX ADMIN — FENTANYL CITRATE 50 MCG: 50 INJECTION, SOLUTION INTRAMUSCULAR; INTRAVENOUS at 16:36

## 2018-05-25 RX ADMIN — PHENYLEPHRINE HYDROCHLORIDE 50 MCG: 10 INJECTION, SOLUTION INTRAMUSCULAR; INTRAVENOUS; SUBCUTANEOUS at 14:30

## 2018-05-25 RX ADMIN — GLYCOPYRROLATE 0.6 MG: 0.2 INJECTION, SOLUTION INTRAMUSCULAR; INTRAVENOUS at 15:52

## 2018-05-25 RX ADMIN — ROCURONIUM BROMIDE 50 MG: 10 INJECTION INTRAVENOUS at 13:02

## 2018-05-25 NOTE — PROGRESS NOTES
Admission medication history interview status for the 5/25/2018  admission is complete. See EPIC admission navigator for prior to admission medications     Medication history source reliability:Good    Medication history interview source(s):Patient    Medication history resources (including written lists, pill bottles, clinic record):None    Primary pharmacy.[facility]    Additional medication history information not noted on PTA med list :None    Time spent in this activity: 40 minutes    Prior to Admission medications    Medication Sig Last Dose Taking? Auth Provider   Amphetamine-Dextroamphetamine (ADDERALL PO) Take 10-20 mg by mouth 2 times daily as needed (When in school) 5/18/2018 at AM Yes Reported, Patient   aspirin 81 MG tablet Take 81 mg by mouth daily 5/18/2018 at AM Yes Unknown, Entered By History   buPROPion (WELLBUTRIN XL) 150 MG 24 hr tablet Take 1 tablet (150 mg) by mouth every morning 5/25/2018 at 0750 Yes Sydnie He MD   butalbital-acetaminophen-caffeine (FIORICET/ESGIC) -40 MG per tablet Take 1 tablet by mouth every 4 hours as needed More than a month at PRN Yes Sydnie He MD   etonogestrel-ethinyl estradiol (NUVARING) 0.12-0.015 MG/24HR vaginal ring Place 1 each vaginally every 28 days Currently in place Yes Reported, Patient   Ibuprofen (ADVIL PO) Take 600 mg by mouth 2 times daily as needed for moderate pain 5/21/2018 at PRN Yes Reported, Patient   rizatriptan (MAXALT) 10 MG tablet TAKE 1 TABLET BY MOUTH ONCE, MAY REPEAT IN 2 HRS. MAX 30MG/24HRS More than a month at PRN Yes Reported, Patient   VITAMIN D PO Take 2,000 Units by mouth daily. 5/24/2018 at AM Yes Reported, Patient   zolpidem (AMBIEN) 10 MG tablet Take 1 tablet (10 mg) by mouth nightly as needed for sleep 5/15/2018 at PM Yes Sydnie He MD   topiramate (TOPAMAX) 25 MG tablet Take 1 tablet (25 mg) by mouth daily 5/21/2018  Sydnie He MD

## 2018-05-25 NOTE — ANESTHESIA CARE TRANSFER NOTE
Patient: Noa Mcgill    Procedure(s):  PROPHYLACTIC BILATERAL MASTECTOMY (GLADIS) BILATERAL BREAST RECONSTRUCTION [facility] TISSUE EXPANDERS (WILLOW)    - Wound Class: I-Clean   - Wound Class: I-Clean    Diagnosis: STATUS POST RIGHT BREAST CANCER   Diagnosis Additional Information: No value filed.    Anesthesia Type:   General, ETT     Note:  Airway :Nasal Cannula  Patient transferred to:PACU  Comments: To recovery room. Vss. Report given to RN assuming care of ptHandoff Report: Identifed the Patient, Identified the Reponsible Provider, Reviewed the pertinent medical history, Discussed the surgical course, Reviewed Intra-OP anesthesia mangement and issues during anesthesia, Set expectations for post-procedure period and Allowed opportunity for questions and acknowledgement of understanding      Vitals: (Last set prior to Anesthesia Care Transfer)    CRNA VITALS  5/25/2018 1531 - 5/25/2018 1609      5/25/2018             Pulse: 93    SpO2: 100 %    Resp Rate (observed): (!)  5    Resp Rate (set): 10                Electronically Signed By: ZULEMA Sanderson CRNA  May 25, 2018  4:09 PM

## 2018-05-25 NOTE — PROGRESS NOTES
Pt can discharge tonight if pain adequately controlled, otherwise discharge for tomorrow.   has been given paper scripts for oxycodone and flexeril.

## 2018-05-25 NOTE — OP NOTE
General Surgery Operative Note      Pre-operative diagnosis: Genetically increased risk of breast cancer   Post-operative diagnosis: Same    Procedure: Bilateral total mastectomies;   Surgeon: Suraj Bergeron MD   Assistant(s): Everardo Saez PA-C  The Physician Assistant was medically necessary for their expertise in prepping, camera management, suctioning, suturing and retraction.   Anesthesia: General    Estimated blood loss:  Complications: 50 cc  None   Specimens:   ID Type Source Tests Collected by Time Destination   A : LEFT BREAST, SUTURE AT 12 O'CLOCK Tissue Breast, Left SURGICAL PATHOLOGY EXAM Suraj Bergeron MD 5/25/2018  1:55 PM    B : RIGHT BREAST, SUTURE AT 12 O'CLOCK Tissue Breast, Right SURGICAL PATHOLOGY EXAM Suraj Bergeron MD 5/25/2018  2:29 PM           DESCRIPTION OF PROCEDURE:  The patient was taken to the operating room and placed on the table in supine position.  The bilateral chest, breasts, and axillae were prepped and draped in standard sterile fashion.  A pause was performed.  A vertical incision as outlined by plastic surgery to include the nipple but preserve most of the areola on the left was made.  The incision was carried down into the subcutaneous tissue.  Flaps were raised superiorly, medially, inferiorly and laterally down to the chest wall. Medial perforators were preserved.   Hemostasis was maintained with electrocautery.  The breast was then lifted from the pectoralis muscle preserving the pectoralis fascia using electrocautery.  The breast was marked with a suture at 12 o'clock. The breast was passed off the field as specimen.  We inspected the field carefully for hemostasis and irrigated with sterile saline.  Once we were satisfied, we packed the wound with a saline-soaked lap.    The same mastectomy procedure was performed on the right.  The breast tissue extended to the dermis in the upper outer quadrant necessitating removal of a small amount of skin that was closed  with 3-0 subcuticular vicryl. The field was inspected for hemostasis.  Hemostasis was assured, the field was irrigated and packed with a saline soaked lap and the operation was turned over to plastic surgery.  The patient tolerated the procedure well.  Sponge and instrument counts were correct.     Suraj Bergeron MD

## 2018-05-25 NOTE — IP AVS SNAPSHOT
MRN:[id number]                      After Visit Summary   5/25/2018    Noa Mcgill    MRN: [id number]           Thank you!     Thank you for choosing [facility] for your care. Our goal is always to provide you with excellent care. Hearing back from our patients is one way we can continue to improve our services. Please take a few minutes to complete the written survey that you may receive in the mail after you visit with us. Thank you!        Patient Information     Date Of Birth          1975        Designated Caregiver       Most Recent Value    Caregiver    Will someone help with your care after discharge? yes    Name of designated caregiver Isaiah-    Phone number of caregiver [phone]    Caregiver address [address] [place] [address]      About your hospital stay     You were admitted on:  May 25, 2018 You last received care in the:  [facility] [facility]    You were discharged on:  May 26, 2018       Who to Call     For medical emergencies, please call 911.  For non-urgent questions about your medical care, please call your primary care provider or clinic, [phone]  For questions related to your surgery, please call your surgery clinic        Attending Provider     Provider Specialty    Suraj Bergeron MD General Surgery    Javier Perea MD Plastic Surgery       Primary Care Provider Office Phone # Fax #    Sydnie He [phone]      After Care Instructions     Activity       Sleep on back for 2 weeks, avoid overhead reaching.            Drain care       Strip and empty shahnaz drains three times a day, record output            Dressing       Do not disturb dressings, ok to loosen ace wrap if constrictive.            Shower       No showering until drains are out. Sponge bath only                  Follow-up Appointments     Follow Up       Pt has Thursday appt with dr. perea                  Future tests that were ordered for  you     CATH INDW SURESH 2 WAY SILICN       Pt will self remove pod 2                  Further instructions from your care team         Discharge Instructions: Post-Operative Mastectomy    ACTIVITY    Take frequent, short walks and increase your activity gradually.      Avoid strenuous physical activity or heavy lifting greater than 15-20 lbs. for 2 weeks on the side of surgery.  You may climb stairs.     Gentle rotation and stretching of your arms and shoulders will prevent joint stiffness.    You may drive without restrictions when you are not using any prescription pain medication and feel comfortable in a car.    You may return to work/school when you are comfortable without any prescription pain medication.    WOUND CARE    You may remove your outer dressing and shower 48 hours after the surgery.  Pat your incisions dry and leave them open to air.  Re-apply dressing (gauze/tape) as needed for comfort or drainage.    You may have steri-strips (looks like white tape) or staples at your incisions.  You may peel off the steri-strips 2 weeks after your surgery if they have not peeled off on their own.  If you have staples, they will be removed at your next office visit.    Do not soak your incisions in a tub or pool for 2 weeks.     Do not apply any lotions, creams, or ointments to your incisions.    A ridge under your incisions is normal and will gradually resolve.    Wear the mastectomy camisole for comfort.    You may have 1-2 drains at your surgical site, refer to your drain care instructions.  Record output totals daily.  You will need to schedule an appointment for drain removal when your output is less than 30 mL per day for 1-2 days or 2 weeks, whichever comes first.    DIET    Start with liquids, then gradually resume your regular diet as tolerated.     Drink plenty of fluids to stay hydrated.    PAIN    Expect some tenderness and discomfort at the incision site(s).  Use the prescribed pain medication at  "your discretion.  Expect gradual resolution of your pain over several days.    You may take ibuprofen with food (unless you have been told not to) instead of or in addition to your prescribed pain medication.  If you are taking Norco or Percocet, do not take any additional acetaminophen/APAP/Tylenol.    Do not drink alcohol or drive while you are taking pain medications.    You may apply ice to your incisions in 20 minute intervals as needed for the next 48 hours.  After that time, consider switching to heat if you prefer.    EXPECTATIONS    Pain medications can cause constipation.  Limit use when possible.  Take over the counter stool softener/stimulant, such as Colace or Senna, 1-2 times a day with plenty of water.  You may take a mild over the counter laxative, such as Miralax or a suppository, as needed.      You may discontinue these medications once you are having regular bowel movements and/or are no longer taking your narcotic pain medication.    RETURN APPOINTMENT    Follow up with our office when your drain is ready to be removed.      CALL OUR OFFICE -[phone] IF YOU HAVE:     Chills or fever above 101 F.    Increased redness, warmth, or drainage at your incisions.    Significant bleeding or swelling.    Pain not relieved by your pain medication or rest.    Increasing pain after the first 48 hours.    Any other concerns or questions.    Revised May 2018    Pending Results     Date and Time Order Name Status Description    5/25/2018 1355 Surgical pathology exam In process             Admission Information     Date & Time Provider Department Dept. Phone    5/25/2018 Javier Perea MD [facility] [facility] [phone]      Your Vitals Were     Blood Pressure Pulse Temperature Respirations Height Weight    110/64 (BP Location: Right arm) 65 98.8  F (37.1  C) (Oral) 16 1.676 m (5' 6\") 88 kg (194 lb)    Last Period Pulse Oximetry BMI (Body Mass Index)             02/08/2015 " (Within Months) 98% 31.31 kg/m2         [facility] Information     [facility] gives you secure access to your electronic health record. If you see a primary care provider, you can also send messages to your care team and make appointments. If you have questions, please call your primary care clinic.  If you do not have a primary care provider, please call [phone] and they will assist you.        Care EveryWhere ID     This is your Care EveryWhere ID. This could be used by other organizations to access your [facility] medical records  [id number]        Equal Access to Services     [facility] : Hadtravis Walden, wabaldo alonzo, milton kaiseraljaguar barclay, lisa rivera . So [facility] [phone].    ATENCIÓN: Si habla español, tiene a horton disposición servicios gratuitos de asistencia lingüística. Llame al [phone].    We comply with applicable federal civil rights laws and [place] laws. We do not discriminate on the basis of race, color, national origin, age, disability, sex, sexual orientation, or gender identity.               Review of your medicines      START taking        Dose / Directions    ondansetron 4 MG ODT tab   Commonly known as:  ZOFRAN-ODT        Dose:  4 mg   Take 1 tablet (4 mg) by mouth every 8 hours as needed for nausea   Quantity:  10 tablet   Refills:  0         CONTINUE these medicines which have NOT CHANGED        Dose / Directions    ADDERALL PO        Dose:  10-20 mg   Take 10-20 mg by mouth 2 times daily as needed (When in school)   Refills:  0       ADVIL PO        Dose:  600 mg   Take 600 mg by mouth 2 times daily as needed for moderate pain   Refills:  0       aspirin 81 MG tablet        Dose:  81 mg   Take 81 mg by mouth daily   Refills:  0       buPROPion 150 MG 24 hr tablet   Commonly known as:  WELLBUTRIN XL   Used for:  Anxiety, Mild major depression (H)        Dose:  150 mg   Take 1 tablet (150 mg) by mouth every morning   Quantity:  30  tablet   Refills:  6       butalbital-acetaminophen-caffeine -40 MG per tablet   Commonly known as:  FIORICET/ESGIC   Used for:  Migraine without status migrainosus, not intractable, unspecified migraine type        Dose:  1 tablet   Take 1 tablet by mouth every 4 hours as needed   Quantity:  30 tablet   Refills:  1       NUVARING 0.12-0.015 MG/24HR vaginal ring   Generic drug:  etonogestrel-ethinyl estradiol        Dose:  1 each   Place 1 each vaginally every 28 days   Refills:  0       rizatriptan 10 MG tablet   Commonly known as:  MAXALT        TAKE 1 TABLET BY MOUTH ONCE, MAY REPEAT IN 2 HRS. MAX 30MG/24HRS   Refills:  3       topiramate 25 MG tablet   Commonly known as:  TOPAMAX   Used for:  Migraine without status migrainosus, not intractable, unspecified migraine type        Dose:  25 mg   Take 1 tablet (25 mg) by mouth daily   Quantity:  30 tablet   Refills:  6       VITAMIN D PO        Dose:  2000 Units   Take 2,000 Units by mouth daily.   Refills:  0       zolpidem 10 MG tablet   Commonly known as:  AMBIEN   Used for:  Insomnia, unspecified type        Dose:  10 mg   Take 1 tablet (10 mg) by mouth nightly as needed for sleep   Quantity:  30 tablet   Refills:  3            Where to get your medicines      Some of these will need a paper prescription and others can be bought over the counter. Ask your nurse if you have questions.     Bring a paper prescription for each of these medications     ondansetron 4 MG ODT tab                Protect others around you: Learn how to safely use, store and throw away your medicines at www.disposemymeds.org.             Medication List: This is a list of all your medications and when to take them. Check marks below indicate your daily home schedule. Keep this list as a reference.      Medications           Morning Afternoon Evening Bedtime As Needed    ADDERALL PO   Take 10-20 mg by mouth 2 times daily as needed (When in school)                                ADVIL  PO   Take 600 mg by mouth 2 times daily as needed for moderate pain                                aspirin 81 MG tablet   Take 81 mg by mouth daily                                buPROPion 150 MG 24 hr tablet   Commonly known as:  WELLBUTRIN XL   Take 1 tablet (150 mg) by mouth every morning                                butalbital-acetaminophen-caffeine -40 MG per tablet   Commonly known as:  FIORICET/ESGIC   Take 1 tablet by mouth every 4 hours as needed                                NUVARING 0.12-0.015 MG/24HR vaginal ring   Place 1 each vaginally every 28 days   Generic drug:  etonogestrel-ethinyl estradiol                                ondansetron 4 MG ODT tab   Commonly known as:  ZOFRAN-ODT   Take 1 tablet (4 mg) by mouth every 8 hours as needed for nausea                                rizatriptan 10 MG tablet   Commonly known as:  MAXALT   TAKE 1 TABLET BY MOUTH ONCE, MAY REPEAT IN 2 HRS. MAX 30MG/24HRS                                topiramate 25 MG tablet   Commonly known as:  TOPAMAX   Take 1 tablet (25 mg) by mouth daily                                VITAMIN D PO   Take 2,000 Units by mouth daily.                                zolpidem 10 MG tablet   Commonly known as:  AMBIEN   Take 1 tablet (10 mg) by mouth nightly as needed for sleep

## 2018-05-25 NOTE — OR NURSING
Patient Noa Mcgill is in stable condition and has met criteria for PACU discharge.  MDA informed and a sign out was given for Unit/Station transfer.

## 2018-05-25 NOTE — PROGRESS NOTES
Pt arrived via cart from PACU, settled in room. VSS, afebrile, pt rates pain at a 2 on a 0/10 pain scale. Denies need for PRN medication. TABITHA drains x 2, dressing CDI. Spouse at bedside, pt ordered regular dinner,

## 2018-05-25 NOTE — ANESTHESIA PREPROCEDURE EVALUATION
Anesthesia Evaluation     . Pt has had prior anesthetic. 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      Psychiatric:     (+) psychiatric history anxiety and depression      Infectious Disease:         Malignancy:         Other:                     Physical Exam  Normal systems: dental    Airway   Mallampati: II  TM distance: >3 FB  Neck ROM: full    Dental     Cardiovascular   Rhythm and rate: regular and normal      Pulmonary    breath sounds clear to auscultation                    Anesthesia Plan      History & Physical Review  History and physical reviewed and following examination; no interval change.    ASA Status:  2 .    NPO Status:  > 8 hours    Plan for General and ETT with Intravenous and Propofol induction. Maintenance will be Balanced.    PONV prophylaxis:  Ondansetron (or other 5HT-3) and Dexamethasone or Solumedrol  ASD precautions      Postoperative Care      Consents  Anesthetic plan, risks, benefits and alternatives discussed with:  Patient..                          .

## 2018-05-25 NOTE — OP NOTE
preop dx: s/p bilateral mastectomy  Postop dx: same  Procedure: bilateral breast reconstruction with tissue expanders  Surgeon: gen George, 20cc 0.25% marcaine with epi subcut  ebl 5cc for recon  TABITHA x2

## 2018-05-25 NOTE — OR NURSING
Telephone report was given to Station 33 RN.  Patient will be transported to Room. 12 via cart accompanied by NA and RN.  Belongings: 1 hospital bag. Family :  in [place].

## 2018-05-25 NOTE — IP AVS SNAPSHOT
[facility] [address] [facility]    [address] [place] [address]    Phone:  [phone]                                       After Visit Summary   5/25/2018    Noa Mcgill    MRN: [id number]           After Visit Summary Signature Page     I have received my discharge instructions, and my questions have been answered. I have discussed any challenges I see with this plan with the nurse or doctor.    ..........................................................................................................................................  Patient/Patient Representative Signature      ..........................................................................................................................................  Patient Representative Print Name and Relationship to Patient    ..................................................               ................................................  Date                                            Time    ..........................................................................................................................................  Reviewed by Signature/Title    ...................................................              ..............................................  Date                                                            Time

## 2018-05-26 VITALS
BODY MASS INDEX: 31.18 KG/M2 | WEIGHT: 194 LBS | DIASTOLIC BLOOD PRESSURE: 64 MMHG | HEIGHT: 66 IN | SYSTOLIC BLOOD PRESSURE: 110 MMHG | OXYGEN SATURATION: 98 % | HEART RATE: 65 BPM | TEMPERATURE: 98.8 F | RESPIRATION RATE: 16 BRPM

## 2018-05-26 PROCEDURE — 25000132 ZZH RX MED GY IP 250 OP 250 PS 637: Performed by: PLASTIC SURGERY

## 2018-05-26 PROCEDURE — 25000128 H RX IP 250 OP 636: Performed by: PLASTIC SURGERY

## 2018-05-26 RX ORDER — ONDANSETRON 4 MG/1
4 TABLET, ORALLY DISINTEGRATING ORAL EVERY 8 HOURS PRN
Qty: 10 TABLET | Refills: 0 | Status: SHIPPED | OUTPATIENT
Start: 2018-05-26 | End: 2018-08-21

## 2018-05-26 RX ADMIN — KETOROLAC TROMETHAMINE 30 MG: 30 INJECTION, SOLUTION INTRAMUSCULAR at 04:44

## 2018-05-26 RX ADMIN — OXYCODONE HYDROCHLORIDE 5 MG: 5 TABLET ORAL at 11:26

## 2018-05-26 RX ADMIN — KETOROLAC TROMETHAMINE 30 MG: 30 INJECTION, SOLUTION INTRAMUSCULAR at 11:03

## 2018-05-26 RX ADMIN — DOCUSATE SODIUM 100 MG: 100 CAPSULE, LIQUID FILLED ORAL at 08:11

## 2018-05-26 NOTE — PROGRESS NOTES
Pt discharged to home via private car accompanied by family. Pt states understanding of discharge instructions. Supplies sent with pt for TABITHA drain care and drake care. No further needs noted.

## 2018-05-26 NOTE — DISCHARGE SUMMARY
"Overnight drake placed for urinary retention.  Mild nausea, o/w pain controlled.    /64 (BP Location: Right arm)  Pulse 65  Temp 98.8  F (37.1  C) (Oral)  Resp 16  Ht 1.676 m (5' 6\")  Wt 88 kg (194 lb)  LMP 02/08/2015 (Within Months)  SpO2 98%  BMI 31.31 kg/m2  Dressing fitting well  Drains benign  No hematoma      A/p: pod 2 s/p bilateral simple mastectomy doing well with postop urinary retention.  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  - f/u Thursday with me, pt has appt  -  given scripts yesterday  "

## 2018-05-26 NOTE — DISCHARGE INSTRUCTIONS
Discharge Instructions: Post-Operative Mastectomy    ACTIVITY    Take frequent, short walks and increase your activity gradually.      Avoid strenuous physical activity or heavy lifting greater than 15-20 lbs. for 2 weeks on the side of surgery.  You may climb stairs.     Gentle rotation and stretching of your arms and shoulders will prevent joint stiffness.    You may drive without restrictions when you are not using any prescription pain medication and feel comfortable in a car.    You may return to work/school when you are comfortable without any prescription pain medication.    WOUND CARE    You may remove your outer dressing and shower 48 hours after the surgery.  Pat your incisions dry and leave them open to air.  Re-apply dressing (gauze/tape) as needed for comfort or drainage.    You may have steri-strips (looks like white tape) or staples at your incisions.  You may peel off the steri-strips 2 weeks after your surgery if they have not peeled off on their own.  If you have staples, they will be removed at your next office visit.    Do not soak your incisions in a tub or pool for 2 weeks.     Do not apply any lotions, creams, or ointments to your incisions.    A ridge under your incisions is normal and will gradually resolve.    Wear the mastectomy camisole for comfort.    You may have 1-2 drains at your surgical site, refer to your drain care instructions.  Record output totals daily.  You will need to schedule an appointment for drain removal when your output is less than 30 mL per day for 1-2 days or 2 weeks, whichever comes first.    DIET    Start with liquids, then gradually resume your regular diet as tolerated.     Drink plenty of fluids to stay hydrated.    PAIN    Expect some tenderness and discomfort at the incision site(s).  Use the prescribed pain medication at your discretion.  Expect gradual resolution of your pain over several days.    You may take ibuprofen with food (unless you have been  told not to) instead of or in addition to your prescribed pain medication.  If you are taking Norco or Percocet, do not take any additional acetaminophen/APAP/Tylenol.    Do not drink alcohol or drive while you are taking pain medications.    You may apply ice to your incisions in 20 minute intervals as needed for the next 48 hours.  After that time, consider switching to heat if you prefer.    EXPECTATIONS    Pain medications can cause constipation.  Limit use when possible.  Take over the counter stool softener/stimulant, such as Colace or Senna, 1-2 times a day with plenty of water.  You may take a mild over the counter laxative, such as Miralax or a suppository, as needed.      You may discontinue these medications once you are having regular bowel movements and/or are no longer taking your narcotic pain medication.    RETURN APPOINTMENT    Follow up with our office when your drain is ready to be removed.      CALL OUR OFFICE -[phone] IF YOU HAVE:     Chills or fever above 101 F.    Increased redness, warmth, or drainage at your incisions.    Significant bleeding or swelling.    Pain not relieved by your pain medication or rest.    Increasing pain after the first 48 hours.    Any other concerns or questions.    Revised May 2018

## 2018-05-26 NOTE — PLAN OF CARE
Problem: Patient Care Overview  Goal: Plan of Care/Patient Progress Review  Outcome: No Change  A/O x4, VSS on RA, afebrile. LS clear, BS active, passing flatus. ACE wrap in place. JPx2, ss drainage. C/o nausea - prn Zofran 1x. Pt had difficulties in voiding. 1x straight cath with 625 ml urine. At 5 am PVR over 750ml, pt not able to empty bladder - placed drake catheter.Pain controlled with PRN Toradol. Pt ambulates SBA.

## 2018-05-26 NOTE — OP NOTE
Procedure Date: 05/25/2018      DATE OF SERVICE:  05/25/2018      PREOPERATIVE DIAGNOSIS:  Status post bilateral mastectomy.      POSTOPERATIVE DIAGNOSIS:  Status post bilateral mastectomy.      PROCEDURE  PERFORMED:  Immediate bilateral breast reconstruction with placement of tissue expanders.      SURGEON:  Javier Perea MD.      ASSISTANT:  None.      ANESTHESIA:  General at 20 mL of 0.25% Marcaine with epinephrine injected along the pectoralis fascia.      BLOOD LOSS:  5 mL for the reconstructive portion of the operation        INDICATIONS FOR PROCEDURE:  This patient is a 43-year-old woman with a genetic predisposition to breast cancer who is having a bilateral prophylactic mastectomy with Dr. Bergeron today.  She is having eventually a TRAM flap reconstruction, but in the immediate period, she is having tissue expanders placed to maintain her skin envelope.      DESCRIPTION OF PROCEDURE:  I explained the risks of the procedure, which includes postoperative hematoma requiring return to the operating room as well as infection of the expanders necessitating removal and the patient was consented.  She was taken to the operating room where preoperative antibiotics were administered.  General anesthesia was induced and the chest was prepped with ChloraPrep and draped off and sealed with an Ioban.  Preoperatively she had midline areola sparing mastectomy incisions.  The mastectomies were performed without event and the flaps appeared to be quite healthy.  The left breast was reconstructed first.  The antibiotic irrigation was used to irrigate the wound bed and hemostasis was assured with electrocautery. I then measured the base of the wound bed, which was about 14 cm for a breast diameter.  A 650 mL tall profile tissue expander from [place] was then opened up serial #[id number].  This was evacuated of all air and then placed within the pocket and secured by suture tabs with 3-0 silks.  A 15-Mexican TABITHA drain was  brought in through a stab incision on the lower lateral quadrant and secured with 3-0 nylon stitch, fluid was then added to the expander bringing total volume of 660 mL at which point I was able to comfortably close the skin and maintain the skin envelope. The dermis was reapproximated with 3-0 Vicryl dermal inverted stitches and a running 4-0 Vicryl subcuticular stitch.      I began reconstruction of the right breast, which also had good appearing flaps.  There was a small buttonhole that had happened on the lateral aspect of the right breast during the mastectomy, but this did not appear to have any effect on the viability of the overall skin flaps.  The same side expander was then opened up 650 mL tall profile [place] tissue expander, serial #[id number].  This was evacuated of all air and then anchored into the breast pocket at the 3 o'clock, 6 o'clock and 9 o'clock position with 3-0 silks.  Additional volume was added to the expander bringing it also to 650 mL, at which point I could comfortably close the skin without excessive tension; 3-0 Vicryl dermal inverted stitches were used to close the dermis and a running 5-0 Monocryl subcuticular stitch was used to finish the closure.  A 15-Ind[place]ian TABITHA drain was placed in the right breast also secured with 3-0 nylon stitch.  Some 5-0 nylons were used to finish off the closure.  Bacitracin ointment was applied to the incisions with Adaptic, a Kerlix roll was placed over the breast and a double long 6-inch Ace wrap was used to secure all the dressings.  She tolerated the procedure well for expected overnight stay in the hospital.         TERRIE DAUGHERTY MD             D: 2018   T: 2018   MT: FINN      Name:     REZA VINCENT   MRN:      [id number]        Account:        [id number]   :      1975           Procedure Date: 2018      Document: [id number]

## 2018-05-26 NOTE — PROVIDER NOTIFICATION
Paged on-call surgical group. Reason: pt unable to void since surgery, feeling uncomfortable.   MD ordered to have patient straight cath

## 2018-05-29 ENCOUNTER — TELEPHONE (OUTPATIENT)
Dept: FAMILY MEDICINE | Facility: CLINIC | Age: 43
End: 2018-05-29

## 2018-05-29 LAB — COPATH REPORT: NORMAL

## 2018-05-29 NOTE — TELEPHONE ENCOUNTER
Please call patient for Inpatient Discharge f/u 05/26/18. Post Right Breast Cancer, S/P Mastectomy, Bilateral. Ruth Behrens.

## 2018-05-30 NOTE — TELEPHONE ENCOUNTER
ED / Discharge Outreach Protocol    Patient Contact    Attempt # 1    Was call answered?  No.  Left message on voicemail with information to call me back.    Juanita Wallace RN

## 2018-05-31 NOTE — TELEPHONE ENCOUNTER
ED / Discharge Outreach Protocol    Patient Contact    Attempt # 2    Was call answered?  No.  Closed.  Juanita Wallace RN

## 2018-06-09 ENCOUNTER — MYC MEDICAL ADVICE (OUTPATIENT)
Dept: FAMILY MEDICINE | Facility: CLINIC | Age: 43
End: 2018-06-09

## 2018-06-09 DIAGNOSIS — D62 ANEMIA DUE TO BLOOD LOSS, ACUTE: Primary | ICD-10-CM

## 2018-06-11 ENCOUNTER — OFFICE VISIT (OUTPATIENT)
Dept: FAMILY MEDICINE | Facility: CLINIC | Age: 43
End: 2018-06-11
Payer: COMMERCIAL

## 2018-06-11 VITALS
HEART RATE: 91 BPM | TEMPERATURE: 99.2 F | WEIGHT: 199.5 LBS | OXYGEN SATURATION: 99 % | BODY MASS INDEX: 32.2 KG/M2 | RESPIRATION RATE: 16 BRPM | SYSTOLIC BLOOD PRESSURE: 108 MMHG | DIASTOLIC BLOOD PRESSURE: 62 MMHG

## 2018-06-11 DIAGNOSIS — Z09 HOSPITAL DISCHARGE FOLLOW-UP: Primary | ICD-10-CM

## 2018-06-11 DIAGNOSIS — N30.00 ACUTE CYSTITIS WITHOUT HEMATURIA: ICD-10-CM

## 2018-06-11 PROCEDURE — 99496 TRANSJ CARE MGMT HIGH F2F 7D: CPT | Performed by: FAMILY MEDICINE

## 2018-06-11 RX ORDER — CEFTRIAXONE 1 G/1
1000 INJECTION, POWDER, FOR SOLUTION INTRAMUSCULAR; INTRAVENOUS ONCE
Qty: 10 ML | Refills: 0 | OUTPATIENT
Start: 2018-06-11 | End: 2018-06-11

## 2018-06-11 RX ORDER — FERROUS SULFATE 325(65) MG
325 TABLET ORAL 2 TIMES DAILY
Qty: 60 TABLET | Refills: 1 | Status: SHIPPED | OUTPATIENT
Start: 2018-06-11 | End: 2018-07-06

## 2018-06-11 ASSESSMENT — ENCOUNTER SYMPTOMS
NAUSEA: 1
HEMATURIA: 0
DYSURIA: 1
DIARRHEA: 0
FEVER: 1
CONSTIPATION: 0
VOMITING: 0
ABDOMINAL PAIN: 1
RESPIRATORY NEGATIVE: 1
PALPITATIONS: 0
SHORTNESS OF BREATH: 0

## 2018-06-11 NOTE — LETTER
My Depression Action Plan  Name: Noa Mcgill   Date of Birth 1975  Date: 6/11/2018    My doctor: Sydnie He   My clinic: [address], [address]  [facility] [address] [phone]          GREEN    ZONE   Good Control    What it looks like:     Things are going generally well. You have normal up s and down s. You may even feel depressed from time to time, but bad moods usually last less than a day.   What you need to do:  1. Continue to care for yourself (see self care plan)  2. Check your depression survival kit and update it as needed  3. Follow your physician s recommendations including any medication.  4. Do not stop taking medication unless you consult with your physician first.           YELLOW         ZONE Getting Worse    What it looks like:     Depression is starting to interfere with your life.     It may be hard to get out of bed; you may be starting to isolate yourself from others.    Symptoms of depression are starting to last most all day and this has happened for several days.     You may have suicidal thoughts but they are not constant.   What you need to do:     1. Call your care team, your response to treatment will improve if you keep your care team informed of your progress. Yellow periods are signs an adjustment may need to be made.     2. Continue your self-care, even if you have to fake it!    3. Talk to someone in your support network    4. Open up your depression survival kit           RED    ZONE Medical Alert - Get Help    What it looks like:     Depression is seriously interfering with your life.     You may experience these or other symptoms: You can t get out of bed most days, can t work or engage in other necessary activities, you have trouble taking care of basic hygiene, or basic responsibilities, thoughts of suicide or death that will not go away, self-injurious behavior.     What you need to do:  1. Call your care  team and request a same-day appointment. If they are not available (weekends or after hours) call your local crisis line, emergency room or 911.            Depression Self Care Plan / Survival Kit    Self-Care for Depression  Here s the deal. Your body and mind are really not as separate as most people think.  What you do and think affects how you feel and how you feel influences what you do and think. This means if you do things that people who feel good do, it will help you feel better.  Sometimes this is all it takes.  There is also a place for medication and therapy depending on how severe your depression is, so be sure to consult with your medical provider and/ or Behavioral Health Consultant if your symptoms are worsening or not improving.     In order to better manage my stress, I will:    Exercise  Get some form of exercise, every day. This will help reduce pain and release endorphins, the  feel good  chemicals in your brain. This is almost as good as taking antidepressants!  This is not the same as joining a gym and then never going! (they count on that by the way ) It can be as simple as just going for a walk or doing some gardening, anything that will get you moving.      Hygiene   Maintain good hygiene (Get out of bed in the morning, Make your bed, Brush your teeth, Take a shower, and Get dressed like you were going to work, even if you are unemployed).  If your clothes don't fit try to get ones that do.    Diet  I will strive to eat foods that are good for me, drink plenty of water, and avoid excessive sugar, caffeine, alcohol, and other mood-altering substances.  Some foods that are helpful in depression are: complex carbohydrates, B vitamins, flaxseed, fish or fish oil, fresh fruits and vegetables.    Psychotherapy  I agree to participate in Individual Therapy (if recommended).    Medication  If prescribed medications, I agree to take them.  Missing doses can result in serious side effects.  I  understand that drinking alcohol, or other illicit drug use, may cause potential side effects.  I will not stop my medication abruptly without first discussing it with my provider.    Staying Connected With Others  I will stay in touch with my friends, family members, and my primary care provider/team.    Use your imagination  Be creative.  We all have a creative side; it doesn t matter if it s oil painting, sand castles, or mud pies! This will also kick up the endorphins.    Witness Beauty  (AKA stop and smell the roses) Take a look outside, even in mid-winter. Notice colors, textures. Watch the squirrels and birds.     Service to others  Be of service to others.  There is always someone else in need.  By helping others we can  get out of ourselves  and remember the really important things.  This also provides opportunities for practicing all the other parts of the program.    Humor  Laugh and be silly!  Adjust your TV habits for less news and crime-drama and more comedy.    Control your stress  Try breathing deep, massage therapy, biofeedback, and meditation. Find time to relax each day.     My support system    Clinic Contact:  Phone number:    Contact 1:  Phone number:    Contact 2:  Phone number:    Anglican/:  Phone number:    Therapist:  Phone number:    Local crisis center:    Phone number:    Other community support:  Phone number:

## 2018-06-11 NOTE — MR AVS SNAPSHOT
After Visit Summary   6/11/2018    Noa Mcgill    MRN: [id number]           Patient Information     Date Of Birth          1975        Visit Information        Provider Department      6/11/2018 7:20 AM Wesly Caraballo MD [facility]        Today's Diagnoses     Hospital discharge follow-up    -  1    Acute cystitis without hematuria           Follow-ups after your visit        Follow-up notes from your care team     Return in about 2 weeks (around 6/25/2018).      Who to contact     If you have questions or need follow up information about today's clinic visit or your schedule please contact [facility] directly at [phone].  Normal or non-critical lab and imaging results will be communicated to you by MyChart, letter or phone within 4 business days after the clinic has received the results. If you do not hear from us within 7 days, please contact the clinic through [employer] or phone. If you have a critical or abnormal lab result, we will notify you by phone as soon as possible.  Submit refill requests through [employer] or call your pharmacy and they will forward the refill request to us. Please allow 3 business days for your refill to be completed.          Additional Information About Your Visit        MyChart Information     [employer] gives you secure access to your electronic health record. If you see a primary care provider, you can also send messages to your care team and make appointments. If you have questions, please call your primary care clinic.  If you do not have a primary care provider, please call [phone] and they will assist you.        Care EveryWhere ID     This is your Care EveryWhere ID. This could be used by other organizations to access your [facility] medical records  [id number]        Your Vitals Were     Pulse Temperature Respirations Last Period Pulse Oximetry Breastfeeding?    91 99.2  F (37.3  C) (Oral) 16 02/08/2015  (Within Months) 99% No    BMI (Body Mass Index)                   32.2 kg/m2            Blood Pressure from Last 3 Encounters:   06/11/18 108/62   05/26/18 110/64   05/21/18 108/74    Weight from Last 3 Encounters:   06/11/18 199 lb 8 oz (90.5 kg)   05/25/18 194 lb (88 kg)   05/21/18 198 lb 4.8 oz (89.9 kg)              Today, you had the following     No orders found for display         Today's Medication Changes          These changes are accurate as of 6/11/18  7:46 AM.  If you have any questions, ask your nurse or doctor.               Start taking these medicines.        Dose/Directions    cefTRIAXone 1 GM vial   Commonly known as:  ROCEPHIN   Used for:  Acute cystitis without hematuria   Started by:  Wesly Caraballo MD        Dose:  1000 mg   Inject 1 g (1,000 mg) into the muscle once for 1 dose   Quantity:  10 mL   Refills:  0            Where to get your medicines      Some of these will need a paper prescription and others can be bought over the counter.  Ask your nurse if you have questions.     You don't need a prescription for these medications     cefTRIAXone 1 GM vial                Primary Care Provider Office Phone # Fax #    Sydnieshonna He [phone] [address] [id number]        Equal Access to Services     KARMEN LEMUS AH: Hadii marshall terrello Soomaali, waaxda luqadaha, qaybta kaalmada adeegyada, lisa cruz. So [facility] [phone].    ATENCIÓN: Si habla español, tiene a horton disposición servicios gratuitos de asistencia lingüística. Llame al [phone].    We comply with applicable federal civil rights laws and [place] laws. We do not discriminate on the basis of race, color, national origin, age, disability, sex, sexual orientation, or gender identity.            Thank you!     Thank you for choosing [facility]  for your care. Our goal is always to provide you with excellent care. Hearing back from our  patients is one way we can continue to improve our services. Please take a few minutes to complete the written survey that you may receive in the mail after your visit with us. Thank you!             Your Updated Medication List - Protect others around you: Learn how to safely use, store and throw away your medicines at www.disposemymeds.org.          This list is accurate as of 6/11/18  7:46 AM.  Always use your most recent med list.                   Brand Name Dispense Instructions for use Diagnosis    ADDERALL PO      Take 10-20 mg by mouth 2 times daily as needed (When in school)        ADVIL PO      Take 600 mg by mouth 2 times daily as needed for moderate pain        aspirin 81 MG tablet      Take 81 mg by mouth daily        buPROPion 150 MG 24 hr tablet    WELLBUTRIN XL    30 tablet    Take 1 tablet (150 mg) by mouth every morning    Anxiety, Mild major depression (H)       butalbital-acetaminophen-caffeine -40 MG per tablet    FIORICET/ESGIC    30 tablet    Take 1 tablet by mouth every 4 hours as needed    Migraine without status migrainosus, not intractable, unspecified migraine type       cefTRIAXone 1 GM vial    ROCEPHIN    10 mL    Inject 1 g (1,000 mg) into the muscle once for 1 dose    Acute cystitis without hematuria       NUVARING 0.12-0.015 MG/24HR vaginal ring   Generic drug:  etonogestrel-ethinyl estradiol      Place 1 each vaginally every 28 days        ondansetron 4 MG ODT tab    ZOFRAN-ODT    10 tablet    Take 1 tablet (4 mg) by mouth every 8 hours as needed for nausea    S/P mastectomy, bilateral       rizatriptan 10 MG tablet    MAXALT     TAKE 1 TABLET BY MOUTH ONCE, MAY REPEAT IN 2 HRS. MAX 30MG/24HRS        topiramate 25 MG tablet    TOPAMAX    30 tablet    Take 1 tablet (25 mg) by mouth daily    Migraine without status migrainosus, not intractable, unspecified migraine type       VITAMIN D PO      Take 2,000 Units by mouth daily.        zolpidem 10 MG tablet    AMBIEN    30 tablet     Take 1 tablet (10 mg) by mouth nightly as needed for sleep    Insomnia, unspecified type

## 2018-06-11 NOTE — PROGRESS NOTES
HPI    SUBJECTIVE:   Noa Mcgill is a 43 year old female who presents to clinic today for the following health issues:        Hospital Follow-up Visit:    Hospital/Nursing Home/IP Rehab Facility: [facility]  Date of Admission: 5/25/18  Date of Discharge: 5/26/18  Reason(s) for Admission: Bilateral simple mastectomy            Problems taking medications regularly:  None       Medication changes since discharge: None       Problems adhering to non-medication therapy:  None    Summary of hospitalization:  Discharge summary unavailable  Diagnostic Tests/Treatments reviewed.  Follow up needed: surgery  Other Healthcare Providers Involved in Patient s Care:         None  Update since discharge: improved.     Post Discharge Medication Reconciliation: discharge medications reconciled and changed, per note/orders (see AVS).  Plan of care communicated with patient     Coding guidelines for this visit:  Type of Medical   Decision Making Face-to-Face Visit       within 7 Days of discharge Face-to-Face Visit        within 14 days of discharge   Moderate Complexity 79521 [id number]   High Complexity 85039 [id number]          Was seen yesterday at a [facility] for UTI.  Feels this started in the hospital and was discharged on cipro.  Sx continued to worsen on cipro.  Did a single injection of Rocephin, was advised to get another today.  Dysuria is receding, odor in urine has resolved.      Noting stretching on abdomen is painful.  Four drains in place - two on breasts, two on abdomen (site of flap for reconstruction.  Febrile yesterday, better today.  Nausea, reducing pain meds, no constipation. Notes some post-surgical anemia.     F/u with surgery in four days.    Review of Systems   Constitutional: Positive for fever and malaise/fatigue.   Respiratory: Negative.  Negative for shortness of breath.    Cardiovascular: Positive for chest pain. Negative for palpitations.   Gastrointestinal: Positive for abdominal pain and  nausea. Negative for constipation, diarrhea and vomiting.   Genitourinary: Positive for dysuria. Negative for hematuria.         Physical Exam   Constitutional: She is oriented to person, place, and time and well-developed, well-nourished, and in no distress.   Eyes: Conjunctivae and EOM are normal.   Cardiovascular: Normal rate, regular rhythm and normal heart sounds.    Pulmonary/Chest: Effort normal and breath sounds normal.   Abdominal:   Post-surgical tenderness.  TABITHA drains with serosanguinous d/c.  Abdominal surgical scars clean and dry without signs of infection.   Musculoskeletal: She exhibits no edema.   Neurological: She is alert and oriented to person, place, and time.   Skin: Skin is warm and dry.   Vitals reviewed.    (Z09) Hospital discharge follow-up  (primary encounter diagnosis)  Comment: stable, no signs of infection of surgical sites  Plan: f/u as planned    (N30.00) Acute cystitis without hematuria  Comment: sx improving, but feel there is little harm in a second dose  Plan: cefTRIAXone (ROCEPHIN) 1 GM vial              RTC in 1w prn    Wesly Caraballo MD

## 2018-06-12 NOTE — ANESTHESIA POSTPROCEDURE EVALUATION
Patient: Noa [facility]    Procedure(s):  PROPHYLACTIC BILATERAL MASTECTOMY (GLADIS) BILATERAL BREAST RECONSTRUCTION [facility] TISSUE EXPANDERS (WILLOW)    - Wound Class: I-Clean   - Wound Class: I-Clean    Diagnosis:STATUS POST RIGHT BREAST CANCER   Diagnosis Additional Information: No value filed.    Anesthesia Type:  General, ETT    Note:  Anesthesia Post Evaluation    Patient location during evaluation: PACU  Patient participation: Able to fully participate in evaluation  Level of consciousness: awake and alert  Pain management: satisfactory to patient  Airway patency: patent  Cardiovascular status: hemodynamically stable  Respiratory status: acceptable and unassisted  Hydration status: balanced  PONV: none     Anesthetic complications: None          Last vitals:  Vitals:    05/26/18 0441 05/26/18 0444 05/26/18 0825   BP: 105/57  110/64   Pulse: 54  65   Resp: 16 16 16   Temp: 36.6  C (97.9  F)  37.1  C (98.8  F)   SpO2: 100%  98%         Electronically Signed By: Montana Vanegas MD  June 12, 2018  6:47 AM

## 2018-06-25 ENCOUNTER — TRANSFERRED RECORDS (OUTPATIENT)
Dept: SURGERY | Facility: CLINIC | Age: 43
End: 2018-06-25

## 2018-06-25 ENCOUNTER — TRANSFERRED RECORDS (OUTPATIENT)
Dept: HEALTH INFORMATION MANAGEMENT | Facility: CLINIC | Age: 43
End: 2018-06-25

## 2018-07-05 ENCOUNTER — MYC MEDICAL ADVICE (OUTPATIENT)
Dept: FAMILY MEDICINE | Facility: CLINIC | Age: 43
End: 2018-07-05

## 2018-07-06 ENCOUNTER — E-VISIT (OUTPATIENT)
Dept: FAMILY MEDICINE | Facility: CLINIC | Age: 43
End: 2018-07-06
Payer: COMMERCIAL

## 2018-07-06 DIAGNOSIS — Z01.84 IMMUNITY STATUS TESTING: Primary | ICD-10-CM

## 2018-07-06 DIAGNOSIS — D62 ANEMIA DUE TO BLOOD LOSS, ACUTE: ICD-10-CM

## 2018-07-06 PROCEDURE — 99207 ZZC NO BILLABLE SERVICE THIS VISIT: CPT | Performed by: FAMILY MEDICINE

## 2018-07-06 NOTE — MR AVS SNAPSHOT
MRN:[id number]                      After Visit Summary   7/6/2018    Noa Mcgill    MRN: [id number]           Visit Information        Provider Department      7/6/2018 9:19 AM Sydnie He MD [facility]        [employer] Information     [employer] gives you secure access to your electronic health record. If you see a primary care provider, you can also send messages to your care team and make appointments. If you have questions, please call your primary care clinic.  If you do not have a primary care provider, please call [phone] and they will assist you.        Care EveryWhere ID     This is your Care EveryWhere ID. This could be used by other organizations to access your [facility] medical records  [id number]        Equal Access to Services     KARMEN LEMUS : Johan Walden, clif alonzo, milton braclay, lisa cruz. So [facility] [phone].    ATENCIÓN: Si habla español, tiene a horton disposición servicios gratuitos de asistencia lingüística. Llame al [phone].    We comply with applicable federal civil rights laws and [place] laws. We do not discriminate on the basis of race, color, national origin, age, disability, sex, sexual orientation, or gender identity.

## 2018-07-06 NOTE — TELEPHONE ENCOUNTER
"May be too soon,  Pharm is asking for 90 day supply.    Requested Prescriptions   Pending Prescriptions Disp Refills     ferrous sulfate (IRON) 325 (65 Fe) MG tablet    Last Written Prescription Date:  6/11/18  Last Fill Quantity: 60 tablet,  # refills: 1   Last office visit: 6/11/2018 with prescribing provider:  Ilya   Future Office Visit:     60 tablet 1     Sig: Take 1 tablet (325 mg) by mouth 2 times daily    Iron Supplements Passed    7/6/2018  4:04 PM       Passed - Patient is 12 years of age or older       Passed - Recent (12 mo) or future (30 days) visit within the authorizing provider's specialty    Patient had office visit in the last 12 months or has a visit in the next 30 days with authorizing provider or within the authorizing provider's specialty.  See \"Patient Info\" tab in inbasket, or \"Choose Columns\" in Meds & Orders section of the refill encounter.           Passed - Hgb OR Hct on record within the past 12 mos.    Patient need only have had a HGB or HCT on file in the past 12 mos. That result does not need to be normal.    Recent Labs   Lab Test  05/21/18   1807  04/11/16   0934  12/20/14   0955   HGB  13.4  13.8  9.6*     Recent Labs   Lab Test  04/11/16   0934  12/19/14   2306  11/16/14   2340   HCT  41.5  35.8  39.6       Please verify a HGB or HCT has been checked SINCE THE LAST DOSE CHANGE.              "

## 2018-07-07 NOTE — TELEPHONE ENCOUNTER
From: Noa Mcgill  To: Sydnie He MD  Sent: 7/6/2018 9:19 AM CDT  Subject: E-Visit Submission: Other    E-Visit Submission: Other  --------------------------------    Question: Please describe your symptoms.  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  Answer: No    Question: How long have you been having these symptoms?  Answer: Just today    Question: Please list any medications you are currently taking for this condition.  Answer:     Question: Please describe any probable cause for these symptoms.   Answer:

## 2018-07-09 NOTE — TELEPHONE ENCOUNTER
Dr. Daley,   Is this indefinite?  If yes, 90 days with X refills?  Anemia due to blood loss, acute   Hemoglobin   Date Value Ref Range Status   05/21/2018 13.4 11.7 - 15.7 g/dL Final   Yoni Diaz, RN

## 2018-07-10 ENCOUNTER — TRANSFERRED RECORDS (OUTPATIENT)
Dept: HEALTH INFORMATION MANAGEMENT | Facility: CLINIC | Age: 43
End: 2018-07-10

## 2018-07-11 DIAGNOSIS — Z01.84 IMMUNITY STATUS TESTING: ICD-10-CM

## 2018-07-11 PROBLEM — D62 ANEMIA DUE TO BLOOD LOSS, ACUTE: Status: ACTIVE | Noted: 2018-07-11

## 2018-07-11 PROCEDURE — 86706 HEP B SURFACE ANTIBODY: CPT | Performed by: FAMILY MEDICINE

## 2018-07-11 PROCEDURE — 86787 VARICELLA-ZOSTER ANTIBODY: CPT | Performed by: FAMILY MEDICINE

## 2018-07-11 PROCEDURE — 86762 RUBELLA ANTIBODY: CPT | Performed by: FAMILY MEDICINE

## 2018-07-11 PROCEDURE — 86735 MUMPS ANTIBODY: CPT | Performed by: FAMILY MEDICINE

## 2018-07-11 PROCEDURE — 86765 RUBEOLA ANTIBODY: CPT | Performed by: FAMILY MEDICINE

## 2018-07-11 RX ORDER — FERROUS SULFATE 325(65) MG
325 TABLET ORAL 2 TIMES DAILY
Qty: 180 TABLET | Refills: 1 | Status: SHIPPED | OUTPATIENT
Start: 2018-07-11 | End: 2018-10-16

## 2018-07-11 NOTE — TELEPHONE ENCOUNTER
"FYI  Pt reports she has had 5 surgeries since last PCP visit. Scheduled with Dr. Daley 8/27/18.    Informs [facility] to watch for medical records from Kathi Sanchez and Juan  Recent left breast mastectomy.   Hemoglobin last week was 8.6 after surgery. It is slowly coming up.  Has only been taking one iron tab daily because feels taking too many pills and new dx adrenal gland deficiency.   Taking Lovenox.    \"I have 8 doctors I am seeing right now.\" Just trying to keep it all straight.      Informed we will refill iron today.  Rx sent 90 days +1.   Noa said she would \"try\" to take iron BID but she doubts will do it.   Yoni Diaz, SERG      "

## 2018-07-13 LAB
MEV IGG SER QL IA: 4.4 AI (ref 0–0.8)
MUV IGG SER QL IA: 1.7 AI (ref 0–0.8)
RUBV IGG SERPL IA-ACNC: 37 IU/ML
VZV IGG SER QL IA: 2.1 AI (ref 0–0.8)

## 2018-07-16 LAB
HBV SURFACE AB SERPL IA-ACNC: >1000 M[IU]/ML
M TB TUBERC IFN-G BLD QL: ABNORMAL
M TB TUBERC IFN-G/MITOGEN IGNF BLD: ABNORMAL IU/ML

## 2018-07-17 DIAGNOSIS — Z01.84 IMMUNITY STATUS TESTING: ICD-10-CM

## 2018-07-17 PROCEDURE — 86480 TB TEST CELL IMMUN MEASURE: CPT | Performed by: FAMILY MEDICINE

## 2018-07-17 PROCEDURE — 36415 COLL VENOUS BLD VENIPUNCTURE: CPT | Performed by: FAMILY MEDICINE

## 2018-07-19 LAB
M TB TUBERC IFN-G BLD QL: NEGATIVE
M TB TUBERC IFN-G/MITOGEN IGNF BLD: 0.03 IU/ML

## 2018-08-01 ENCOUNTER — TRANSFERRED RECORDS (OUTPATIENT)
Dept: SURGERY | Facility: CLINIC | Age: 43
End: 2018-08-01

## 2018-08-07 ENCOUNTER — TELEPHONE (OUTPATIENT)
Dept: FAMILY MEDICINE | Facility: CLINIC | Age: 43
End: 2018-08-07

## 2018-08-07 NOTE — TELEPHONE ENCOUNTER
Caller HP  Sydnie -[phone]. Sydnie does telephonic case management with education, support and resources  This is FYI pt had breast surgery, is doing well and has follow up appointment with plastics 8/16/18.   Sydnie is available for help if needed.  Informed PCP has been notified of hospital admission and discharge.   Message handled by Nurse Triage.  Yoni Diaz RN

## 2018-08-21 ENCOUNTER — OFFICE VISIT (OUTPATIENT)
Dept: FAMILY MEDICINE | Facility: CLINIC | Age: 43
End: 2018-08-21
Payer: COMMERCIAL

## 2018-08-21 VITALS
TEMPERATURE: 98.1 F | WEIGHT: 200 LBS | SYSTOLIC BLOOD PRESSURE: 120 MMHG | BODY MASS INDEX: 32.28 KG/M2 | HEART RATE: 98 BPM | DIASTOLIC BLOOD PRESSURE: 86 MMHG | RESPIRATION RATE: 20 BRPM

## 2018-08-21 DIAGNOSIS — Z90.13 S/P MASTECTOMY, BILATERAL: ICD-10-CM

## 2018-08-21 DIAGNOSIS — F32.0 MILD MAJOR DEPRESSION (H): ICD-10-CM

## 2018-08-21 DIAGNOSIS — F41.9 ANXIETY: ICD-10-CM

## 2018-08-21 DIAGNOSIS — D62 ANEMIA DUE TO BLOOD LOSS, ACUTE: Primary | ICD-10-CM

## 2018-08-21 LAB
BASOPHILS # BLD AUTO: 0 10E9/L (ref 0–0.2)
BASOPHILS NFR BLD AUTO: 0.6 %
DIFFERENTIAL METHOD BLD: ABNORMAL
EOSINOPHIL # BLD AUTO: 0.1 10E9/L (ref 0–0.7)
EOSINOPHIL NFR BLD AUTO: 1 %
ERYTHROCYTE [DISTWIDTH] IN BLOOD BY AUTOMATED COUNT: 19.7 % (ref 10–15)
HCT VFR BLD AUTO: 39.9 % (ref 35–47)
HGB BLD-MCNC: 12.4 G/DL (ref 11.7–15.7)
LYMPHOCYTES # BLD AUTO: 1.7 10E9/L (ref 0.8–5.3)
LYMPHOCYTES NFR BLD AUTO: 24.7 %
MCH RBC QN AUTO: 23.5 PG (ref 26.5–33)
MCHC RBC AUTO-ENTMCNC: 31.1 G/DL (ref 31.5–36.5)
MCV RBC AUTO: 76 FL (ref 78–100)
MONOCYTES # BLD AUTO: 0.5 10E9/L (ref 0–1.3)
MONOCYTES NFR BLD AUTO: 6.8 %
NEUTROPHILS # BLD AUTO: 4.6 10E9/L (ref 1.6–8.3)
NEUTROPHILS NFR BLD AUTO: 66.9 %
PLATELET # BLD AUTO: 287 10E9/L (ref 150–450)
RBC # BLD AUTO: 5.27 10E12/L (ref 3.8–5.2)
WBC # BLD AUTO: 6.9 10E9/L (ref 4–11)

## 2018-08-21 PROCEDURE — 83540 ASSAY OF IRON: CPT | Performed by: FAMILY MEDICINE

## 2018-08-21 PROCEDURE — 36415 COLL VENOUS BLD VENIPUNCTURE: CPT | Performed by: FAMILY MEDICINE

## 2018-08-21 PROCEDURE — 85025 COMPLETE CBC W/AUTO DIFF WBC: CPT | Performed by: FAMILY MEDICINE

## 2018-08-21 PROCEDURE — 82728 ASSAY OF FERRITIN: CPT | Performed by: FAMILY MEDICINE

## 2018-08-21 PROCEDURE — 80053 COMPREHEN METABOLIC PANEL: CPT | Performed by: FAMILY MEDICINE

## 2018-08-21 PROCEDURE — 83550 IRON BINDING TEST: CPT | Performed by: FAMILY MEDICINE

## 2018-08-21 PROCEDURE — 99215 OFFICE O/P EST HI 40 MIN: CPT | Performed by: FAMILY MEDICINE

## 2018-08-21 RX ORDER — PRASTERONE (DHEA) 50 MG
50 TABLET ORAL
COMMUNITY
End: 2019-11-30

## 2018-08-21 RX ORDER — HYDROXYZINE HYDROCHLORIDE 25 MG/1
25 TABLET, FILM COATED ORAL EVERY 6 HOURS PRN
Qty: 30 TABLET | Refills: 0 | Status: SHIPPED | OUTPATIENT
Start: 2018-08-21 | End: 2018-11-21

## 2018-08-21 RX ORDER — BUPROPION HYDROCHLORIDE 300 MG/1
300 TABLET ORAL EVERY MORNING
COMMUNITY
Start: 2018-08-21 | End: 2018-10-16

## 2018-08-21 ASSESSMENT — ANXIETY QUESTIONNAIRES
1. FEELING NERVOUS, ANXIOUS, OR ON EDGE: NEARLY EVERY DAY
GAD7 TOTAL SCORE: 15
3. WORRYING TOO MUCH ABOUT DIFFERENT THINGS: NEARLY EVERY DAY
2. NOT BEING ABLE TO STOP OR CONTROL WORRYING: NEARLY EVERY DAY
5. BEING SO RESTLESS THAT IT IS HARD TO SIT STILL: NOT AT ALL
GAD7 TOTAL SCORE: 15
6. BECOMING EASILY ANNOYED OR IRRITABLE: SEVERAL DAYS
7. FEELING AFRAID AS IF SOMETHING AWFUL MIGHT HAPPEN: NEARLY EVERY DAY
7. FEELING AFRAID AS IF SOMETHING AWFUL MIGHT HAPPEN: NEARLY EVERY DAY
GAD7 TOTAL SCORE: 15
4. TROUBLE RELAXING: MORE THAN HALF THE DAYS

## 2018-08-21 ASSESSMENT — PATIENT HEALTH QUESTIONNAIRE - PHQ9
SUM OF ALL RESPONSES TO PHQ QUESTIONS 1-9: 9
10. IF YOU CHECKED OFF ANY PROBLEMS, HOW DIFFICULT HAVE THESE PROBLEMS MADE IT FOR YOU TO DO YOUR WORK, TAKE CARE OF THINGS AT HOME, OR GET ALONG WITH OTHER PEOPLE: VERY DIFFICULT
SUM OF ALL RESPONSES TO PHQ QUESTIONS 1-9: 9

## 2018-08-21 NOTE — PROGRESS NOTES
"  SUBJECTIVE:   Noa Mcgill is a 43 year old female who presents to clinic today for the following health issues:      Depression and Anxiety Follow-Up    Status since last visit: Worsened     Other associated symptoms:None    Complicating factors:     Significant life event: Yes-  8 surgery     Current substance abuse: None    PHQ-9 12/12/2016 11/6/2017 8/21/2018   Total Score 6 11 9   Q9: Suicide Ideation Not at all Not at all Not at all     BASSAM-7 SCORE 11/6/2017 3/21/2018 8/21/2018   Total Score - - -   Total Score - 4 (minimal anxiety) 15 (severe anxiety)   Total Score 3 4 15     In the past two weeks have you had thoughts of suicide or self-harm?  No.    Do you have concerns about your personal safety or the safety of others?   No  PHQ-9  English  PHQ-9   Any Language  BASSAM-7  Suicide Assessment Five-step Evaluation and Treatment (SAFE-T)    Amount of exercise or physical activity: None    Problems taking medications regularly: No    Medication side effects: none    Diet: regular (no restrictions)    Patient restarting work on Thursday and is looking forward to it.   She has a ton of anxiety about her wound opening again.   Also feels her  is not understanding of all her surgical complications and makes comments such as- \"you should be over this by now\".         Problem list and histories reviewed & adjusted, as indicated.  Additional history: as documented    Patient Active Problem List   Diagnosis     Acute reaction to stress     Psoriatic arthropathy (H)     Other psoriasis     Herpes simplex virus (HSV) infection     Obesity     Recurrent UTI     Insomnia     CARDIOVASCULAR SCREENING; LDL GOAL LESS THAN 160     Migraine headache     Lumbago     Nonallopathic lesion of sacral region     Phobia, flying     Vaginal bleeding in pregnancy     Retinal artery branch occlusion of right eye     Vision loss of right eye     ASD (atrial septal defect)     Bleeding     Attention deficit disorder     Retained " products of conception with hemorrhage     Anxiety     Neck pain     Chronic pain syndrome     BRCA1 gene mutation positive     Mild major depression (H)     S/P mastectomy, bilateral     Anemia due to blood loss, acute     Past Surgical History:   Procedure Laterality Date     APPENDECTOMY  2006      SECTION  2013    Procedure:  SECTION;  Primary  Section;  Surgeon: Chad Hughes MD;  Location:  L+D     DILATION AND CURETTAGE SUCTION N/A 2014    Procedure: DILATION AND CURETTAGE SUCTION;  Surgeon: Srini Martinez MD;  Location:  OR     DILATION AND CURETTAGE SUCTION N/A 2014    Procedure: DILATION AND CURETTAGE SUCTION;  Surgeon: Connor Kang MD;  Location:  OR     GENITOURINARY SURGERY      bladder sling and complete historectomy     HC DILATION/CURETTAGE DIAG/THER NON OB       HC DILATION/CURETTAGE DIAG/THER NON OB       HYSTERECTOMY, CARLOS  2016    [facility]     MASTECTOMY SIMPLE BILATERAL Bilateral 2018    Procedure: MASTECTOMY SIMPLE BILATERAL;  PROPHYLACTIC BILATERAL MASTECTOMY (GLADIS) BILATERAL BREAST RECONSTRUCTION [facility] TISSUE EXPANDERS (WILLOW)   ;  Surgeon: Suraj Bergeron MD;  Location:  OR     ORTHOPEDIC SURGERY      (L) thumb fused     RECONSTRUCT BREAST, INSERT TISSUE EXPANDER BILATERAL, COMBINED Bilateral 2018    Procedure: COMBINED RECONSTRUCT BREAST, INSERT TISSUE EXPANDER BILATERAL;;  Surgeon: Javier Perea MD;  Location:  OR     SURGICAL HISTORY OF -       left thumb fusion due to arthritis       Social History   Substance Use Topics     Smoking status: Never Smoker     Smokeless tobacco: Never Used     Alcohol use 0.0 oz/week      Comment: 1-3drink per week     Family History   Problem Relation Age of Onset     C.A.D. Maternal Grandmother      Hypertension Maternal Grandmother      Psychotic Disorder Mother      Depression, alcoholism     Diabetes Mother      Hereditary Breast and  Ovarian Cancer Syndrome Mother      BRCA1+, no hx of cancer, s/p prophylactic surgery to remove breast tissue, ovaries, fallopian tubes     Hypertension Mother      Thyroid Disease Mother      thyroid nodules     Substance Abuse Father      Hereditary Breast and Ovarian Cancer Syndrome Sister      matrnal half sister, BRCA1+     Hereditary Breast and Ovarian Cancer Syndrome Daughter 23     BRCA1+     Breast Cancer Maternal Aunt 40     lumpectomy, then 2nd primary breast cancer later in 40s, treated with mastectomy     Hereditary Breast and Ovarian Cancer Syndrome Maternal Aunt      BRCA1+     Ovarian Cancer Maternal Aunt 70     surgry     Thyroid Cancer Maternal Aunt      medullary thyroid cancer     Hereditary Breast and Ovarian Cancer Syndrome Maternal Aunt      BRCA1+     Breast Cancer Other 41     maternal great aunt     Ovarian Cancer Other 45      in 40s     Thyroid Cancer Maternal Uncle 66     papillary thyroid cancer     Breast Cancer Cousin 45     Hereditary Breast and Ovarian Cancer Syndrome Cousin      BRCA1+     Breast Cancer Other 43     maternal great aunt     Other Cancer Other      ovarian cancer           Reviewed and updated as needed this visit by clinical staff  Tobacco  Allergies       Reviewed and updated as needed this visit by Provider         ROS:  Constitutional, HEENT, cardiovascular, pulmonary, GI, , musculoskeletal, neuro, skin, endocrine and psych systems are negative, except as otherwise noted.    OBJECTIVE:     /86 (BP Location: Left arm, Patient Position: Chair, Cuff Size: Adult Large)  Pulse 98  Temp 98.1  F (36.7  C) (Oral)  Resp 20  Wt 200 lb (90.7 kg)  LMP 2015 (Within Months)  Breastfeeding? No  BMI 32.28 kg/m2  Body mass index is 32.28 kg/(m^2).  GENERAL: healthy, alert and no distress  RESP: lungs clear to auscultation - no rales, rhonchi or wheezes  BREAST: s/p mastectomy - steristrips on right breast- C/D/I  CV: regular rate and rhythm, normal S1  S2  ABDOMEN: soft, nontender, and bowel sounds normal; well healed abdominal scar   PSYCH: mentation appears normal, tearful, anxious and fatigued    Diagnostic Test Results:  none     ASSESSMENT/PLAN:         1. Anemia due to blood loss, acute  - recheck labs   - CBC with platelets differential  - Iron and iron binding capacity  - Ferritin    2. Mild major depression (H)  - worsening due to recent surgical complications. Will increase wellbutrin dose to see if this helps. Also discussed therapy. She will call her insurance and look into her options   - buPROPion (WELLBUTRIN XL) 300 MG 24 hr tablet; Take 1 tablet (300 mg) by mouth every morning    3. Anxiety  - will start on prn hydroxyzine   - buPROPion (WELLBUTRIN XL) 300 MG 24 hr tablet; Take 1 tablet (300 mg) by mouth every morning  - hydrOXYzine (ATARAX) 25 MG tablet; Take 1 tablet (25 mg) by mouth every 6 hours as needed for anxiety  Dispense: 30 tablet; Refill: 0    4. S/P mastectomy, bilateral  - wound healing well. No signs of dehiscing at this time   - Comprehensive metabolic panel    See Patient Instructions    Haleigh Montano MD  [facility]    Answers for HPI/ROS submitted by the patient on 8/21/2018   If you checked off any problems, how difficult have these problems made it for you to do your work, take care of things at home, or get along with other people?: Very difficult  PHQ9 TOTAL SCORE: 9  BASSAM 7 TOTAL SCORE: 15

## 2018-08-21 NOTE — MR AVS SNAPSHOT
After Visit Summary   8/21/2018    Noa Mcgill    MRN: [id number]           Patient Information     Date Of Birth          1975        Visit Information        Provider Department      8/21/2018 11:30 AM Haleigh Montano MD [facility]        Today's Diagnoses     Anemia due to blood loss, acute    -  1    Mild major depression (H)        Anxiety        S/P mastectomy, bilateral          Care Instructions    Increase wellbutrin to 2 tablets daily   We will notify you about labs   Follow up as scheduled on 8/27          Follow-ups after your visit        Follow-up notes from your care team     Return in about 6 days (around 8/27/2018).      Your next 10 appointments already scheduled     Aug 27, 2018  5:45 PM CDT   MyChart Long with Sydnie He MD   [facility] ([facility]    [address] [place] [address] [phone] [phone]            Oct 16, 2018  9:45 AM CDT   New Visit with Victoriano Cornejo MD   [facility] ([facility])    [address] [place] [id number] [phone] OPT 2              Who to contact     If you have questions or need follow up information about today's clinic visit or your schedule please contact [facility] directly at [phone].  Normal or non-critical lab and imaging results will be communicated to you by MyChart, letter or phone within 4 business days after the clinic has received the results. If you do not hear from us within 7 days, please contact the clinic through MyChart or phone. If you have a critical or abnormal lab result, we will notify you by phone as soon as possible.  Submit refill requests through [employer] or call your pharmacy and they will forward the refill request to us. Please allow 3 business days for your refill to be completed.          Additional Information About Your  Visit        [employer] Information     [employer] gives you secure access to your electronic health record. If you see a primary care provider, you can also send messages to your care team and make appointments. If you have questions, please call your primary care clinic.  If you do not have a primary care provider, please call [phone] and they will assist you.        Care EveryWhere ID     This is your Care EveryWhere ID. This could be used by other organizations to access your [place] medical records  [id number]        Your Vitals Were     Pulse Temperature Respirations Last Period Breastfeeding? BMI (Body Mass Index)    98 98.1  F (36.7  C) (Oral) 20 02/08/2015 (Within Months) No 32.28 kg/m2       Blood Pressure from Last 3 Encounters:   08/21/18 120/86   06/11/18 108/62   05/26/18 110/64    Weight from Last 3 Encounters:   08/21/18 200 lb (90.7 kg)   06/11/18 199 lb 8 oz (90.5 kg)   05/25/18 194 lb (88 kg)              We Performed the Following     CBC with platelets differential     Comprehensive metabolic panel     Ferritin     Iron and iron binding capacity          Today's Medication Changes          These changes are accurate as of 8/21/18 12:18 PM.  If you have any questions, ask your nurse or doctor.               Start taking these medicines.        Dose/Directions    hydrOXYzine 25 MG tablet   Commonly known as:  ATARAX   Used for:  Anxiety   Started by:  Haleigh Montano MD        Dose:  25 mg   Take 1 tablet (25 mg) by mouth every 6 hours as needed for anxiety   Quantity:  30 tablet   Refills:  0         These medicines have changed or have updated prescriptions.        Dose/Directions    aspirin 325 MG EC tablet   This may have changed:  Another medication with the same name was removed. Continue taking this medication, and follow the directions you see here.   Changed by:  Haleigh Montano MD        Dose:  325 mg   Take 325 mg by mouth Take 325 mg by mouth   Refills:  0        WELLBUTRIN  MG 24 hr tablet   This may have changed:    - medication strength  - how much to take   Used for:  Anxiety, Mild major depression (H)   Generic drug:  buPROPion   Changed by:  Haleigh Montano MD        Dose:  300 mg   Take 1 tablet (300 mg) by mouth every morning   Refills:  0         Stop taking these medicines if you haven't already. Please contact your care team if you have questions.     ADDERALL PO   Stopped by:  Haleigh Montano MD           ondansetron 4 MG ODT tab   Commonly known as:  ZOFRAN-ODT   Stopped by:  Haleigh Montano MD           topiramate 25 MG tablet   Commonly known as:  TOPAMAX   Stopped by:  Haleigh Montano MD                Where to get your medicines      These medications were sent to [facility] [address] [place] [address]  [address] [facility] [address]     Phone:  [phone]     hydrOXYzine 25 MG tablet                Primary Care Provider Office Phone # Fax #    Sydnie He [phone] [address] [facility] [address]        Equal Access to Services     [facility]ERNEY [facility] Hospital[facility] : Hadii marshall kirkland hadasho Soomaali, waaxda luqadaha, qaybta kaalmada adeegyada, lisa rivera . So [facility] [phone].    ATENCIÓN: Si habla español, tiene a horton disposición servicios gratuitos de asistencia lingüística. Llame al [phone].    We comply with applicable federal civil rights laws and [place] laws. We do not discriminate on the basis of race, color, national origin, age, disability, sex, sexual orientation, or gender identity.            Thank you!     Thank you for choosing [facility]  for your care. Our goal is always to provide you with excellent care. Hearing back from our patients is one way we can continue to improve our services. Please take a few minutes to complete the written survey that you may receive in the mail  after your visit with us. Thank you!             Your Updated Medication List - Protect others around you: Learn how to safely use, store and throw away your medicines at www.disposemymeds.org.          This list is accurate as of 8/21/18 12:18 PM.  Always use your most recent med list.                   Brand Name Dispense Instructions for use Diagnosis    ADVIL PO      Take 600 mg by mouth 2 times daily as needed for moderate pain        ascorbic acid 250 MG Tabs tablet      Take by mouth twice a day.        aspirin 325 MG EC tablet      Take 325 mg by mouth Take 325 mg by mouth        butalbital-acetaminophen-caffeine -40 MG per tablet    FIORICET/ESGIC    30 tablet    Take 1 tablet by mouth every 4 hours as needed    Migraine without status migrainosus, not intractable, unspecified migraine type       DHEA 50 MG Tabs      Take 50 mg by mouth 1 tab QD        ferrous sulfate 325 (65 Fe) MG tablet    IRON    180 tablet    Take 1 tablet (325 mg) by mouth 2 times daily    Anemia due to blood loss, acute       hydrOXYzine 25 MG tablet    ATARAX    30 tablet    Take 1 tablet (25 mg) by mouth every 6 hours as needed for anxiety    Anxiety       IRON PO      Take 1 tablet by mouth Take 1 tablet by mouth        NUVARING 0.12-0.015 MG/24HR vaginal ring   Generic drug:  etonogestrel-ethinyl estradiol      Place 1 each vaginally every 28 days        rizatriptan 10 MG tablet    MAXALT     TAKE 1 TABLET BY MOUTH ONCE, MAY REPEAT IN 2 HRS. MAX 30MG/24HRS        VITAMIN D PO      Take 2,000 Units by mouth daily.        WELLBUTRIN  MG 24 hr tablet   Generic drug:  buPROPion      Take 1 tablet (300 mg) by mouth every morning    Anxiety, Mild major depression (H)       zolpidem 10 MG tablet    AMBIEN    30 tablet    Take 1 tablet (10 mg) by mouth nightly as needed for sleep    Insomnia, unspecified type

## 2018-08-21 NOTE — PATIENT INSTRUCTIONS
Increase wellbutrin to 2 tablets daily   We will notify you about labs   Follow up as scheduled on 8/27

## 2018-08-22 LAB
ALBUMIN SERPL-MCNC: 3.6 G/DL (ref 3.4–5)
ALP SERPL-CCNC: 65 U/L (ref 40–150)
ALT SERPL W P-5'-P-CCNC: 27 U/L (ref 0–50)
ANION GAP SERPL CALCULATED.3IONS-SCNC: 10 MMOL/L (ref 3–14)
AST SERPL W P-5'-P-CCNC: 28 U/L (ref 0–45)
BILIRUB SERPL-MCNC: 0.3 MG/DL (ref 0.2–1.3)
BUN SERPL-MCNC: 13 MG/DL (ref 7–30)
CALCIUM SERPL-MCNC: 8.9 MG/DL (ref 8.5–10.1)
CHLORIDE SERPL-SCNC: 111 MMOL/L (ref 94–109)
CO2 SERPL-SCNC: 19 MMOL/L (ref 20–32)
CREAT SERPL-MCNC: 0.82 MG/DL (ref 0.52–1.04)
FERRITIN SERPL-MCNC: 31 NG/ML (ref 12–150)
GFR SERPL CREATININE-BSD FRML MDRD: 77 ML/MIN/1.7M2
GLUCOSE SERPL-MCNC: 92 MG/DL (ref 70–99)
IRON SATN MFR SERPL: 9 % (ref 15–46)
IRON SERPL-MCNC: 42 UG/DL (ref 35–180)
POTASSIUM SERPL-SCNC: 4.7 MMOL/L (ref 3.4–5.3)
PROT SERPL-MCNC: 7.2 G/DL (ref 6.8–8.8)
SODIUM SERPL-SCNC: 140 MMOL/L (ref 133–144)
TIBC SERPL-MCNC: 466 UG/DL (ref 240–430)

## 2018-08-22 ASSESSMENT — PATIENT HEALTH QUESTIONNAIRE - PHQ9: SUM OF ALL RESPONSES TO PHQ QUESTIONS 1-9: 9

## 2018-08-22 ASSESSMENT — ANXIETY QUESTIONNAIRES: GAD7 TOTAL SCORE: 15

## 2018-08-27 ENCOUNTER — OFFICE VISIT (OUTPATIENT)
Dept: FAMILY MEDICINE | Facility: CLINIC | Age: 43
End: 2018-08-27
Payer: COMMERCIAL

## 2018-08-27 DIAGNOSIS — D62 ANEMIA DUE TO BLOOD LOSS, ACUTE: ICD-10-CM

## 2018-08-27 DIAGNOSIS — G43.909 MIGRAINE WITHOUT STATUS MIGRAINOSUS, NOT INTRACTABLE, UNSPECIFIED MIGRAINE TYPE: ICD-10-CM

## 2018-08-27 DIAGNOSIS — Z15.09 BRCA1 GENE MUTATION POSITIVE: ICD-10-CM

## 2018-08-27 DIAGNOSIS — F43.0 ACUTE REACTION TO STRESS: Primary | ICD-10-CM

## 2018-08-27 DIAGNOSIS — H34.231 RETINAL ARTERY BRANCH OCCLUSION OF RIGHT EYE: ICD-10-CM

## 2018-08-27 DIAGNOSIS — Z15.01 BRCA1 GENE MUTATION POSITIVE: ICD-10-CM

## 2018-08-27 PROCEDURE — 99214 OFFICE O/P EST MOD 30 MIN: CPT | Performed by: FAMILY MEDICINE

## 2018-08-27 NOTE — MR AVS SNAPSHOT
After Visit Summary   8/27/2018    Noa Mcgill    MRN: [id number]           Patient Information     Date Of Birth          1975        Visit Information        Provider Department      8/27/2018 5:45 PM Sydnie He MD [facility]         Follow-ups after your visit        Follow-up notes from your care team     Return in about 6 months (around 2/27/2019) for Physical Exam.      Your next 10 appointments already scheduled     Oct 16, 2018  9:45 AM CDT   New Visit with Victoriano Cornejo MD   [facility] ([facility])    [address] [place], [place] [id number]  [facility] [id number] [phone] OPT 2              Who to contact     If you have questions or need follow up information about today's clinic visit or your schedule please contact [facility] directly at [phone].  Normal or non-critical lab and imaging results will be communicated to you by MyChart, letter or phone within 4 business days after the clinic has received the results. If you do not hear from us within 7 days, please contact the clinic through [facility] or phone. If you have a critical or abnormal lab result, we will notify you by phone as soon as possible.  Submit refill requests through [facility] or call your pharmacy and they will forward the refill request to us. Please allow 3 business days for your refill to be completed.          Additional Information About Your Visit        MyChart Information     [facility] gives you secure access to your electronic health record. If you see a primary care provider, you can also send messages to your care team and make appointments. If you have questions, please call your primary care clinic.  If you do not have a primary care provider, please call [phone] and they will assist you.        Care EveryWhere ID     This is your Care EveryWhere ID. This could be used by other organizations to access your  [place] medical records  [id number]        Your Vitals Were     Last Period Breastfeeding?                02/08/2015 (Within Months) Unknown           Blood Pressure from Last 3 Encounters:   08/21/18 120/86   06/11/18 108/62   05/26/18 110/64    Weight from Last 3 Encounters:   08/27/18 (P) 200 lb 12.8 oz (91.1 kg)   08/21/18 200 lb (90.7 kg)   06/11/18 199 lb 8 oz (90.5 kg)              Today, you had the following     No orders found for display       Primary Care Provider Office Phone # Fax #    Sydnie He [phone] [address] [facility] [address]        Equal Access to Services     KARMEN LEMUS : Johan terrello Iram, waaxda luqadaha, qaybta kaalmada adeegyada, lisa rivear . So [facility] [phone].    ATENCIÓN: Si habla español, tiene a horton disposición servicios gratuitos de asistencia lingüística. Ll[facility] [phone].    We comply with applicable federal civil rights laws and [place] laws. We do not discriminate on the basis of race, color, national origin, age, disability, sex, sexual orientation, or gender identity.            Thank you!     Thank you for choosing [facility]  for your care. Our goal is always to provide you with excellent care. Hearing back from our patients is one way we can continue to improve our services. Please take a few minutes to complete the written survey that you may receive in the mail after your visit with us. Thank you!             Your Updated Medication List - Protect others around you: Learn how to safely use, store and throw away your medicines at www.disposemymeds.org.          This list is accurate as of 8/27/18  6:40 PM.  Always use your most recent med list.                   Brand Name Dispense Instructions for use Diagnosis    ADVIL PO      Take 600 mg by mouth 2 times daily as needed for moderate pain        ascorbic acid 250 MG Tabs tablet      Take by mouth twice a day.         aspirin 325 MG EC tablet      Take 325 mg by mouth Take 325 mg by mouth        butalbital-acetaminophen-caffeine -40 MG per tablet    FIORICET/ESGIC    30 tablet    Take 1 tablet by mouth every 4 hours as needed    Migraine without status migrainosus, not intractable, unspecified migraine type       DHEA 50 MG Tabs      Take 50 mg by mouth 1 tab QD        ferrous sulfate 325 (65 Fe) MG tablet    IRON    180 tablet    Take 1 tablet (325 mg) by mouth 2 times daily    Anemia due to blood loss, acute       hydrOXYzine 25 MG tablet    ATARAX    30 tablet    Take 1 tablet (25 mg) by mouth every 6 hours as needed for anxiety    Anxiety       IRON PO      Take 1 tablet by mouth Take 1 tablet by mouth        NUVARING 0.12-0.015 MG/24HR vaginal ring   Generic drug:  etonogestrel-ethinyl estradiol      Place 1 each vaginally every 28 days        rizatriptan 10 MG tablet    MAXALT     TAKE 1 TABLET BY MOUTH ONCE, MAY REPEAT IN 2 HRS. MAX 30MG/24HRS        VITAMIN D PO      Take 2,000 Units by mouth daily.        WELLBUTRIN  MG 24 hr tablet   Generic drug:  buPROPion      Take 1 tablet (300 mg) by mouth every morning    Anxiety, Mild major depression (H)       zolpidem 10 MG tablet    AMBIEN    30 tablet    Take 1 tablet (10 mg) by mouth nightly as needed for sleep    Insomnia, unspecified type

## 2018-08-27 NOTE — PROGRESS NOTES
SUBJECTIVE:   Noa Mcgill is a 43 year old female who presents to clinic today for the following health issues:      Pt is here to update you on her 8 surgeries she has had since her Mastectomy in May.  Her migraines are much better.   She is not on topamax.   She sees chiropracter.   She is currently trying to decide if she will have another attempt at reconstruction.  In may she had her breasts removed due to BRCA.  She had expanders and then attempt at tram.   There were blood clots and infections and 8 surgeries and finally the reconstruction had to be stopped and removed and she had to be stitched up.   She is healing and has not had a surgery now for a month but it was emotionally very taxing on her.  She would have rather had ketoprofen for pain but due to her clotting disorder could not.   She is off pain meds now.      Past Medical History:   Diagnosis Date     Allergic rhinitis, cause unspecified      Arthritis      ASD (atrial septal defect) 2/15/13    ASD dx by echo, recheck and repair after done having children     Attention deficit disorder without mention of hyperactivity 2014     BRCA1 gene mutation positive 2018    Reported by patient, no report available at this time Records requested from [facility]     Cerebral infarction (H)      Depressive disorder      Endometriosis of uterus      HERPES SIMPLEX NOS 2007    cold sores on remicade     Insomnia, unspecified 2/10/2005     Migraine headaches      Other chronic sinusitis      Psoriatic arthropathy (H) 2007     Recurrent UTI 2008     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       Past Surgical History:   Procedure Laterality Date     APPENDECTOMY  2006      SECTION  2013    Procedure:  SECTION;  Primary  Section;  Surgeon: Chad Hughes MD;  Location: [facility] L[facility]     DILATION AND CURETTAGE SUCTION N/A 2014    Procedure: DILATION  AND CURETTAGE SUCTION;  Surgeon: Srini Martinez MD;  Location:  OR     DILATION AND CURETTAGE SUCTION N/A 12/20/2014    Procedure: DILATION AND CURETTAGE SUCTION;  Surgeon: Connor Kang MD;  Location:  OR     GENITOURINARY SURGERY  2016    bladder sling and complete historectomy     HC DILATION/CURETTAGE DIAG/THER NON OB  1998     HC DILATION/CURETTAGE DIAG/THER NON OB  2006     HYSTERECTOMY, CARLOS  04/18/2016    [facility]     MASTECTOMY SIMPLE BILATERAL Bilateral 5/25/2018    Procedure: MASTECTOMY SIMPLE BILATERAL;  PROPHYLACTIC BILATERAL MASTECTOMY (GLADIS) BILATERAL BREAST RECONSTRUCTION [facility] TISSUE EXPANDERS (WILLOW)   ;  Surgeon: Suraj Bergeron MD;  Location:  OR     ORTHOPEDIC SURGERY      (L) thumb fused     RECONSTRUCT BREAST, INSERT TISSUE EXPANDER BILATERAL, COMBINED Bilateral 5/25/2018    expander and tram flap with drains - had weekly surgery to have I & D and removal of infected tissue     SURGICAL HISTORY OF -   2006    left thumb fusion due to arthritis       MEDICATIONS:  Current Outpatient Prescriptions   Medication     ascorbic acid 250 MG TABS tablet     aspirin 325 MG EC tablet     buPROPion (WELLBUTRIN XL) 300 MG 24 hr tablet     butalbital-acetaminophen-caffeine (FIORICET/ESGIC) -40 MG per tablet     DHEA 50 MG TABS     etonogestrel-ethinyl estradiol (NUVARING) 0.12-0.015 MG/24HR vaginal ring     ferrous sulfate (IRON) 325 (65 Fe) MG tablet     hydrOXYzine (ATARAX) 25 MG tablet     Ibuprofen (ADVIL PO)     IRON PO     rizatriptan (MAXALT) 10 MG tablet     VITAMIN D PO     zolpidem (AMBIEN) 10 MG tablet     No current facility-administered medications for this visit.        SOCIAL HISTORY:  Social History   Substance Use Topics     Smoking status: Never Smoker     Smokeless tobacco: Never Used     Alcohol use 0.0 oz/week      Comment: 1-3drink per week       Family History   Problem Relation Age of Onset     C.A.D. Maternal Grandmother      Hypertension Maternal  "Grandmother      Psychotic Disorder Mother      Depression, alcoholism     Diabetes Mother      Hereditary Breast and Ovarian Cancer Syndrome Mother      BRCA1+, no hx of cancer, s/p prophylactic surgery to remove breast tissue, ovaries, fallopian tubes     Hypertension Mother      Thyroid Disease Mother      thyroid nodules     Substance Abuse Father      Hereditary Breast and Ovarian Cancer Syndrome Sister      matrnal half sister, BRCA1+     Hereditary Breast and Ovarian Cancer Syndrome Daughter 23     BRCA1+     Breast Cancer Maternal Aunt 40     lumpectomy, then 2nd primary breast cancer later in 40s, treated with mastectomy     Hereditary Breast and Ovarian Cancer Syndrome Maternal Aunt      BRCA1+     Ovarian Cancer Maternal Aunt 70     surgry     Thyroid Cancer Maternal Aunt      medullary thyroid cancer     Hereditary Breast and Ovarian Cancer Syndrome Maternal Aunt      BRCA1+     Breast Cancer Other 41     maternal great aunt     Ovarian Cancer Other 45      in 40s     Thyroid Cancer Maternal Uncle 66     papillary thyroid cancer     Breast Cancer Cousin 45     Hereditary Breast and Ovarian Cancer Syndrome Cousin      BRCA1+     Breast Cancer Other 43     maternal great aunt     Other Cancer Other      ovarian cancer       Objective:  Pulse (P) 95, temperature (P) 98.3  F (36.8  C), temperature source (P) Oral, resp. rate (P) 18, height (P) 5' 6\" (1.676 m), weight (P) 200 lb 12.8 oz (91.1 kg), last menstrual period 2015, SpO2 (P) 100 %, unknown if currently breastfeeding.  Neck:  There is no lymphadenopathy or thyroid tenderness or enlargement  Chest: Clear to auscultation bilaterally.  No wheezes, rales or retractions.  CV: Regular rate and rhythm without murmurs, rubs or gallops.  ABDOMEN:  Positive bowel sounds, soft, nondistended and nontender.  No masses are palpated and there is no organomegaly or inguinal lymphadenopathy.    Assessment:  1. stess - seeing therapist and doing better " now  2. Anemia - this has resolved - may stop iron but continue MVI with iron  3. Clotting disorder  - sees DIen for this  4. Migraines - much better now    Plan:  1. Continue current therapy  2. May attempt surgery in fall for implants  3. CPE in 2/2019  4. No refills needed

## 2018-08-28 VITALS — SYSTOLIC BLOOD PRESSURE: 114 MMHG | DIASTOLIC BLOOD PRESSURE: 66 MMHG

## 2018-09-20 ENCOUNTER — ALLIED HEALTH/NURSE VISIT (OUTPATIENT)
Dept: NURSING | Facility: CLINIC | Age: 43
End: 2018-09-20
Payer: COMMERCIAL

## 2018-09-20 DIAGNOSIS — Z23 NEED FOR PROPHYLACTIC VACCINATION AND INOCULATION AGAINST INFLUENZA: Primary | ICD-10-CM

## 2018-09-20 PROCEDURE — 99207 ZZC NO CHARGE NURSE ONLY: CPT

## 2018-09-20 PROCEDURE — 90686 IIV4 VACC NO PRSV 0.5 ML IM: CPT

## 2018-09-20 PROCEDURE — 90471 IMMUNIZATION ADMIN: CPT

## 2018-09-20 NOTE — PROGRESS NOTES

## 2018-09-20 NOTE — MR AVS SNAPSHOT
After Visit Summary   9/20/2018    Noa Mcgill    MRN: [id number]           Patient Information     Date Of Birth          1975        Visit Information        Provider Department      9/20/2018 10:00 AM FM MA/LPN [facility]        Today's Diagnoses     Need for prophylactic vaccination and inoculation against influenza    -  1       Follow-ups after your visit        Your next 10 appointments already scheduled     Sep 20, 2018 10:00 AM CDT   Nurse [facility] with [facility]    [address], [address]  [facility] [address]   [phone]            Oct 16, 2018  9:45 AM CDT   New Visit with Victoriano Cornejo MD   [facility] ([facility]    [address] [place], [place] [id number]  [facility] [address] [phone] OPT 2              Who to contact     If you have questions or need follow up information about today's clinic visit or your schedule please contact [facility] directly at [phone].  Normal or non-critical lab and imaging results will be communicated to you by [employer], letter or phone within 4 business days after the clinic has received the results. If you do not hear from us within 7 days, please contact the clinic through [employer] or phone. If you have a critical or abnormal lab result, we will notify you by phone as soon as possible.  Submit refill requests through [employer] or call your pharmacy and they will forward the refill request to us. Please allow 3 business days for your refill to be completed.          Additional Information About Your Visit        MyChart Information     [employer] gives you secure access to your electronic health record. If you see a primary care provider, you can also send messages to your care team and make appointments. If you have questions, please call your primary care clinic.  If you do not have a primary care  provider, please call [phone] and they will assist you.        Care EveryWhere ID     This is your Care EveryWhere ID. This could be used by other organizations to access your [facility] medical records  [id number]        Your Vitals Were     Last Period                   02/08/2015 (Within Months)            Blood Pressure from Last 3 Encounters:   08/27/18 114/66   08/21/18 120/86   06/11/18 108/62    Weight from Last 3 Encounters:   08/27/18 (P) 200 lb 12.8 oz (91.1 kg)   08/21/18 200 lb (90.7 kg)   06/11/18 199 lb 8 oz (90.5 kg)              We Performed the Following     FLU VACCINE, SPLIT VIRUS, IM (QUADRIVALENT) [15085]- >3 YRS     Vaccine Administration, Initial [10626]        Primary Care Provider Office Phone # Fax #    Sydnie ALDRIDGE MD Neri [phone] [address] [facility] [address]        Equal Access to Services     KARMEN LEMUS : Hadii aad ku hadasho Soomaali, waaxda luqadaha, qaybta kaalmada adeegyada, waxay idiin hayedenilsonn haily rivera . So [facility] [phone].    ATENCIÓN: Si habla español, tiene a horton disposición servicios gratuitos de asistencia lingüística. Llame al [phone].    We comply with applicable federal civil rights laws and [place] laws. We do not discriminate on the basis of race, color, national origin, age, disability, sex, sexual orientation, or gender identity.            Thank you!     Thank you for choosing [facility]  for your care. Our goal is always to provide you with excellent care. Hearing back from our patients is one way we can continue to improve our services. Please take a few minutes to complete the written survey that you may receive in the mail after your visit with us. Thank you!             Your Updated Medication List - Protect others around you: Learn how to safely use, store and throw away your medicines at www.disposemymeds.org.          This list is accurate as of 9/20/18  9:37 AM.  Always use your most  recent med list.                   Brand Name Dispense Instructions for use Diagnosis    ADVIL PO      Take 600 mg by mouth 2 times daily as needed for moderate pain        ascorbic acid 250 MG Tabs tablet      Take by mouth twice a day.        aspirin 325 MG EC tablet      Take 325 mg by mouth Take 325 mg by mouth        butalbital-acetaminophen-caffeine -40 MG per tablet    FIORICET/ESGIC    30 tablet    Take 1 tablet by mouth every 4 hours as needed    Migraine without status migrainosus, not intractable, unspecified migraine type       DHEA 50 MG Tabs      Take 50 mg by mouth 1 tab QD        ferrous sulfate 325 (65 Fe) MG tablet    IRON    180 tablet    Take 1 tablet (325 mg) by mouth 2 times daily    Anemia due to blood loss, acute       hydrOXYzine 25 MG tablet    ATARAX    30 tablet    Take 1 tablet (25 mg) by mouth every 6 hours as needed for anxiety    Anxiety       IRON PO      Take 1 tablet by mouth Take 1 tablet by mouth        NUVARING 0.12-0.015 MG/24HR vaginal ring   Generic drug:  etonogestrel-ethinyl estradiol      Place 1 each vaginally every 28 days        rizatriptan 10 MG tablet    MAXALT     TAKE 1 TABLET BY MOUTH ONCE, MAY REPEAT IN 2 HRS. MAX 30MG/24HRS        VITAMIN D PO      Take 2,000 Units by mouth daily.        WELLBUTRIN  MG 24 hr tablet   Generic drug:  buPROPion      Take 1 tablet (300 mg) by mouth every morning    Anxiety, Mild major depression (H)       zolpidem 10 MG tablet    AMBIEN    30 tablet    Take 1 tablet (10 mg) by mouth nightly as needed for sleep    Insomnia, unspecified type

## 2018-10-09 ENCOUNTER — TRANSFERRED RECORDS (OUTPATIENT)
Dept: SURGERY | Facility: CLINIC | Age: 43
End: 2018-10-09

## 2018-10-16 ENCOUNTER — OFFICE VISIT (OUTPATIENT)
Dept: CARDIOLOGY | Facility: CLINIC | Age: 43
End: 2018-10-16
Payer: COMMERCIAL

## 2018-10-16 VITALS
SYSTOLIC BLOOD PRESSURE: 128 MMHG | DIASTOLIC BLOOD PRESSURE: 76 MMHG | HEART RATE: 78 BPM | WEIGHT: 211 LBS | BODY MASS INDEX: 33.91 KG/M2 | HEIGHT: 66 IN

## 2018-10-16 DIAGNOSIS — Q21.11 OSTIUM SECUNDUM TYPE ATRIAL SEPTAL DEFECT: Primary | ICD-10-CM

## 2018-10-16 PROCEDURE — 99204 OFFICE O/P NEW MOD 45 MIN: CPT | Performed by: INTERNAL MEDICINE

## 2018-10-16 RX ORDER — BUPROPION HYDROCHLORIDE 150 MG/1
150 TABLET, EXTENDED RELEASE ORAL DAILY
COMMUNITY
End: 2019-04-10

## 2018-10-16 RX ORDER — HYDROCORTISONE 5 MG/1
5 TABLET ORAL DAILY
COMMUNITY
End: 2019-01-22

## 2018-10-16 RX ORDER — CIDER VINEGAR 300 MG
1000 TABLET ORAL DAILY
COMMUNITY
End: 2021-09-14

## 2018-10-16 NOTE — PROGRESS NOTES
HPI and Plan:   See dictation    Orders Placed This Encounter   Procedures     Follow-Up with Cardiac Advanced Practice Provider     Transesophageal Echocardiogram     Orders Placed This Encounter   Medications     buPROPion (WELLBUTRIN SR) 150 MG 12 hr tablet     Sig: Take 150 mg by mouth daily     hydrocortisone (CORTEF) 5 MG tablet     Sig: Take 5 mg by mouth daily Pt takes 2 tabs (10mg) in Am and 1 tab (5mg) PM     Omega-3 Fatty Acids (FISH OIL) 1000 MG CPDR     Sig: Take 1,000 mg by mouth daily     Medications Discontinued During This Encounter   Medication Reason     buPROPion (WELLBUTRIN XL) 300 MG 24 hr tablet Discontinued by another Health Care Provider     ferrous sulfate (IRON) 325 (65 Fe) MG tablet Therapy completed     IRON PO Therapy completed         Encounter Diagnosis   Name Primary?     Ostium secundum type atrial septal defect Yes       CURRENT MEDICATIONS:  Current Outpatient Prescriptions   Medication Sig Dispense Refill     ascorbic acid 250 MG TABS tablet Take by mouth twice a day.       aspirin 325 MG EC tablet Take 325 mg by mouth Take 325 mg by mouth       buPROPion (WELLBUTRIN SR) 150 MG 12 hr tablet Take 150 mg by mouth daily       butalbital-acetaminophen-caffeine (FIORICET/ESGIC) -40 MG per tablet Take 1 tablet by mouth every 4 hours as needed 30 tablet 1     DHEA 50 MG TABS Take 50 mg by mouth 1 tab QD       etonogestrel-ethinyl estradiol (NUVARING) 0.12-0.015 MG/24HR vaginal ring Place 1 each vaginally every 28 days       hydrocortisone (CORTEF) 5 MG tablet Take 5 mg by mouth daily Pt takes 2 tabs (10mg) in Am and 1 tab (5mg) PM       Ibuprofen (ADVIL PO) Take 600 mg by mouth 2 times daily as needed for moderate pain       Omega-3 Fatty Acids (FISH OIL) 1000 MG CPDR Take 1,000 mg by mouth daily       rizatriptan (MAXALT) 10 MG tablet TAKE 1 TABLET BY MOUTH ONCE, MAY REPEAT IN 2 HRS. MAX 30MG/24HRS  3     VITAMIN D PO Take 2,000 Units by mouth daily.       zolpidem (AMBIEN) 10 MG  tablet Take 1 tablet (10 mg) by mouth nightly as needed for sleep 30 tablet 3     hydrOXYzine (ATARAX) 25 MG tablet Take 1 tablet (25 mg) by mouth every 6 hours as needed for anxiety (Patient not taking: Reported on 10/16/2018) 30 tablet 0     [DISCONTINUED] buPROPion (WELLBUTRIN XL) 300 MG 24 hr tablet Take 1 tablet (300 mg) by mouth every morning         ALLERGIES     Allergies   Allergen Reactions     Compazine [Prochlorperazine]      Shaky and anxiety     Enbrel Hives     Humira [Humira]      Rash hives     Prednisone      Pt sensitive to prednisone--shakey heart racing - only with large doses       PAST MEDICAL HISTORY:  Past Medical History:   Diagnosis Date     Adrenal insufficiency (Hagan's disease) (H)     ACTH stim test failed to increase cortisol     Allergic rhinitis, cause unspecified      Arthritis      ASD (atrial septal defect) 02/15/2013    ASD dx by echo, pt declined ASD closure     Attention deficit disorder without mention of hyperactivity 2014     BRCA1 gene mutation positive 2018    Reported by patient, no report available at this time Records requested from [facility] 18     Cerebral infarction (H)      Clotting disorder (H) birth    undefined clotting disorder - sees Dr. Sanchez Retinal artery occlusion, R int mammary artery occlusion post breastCA surg and recd TPA     Depressive disorder      Endometriosis of uterus      HERPES SIMPLEX NOS 2007    cold sores on remicade     Insomnia, unspecified 2/10/2005     Migraine headaches      Other chronic sinusitis      Psoriatic arthropathy (H) 2007     Recurrent UTI 2008     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       PAST SURGICAL HISTORY:  Past Surgical History:   Procedure Laterality Date     APPENDECTOMY  2006      SECTION  2013    Procedure:  SECTION;  Primary  Section;  Surgeon: Chad Hughes MD;  Location:  L+D     DILATION  AND CURETTAGE SUCTION N/A 12/12/2014    Procedure: DILATION AND CURETTAGE SUCTION;  Surgeon: Srini Martinez MD;  Location:  OR     DILATION AND CURETTAGE SUCTION N/A 12/20/2014    Procedure: DILATION AND CURETTAGE SUCTION;  Surgeon: Connor Kang MD;  Location:  OR     GENITOURINARY SURGERY  2016    bladder sling and complete historectomy     HC DILATION/CURETTAGE DIAG/THER NON OB  1998     HC DILATION/CURETTAGE DIAG/THER NON OB  2006     HYSTERECTOMY, CARLOS  04/18/2016    [facility]     MASTECTOMY SIMPLE BILATERAL Bilateral 5/25/2018    Procedure: MASTECTOMY SIMPLE BILATERAL;  PROPHYLACTIC BILATERAL MASTECTOMY (GLADIS) BILATERAL BREAST RECONSTRUCTION [facility] TISSUE EXPANDERS (WILLOW)   ;  Surgeon: Suraj Bergeron MD;  Location:  OR     ORTHOPEDIC SURGERY      (L) thumb fused     RECONSTRUCT BREAST, INSERT TISSUE EXPANDER BILATERAL, COMBINED Bilateral 5/25/2018    expander and tram flap with drains - had weekly surgery to have I & D and removal of infected tissue     SURGICAL HISTORY OF -   2006    left thumb fusion due to arthritis       FAMILY HISTORY:  Family History   Problem Relation Age of Onset     C.A.D. Maternal Grandmother      Hypertension Maternal Grandmother      Psychotic Disorder Mother      Depression, alcoholism     Diabetes Mother      Hereditary Breast and Ovarian Cancer Syndrome Mother      BRCA1+, no hx of cancer, s/p prophylactic surgery to remove breast tissue, ovaries, fallopian tubes     Hypertension Mother      Thyroid Disease Mother      thyroid nodules     Substance Abuse Father      Hereditary Breast and Ovarian Cancer Syndrome Sister      matrnal half sister, BRCA1+     Hereditary Breast and Ovarian Cancer Syndrome Daughter 23     BRCA1+     Breast Cancer Maternal Aunt 40     lumpectomy, then 2nd primary breast cancer later in 40s, treated with mastectomy     Hereditary Breast and Ovarian Cancer Syndrome Maternal Aunt      BRCA1+     Ovarian Cancer Maternal Aunt 70     " surgry     Thyroid Cancer Maternal Aunt      medullary thyroid cancer     Hereditary Breast and Ovarian Cancer Syndrome Maternal Aunt      BRCA1+     Breast Cancer Other 41     maternal great aunt     Ovarian Cancer Other 45      in 40s     Thyroid Cancer Maternal Uncle 66     papillary thyroid cancer     Breast Cancer Cousin 45     Hereditary Breast and Ovarian Cancer Syndrome Cousin      BRCA1+     Breast Cancer Other 43     maternal great aunt     Other Cancer Other      ovarian cancer       SOCIAL HISTORY:  Social History     Social History     Marital status:      Spouse name: Ciro     Number of children: 3     Years of education: 14     Occupational History     [employer]     Social History Main Topics     Smoking status: Never Smoker     Smokeless tobacco: Never Used     Alcohol use 0.0 oz/week      Comment: 1-3drink per week     Drug use: No     Sexual activity: Yes     Partners: Male     Birth control/ protection: Female Surgical     Other Topics Concern     Parent/Sibling W/ Cabg, Mi Or Angioplasty Before 65f 55m? No     Social History Narrative       Review of Systems:  Skin:  Negative     Eyes:  Positive for glasses;contacts  ENT:  Negative    Respiratory:  Negative    Cardiovascular:  Negative    Gastroenterology: Negative    Genitourinary:  not assessed    Musculoskeletal:  Negative    Neurologic:  Positive for migraine headaches  Psychiatric:  Positive for sleep disturbances  Heme/Lymph/Imm:  Negative    Endocrine:  Negative      Physical Exam:  Vitals: /76  Pulse 78  Ht 1.676 m (5' 5.98\")  Wt 95.7 kg (211 lb)  LMP 2015 (Within Months)  BMI 34.07 kg/m2    Constitutional:  cooperative, alert and oriented, well developed, well nourished, in no acute distress        Skin:  warm and dry to the touch, no apparent skin lesions or masses noted          Head:  normocephalic, no masses or lesions        Eyes:  pupils equal and round, " conjunctivae and lids unremarkable, sclera white, no xanthalasma, EOMS intact, no nystagmus        Lymph:No Cervical lymphadenopathy present     ENT:  no pallor or cyanosis, dentition good        Neck:  carotid pulses are full and equal bilaterally, JVP normal, no carotid bruit        Respiratory:  normal breath sounds, clear to auscultation, normal A-P diameter, normal symmetry, normal respiratory excursion, no use of accessory muscles    bilat mastectomy    Cardiac: regular rhythm, normal S1/S2, no S3 or S4, apical impulse not displaced, no murmurs, gallops or rubs             no murmur, normal split s2  pulses full and equal, no bruits auscultated                                        GI:  abdomen soft, non-tender, BS normoactive, no mass, no HSM, no bruits obese;surgical scars      Extremities and Muscular Skeletal:  no deformities, clubbing, cyanosis, erythema observed         scar L ankle -injury as child    Neurological:  no gross motor deficits        Psych:  Alert and Oriented x 3      Recent Lab Results:  LIPID RESULTS:  Lab Results   Component Value Date    CHOL 188 02/10/2018    HDL 58 02/10/2018    LDL 84 02/10/2018    TRIG 231 (H) 02/10/2018    CHOLHDLRATIO 2.7 05/08/2014       LIVER ENZYME RESULTS:  Lab Results   Component Value Date    AST 28 08/21/2018    ALT 27 08/21/2018       CBC RESULTS:  Lab Results   Component Value Date    WBC 6.9 08/21/2018    RBC 5.27 (H) 08/21/2018    HGB 12.4 08/21/2018    HCT 39.9 08/21/2018    MCV 76 (L) 08/21/2018    MCH 23.5 (L) 08/21/2018    MCHC 31.1 (L) 08/21/2018    RDW 19.7 (H) 08/21/2018     08/21/2018       BMP RESULTS:  Lab Results   Component Value Date     08/21/2018    POTASSIUM 4.7 08/21/2018    CHLORIDE 111 (H) 08/21/2018    CO2 19 (L) 08/21/2018    ANIONGAP 10 08/21/2018    GLC 92 08/21/2018    BUN 13 08/21/2018    CR 0.82 08/21/2018    GFRESTIMATED 77 08/21/2018    GFRESTBLACK >90 08/21/2018    KENDRICK 8.9 08/21/2018        A1C RESULTS:  Lab  Results   Component Value Date    A1C 5.2 05/08/2014       INR RESULTS:  Lab Results   Component Value Date    INR 0.92 05/21/2013    INR 0.94 04/22/2013           CC  Sydnie He MD  [address] [place] [address]

## 2018-10-16 NOTE — LETTER
10/16/2018    Sydnie He MD  [address] [address] [facility] [address]    RE: Noa Mcgill       Dear Colleague,    I had the pleasure of seeing Noa Mcgill in the [facility].    HPI and Plan:   See dictation    Orders Placed This Encounter   Procedures     Follow-Up with Cardiac Advanced Practice Provider     Transesophageal Echocardiogram     Orders Placed This Encounter   Medications     buPROPion (WELLBUTRIN SR) 150 MG 12 hr tablet     Sig: Take 150 mg by mouth daily     hydrocortisone (CORTEF) 5 MG tablet     Sig: Take 5 mg by mouth daily Pt takes 2 tabs (10mg) in Am and 1 tab (5mg) PM     Omega-3 Fatty Acids (FISH OIL) 1000 MG CPDR     Sig: Take 1,000 mg by mouth daily     Medications Discontinued During This Encounter   Medication Reason     buPROPion (WELLBUTRIN XL) 300 MG 24 hr tablet Discontinued by another Health Care Provider     ferrous sulfate (IRON) 325 (65 Fe) MG tablet Therapy completed     IRON PO Therapy completed         Encounter Diagnosis   Name Primary?     Ostium secundum type atrial septal defect Yes       CURRENT MEDICATIONS:  Current Outpatient Prescriptions   Medication Sig Dispense Refill     ascorbic acid 250 MG TABS tablet Take by mouth twice a day.       aspirin 325 MG EC tablet Take 325 mg by mouth Take 325 mg by mouth       buPROPion (WELLBUTRIN SR) 150 MG 12 hr tablet Take 150 mg by mouth daily       butalbital-acetaminophen-caffeine (FIORICET/ESGIC) -40 MG per tablet Take 1 tablet by mouth every 4 hours as needed 30 tablet 1     DHEA 50 MG TABS Take 50 mg by mouth 1 tab QD       etonogestrel-ethinyl estradiol (NUVARING) 0.12-0.015 MG/24HR vaginal ring Place 1 each vaginally every 28 days       hydrocortisone (CORTEF) 5 MG tablet Take 5 mg by mouth daily Pt takes 2 tabs (10mg) in Am and 1 tab (5mg) PM       Ibuprofen (ADVIL PO) Take 600 mg by mouth 2 times daily as needed for moderate pain       Omega-3 Fatty Acids (FISH OIL) 1000 MG CPDR  Take 1,000 mg by mouth daily       rizatriptan (MAXALT) 10 MG tablet TAKE 1 TABLET BY MOUTH ONCE, MAY REPEAT IN 2 HRS. MAX 30MG/24HRS  3     VITAMIN D PO Take 2,000 Units by mouth daily.       zolpidem (AMBIEN) 10 MG tablet Take 1 tablet (10 mg) by mouth nightly as needed for sleep 30 tablet 3     hydrOXYzine (ATARAX) 25 MG tablet Take 1 tablet (25 mg) by mouth every 6 hours as needed for anxiety (Patient not taking: Reported on 10/16/2018) 30 tablet 0     [DISCONTINUED] buPROPion (WELLBUTRIN XL) 300 MG 24 hr tablet Take 1 tablet (300 mg) by mouth every morning         ALLERGIES     Allergies   Allergen Reactions     Compazine [Prochlorperazine]      Shaky and anxiety     Enbrel Hives     Humira [Humira]      Rash hives     Prednisone      Pt sensitive to prednisone--shakey heart racing - only with large doses       PAST MEDICAL HISTORY:  Past Medical History:   Diagnosis Date     Adrenal insufficiency ([place]field's disease) (H)     ACTH stim test failed to increase cortisol     Allergic rhinitis, cause unspecified      Arthritis      ASD (atrial septal defect) 02/15/2013    ASD dx by echo, pt declined ASD closure     Attention deficit disorder without mention of hyperactivity 7/2/2014     BRCA1 gene mutation positive 1/24/2018    Reported by patient, no report available at this time Records requested from [facility] 1/23/18     Cerebral infarction (H)      Clotting disorder (H) birth    undefined clotting disorder - sees Dr. 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       PAST SURGICAL HISTORY:  Past  Surgical History:   Procedure Laterality Date     APPENDECTOMY  2006      SECTION  2013    Procedure:  SECTION;  Primary  Section;  Surgeon: Chad Hughes MD;  Location:  L+D     DILATION AND CURETTAGE SUCTION N/A 2014    Procedure: DILATION AND CURETTAGE SUCTION;  Surgeon: Srini Martinez MD;  Location:  OR     DILATION AND CURETTAGE SUCTION N/A 2014    Procedure: DILATION AND CURETTAGE SUCTION;  Surgeon: Connor Kang MD;  Location:  OR     GENITOURINARY SURGERY      bladder sling and complete historectomy     HC DILATION/CURETTAGE DIAG/THER NON OB       HC DILATION/CURETTAGE DIAG/THER NON OB       HYSTERECTOMY, CARLOS  2016    [facility]     MASTECTOMY SIMPLE BILATERAL Bilateral 2018    Procedure: MASTECTOMY SIMPLE BILATERAL;  PROPHYLACTIC BILATERAL MASTECTOMY (GLADIS) BILATERAL BREAST RECONSTRUCTION [facility] TISSUE EXPANDERS (WILLOW)   ;  Surgeon: Suraj Bergeron MD;  Location:  OR     ORTHOPEDIC SURGERY      (L) thumb fused     RECONSTRUCT BREAST, INSERT TISSUE EXPANDER BILATERAL, COMBINED Bilateral 2018    expander and tram flap with drains - had weekly surgery to have I & D and removal of infected tissue     SURGICAL HISTORY OF -       left thumb fusion due to arthritis       FAMILY HISTORY:  Family History   Problem Relation Age of Onset     C.A.D. Maternal Grandmother      Hypertension Maternal Grandmother      Psychotic Disorder Mother      Depression, alcoholism     Diabetes Mother      Hereditary Breast and Ovarian Cancer Syndrome Mother      BRCA1+, no hx of cancer, s/p prophylactic surgery to remove breast tissue, ovaries, fallopian tubes     Hypertension Mother      Thyroid Disease Mother      thyroid nodules     Substance Abuse Father      Hereditary Breast and Ovarian Cancer Syndrome Sister      matrnal half sister, BRCA1+     Hereditary Breast and Ovarian Cancer Syndrome Daughter 23     BRCA1+     Breast  "Cancer Maternal Aunt 40     lumpectomy, then 2nd primary breast cancer later in 40s, treated with mastectomy     Hereditary Breast and Ovarian Cancer Syndrome Maternal Aunt      BRCA1+     Ovarian Cancer Maternal Aunt 70     surgry     Thyroid Cancer Maternal Aunt      medullary thyroid cancer     Hereditary Breast and Ovarian Cancer Syndrome Maternal Aunt      BRCA1+     Breast Cancer Other 41     maternal great aunt     Ovarian Cancer Other 45      in 40s     Thyroid Cancer Maternal Uncle 66     papillary thyroid cancer     Breast Cancer Cousin 45     Hereditary Breast and Ovarian Cancer Syndrome Cousin      BRCA1+     Breast Cancer Other 43     maternal great aunt     Other Cancer Other      ovarian cancer       SOCIAL HISTORY:  Social History     Social History     Marital status:      Spouse name: Ciro     Number of children: 3     Years of education: 14     Occupational History     [employer]      [employer]     Social History Main Topics     Smoking status: Never Smoker     Smokeless tobacco: Never Used     Alcohol use 0.0 oz/week      Comment: 1-3drink per week     Drug use: No     Sexual activity: Yes     Partners: Male     Birth control/ protection: Female Surgical     Other Topics Concern     Parent/Sibling W/ Cabg, Mi Or Angioplasty Before 65f 55m? No     Social History Narrative       Review of Systems:  Skin:  Negative     Eyes:  Positive for glasses;contacts  ENT:  Negative    Respiratory:  Negative    Cardiovascular:  Negative    Gastroenterology: Negative    Genitourinary:  not assessed    Musculoskeletal:  Negative    Neurologic:  Positive for migraine headaches  Psychiatric:  Positive for sleep disturbances  Heme/Lymph/Imm:  Negative    Endocrine:  Negative      Physical Exam:  Vitals: /76  Pulse 78  Ht 1.676 m (5' 5.98\")  Wt 95.7 kg (211 lb)  LMP 2015 (Within Months)  BMI 34.07 kg/m2    Constitutional:  cooperative, alert and oriented, well " developed, well nourished, in no acute distress        Skin:  warm and dry to the touch, no apparent skin lesions or masses noted          Head:  normocephalic, no masses or lesions        Eyes:  pupils equal and round, conjunctivae and lids unremarkable, sclera white, no xanthalasma, EOMS intact, no nystagmus        Lymph:No Cervical lymphadenopathy present     ENT:  no pallor or cyanosis, dentition good        Neck:  carotid pulses are full and equal bilaterally, JVP normal, no carotid bruit        Respiratory:  normal breath sounds, clear to auscultation, normal A-P diameter, normal symmetry, normal respiratory excursion, no use of accessory muscles    bilat mastectomy    Cardiac: regular rhythm, normal S1/S2, no S3 or S4, apical impulse not displaced, no murmurs, gallops or rubs             no murmur, normal split s2  pulses full and equal, no bruits auscultated                                        GI:  abdomen soft, non-tender, BS normoactive, no mass, no HSM, no bruits obese;surgical scars      Extremities and Muscular Skeletal:  no deformities, clubbing, cyanosis, erythema observed         scar L ankle -injury as child    Neurological:  no gross motor deficits        Psych:  Alert and Oriented x 3      Recent Lab Results:  LIPID RESULTS:  Lab Results   Component Value Date    CHOL 188 02/10/2018    HDL 58 02/10/2018    LDL 84 02/10/2018    TRIG 231 (H) 02/10/2018    CHOLHDLRATIO 2.7 05/08/2014       LIVER ENZYME RESULTS:  Lab Results   Component Value Date    AST 28 08/21/2018    ALT 27 08/21/2018       CBC RESULTS:  Lab Results   Component Value Date    WBC 6.9 08/21/2018    RBC 5.27 (H) 08/21/2018    HGB 12.4 08/21/2018    HCT 39.9 08/21/2018    MCV 76 (L) 08/21/2018    MCH 23.5 (L) 08/21/2018    MCHC 31.1 (L) 08/21/2018    RDW 19.7 (H) 08/21/2018     08/21/2018       BMP RESULTS:  Lab Results   Component Value Date     08/21/2018    POTASSIUM 4.7 08/21/2018    CHLORIDE 111 (H) 08/21/2018     CO2 19 (L) 08/21/2018    ANIONGAP 10 08/21/2018    GLC 92 08/21/2018    BUN 13 08/21/2018    CR 0.82 08/21/2018    GFRESTIMATED 77 08/21/2018    GFRESTBLACK >90 08/21/2018    KENDRICK 8.9 08/21/2018        A1C RESULTS:  Lab Results   Component Value Date    A1C 5.2 05/08/2014       INR RESULTS:  Lab Results   Component Value Date    INR 0.92 05/21/2013    INR 0.94 04/22/2013           CC  Sydnie He MD  [id number] [place] [address]    Thank you for allowing me to participate in the care of your patient.      Sincerely,     Victoriano Cornejo MD     [facility]    cc:   Sydnie He MD  [address] [id number]

## 2018-10-16 NOTE — MR AVS SNAPSHOT
After Visit Summary   10/16/2018    Noa Mcgill    MRN: [id number]           Patient Information     Date Of Birth          1975        Visit Information        Provider Department      10/16/2018 9:45 AM Victoriano Cornejo MD [facility]        Today's Diagnoses     Ostium secundum type atrial septal defect    -  1       Follow-ups after your visit        Additional Services     Follow-Up with Cardiac Advanced Practice Provider       Only kelly to discuss ASD closure timing                  Your next 10 appointments already scheduled     Oct 25, 2018  2:30 PM CDT   CONSULT with Suraj Bergeron MD   [facility]    [address]  [facility] [id number] [phone]            Nov 20, 2018  7:30 AM CST   Return Visit with ZULEMA Pugh CNP   [facility] ([facility])    [address] [place] [address] [address]  [facility] [id number] [phone] OPT 2              Future tests that were ordered for you today     Open Future Orders        Priority Expected Expires Ordered    Follow-Up with Cardiac Advanced Practice Provider Routine 11/15/2018 10/16/2019 10/16/2018    Transesophageal Echocardiogram Routine 10/17/2018 10/16/2019 10/16/2018            Who to contact     If you have questions or need follow up information about today's clinic visit or your schedule please contact [facility] directly at [phone].  Normal or non-critical lab and imaging results will be communicated to you by MyChart, letter or phone within 4 business days after the clinic has received the results. If you do not hear from us within 7 days, please contact the clinic through MyChart or phone. If you have a critical or abnormal lab result, we will notify you by phone as soon as possible.  Submit refill requests  "through [employer] or call your pharmacy and they will forward the refill request to us. Please allow 3 business days for your refill to be completed.          Additional Information About Your Visit        Link[employer] Information     [employer] gives you secure access to your electronic health record. If you see a primary care provider, you can also send messages to your care team and make appointments. If you have questions, please call your primary care clinic.  If you do not have a primary care provider, please call [phone] and they will assist you.        Care EveryWhere ID     This is your Care EveryWhere ID. This could be used by other organizations to access your [place] medical records  [id number]        Your Vitals Were     Pulse Height Last Period BMI (Body Mass Index)          78 1.676 m (5' 5.98\") 02/08/2015 (Within Months) 34.07 kg/m2         Blood Pressure from Last 3 Encounters:   10/16/18 128/76   08/27/18 114/66   08/21/18 120/86    Weight from Last 3 Encounters:   10/16/18 95.7 kg (211 lb)   08/27/18 (P) 91.1 kg (200 lb 12.8 oz)   08/21/18 90.7 kg (200 lb)               Primary Care Provider Office Phone # Fax #    Sydnie He [phone] [address] [facility] [address]        Equal Access to Services     KARMEN LEMUS AH: Hadii aad ku hadasho Soomaali, waaxda luqadaha, qaybta kaalmada deny, lisa cruz. So [facility] [phone].    ATENCIÓN: Si habla español, tiene a horton disposición servicios gratuitos de asistencia lingüística. Llfreeman al [phone].    We comply with applicable federal civil rights laws and [place] laws. We do not discriminate on the basis of race, color, national origin, age, disability, sex, sexual orientation, or gender identity.            Thank you!     Thank you for choosing [facility]  for your care. Our goal is always to provide you with excellent care. Hearing back from " our patients is one way we can continue to improve our services. Please take a few minutes to complete the written survey that you may receive in the mail after your visit with us. Thank you!             Your Updated Medication List - Protect others around you: Learn how to safely use, store and throw away your medicines at www.disposemymeds.org.          This list is accurate as of 10/16/18 10:48 AM.  Always use your most recent med list.                   Brand Name Dispense Instructions for use Diagnosis    ADVIL PO      Take 600 mg by mouth 2 times daily as needed for moderate pain        ascorbic acid 250 MG Tabs tablet      Take by mouth twice a day.        aspirin 325 MG EC tablet      Take 325 mg by mouth Take 325 mg by mouth        butalbital-acetaminophen-caffeine -40 MG per tablet    FIORICET/ESGIC    30 tablet    Take 1 tablet by mouth every 4 hours as needed    Migraine without status migrainosus, not intractable, unspecified migraine type       DHEA 50 MG Tabs      Take 50 mg by mouth 1 tab QD        Fish Oil 1000 MG Cpdr      Take 1,000 mg by mouth daily        hydrocortisone 5 MG tablet    CORTEF     Take 5 mg by mouth daily Pt takes 2 tabs (10mg) in Am and 1 tab (5mg) PM        hydrOXYzine 25 MG tablet    ATARAX    30 tablet    Take 1 tablet (25 mg) by mouth every 6 hours as needed for anxiety    Anxiety       NUVARING 0.12-0.015 MG/24HR vaginal ring   Generic drug:  etonogestrel-ethinyl estradiol      Place 1 each vaginally every 28 days        rizatriptan 10 MG tablet    MAXALT     TAKE 1 TABLET BY MOUTH ONCE, MAY REPEAT IN 2 HRS. MAX 30MG/24HRS        VITAMIN D PO      Take 2,000 Units by mouth daily.        WELLBUTRIN  MG 12 hr tablet   Generic drug:  buPROPion      Take 150 mg by mouth daily        zolpidem 10 MG tablet    AMBIEN    30 tablet    Take 1 tablet (10 mg) by mouth nightly as needed for sleep    Insomnia, unspecified type

## 2018-10-16 NOTE — LETTER
10/16/2018      Sydnie He MD  [address] [facility] [address]      RE: Noa Mcgill       Dear Colleague,    I had the pleasure of seeing Noa Mcgill in the [facility].    Service Date: 10/16/2018      CARDIOLOGY CONSULTATION       HISTORY OF PRESENT ILLNESS:  I had the pleasure of following up and reacquainting myself with Noa Mcgill.  She is a pleasant 43-year-old woman.  She is a nurse.  I met her back in 2013 when she presented with a retinal artery occlusion somewhat shortly following a pregnancy.  She had had 5 pregnancies and I believe 2 miscarriages.  She had had a workup for hypercoagulable state including, I believe, a lupus anticoagulant syndrome because of the miscarriages and I believe that workup was negative.  We identified a tunnel-type atrial septal defect.  We note that the right heart was slightly enlarged suggesting it was not just a matter of possible paradoxical embolus but also volume overload and we had recommended atrial septal defect closure.  The patient was all set to do it but eventually she canceled and we have not seen her since 2014.  The patient was BRCA1 gene positive with a family history of breast cancer.  Then she had elective bilateral mastectomy earlier this year.  Unfortunately, there were significant issues with it.  In 05/2018 after her surgery, she underwent a TRAM flap reconstruction.  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  She also developed venous thrombosis to the left TRAM flap, treated with tPA.  She was then placed on Lovenox for several days.  Unfortunately, reconstruction was difficult with some failure and she required resection of the left breast flap.  Her anticoagulation was held for a while.  She did require blood transfusion.  I believe that Dr. Sanchez did another full hypercoagulable workup and it was negative but the  patient tells me Dr. Sanchez is convinced that there is a hypercoagulable condition going on here.  Family history-wise, there are no clots.  She has a remote history of psoriatic arthritis, treated with Remicade, but that has not been a current problem.  She reports no specific heart symptoms, specifically no right heart failure, no palpitations, no syncope, no chest pain, no exercise intolerance.  She was referred back to talk about the atrial septal defect given her hypercoagulable state, arterial and venous thromboses again and the remote stroke.  Today, I talked to her in depth about the procedure.  She remembers it from our previous discussion.  I again showed her both the Amplatz and the Roland device.  We talked about the fact that these devices are not FDA approved for PFO closure with stroke which, as before, the devices were FDA approved for ASD but not for the intended purpose.  That is no longer an issue.  We reviewed all the trials including CLOSURE and RESPECT trial.  We talked about the potential risk of procedure including bleeding, infection, cardiac perforation, device migration, arrhythmia.  We talked about how the procedure is done.  The procedure would normally be done on aspirin 81 mg a day plus Plavix 75 mg daily plus intravenous heparin at the time of the procedure.  Certainly, we would call Dr. Zelalem Sanchez to determine if he would want us to have any Lovenox, etc. post procedure.  The patient is currently on high-dose aspirin 325 and no antithrombotic agents, only antiplatelet currently.  She was also diagnosed with adrenal insufficiency, is on hydrocortisone.  She may need a slight bump if we do the procedure since it is done under general anesthesia.  We can decide if she will get 1 dose of extra steroid preprocedure.  We talked about the procedure, being hospitalized overnight and going home the next day.  No driving a car or heavy lifting for 48 hours.  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  She is going to require an abdominal hernia repair because of pain in the area which is related to her flap surgery.  Since she would have to be on aspirin and Plavix up to 6 months with the PFO device or the ASD device, she will go ahead and get that abdominal surgery first and then we will plan on doing a transesophageal echo to take another look and see if the right heart is enlarged.  If the right heart is enlarged, it should be closed regardless of stroke or no stroke to prevent heart failure later in life.  She will eventually need breast reconstruction and that will of course change the timing.  We might want to close the hole before she undertakes another large surgery because of thrombosis she had last time.  Breast reconstruction would be more elective.  The abdominal hernia is less elective because it is causing pain now.      Today's visit was 1 hour.  We discussed all the options.  I showed her the devices and answered all of her questions.  We will keep you apprised of her workup and the timing.      Victoriano Mclaughlin MD       cc:   Sydnie Daley MD    [facility]    [address] [place] [address]      Stas Sanchez MD    [facility]   [address] [facility]   [place] [address]      Suraj Bergeron MD    [facility]    [address] [facility]   [place] [address]      Javier Perea MD    [facility]    [address] [facility] [facility] [address]   [place] [address]         VICTORIANO MCLAUGHLIN MD             D: 10/16/2018   T: 10/16/2018   MT: TG      Name:     REZA VINCENT   MRN:      [id number]        Account:      [id number]   :      1975           Service Date: 10/16/2018      Document: [id number]         Outpatient Encounter Prescriptions as of 10/16/2018   Medication Sig Dispense  Refill     ascorbic acid 250 MG TABS tablet Take by mouth twice a day.       aspirin 325 MG EC tablet Take 325 mg by mouth Take 325 mg by mouth       buPROPion (WELLBUTRIN SR) 150 MG 12 hr tablet Take 150 mg by mouth daily       butalbital-acetaminophen-caffeine (FIORICET/ESGIC) -40 MG per tablet Take 1 tablet by mouth every 4 hours as needed 30 tablet 1     DHEA 50 MG TABS Take 50 mg by mouth 1 tab QD       etonogestrel-ethinyl estradiol (NUVARING) 0.12-0.015 MG/24HR vaginal ring Place 1 each vaginally every 28 days       hydrocortisone (CORTEF) 5 MG tablet Take 5 mg by mouth daily Pt takes 2 tabs (10mg) in Am and 1 tab (5mg) PM       Ibuprofen (ADVIL PO) Take 600 mg by mouth 2 times daily as needed for moderate pain       Omega-3 Fatty Acids (FISH OIL) 1000 MG CPDR Take 1,000 mg by mouth daily       rizatriptan (MAXALT) 10 MG tablet TAKE 1 TABLET BY MOUTH ONCE, MAY REPEAT IN 2 HRS. MAX 30MG/24HRS  3     VITAMIN D PO Take 2,000 Units by mouth daily.       zolpidem (AMBIEN) 10 MG tablet Take 1 tablet (10 mg) by mouth nightly as needed for sleep 30 tablet 3     hydrOXYzine (ATARAX) 25 MG tablet Take 1 tablet (25 mg) by mouth every 6 hours as needed for anxiety (Patient not taking: Reported on 10/16/2018) 30 tablet 0     [DISCONTINUED] buPROPion (WELLBUTRIN XL) 300 MG 24 hr tablet Take 1 tablet (300 mg) by mouth every morning       [DISCONTINUED] ferrous sulfate (IRON) 325 (65 Fe) MG tablet Take 1 tablet (325 mg) by mouth 2 times daily 180 tablet 1     [DISCONTINUED] IRON PO Take 1 tablet by mouth Take 1 tablet by mouth       No facility-administered encounter medications on file as of 10/16/2018.        Again, thank you for allowing me to participate in the care of your patient.      Sincerely,    Victoriano Cornejo MD     [facility]

## 2018-10-16 NOTE — PROGRESS NOTES
Service Date: 10/16/2018      CARDIOLOGY CONSULTATION       HISTORY OF PRESENT ILLNESS:  I had the pleasure of following up and reacquainting myself with Noa Mcgill.  She is a pleasant 43-year-old woman.  She is a nurse.  I met her back in 2013 when she presented with a retinal artery occlusion somewhat shortly following a pregnancy.  She had had 5 pregnancies and I believe 2 miscarriages.  She had had a workup for hypercoagulable state including, I believe, a lupus anticoagulant syndrome because of the miscarriages and I believe that workup was negative.  We identified a tunnel-type atrial septal defect.  We note that the right heart was slightly enlarged suggesting it was not just a matter of possible paradoxical embolus but also volume overload and we had recommended atrial septal defect closure.  The patient was all set to do it but eventually she canceled and we have not seen her since 2014.  The patient was BRCA1 gene positive with a family history of breast cancer.  Then she had elective bilateral mastectomy earlier this year.  Unfortunately, there were significant issues with it.  In 05/2018 after her surgery, she underwent a TRAM flap reconstruction.  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  She also developed venous thrombosis to the left TRAM flap, treated with tPA.  She was then placed on Lovenox for several days.  Unfortunately, reconstruction was difficult with some failure and she required resection of the left breast flap.  Her anticoagulation was held for a while.  She did require blood transfusion.  I believe that Dr. Sanchez did another full hypercoagulable workup and it was negative but the patient tells me Dr. Sanchez is convinced that there is a hypercoagulable condition going on here.  Family history-wise, there are no clots.  She has a remote history of psoriatic arthritis, treated with Remicade, but that has  not been a current problem.  She reports no specific heart symptoms, specifically no right heart failure, no palpitations, no syncope, no chest pain, no exercise intolerance.  She was referred back to talk about the atrial septal defect given her hypercoagulable state, arterial and venous thromboses again and the remote stroke.  Today, I talked to her in depth about the procedure.  She remembers it from our previous discussion.  I again showed her both the Amplatz and the Benedict device.  We talked about the fact that these devices are not FDA approved for PFO closure with stroke which, as before, the devices were FDA approved for ASD but not for the intended purpose.  That is no longer an issue.  We reviewed all the trials including CLOSURE and RESPECT trial.  We talked about the potential risk of procedure including bleeding, infection, cardiac perforation, device migration, arrhythmia.  We talked about how the procedure is done.  The procedure would normally be done on aspirin 81 mg a day plus Plavix 75 mg daily plus intravenous heparin at the time of the procedure.  Certainly, we would call Dr. Zelalem Sanchez to determine if he would want us to have any Lovenox, etc. post procedure.  The patient is currently on high-dose aspirin 325 and no antithrombotic agents, only antiplatelet currently.  She was also diagnosed with adrenal insufficiency, is on hydrocortisone.  She may need a slight bump if we do the procedure since it is done under general anesthesia.  We can decide if she will get 1 dose of extra steroid preprocedure.  We talked about the procedure, being hospitalized overnight and going home the next day.  No driving a car or heavy lifting for 48 hours.  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  She is going to require an abdominal hernia repair because of  pain in the area which is related to her flap surgery.  Since she would have to be on aspirin and Plavix up to 6 months with the PFO device or the ASD device, she will go ahead and get that abdominal surgery first and then we will plan on doing a transesophageal echo to take another look and see if the right heart is enlarged.  If the right heart is enlarged, it should be closed regardless of stroke or no stroke to prevent heart failure later in life.  She will eventually need breast reconstruction and that will of course change the timing.  We might want to close the hole before she undertakes another large surgery because of thrombosis she had last time.  Breast reconstruction would be more elective.  The abdominal hernia is less elective because it is causing pain now.      Today's visit was 1 hour.  We discussed all the options.  I showed her the devices and answered all of her questions.  We will keep you apprised of her workup and the timing.      Victoriano Mclaughlin MD       cc:   Sydnie Daley MD    [facility]    [address] [place] [address]      Stas Sanchez MD    [facility]   [address]   [place] [address]      Suraj Bergeron MD    [facility]    [address]   [place] [address]      Javier Perea MD    [facility]    [address], [facility] [address]   [place] [address]         VICTORIANO MCLAUGHLIN MD             D: 10/16/2018   T: 10/16/2018   MT: TG      Name:     REZA VINCENT   MRN:      [id number]        Account:      [id number]   :      1975           Service Date: 10/16/2018      Document: [id number]

## 2018-10-25 ENCOUNTER — OFFICE VISIT (OUTPATIENT)
Dept: SURGERY | Facility: CLINIC | Age: 43
End: 2018-10-25
Payer: COMMERCIAL

## 2018-10-25 ENCOUNTER — TELEPHONE (OUTPATIENT)
Dept: CARDIOLOGY | Facility: CLINIC | Age: 43
End: 2018-10-25

## 2018-10-25 ENCOUNTER — TELEPHONE (OUTPATIENT)
Dept: SURGERY | Facility: CLINIC | Age: 43
End: 2018-10-25

## 2018-10-25 VITALS
RESPIRATION RATE: 16 BRPM | HEIGHT: 69 IN | HEART RATE: 103 BPM | OXYGEN SATURATION: 97 % | BODY MASS INDEX: 31.25 KG/M2 | DIASTOLIC BLOOD PRESSURE: 70 MMHG | WEIGHT: 211 LBS | SYSTOLIC BLOOD PRESSURE: 120 MMHG

## 2018-10-25 DIAGNOSIS — K43.2 INCISIONAL HERNIA: Primary | ICD-10-CM

## 2018-10-25 DIAGNOSIS — K43.2 INCISIONAL HERNIA, WITHOUT OBSTRUCTION OR GANGRENE: Primary | ICD-10-CM

## 2018-10-25 PROCEDURE — 99213 OFFICE O/P EST LOW 20 MIN: CPT | Performed by: SURGERY

## 2018-10-25 NOTE — LETTER
2018    Re: Noa Mcgill, : 1975    Noa is a 43 year old White female who presents for hernia evaluation. The patient has noticed a bulge. Pain has been present. Symptoms are described as aching and pressure  located in the periumbilical and lower abdomen.  Associated with this  is nausea.  Pt has had previous ABD surgery including TRAM flap on 2018.  Patient does report that increased activity/lifting causes pain. Employment does require lifting.     She has had significant problems with clotting of vessels related to her TRAM flap.  Been back to her hematologist and no clotting abnormality Has been identified.     Constipation No  Dysuria No  Cough No  Smoking No  Diabetes No     Pt's chart has been reviewed for PMH, PSH, allergies, medications and social history.     ROS:  Pulm:  No shortness of breath, dyspnea on exertion, cough, or hemoptysis  CV:  negative  ABD:  See chief complaint  :  negative     Physical exam:  Patient able to get up on table without difficulty.  Head eyes, nose and mouth within normal limits.  Sclera are clear  No supraclavicular or cervical adenopathy noted.  Neck shows no gross mass  Respirations are regular and non labored  Abdomen is abdomen is soft without significant tenderness, masses, organomegaly or guarding  bowel sounds are positive and no caput medusa noted.  She has a large lower abdominal incision from her TRAM flap associated panniculectomy.  Hernia is present to the right of the umbilicus.  This is partially reducible supine.  Additionally, there is generalized bulging in the lower abdomen near the midline and particularly on the left.     Imaging  CT Yes, confirms an incisional hernia adjacent to the umbilicus with a defect measuring just under 6 cm.  There is also wide separation of the rectus muscles in the lower mid abdomen measuring greater than 7 cm.        Assesment: umbilical and lower ventral wall incisional hernias     Plan:  Discussed observation, external support, possible progression, incarceration and strangulation signs and symptoms and need for immediate treatment if they develop.  Discussed surgery in detail, including risk, benefits, complications, incision/cosmetics, mesh, infection (possibly requiring removal of the mesh), chronic pain, involvement of inta-abdominal organs, lifting and activity limits after surgery, use of a drain. Gave literature to review.  We discussed the option of Botox injections of her lateral abdominal musculature to allow for closure with less tension in her lower central abdomen.  This will hopefully allow us to avoid a component separation repair.  Will schedule surgery in near future     Suraj Bergeron MD

## 2018-10-25 NOTE — PROGRESS NOTES
Noa is a 43 year old White female who presents for hernia evaluation. The patient has noticed a bulge. Pain has been present. Symptoms are described as aching and pressure  located in the periumbilical and lower abdomen.  Associated with this  is nausea.  Pt has had previous ABD surgery including TRAM flap on 5/25/2018.  Patient does report that increased activity/lifting causes pain. Employment does require lifting.    She has had significant problems with clotting of vessels related to her TRAM flap.  Been back to her hematologist and no clotting abnormality Has been identified.    Constipation No  Dysuria No  Cough No  Smoking No  Diabetes No    Pt's chart has been reviewed for PMH, PSH, allergies, medications and social history.    ROS:  Pulm:  No shortness of breath, dyspnea on exertion, cough, or hemoptysis  CV:  negative  ABD:  See chief complaint  :  negative    Physical exam:  Patient able to get up on table without difficulty.  Head eyes, nose and mouth within normal limits.  Sclera are clear  No supraclavicular or cervical adenopathy noted.  Neck shows no gross mass  Respirations are regular and non labored  Abdomen is abdomen is soft without significant tenderness, masses, organomegaly or guarding  bowel sounds are positive and no caput medusa noted.  She has a large lower abdominal incision from her TRAM flap associated panniculectomy.  Hernia is present to the right of the umbilicus.  This is partially reducible supine.  Additionally, there is generalized bulging in the lower abdomen near the midline and particularly on the left.    Imaging  CT Yes, confirms an incisional hernia adjacent to the umbilicus with a defect measuring just under 6 cm.  There is also wide separation of the rectus muscles in the lower mid abdomen measuring greater than 7 cm.      Assesment: umbilical and lower ventral wall incisional hernias    Plan: Discussed observation, external support, possible progression,  incarceration and strangulation signs and symptoms and need for immediate treatment if they develop.  Discussed surgery in detail, including risk, benefits, complications, incision/cosmetics, mesh, infection (possibly requiring removal of the mesh), chronic pain, involvement of inta-abdominal organs, lifting and activity limits after surgery, use of a drain. Gave literature to review.  We discussed the option of Botox injections of her lateral abdominal musculature to allow for closure with less tension in her lower central abdomen.  This will hopefully allow us to avoid a component separation repair.  Will schedule surgery in near future  Time spent with the patient with greater that 50% of the time in discussion was 20-30 minutes.    Suraj Bergeron MD  10/25/2018 12:19 PM    Please route or send letter to:  Primary Care Provider (PCP) and Include Progress Note

## 2018-10-25 NOTE — MR AVS SNAPSHOT
After Visit Summary   10/25/2018    Noa Mcgill    MRN: [id number]           Patient Information     Date Of Birth          1975        Visit Information        Provider Department      10/25/2018 2:30 PM Suraj Bergeron MD [facility]      Today's Diagnoses     Incisional hernia, without obstruction or gangrene    -  1       Follow-ups after your visit        Your next 10 appointments already scheduled     Oct 30, 2018  8:30 AM CDT   Ech Jerome with SHECHR2   [facility] Radiology ([facility])    [address] [place] [address]   [phone]           1.  Please bring or wear a comfortable two-piece outfit. 2.  Arrival time: -   [facility]:  arrive 75 minutes prior to examination time. -   [facility]:  arrive 90 minutes prior to examination time. -   [facility]:   arrive 15 minutes prior to examination time. 3.  Plan to have a responsible adult take you home after the test. After the exam you may not drive, take a bus or taxi by yourself. -   Someone should stay with you for 6 hours after your test. 4.  No food or drink: -   6 hours before the test 5.  If you take antacids or water pills (diuretics): Do not take them until after your test. You may take blood pressure medicine with a few sips of water. 6.  If you have diabetes: -   Morning slots preferred -   If you take insulin, call your diabetes care team. Ask if you should take a   dose the morning of your test. -   If you take diabetes medicine by mouth, don't take it on the morning of your test. Bring it with you to take after the test. (If you have questions, call your diabetes care team.) 7.  Bring a list of any medicines you are taking. 8.  Do not drive for 24 hours after the test. 9.  For any questions that cannot be answered, please contact the ordering physician 10. Please do not wear perfumes or scented lotions on the day of your  exam.            Oct 31, 2018  1:45 PM CDT   MyChart Long with Sydnie He MD   [facility] ([facility])    [address] [facility] [id number] [phone]            Nov 20, 2018  7:30 AM CST   Return Visit with ZULEMA Pugh CNP   [facility]   [place] ([facility])    [address]  [facility] [address] [phone] OPT 2            Dec 07, 2018   Procedure with Suraj Bergeron MD   [facility] (--)    [address]  [facility] [address]   [phone]            Dec 07, 2018 11:15 AM CST   [facility] OR with Suraj Bergeron MD, Kelsey Jane Alexander, PA-C   [facility] ([facility]    [facility] ([facility])   [phone]              Who to contact     If you have questions or need follow up information about today's clinic visit or your schedule please contact [facility] directly at [phone].  Normal or non-critical lab and imaging results will be communicated to you by MyChart, letter or phone within 4 business days after the clinic has received the results. If you do not hear from us within 7 days, please contact the clinic through Trillianthart or phone. If you have a critical or abnormal lab result, we will notify you by phone as soon as possible.  Submit refill requests through Codewise or call your pharmacy and they will forward the refill request to us. Please allow 3 business days for your refill to be completed.          Additional Information About Your Visit        TrilliantharMyDoc Information     Codewise gives you secure access to your electronic health record. If you see a primary care provider, you can also send messages to your care team and make appointments. If you have questions, please call your primary care clinic.  If you do not  "have a primary care provider, please call [phone] and they will assist you.        Care EveryWhere ID     This is your Care EveryWhere ID. This could be used by other organizations to access your [place] medical records  [id number]        Your Vitals Were     Pulse Respirations Height Last Period Pulse Oximetry BMI (Body Mass Index)    103 16 5' 9\" (1.753 m) 02/08/2015 (Within Months) 97% 31.16 kg/m2       Blood Pressure from Last 3 Encounters:   10/25/18 120/70   10/16/18 128/76   08/27/18 114/66    Weight from Last 3 Encounters:   10/25/18 211 lb (95.7 kg)   10/16/18 211 lb (95.7 kg)   08/27/18 (P) 200 lb 12.8 oz (91.1 kg)              Today, you had the following     No orders found for display       Primary Care Provider Office Phone # Fax #    Sydnieshonna He [phone] [address] [facility] [address]        Equal Access to Services     [facility]: Hadii marshall kirkland hadasho Soludy, waaxda luqadaha, qaybta kaalmada aderaul, lisa rivera . So [facility] [phone].    ATENCIÓN: Si habla español, tiene a horton disposición servicios gratuitos de asistencia lingüística. Llame al [phone].    We comply with applicable federal civil rights laws and [place] laws. We do not discriminate on the basis of race, color, national origin, age, disability, sex, sexual orientation, or gender identity.            Thank you!     Thank you for choosing [facility]  for your care. Our goal is always to provide you with excellent care. Hearing back from our patients is one way we can continue to improve our services. Please take a few minutes to complete the written survey that you may receive in the mail after your visit with us. Thank you!             Your Updated Medication List - Protect others around you: Learn how to safely use, store and throw away your medicines at www.disposemymeds.org.          This list is accurate as of 10/25/18  3:21 " PM.  Always use your most recent med list.                   Brand Name Dispense Instructions for use Diagnosis    ADVIL PO      Take 600 mg by mouth 2 times daily as needed for moderate pain        ascorbic acid 250 MG Tabs tablet      Take by mouth twice a day.        aspirin 325 MG EC tablet      Take 325 mg by mouth Take 325 mg by mouth        butalbital-acetaminophen-caffeine -40 MG per tablet    FIORICET/ESGIC    30 tablet    Take 1 tablet by mouth every 4 hours as needed    Migraine without status migrainosus, not intractable, unspecified migraine type       DHEA 50 MG Tabs      Take 50 mg by mouth 1 tab QD        Fish Oil 1000 MG Cpdr      Take 1,000 mg by mouth daily        hydrocortisone 5 MG tablet    CORTEF     Take 5 mg by mouth daily Pt takes 2 tabs (10mg) in Am and 1 tab (5mg) PM        hydrOXYzine 25 MG tablet    ATARAX    30 tablet    Take 1 tablet (25 mg) by mouth every 6 hours as needed for anxiety    Anxiety       NUVARING 0.12-0.015 MG/24HR vaginal ring   Generic drug:  etonogestrel-ethinyl estradiol      Place 1 each vaginally every 28 days        rizatriptan 10 MG tablet    MAXALT     TAKE 1 TABLET BY MOUTH ONCE, MAY REPEAT IN 2 HRS. MAX 30MG/24HRS        VITAMIN D PO      Take 2,000 Units by mouth daily.        WELLBUTRIN  MG 12 hr tablet   Generic drug:  buPROPion      Take 150 mg by mouth daily        zolpidem 10 MG tablet    AMBIEN    30 tablet    Take 1 tablet (10 mg) by mouth nightly as needed for sleep    Insomnia, unspecified type

## 2018-10-25 NOTE — TELEPHONE ENCOUNTER
Type of surgery: INCISIONAL HERNIA REPAIR WITH MESH   Location of surgery: [facility] OR  Date and time of surgery: 12/7/2018 @ 11:15 AM   Surgeon: JOHN GRIFFITH MD    Pre-Op Appt Date: PATIENT TO SCHEDULE    Post-Op Appt Date: PATIENT TO SCHEDULE     Packet sent out:   PACKET AND SOAP GIVEN TO PATIENT   Pre-cert/Authorization completed:  Not Applicable  Date: 10/25/2018     INCISIONAL HERNIA REPAIR WITH MESH   GENERAL PT INST TO HAVE H&P WITH DR GRAHAM 240 MIN REQ PA ASSIST CECILIA NMS

## 2018-10-25 NOTE — TELEPHONE ENCOUNTER
Pt called in today needing to find out when she can do PFO closure. Pt is to have abdominal surgery (hernia) first and then PFO. Put hold on December 20th, do not hve orders yet. DARYA Mandujano RN

## 2018-10-26 DIAGNOSIS — K43.2 INCISIONAL HERNIA: Primary | ICD-10-CM

## 2018-10-29 ENCOUNTER — TELEPHONE (OUTPATIENT)
Dept: CARDIOLOGY | Facility: CLINIC | Age: 43
End: 2018-10-29

## 2018-10-29 NOTE — TELEPHONE ENCOUNTER
Pt to have KAYLIE tomorrow. Went over 6 hr NPO, sips of water with pills. Need , no esophagus issues.  DARYA Mandujano RN

## 2018-10-30 ENCOUNTER — HOSPITAL ENCOUNTER (OUTPATIENT)
Facility: CLINIC | Age: 43
Discharge: HOME | End: 2018-10-30
Attending: INTERNAL MEDICINE | Admitting: INTERNAL MEDICINE
Payer: COMMERCIAL

## 2018-10-30 ENCOUNTER — HOSPITAL ENCOUNTER (OUTPATIENT)
Dept: CARDIOLOGY | Facility: CLINIC | Age: 43
End: 2018-10-30
Attending: INTERNAL MEDICINE | Admitting: INTERNAL MEDICINE
Payer: COMMERCIAL

## 2018-10-30 VITALS
HEIGHT: 66 IN | SYSTOLIC BLOOD PRESSURE: 124 MMHG | BODY MASS INDEX: 33.85 KG/M2 | DIASTOLIC BLOOD PRESSURE: 65 MMHG | OXYGEN SATURATION: 95 % | HEART RATE: 78 BPM | WEIGHT: 210.6 LBS | TEMPERATURE: 98.5 F | RESPIRATION RATE: 13 BRPM

## 2018-10-30 DIAGNOSIS — Q21.11 OSTIUM SECUNDUM TYPE ATRIAL SEPTAL DEFECT: ICD-10-CM

## 2018-10-30 PROCEDURE — 93325 DOPPLER ECHO COLOR FLOW MAPG: CPT | Mod: 26 | Performed by: INTERNAL MEDICINE

## 2018-10-30 PROCEDURE — 93320 DOPPLER ECHO COMPLETE: CPT

## 2018-10-30 PROCEDURE — 25000128 H RX IP 250 OP 636: Performed by: INTERNAL MEDICINE

## 2018-10-30 PROCEDURE — 25000125 ZZHC RX 250: Performed by: INTERNAL MEDICINE

## 2018-10-30 PROCEDURE — 93320 DOPPLER ECHO COMPLETE: CPT | Mod: 26 | Performed by: INTERNAL MEDICINE

## 2018-10-30 PROCEDURE — 40000235 ZZH STATISTIC TELEMETRY

## 2018-10-30 PROCEDURE — 93312 ECHO TRANSESOPHAGEAL: CPT | Mod: 26 | Performed by: INTERNAL MEDICINE

## 2018-10-30 PROCEDURE — 40000857 ZZH STATISTIC TEE INCLUDES SEDATION

## 2018-10-30 RX ORDER — GLYCOPYRROLATE 0.2 MG/ML
0.1 INJECTION, SOLUTION INTRAMUSCULAR; INTRAVENOUS ONCE
Status: COMPLETED | OUTPATIENT
Start: 2018-10-30 | End: 2018-10-30

## 2018-10-30 RX ORDER — SODIUM CHLORIDE 9 MG/ML
INJECTION, SOLUTION INTRAVENOUS CONTINUOUS PRN
Status: DISCONTINUED | OUTPATIENT
Start: 2018-10-30 | End: 2018-10-30 | Stop reason: HOSPADM

## 2018-10-30 RX ORDER — LIDOCAINE 40 MG/G
CREAM TOPICAL
Status: DISCONTINUED | OUTPATIENT
Start: 2018-10-30 | End: 2018-10-30 | Stop reason: HOSPADM

## 2018-10-30 RX ORDER — NALOXONE HYDROCHLORIDE 0.4 MG/ML
.1-.4 INJECTION, SOLUTION INTRAMUSCULAR; INTRAVENOUS; SUBCUTANEOUS
Status: DISCONTINUED | OUTPATIENT
Start: 2018-10-30 | End: 2018-10-30 | Stop reason: HOSPADM

## 2018-10-30 RX ORDER — LIDOCAINE HYDROCHLORIDE 40 MG/ML
1.5 SOLUTION TOPICAL ONCE
Status: COMPLETED | OUTPATIENT
Start: 2018-10-30 | End: 2018-10-30

## 2018-10-30 RX ORDER — FLUMAZENIL 0.1 MG/ML
0.2 INJECTION, SOLUTION INTRAVENOUS
Status: DISCONTINUED | OUTPATIENT
Start: 2018-10-30 | End: 2018-10-30 | Stop reason: HOSPADM

## 2018-10-30 RX ORDER — FENTANYL CITRATE 50 UG/ML
25-50 INJECTION, SOLUTION INTRAMUSCULAR; INTRAVENOUS
Status: DISCONTINUED | OUTPATIENT
Start: 2018-10-30 | End: 2018-10-30 | Stop reason: HOSPADM

## 2018-10-30 RX ORDER — FENTANYL CITRATE 50 UG/ML
50-100 INJECTION, SOLUTION INTRAMUSCULAR; INTRAVENOUS
Status: DISCONTINUED | OUTPATIENT
Start: 2018-10-30 | End: 2018-10-30 | Stop reason: HOSPADM

## 2018-10-30 RX ORDER — ACYCLOVIR 200 MG/1
9.5 CAPSULE ORAL
Status: DISCONTINUED | OUTPATIENT
Start: 2018-10-30 | End: 2018-10-30 | Stop reason: HOSPADM

## 2018-10-30 RX ADMIN — FENTANYL CITRATE 25 MCG: 50 INJECTION, SOLUTION INTRAMUSCULAR; INTRAVENOUS at 09:21

## 2018-10-30 RX ADMIN — SODIUM CHLORIDE: 9 INJECTION, SOLUTION INTRAVENOUS at 07:20

## 2018-10-30 RX ADMIN — MIDAZOLAM HYDROCHLORIDE 1 MG: 1 INJECTION, SOLUTION INTRAMUSCULAR; INTRAVENOUS at 09:20

## 2018-10-30 RX ADMIN — FENTANYL CITRATE 50 MCG: 50 INJECTION, SOLUTION INTRAMUSCULAR; INTRAVENOUS at 09:12

## 2018-10-30 RX ADMIN — MIDAZOLAM HYDROCHLORIDE 0.5 MG: 1 INJECTION, SOLUTION INTRAMUSCULAR; INTRAVENOUS at 09:24

## 2018-10-30 RX ADMIN — FENTANYL CITRATE 25 MCG: 50 INJECTION, SOLUTION INTRAMUSCULAR; INTRAVENOUS at 09:18

## 2018-10-30 RX ADMIN — LIDOCAINE HYDROCHLORIDE 1.5 ML: 40 SOLUTION TOPICAL at 08:13

## 2018-10-30 RX ADMIN — MIDAZOLAM HYDROCHLORIDE 1 MG: 1 INJECTION, SOLUTION INTRAMUSCULAR; INTRAVENOUS at 09:26

## 2018-10-30 RX ADMIN — TOPICAL ANESTHETIC 1 ML: 200 SPRAY DENTAL; PERIODONTAL at 09:10

## 2018-10-30 RX ADMIN — MIDAZOLAM HYDROCHLORIDE 1 MG: 1 INJECTION, SOLUTION INTRAMUSCULAR; INTRAVENOUS at 09:31

## 2018-10-30 RX ADMIN — GLYCOPYRROLATE 0.1 MG: 0.2 INJECTION, SOLUTION INTRAMUSCULAR; INTRAVENOUS at 08:14

## 2018-10-30 RX ADMIN — MIDAZOLAM HYDROCHLORIDE 2 MG: 1 INJECTION, SOLUTION INTRAMUSCULAR; INTRAVENOUS at 09:11

## 2018-10-30 RX ADMIN — MIDAZOLAM HYDROCHLORIDE 1 MG: 1 INJECTION, SOLUTION INTRAMUSCULAR; INTRAVENOUS at 09:17

## 2018-10-30 RX ADMIN — MIDAZOLAM HYDROCHLORIDE 0.5 MG: 1 INJECTION, SOLUTION INTRAMUSCULAR; INTRAVENOUS at 09:25

## 2018-10-30 NOTE — IP AVS SNAPSHOT
MRN:[id number]                      After Visit Summary   10/30/2018    Noa Mcgill    MRN: [id number]           Visit Information        Department      10/30/2018  6:36 AM [facility]          Review of your medicines      UNREVIEWED medicines. Ask your doctor about these medicines        Dose / Directions    ADVIL PO        Dose:  600 mg   Take 600 mg by mouth 2 times daily as needed for moderate pain   Refills:  0       ascorbic acid 250 MG Tabs tablet        Take by mouth daily Take by mouth twice a day.   Refills:  0       aspirin 325 MG EC tablet        Dose:  325 mg   Take 325 mg by mouth Take 325 mg by mouth   Refills:  0       butalbital-acetaminophen-caffeine -40 MG per tablet   Commonly known as:  FIORICET/ESGIC   Used for:  Migraine without status migrainosus, not intractable, unspecified migraine type        Dose:  1 tablet   Take 1 tablet by mouth every 4 hours as needed   Quantity:  30 tablet   Refills:  1       DHEA 50 MG Tabs        Dose:  50 mg   Take 50 mg by mouth 1 tab QD   Refills:  0       Fish Oil 1000 MG Cpdr        Dose:  1000 mg   Take 1,000 mg by mouth daily   Refills:  0       hydrocortisone 5 MG tablet   Commonly known as:  CORTEF        Dose:  5 mg   Take 5 mg by mouth daily Pt takes 2 tabs (10mg) in Am and 1 tab (5mg) PM   Refills:  0       hydrOXYzine 25 MG tablet   Commonly known as:  ATARAX   Used for:  Anxiety        Dose:  25 mg   Take 1 tablet (25 mg) by mouth every 6 hours as needed for anxiety   Quantity:  30 tablet   Refills:  0       KEFLEX PO        Dose:  500 mg   Take 500 mg by mouth 2 times daily   Refills:  0       NUVARING 0.12-0.015 MG/24HR vaginal ring   Generic drug:  etonogestrel-ethinyl estradiol        Dose:  1 each   Place 1 each vaginally every 28 days   Refills:  0       rizatriptan 10 MG tablet   Commonly known as:  MAXALT        TAKE 1 TABLET BY MOUTH ONCE, MAY REPEAT IN 2 HRS. MAX 30MG/24HRS   Refills:  3        VITAMIN D PO        Dose:  2000 Units   Take 2,000 Units by mouth daily.   Refills:  0       WELLBUTRIN  MG 12 hr tablet   Generic drug:  buPROPion        Dose:  150 mg   Take 150 mg by mouth daily   Refills:  0       zolpidem 10 MG tablet   Commonly known as:  AMBIEN   Used for:  Insomnia, unspecified type        Dose:  10 mg   Take 1 tablet (10 mg) by mouth nightly as needed for sleep   Quantity:  30 tablet   Refills:  3                Protect others around you: Learn how to safely use, store and throw away your medicines at www.disposemymeds.org.         Follow-ups after your visit        Your next 10 appointments already scheduled     Oct 30, 2018  8:30 AM CDT   Ech Jerome with SHECHR2   [facility] ([facility])    [address] [place] [id number] [phone]           1.  Please bring or wear a comfortable two-piece outfit. 2.  Arrival time: -   [facility]:  arrive 75 minutes prior to examination time. -   [facility]:  arrive 90 minutes prior to examination time. -   [facility]:   arrive 15 minutes prior to examination time. 3.  Plan to have a responsible adult take you home after the test. After the exam you may not drive, take a bus or taxi by yourself. -   Someone should stay with you for 6 hours after your test. 4.  No food or drink: -   6 hours before the test 5.  If you take antacids or water pills (diuretics): Do not take them until after your test. You may take blood pressure medicine with a few sips of water. 6.  If you have diabetes: -   Morning slots preferred -   If you take insulin, call your diabetes care team. Ask if you should take a   dose the morning of your test. -   If you take diabetes medicine by mouth, don't take it on the morning of your test. Bring it with you to take after the test. (If you have questions, call your diabetes care team.) 7.  Bring a list of any medicines you are taking. 8.  Do not drive for 24 hours after the test. 9.   For any questions that cannot be answered, please contact the ordering physician 10. Please do not wear perfumes or scented lotions on the day of your exam.            Nov 20, 2018  7:30 AM CST   Return Visit with ZULEMA Pugh CNP   [facility] ([facility])    [address]  [place] [address]   [phone] OPT 2            Nov 21, 2018  4:15 PM CST   Pre-Op physical with Sydnie He MD   [facility]    [address] [address]   [phone]            Dec 07, 2018   Procedure with Suraj Bergeron MD   [facility] (--)    [address]  [facility] [address]   [phone]            Dec 07, 2018 11:15 AM CST   [facility] OR with Suraj Bergeron MD, Taylor Aiken PA-C   [facility] ([facility])    [facility] ([facility])   [phone]               Care Instructions        Further instructions from your care team       KAYLIE  (Transesophageal Echocardiogram)  Discharge Instructions    After you go home:      Have an adult stay with you for 6 hours.       For 24 hours - due to the sedation you received:    Relax and take it easy.    Do NOT make any important or legal decisions.    Do NOT drive or operate machines at home or at work.    Do NOT drink alcohol.    Diet:    You may resume your normal diet, but no scratchy foods for two days.    If your throat is sore, eat cold, bland or soft foods.    You may have heartburn if the tube used in the exam entered your stomach.  If so:   - Do not eat acidic and spicy foods.   - Do not eat three hours before bedtime. Clear liquids are okay.   - When lying down, use two pillows to raise your head.    Medicines:      Take your medications, including blood thinners, unless your provider  "tells you not to.    If you have stopped any medicines, check with your provider about when to restart them.    You may take Tylenol (Acetaminophen) if your throat is sore.    You may take antacids if you have heartburn.      Follow Up Appointments:      Follow up with your cardiologist at [facility] of patient preference as instructed.    Follow up with your primary care provider as needed.    Call the clinic if:      You have heartburn that is severe or lasts more than 72 hours.    You have a sore throat that feels worse after 72 hours.    You have shortness of breath, neck pain, chest pain, fever, chills, coughing up blood, or other unusual signs.    Questions or concerns      [facility] [place]:    [phone] [facility] (7 days a week)         Additional Information About Your Visit        MyChart Information     [employer] gives you secure access to your electronic health record. If you see a primary care provider, you can also send messages to your care team and make appointments. If you have questions, please call your primary care clinic.  If you do not have a primary care provider, please call [phone] and they will assist you.        Care EveryWhere ID     This is your Care EveryWhere ID. This could be used by other organizations to access your [facility] medical records  [id number]        Your Vitals Were     Blood Pressure Pulse Temperature Respirations Height Weight    111/85 (BP Location: Left arm) 86 98.5  F (36.9  C) (Oral) 18 1.676 m (5' 6\") 95.5 kg (210 lb 9.6 oz)    Last Period Pulse Oximetry BMI (Body Mass Index)             02/08/2015 (Within Months) 100% 33.99 kg/m2          Primary Care Provider Office Phone # Fax #    Sydnie He [phone]      Equal Access to Services     KARMEN LEMUS AH: Johan Walden, clif alonzo, milton barclay, lisa cruz. So [facility] " [phone].    ATENCIÓN: Si alexey cottrell, tiene a horton disposición servicios gratuitos de asistencia lingüística. Roge cee [phone].    We comply with applicable federal civil rights laws and [place] laws. We do not discriminate on the basis of race, color, national origin, age, disability, sex, sexual orientation, or gender identity.            Thank you!     Thank you for choosing [facility] for your care. Our goal is always to provide you with excellent care. Hearing back from our patients is one way we can continue to improve our services. Please take a few minutes to complete the written survey that you may receive in the mail after you visit with us. Thank you!             Medication List: This is a list of all your medications and when to take them. Check marks below indicate your daily home schedule. Keep this list as a reference.      Medications           Morning Afternoon Evening Bedtime As Needed    ADVIL PO   Take 600 mg by mouth 2 times daily as needed for moderate pain                                ascorbic acid 250 MG Tabs tablet   Take by mouth daily Take by mouth twice a day.                                aspirin 325 MG EC tablet   Take 325 mg by mouth Take 325 mg by mouth                                butalbital-acetaminophen-caffeine -40 MG per tablet   Commonly known as:  FIORICET/ESGIC   Take 1 tablet by mouth every 4 hours as needed                                DHEA 50 MG Tabs   Take 50 mg by mouth 1 tab QD                                Fish Oil 1000 MG Cpdr   Take 1,000 mg by mouth daily                                hydrocortisone 5 MG tablet   Commonly known as:  CORTEF   Take 5 mg by mouth daily Pt takes 2 tabs (10mg) in Am and 1 tab (5mg) PM                                hydrOXYzine 25 MG tablet   Commonly known as:  ATARAX   Take 1 tablet (25 mg) by mouth every 6 hours as needed for anxiety                                KEFLEX PO   Take 500 mg by mouth 2 times daily                                 NUVARING 0.12-0.015 MG/24HR vaginal ring   Place 1 each vaginally every 28 days   Generic drug:  etonogestrel-ethinyl estradiol                                rizatriptan 10 MG tablet   Commonly known as:  MAXALT   TAKE 1 TABLET BY MOUTH ONCE, MAY REPEAT IN 2 HRS. MAX 30MG/24HRS                                VITAMIN D PO   Take 2,000 Units by mouth daily.                                WELLBUTRIN  MG 12 hr tablet   Take 150 mg by mouth daily   Generic drug:  buPROPion                                zolpidem 10 MG tablet   Commonly known as:  AMBIEN   Take 1 tablet (10 mg) by mouth nightly as needed for sleep

## 2018-10-30 NOTE — IP AVS SNAPSHOT
[place] [address]    [place] [address]    Phone:  [phone]                                       After Visit Summary   10/30/2018    Noa Mcgill    MRN: [id number]           After Visit Summary Signature Page     I have received my discharge instructions, and my questions have been answered. I have discussed any challenges I see with this plan with the nurse or doctor.    ..........................................................................................................................................  Patient/Patient Representative Signature      ..........................................................................................................................................  Patient Representative Print Name and Relationship to Patient    ..................................................               ................................................  Date                                   Time    ..........................................................................................................................................  Reviewed by Signature/Title    ...................................................              ..............................................  Date                                               Time          22EPIC Rev 08/18

## 2018-10-30 NOTE — PROGRESS NOTES
Procedure: KAYLIE.    No complications.  Patient did pull scope early but PFO and atrial septum were fully imaged.    Findings:  Small PFO, with mainly left to right shunting.

## 2018-10-30 NOTE — PROGRESS NOTES
Pt admitted for KAYLIE. Denies c/o. Procedure and discharge instructions given. Pt states understanding.  at bedside.

## 2018-10-30 NOTE — PROGRESS NOTES
KAYLIE completed by Dr Mcrae. Pt with strong gag reflex. Pt pulled tube near end of KAYLIE. Total sedation 7 mg Versed and 100mcg Fentanyl. VSS. SR. C/o slight scratchy throat after procedure.  updated.    1045 Pt awake c/o slight scratchy throat, otherwise no c/o. VSS SR. IV DCD Pt discharged per w/c to door #1 with .

## 2018-10-30 NOTE — DISCHARGE INSTRUCTIONS
KAYLIE  (Transesophageal Echocardiogram)  Discharge Instructions    After you go home:      Have an adult stay with you for 6 hours.       For 24 hours - due to the sedation you received:    Relax and take it easy.    Do NOT make any important or legal decisions.    Do NOT drive or operate machines at home or at work.    Do NOT drink alcohol.    Diet:    You may resume your normal diet, but no scratchy foods for two days.    If your throat is sore, eat cold, bland or soft foods.    You may have heartburn if the tube used in the exam entered your stomach.  If so:   - Do not eat acidic and spicy foods.   - Do not eat three hours before bedtime. Clear liquids are okay.   - When lying down, use two pillows to raise your head.    Medicines:      Take your medications, including blood thinners, unless your provider tells you not to.    If you have stopped any medicines, check with your provider about when to restart them.    You may take Tylenol (Acetaminophen) if your throat is sore.    You may take antacids if you have heartburn.      Follow Up Appointments:      Follow up with your cardiologist at [facility] of patient preference as instructed.    Follow up with your primary care provider as needed.    Call the clinic if:      You have heartburn that is severe or lasts more than 72 hours.    You have a sore throat that feels worse after 72 hours.    You have shortness of breath, neck pain, chest pain, fever, chills, coughing up blood, or other unusual signs.    Questions or concerns      [facility] [place]:    [phone] [facility] (7 days a week)

## 2018-10-31 ENCOUNTER — TELEPHONE (OUTPATIENT)
Dept: CARDIOLOGY | Facility: CLINIC | Age: 43
End: 2018-10-31

## 2018-10-31 NOTE — TELEPHONE ENCOUNTER
Pt called in today. Pt just had KAYLIE in preparation for PFO. Pt does see Stacy 11/20/18.  Per Pt her oncologist DR Muñoz who she sees feels she has hypercoagulable issue but has tested negative for  informed her she should do lovenox 5 days before procedure and hold aspirin 7 days before procedure. Pt goes on to say Dr Muñoz wants to speak with DR Cornejo about post procedure medications, but DR Cornejo on vacation now, and when he is back in office Dr Muñoz gone until after Pt's surgery. Will message provider's  this information.   DARYA Mandujano RN

## 2018-10-31 NOTE — TELEPHONE ENCOUNTER
But hernia surgery is the 7th of December so I doubt the closure can be done on the third and hernia on the 7th...something about this does not add up    She likely cannot have hernia on plavix either so need to clarify if hernia gets done with pre/post op bridging per dien and then plan pfo closure when cleared by surgery I would suspect but need to involve Dr. Cornejo in this situation

## 2018-10-31 NOTE — TELEPHONE ENCOUNTER
Is the surgery you are referring to the hernia surgery?     She has no PFO scheduled yet and not sure Dr. Milian knew she is having hernia surgery.    We cannot hold asa prior to pfo closure and has to be plavix loaded as well    So if Dr Sanchez was referring to hernia surgery bridging and ASA perhaps he needs to talk to general surgeon Dr. Leary    I would let Chantal know this when he returns and be sure the general surgeon and Dr. Sanchez talk about preop and post op plan for anticoagulation for her hernia scheduled 12-7 as we are not the ones managing that I don't think

## 2018-10-31 NOTE — TELEPHONE ENCOUNTER
Per NP's note did call Pt back and confirmed that she was speaking of PFO closure when holding aspirin.. Informed her that she needs to be on aspirin and Plavix going into PFO closure and will remain on them.  Also reminded Pt that was why there was concern of scheduling Hernia surgery and PFO closure to close together because of bleeding risk. Informed Pt she needs to clarify  With DR Muñoz and hernia surgeon regarding her aspirin going into hernia surgery., and she will need to make sure DR Leary understands she will be put on aspirin and plavix for PFO procedure and what time period should be between hernia repair and starting Plavix, and that DR Muñoz is informed Pt needs aspirin and Plavix for PFO procedure and cannot be stopped.  Scheduling was holding a PFO slot for 3 rd week of December. DARYA Mandujano RN

## 2018-11-01 NOTE — TELEPHONE ENCOUNTER
Informed Pt that this Nurse spoke to DR Muñoz of oncology. Did explain needing aspirin 2-4 days before and plavix would also be before PFO procedure, and that in FDr Heifetz note he may use heparin during procedure. DR Muñoz felt this was all ok.  Did review this with NP. Asked Pt to speak with surgeon and informed her heart clinic would need surgeons ok as to when she is safe for PFO procedure.  DARYA Mandujano RN

## 2018-11-01 NOTE — TELEPHONE ENCOUNTER
Pt informed she will not be able to do PFO closure in December it is to close to Hernia surgery, and would be issues with bleeding. Did leave message at DR Muñoz's office asking for discussion regarding PFO closure.       Informed Pt she needs to speak with surgeons office for further discussion regarding lovenox post hernia surgery. Pt has had clotting post surgeries in the past. DARYA Mandujano RN

## 2018-11-02 NOTE — TELEPHONE ENCOUNTER
Pt called back and stated DR Muñoz spoke with DR Leary, and per DR Leary Pt will be fine to have PFO closure 2 weeks post hernia repair. Did ask Pt to have DR Leary send a note post surgery to verify this. Will put hold back on Dec 20 th with David for PFO closure. DARYA Mandujano RN

## 2018-11-13 ENCOUNTER — HOSPITAL ENCOUNTER (OUTPATIENT)
Dept: ULTRASOUND IMAGING | Facility: CLINIC | Age: 43
Discharge: HOME | End: 2018-11-13
Attending: SURGERY | Admitting: SURGERY
Payer: COMMERCIAL

## 2018-11-13 DIAGNOSIS — K43.2 INCISIONAL HERNIA: ICD-10-CM

## 2018-11-13 PROCEDURE — 64647 CHEMODENERV TRUNK MUSC 6/>: CPT

## 2018-11-13 PROCEDURE — 25000576 ZZH RX IP 250 OP 636 J0585: Performed by: RADIOLOGY

## 2018-11-13 PROCEDURE — 25000125 ZZHC RX 250: Performed by: RADIOLOGY

## 2018-11-13 RX ADMIN — LIDOCAINE HYDROCHLORIDE 42 ML: 10 INJECTION, SOLUTION EPIDURAL; INFILTRATION; INTRACAUDAL; PERINEURAL at 14:18

## 2018-11-13 RX ADMIN — ONABOTULINUMTOXINA 300 UNITS: 100 INJECTION, POWDER, LYOPHILIZED, FOR SOLUTION INTRADERMAL; INTRAMUSCULAR at 14:13

## 2018-11-13 NOTE — PROGRESS NOTES
Consent for botox injections as pre procedure step for hernia surgery obtained by Dr. Mtz.  Pt tolerated procedure well.  See dictation.  Sterile dressings to six sites dry on ambulatory discontinue to home per self.  Report of completion of procedure given to Dr. Bergeron's staff.

## 2018-11-19 ENCOUNTER — DOCUMENTATION ONLY (OUTPATIENT)
Dept: CARDIOLOGY | Facility: CLINIC | Age: 43
End: 2018-11-19

## 2018-11-19 NOTE — PROGRESS NOTES
Debora,    I have several questions about this closure  I think her appointent with me is before you get to work on Tuesday    Maybe we can talk or I can go directly to Chantal  1. Did [employer] send letter clearing her for this so soon after hernia  2. Any need for bridging per Heme periprocedure  3. Did Chantal look at KAYLIE and pick a device  4. He talks about extra steroids preop  5. And does David know he is doing this      Not sure where we are at with these issues

## 2018-11-20 NOTE — TELEPHONE ENCOUNTER
One of your notes says [employer] was okay to close 2 weeks after hernia which is 12-7  I think the January spots might be gone now...not sure    We should talk more about this

## 2018-11-21 ENCOUNTER — OFFICE VISIT (OUTPATIENT)
Dept: FAMILY MEDICINE | Facility: CLINIC | Age: 43
End: 2018-11-21
Payer: COMMERCIAL

## 2018-11-21 ENCOUNTER — RADIANT APPOINTMENT (OUTPATIENT)
Dept: GENERAL RADIOLOGY | Facility: CLINIC | Age: 43
End: 2018-11-21
Attending: FAMILY MEDICINE
Payer: COMMERCIAL

## 2018-11-21 VITALS
TEMPERATURE: 98.1 F | HEIGHT: 67 IN | HEART RATE: 96 BPM | BODY MASS INDEX: 32.18 KG/M2 | DIASTOLIC BLOOD PRESSURE: 81 MMHG | WEIGHT: 205 LBS | RESPIRATION RATE: 12 BRPM | SYSTOLIC BLOOD PRESSURE: 121 MMHG | OXYGEN SATURATION: 99 %

## 2018-11-21 DIAGNOSIS — Z01.818 PREOP GENERAL PHYSICAL EXAM: Primary | ICD-10-CM

## 2018-11-21 DIAGNOSIS — F98.8 ATTENTION DEFICIT DISORDER (ADD) WITHOUT HYPERACTIVITY: ICD-10-CM

## 2018-11-21 DIAGNOSIS — Z01.818 PREOP GENERAL PHYSICAL EXAM: ICD-10-CM

## 2018-11-21 LAB — HGB BLD-MCNC: 12.7 G/DL (ref 11.7–15.7)

## 2018-11-21 PROCEDURE — 99214 OFFICE O/P EST MOD 30 MIN: CPT | Performed by: FAMILY MEDICINE

## 2018-11-21 PROCEDURE — 80048 BASIC METABOLIC PNL TOTAL CA: CPT | Performed by: FAMILY MEDICINE

## 2018-11-21 PROCEDURE — 85018 HEMOGLOBIN: CPT | Performed by: FAMILY MEDICINE

## 2018-11-21 PROCEDURE — 71046 X-RAY EXAM CHEST 2 VIEWS: CPT

## 2018-11-21 PROCEDURE — 36415 COLL VENOUS BLD VENIPUNCTURE: CPT | Performed by: FAMILY MEDICINE

## 2018-11-21 RX ORDER — DEXTROAMPHETAMINE SACCHARATE, AMPHETAMINE ASPARTATE, DEXTROAMPHETAMINE SULFATE AND AMPHETAMINE SULFATE 2.5; 2.5; 2.5; 2.5 MG/1; MG/1; MG/1; MG/1
TABLET ORAL
Qty: 90 TABLET | Refills: 0 | Status: SHIPPED | OUTPATIENT
Start: 2018-11-21 | End: 2019-09-09
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
## 2018-11-21 NOTE — MR AVS SNAPSHOT
After Visit Summary   11/21/2018    Noa Mcgill    MRN: [id number]           Patient Information     Date Of Birth          1975        Visit Information        Provider Department      11/21/2018 4:15 PM Sydnie He MD [facility]        Today's Diagnoses     Preop general physical exam    -  1    Attention deficit disorder (ADD) without hyperactivity          Care Instructions      Before Your Surgery      Call your surgeon if there is any change in your health. This includes signs of a cold or flu (such as a sore throat, runny nose, cough, rash or fever).    Do not smoke, drink alcohol or take over the counter medicine (unless your surgeon or primary care doctor tells you to) for the 24 hours before and after surgery.    If you take prescribed drugs: Follow your doctor s orders about which medicines to take and which to stop until after surgery.    Eating and drinking prior to surgery: follow the instructions from your surgeon    Take a shower or bath the night before surgery. Use the soap your surgeon gave you to gently clean your skin. If you do not have soap from your surgeon, use your regular soap. Do not shave or scrub the surgery site.  Wear clean pajamas and have clean sheets on your bed.           Follow-ups after your visit        Follow-up notes from your care team     Return in about 1 month (around 12/21/2018).      Your next 10 appointments already scheduled     Dec 07, 2018   Procedure with Suraj Bergeron MD   [facility] (--)    [address] [facility] [address]   [phone]            Dec 07, 2018 11:15 AM [facility] OR with Suraj Bergeron MD, Taylor Aiken PA-C   [facility] ([facility])    [facility] ([facility])   [phone]              Future tests that were ordered for you today     Open Future Orders   "      Priority Expected Expires Ordered    XR Chest 2 Views Routine 11/21/2018 11/21/2019 11/21/2018            Who to contact     If you have questions or need follow up information about today's clinic visit or your schedule please contact [facility] directly at [phone].  Normal or non-critical lab and imaging results will be communicated to you by MyChart, letter or phone within 4 business days after the clinic has received the results. If you do not hear from us within 7 days, please contact the clinic through [employer] or phone. If you have a critical or abnormal lab result, we will notify you by phone as soon as possible.  Submit refill requests through [employer] or call your pharmacy and they will forward the refill request to us. Please allow 3 business days for your refill to be completed.          Additional Information About Your Visit        MyChart Information     [employer] gives you secure access to your electronic health record. If you see a primary care provider, you can also send messages to your care team and make appointments. If you have questions, please call your primary care clinic.  If you do not have a primary care provider, please call [phone] and they will assist you.        Care EveryWhere ID     This is your Care EveryWhere ID. This could be used by other organizations to access your [facility] medical records  [id number]        Your Vitals Were     Pulse Temperature Respirations Height Last Period Pulse Oximetry    96 98.1  F (36.7  C) (Oral) 12 5' 6.5\" (1.689 m) 02/08/2015 (Within Months) 99%    BMI (Body Mass Index)                   32.59 kg/m2            Blood Pressure from Last 3 Encounters:   11/21/18 121/81   10/30/18 124/65   10/25/18 120/70    Weight from Last 3 Encounters:   11/21/18 205 lb (93 kg)   10/30/18 210 lb 9.6 oz (95.5 kg)   10/25/18 211 lb (95.7 kg)              We Performed the Following     Basic metabolic panel     Hemoglobin        "   Today's Medication Changes          These changes are accurate as of 11/21/18  4:53 PM.  If you have any questions, ask your nurse or doctor.               Start taking these medicines.        Dose/Directions    amphetamine-dextroamphetamine 10 MG tablet   Commonly known as:  ADDERALL   Used for:  Attention deficit disorder (ADD) without hyperactivity   Started by:  Sydnie He MD        Take 1 tablet (10 mg) by mouth in AM and take 2 tablets (20 mg) by mouth in PM   Quantity:  90 tablet   Refills:  0         Stop taking these medicines if you haven't already. Please contact your care team if you have questions.     hydrOXYzine 25 MG tablet   Commonly known as:  ATARAX   Stopped by:  Sydnie He MD                Where to get your medicines      Some of these will need a paper prescription and others can be bought over the counter.  Ask your nurse if you have questions.     Bring a paper prescription for each of these medications     amphetamine-dextroamphetamine 10 MG tablet                Primary Care Provider Office Phone # Fax #    Sydnie He [phone] [address] [facility] [address]        Equal Access to Services     [facility]: Hadii aad ku hadasho Soludy, waaxda luqadaha, qaybta kaalmada aderaul, lisa rivera . So [facility] [phone].    ATENCIÓN: Si habla español, tiene a horton disposición servicios gratuitos de asistencia lingüística. Llame al [phone].    We comply with applicable federal civil rights laws and [place] laws. We do not discriminate on the basis of race, color, national origin, age, disability, sex, sexual orientation, or gender identity.            Thank you!     Thank you for choosing [facility]  for your care. Our goal is always to provide you with excellent care. Hearing back from our patients is one way we can continue to improve our services. Please take a few minutes to complete the  written survey that you may receive in the mail after your visit with us. Thank you!             Your Updated Medication List - Protect others around you: Learn how to safely use, store and throw away your medicines at www.disposemymeds.org.          This list is accurate as of 11/21/18  4:53 PM.  Always use your most recent med list.                   Brand Name Dispense Instructions for use Diagnosis    ADVIL PO      Take 600 mg by mouth 2 times daily as needed for moderate pain        amphetamine-dextroamphetamine 10 MG tablet    ADDERALL    90 tablet    Take 1 tablet (10 mg) by mouth in AM and take 2 tablets (20 mg) by mouth in PM    Attention deficit disorder (ADD) without hyperactivity       ascorbic acid 250 MG Tabs tablet      Take by mouth daily Take by mouth twice a day.        aspirin 325 MG EC tablet    ASA     Take 325 mg by mouth Take 325 mg by mouth        butalbital-acetaminophen-caffeine -40 MG tablet    FIORICET/ESGIC    30 tablet    Take 1 tablet by mouth every 4 hours as needed    Migraine without status migrainosus, not intractable, unspecified migraine type       DHEA 50 MG Tabs      Take 50 mg by mouth 1 tab QD        Fish Oil 1000 MG Cpdr      Take 1,000 mg by mouth daily        hydrocortisone 5 MG tablet    CORTEF     Take 5 mg by mouth daily Pt takes 2 tabs (10mg) in Am and 1 tab (5mg) PM        KEFLEX PO      Take 500 mg by mouth 2 times daily        NUVARING 0.12-0.015 MG/24HR vaginal ring   Generic drug:  etonogestrel-ethinyl estradiol      Place 1 each vaginally every 28 days        rizatriptan 10 MG tablet    MAXALT     TAKE 1 TABLET BY MOUTH ONCE, MAY REPEAT IN 2 HRS. MAX 30MG/24HRS        VITAMIN D PO      Take 2,000 Units by mouth daily.        WELLBUTRIN  MG 12 hr tablet   Generic drug:  buPROPion      Take 150 mg by mouth daily        zolpidem 10 MG tablet    AMBIEN    30 tablet    Take 1 tablet (10 mg) by mouth nightly as needed for sleep    Insomnia, unspecified type

## 2018-11-23 LAB
ANION GAP SERPL CALCULATED.3IONS-SCNC: 6 MMOL/L (ref 3–14)
BUN SERPL-MCNC: 13 MG/DL (ref 7–30)
CALCIUM SERPL-MCNC: 9.2 MG/DL (ref 8.5–10.1)
CHLORIDE SERPL-SCNC: 109 MMOL/L (ref 94–109)
CO2 SERPL-SCNC: 25 MMOL/L (ref 20–32)
CREAT SERPL-MCNC: 0.92 MG/DL (ref 0.52–1.04)
GFR SERPL CREATININE-BSD FRML MDRD: 67 ML/MIN/1.7M2
GLUCOSE SERPL-MCNC: 89 MG/DL (ref 70–99)
POTASSIUM SERPL-SCNC: 4 MMOL/L (ref 3.4–5.3)
SODIUM SERPL-SCNC: 140 MMOL/L (ref 133–144)

## 2018-11-29 ENCOUNTER — TRANSFERRED RECORDS (OUTPATIENT)
Dept: HEALTH INFORMATION MANAGEMENT | Facility: CLINIC | Age: 43
End: 2018-11-29
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
## 2018-12-07 ENCOUNTER — ANESTHESIA (OUTPATIENT)
Dept: SURGERY | Facility: CLINIC | Age: 43
DRG: 354 | End: 2018-12-07
Payer: COMMERCIAL

## 2018-12-07 ENCOUNTER — HOSPITAL ENCOUNTER (INPATIENT)
Facility: CLINIC | Age: 43
LOS: 4 days | Discharge: HOME | DRG: 354 | End: 2018-12-11
Attending: SURGERY | Admitting: SURGERY
Payer: COMMERCIAL

## 2018-12-07 ENCOUNTER — APPOINTMENT (OUTPATIENT)
Dept: SURGERY | Facility: PHYSICIAN GROUP | Age: 43
End: 2018-12-07
Payer: COMMERCIAL

## 2018-12-07 ENCOUNTER — ANESTHESIA EVENT (OUTPATIENT)
Dept: SURGERY | Facility: CLINIC | Age: 43
DRG: 354 | End: 2018-12-07
Payer: COMMERCIAL

## 2018-12-07 DIAGNOSIS — K43.2 INCISIONAL HERNIA, WITHOUT OBSTRUCTION OR GANGRENE: Primary | ICD-10-CM

## 2018-12-07 LAB — PLATELET # BLD AUTO: 252 10E9/L (ref 150–450)

## 2018-12-07 PROCEDURE — 40000306 ZZH STATISTIC PRE PROC ASSESS II: Performed by: SURGERY

## 2018-12-07 PROCEDURE — 15734 MUSCLE-SKIN GRAFT TRUNK: CPT | Mod: AS | Performed by: PHYSICIAN ASSISTANT

## 2018-12-07 PROCEDURE — 25000128 H RX IP 250 OP 636: Performed by: NURSE ANESTHETIST, CERTIFIED REGISTERED

## 2018-12-07 PROCEDURE — 49568 ZZHC REPAIR HERNIA WITH MESH: CPT | Performed by: SURGERY

## 2018-12-07 PROCEDURE — C1781 MESH (IMPLANTABLE): HCPCS | Performed by: SURGERY

## 2018-12-07 PROCEDURE — 49560 ZZHC REPAIR INCISIONAL HERNIA,REDUCIBLE: CPT | Mod: AS | Performed by: PHYSICIAN ASSISTANT

## 2018-12-07 PROCEDURE — 25000125 ZZHC RX 250: Performed by: ANESTHESIOLOGY

## 2018-12-07 PROCEDURE — 0WUF0KZ SUPPLEMENT ABDOMINAL WALL WITH NONAUTOLOGOUS TISSUE SUBSTITUTE, OPEN APPROACH: ICD-10-PCS | Performed by: SURGERY

## 2018-12-07 PROCEDURE — 0KNL0ZZ RELEASE LEFT ABDOMEN MUSCLE, OPEN APPROACH: ICD-10-PCS | Performed by: SURGERY

## 2018-12-07 PROCEDURE — 37000008 ZZH ANESTHESIA TECHNICAL FEE, 1ST 30 MIN: Performed by: SURGERY

## 2018-12-07 PROCEDURE — 25000128 H RX IP 250 OP 636: Performed by: SURGERY

## 2018-12-07 PROCEDURE — 25000125 ZZHC RX 250: Performed by: NURSE ANESTHETIST, CERTIFIED REGISTERED

## 2018-12-07 PROCEDURE — 36415 COLL VENOUS BLD VENIPUNCTURE: CPT | Performed by: SURGERY

## 2018-12-07 PROCEDURE — 12000000 ZZH R&B MED SURG/OB

## 2018-12-07 PROCEDURE — 71000012 ZZH RECOVERY PHASE 1 LEVEL 1 FIRST HR: Performed by: SURGERY

## 2018-12-07 PROCEDURE — 37000009 ZZH ANESTHESIA TECHNICAL FEE, EACH ADDTL 15 MIN: Performed by: SURGERY

## 2018-12-07 PROCEDURE — 25000132 ZZH RX MED GY IP 250 OP 250 PS 637: Performed by: SURGERY

## 2018-12-07 PROCEDURE — 36000050 ZZH SURGERY LEVEL 2 1ST 30 MIN: Performed by: SURGERY

## 2018-12-07 PROCEDURE — 27210794 ZZH OR GENERAL SUPPLY STERILE: Performed by: SURGERY

## 2018-12-07 PROCEDURE — 85049 AUTOMATED PLATELET COUNT: CPT | Performed by: SURGERY

## 2018-12-07 PROCEDURE — 49568 ZZHC REPAIR HERNIA WITH MESH: CPT | Mod: AS | Performed by: PHYSICIAN ASSISTANT

## 2018-12-07 PROCEDURE — 15734 MUSCLE-SKIN GRAFT TRUNK: CPT | Mod: 59 | Performed by: SURGERY

## 2018-12-07 PROCEDURE — C1765 ADHESION BARRIER: HCPCS | Performed by: SURGERY

## 2018-12-07 PROCEDURE — 36000052 ZZH SURGERY LEVEL 2 EA 15 ADDTL MIN: Performed by: SURGERY

## 2018-12-07 PROCEDURE — 25000125 ZZHC RX 250: Performed by: SURGERY

## 2018-12-07 PROCEDURE — 25000566 ZZH SEVOFLURANE, EA 15 MIN: Performed by: SURGERY

## 2018-12-07 PROCEDURE — 49560 ZZHC REPAIR INCISIONAL HERNIA,REDUCIBLE: CPT | Performed by: SURGERY

## 2018-12-07 PROCEDURE — 25000128 H RX IP 250 OP 636: Performed by: ANESTHESIOLOGY

## 2018-12-07 PROCEDURE — 71000013 ZZH RECOVERY PHASE 1 LEVEL 1 EA ADDTL HR: Performed by: SURGERY

## 2018-12-07 DEVICE — IMPLANTABLE DEVICE: Type: IMPLANTABLE DEVICE | Site: ABDOMEN | Status: FUNCTIONAL

## 2018-12-07 RX ORDER — DIMENHYDRINATE 50 MG/ML
25 INJECTION, SOLUTION INTRAMUSCULAR; INTRAVENOUS
Status: DISCONTINUED | OUTPATIENT
Start: 2018-12-07 | End: 2018-12-07 | Stop reason: HOSPADM

## 2018-12-07 RX ORDER — LIDOCAINE 40 MG/G
CREAM TOPICAL
Status: DISCONTINUED | OUTPATIENT
Start: 2018-12-07 | End: 2018-12-07 | Stop reason: HOSPADM

## 2018-12-07 RX ORDER — EPHEDRINE SULFATE 50 MG/ML
25 INJECTION, SOLUTION INTRAVENOUS ONCE
Status: COMPLETED | OUTPATIENT
Start: 2018-12-07 | End: 2018-12-07

## 2018-12-07 RX ORDER — ONDANSETRON 2 MG/ML
4 INJECTION INTRAMUSCULAR; INTRAVENOUS EVERY 30 MIN PRN
Status: DISCONTINUED | OUTPATIENT
Start: 2018-12-07 | End: 2018-12-07 | Stop reason: HOSPADM

## 2018-12-07 RX ORDER — OXYCODONE HYDROCHLORIDE 5 MG/1
5-10 TABLET ORAL
Status: DISCONTINUED | OUTPATIENT
Start: 2018-12-07 | End: 2018-12-11 | Stop reason: HOSPADM

## 2018-12-07 RX ORDER — ONDANSETRON 4 MG/1
4 TABLET, ORALLY DISINTEGRATING ORAL EVERY 6 HOURS PRN
Status: DISCONTINUED | OUTPATIENT
Start: 2018-12-07 | End: 2018-12-11 | Stop reason: HOSPADM

## 2018-12-07 RX ORDER — CEFAZOLIN SODIUM 1 G/3ML
1 INJECTION, POWDER, FOR SOLUTION INTRAMUSCULAR; INTRAVENOUS SEE ADMIN INSTRUCTIONS
Status: DISCONTINUED | OUTPATIENT
Start: 2018-12-07 | End: 2018-12-07

## 2018-12-07 RX ORDER — PROPOFOL 10 MG/ML
INJECTION, EMULSION INTRAVENOUS CONTINUOUS PRN
Status: DISCONTINUED | OUTPATIENT
Start: 2018-12-07 | End: 2018-12-07

## 2018-12-07 RX ORDER — SODIUM CHLORIDE, SODIUM LACTATE, POTASSIUM CHLORIDE, CALCIUM CHLORIDE 600; 310; 30; 20 MG/100ML; MG/100ML; MG/100ML; MG/100ML
INJECTION, SOLUTION INTRAVENOUS CONTINUOUS
Status: DISCONTINUED | OUTPATIENT
Start: 2018-12-07 | End: 2018-12-07 | Stop reason: HOSPADM

## 2018-12-07 RX ORDER — ONDANSETRON 2 MG/ML
4 INJECTION INTRAMUSCULAR; INTRAVENOUS EVERY 6 HOURS PRN
Status: DISCONTINUED | OUTPATIENT
Start: 2018-12-07 | End: 2018-12-11 | Stop reason: HOSPADM

## 2018-12-07 RX ORDER — MAGNESIUM HYDROXIDE 1200 MG/15ML
LIQUID ORAL PRN
Status: DISCONTINUED | OUTPATIENT
Start: 2018-12-07 | End: 2018-12-07 | Stop reason: HOSPADM

## 2018-12-07 RX ORDER — ONDANSETRON 2 MG/ML
INJECTION INTRAMUSCULAR; INTRAVENOUS PRN
Status: DISCONTINUED | OUTPATIENT
Start: 2018-12-07 | End: 2018-12-07

## 2018-12-07 RX ORDER — NEOSTIGMINE METHYLSULFATE 1 MG/ML
VIAL (ML) INJECTION PRN
Status: DISCONTINUED | OUTPATIENT
Start: 2018-12-07 | End: 2018-12-07

## 2018-12-07 RX ORDER — PROPOFOL 10 MG/ML
INJECTION, EMULSION INTRAVENOUS PRN
Status: DISCONTINUED | OUTPATIENT
Start: 2018-12-07 | End: 2018-12-07

## 2018-12-07 RX ORDER — VANCOMYCIN HYDROCHLORIDE 1 G/200ML
1000 INJECTION, SOLUTION INTRAVENOUS
Status: COMPLETED | OUTPATIENT
Start: 2018-12-07 | End: 2018-12-07

## 2018-12-07 RX ORDER — ALBUTEROL SULFATE 0.83 MG/ML
2.5 SOLUTION RESPIRATORY (INHALATION) EVERY 4 HOURS PRN
Status: DISCONTINUED | OUTPATIENT
Start: 2018-12-07 | End: 2018-12-07 | Stop reason: HOSPADM

## 2018-12-07 RX ORDER — BUPROPION HYDROCHLORIDE 150 MG/1
150 TABLET, EXTENDED RELEASE ORAL DAILY
Status: DISCONTINUED | OUTPATIENT
Start: 2018-12-08 | End: 2018-12-11 | Stop reason: HOSPADM

## 2018-12-07 RX ORDER — DEXAMETHASONE SODIUM PHOSPHATE 4 MG/ML
INJECTION, SOLUTION INTRA-ARTICULAR; INTRALESIONAL; INTRAMUSCULAR; INTRAVENOUS; SOFT TISSUE PRN
Status: DISCONTINUED | OUTPATIENT
Start: 2018-12-07 | End: 2018-12-07

## 2018-12-07 RX ORDER — LIDOCAINE 40 MG/G
CREAM TOPICAL
Status: DISCONTINUED | OUTPATIENT
Start: 2018-12-07 | End: 2018-12-11 | Stop reason: HOSPADM

## 2018-12-07 RX ORDER — HEPARIN SODIUM 5000 [USP'U]/.5ML
5000 INJECTION, SOLUTION INTRAVENOUS; SUBCUTANEOUS EVERY 8 HOURS
Status: DISCONTINUED | OUTPATIENT
Start: 2018-12-08 | End: 2018-12-10

## 2018-12-07 RX ORDER — ACETAMINOPHEN 325 MG/1
975 TABLET ORAL EVERY 8 HOURS
Status: COMPLETED | OUTPATIENT
Start: 2018-12-07 | End: 2018-12-10

## 2018-12-07 RX ORDER — LIDOCAINE HYDROCHLORIDE 10 MG/ML
INJECTION, SOLUTION INFILTRATION; PERINEURAL PRN
Status: DISCONTINUED | OUTPATIENT
Start: 2018-12-07 | End: 2018-12-07

## 2018-12-07 RX ORDER — KETAMINE HYDROCHLORIDE 10 MG/ML
INJECTION, SOLUTION INTRAMUSCULAR; INTRAVENOUS PRN
Status: DISCONTINUED | OUTPATIENT
Start: 2018-12-07 | End: 2018-12-07

## 2018-12-07 RX ORDER — HYDROCORTISONE 5 MG/1
5 TABLET ORAL EVERY EVENING
Status: DISCONTINUED | OUTPATIENT
Start: 2018-12-07 | End: 2018-12-11 | Stop reason: HOSPADM

## 2018-12-07 RX ORDER — ONDANSETRON 4 MG/1
4 TABLET, ORALLY DISINTEGRATING ORAL EVERY 30 MIN PRN
Status: DISCONTINUED | OUTPATIENT
Start: 2018-12-07 | End: 2018-12-07 | Stop reason: HOSPADM

## 2018-12-07 RX ORDER — NALOXONE HYDROCHLORIDE 0.4 MG/ML
.1-.4 INJECTION, SOLUTION INTRAMUSCULAR; INTRAVENOUS; SUBCUTANEOUS
Status: DISCONTINUED | OUTPATIENT
Start: 2018-12-07 | End: 2018-12-07 | Stop reason: HOSPADM

## 2018-12-07 RX ORDER — FENTANYL CITRATE 50 UG/ML
INJECTION, SOLUTION INTRAMUSCULAR; INTRAVENOUS PRN
Status: DISCONTINUED | OUTPATIENT
Start: 2018-12-07 | End: 2018-12-07

## 2018-12-07 RX ORDER — ACETAMINOPHEN 325 MG/1
650 TABLET ORAL EVERY 4 HOURS PRN
Status: DISCONTINUED | OUTPATIENT
Start: 2018-12-10 | End: 2018-12-10

## 2018-12-07 RX ORDER — GLYCINE 1.5 G/100ML
SOLUTION IRRIGATION PRN
Status: DISCONTINUED | OUTPATIENT
Start: 2018-12-07 | End: 2018-12-07 | Stop reason: HOSPADM

## 2018-12-07 RX ORDER — SODIUM CHLORIDE, SODIUM LACTATE, POTASSIUM CHLORIDE, CALCIUM CHLORIDE 600; 310; 30; 20 MG/100ML; MG/100ML; MG/100ML; MG/100ML
INJECTION, SOLUTION INTRAVENOUS CONTINUOUS PRN
Status: DISCONTINUED | OUTPATIENT
Start: 2018-12-07 | End: 2018-12-07

## 2018-12-07 RX ORDER — GLYCOPYRROLATE 0.2 MG/ML
INJECTION, SOLUTION INTRAMUSCULAR; INTRAVENOUS PRN
Status: DISCONTINUED | OUTPATIENT
Start: 2018-12-07 | End: 2018-12-07

## 2018-12-07 RX ORDER — BUPIVACAINE HYDROCHLORIDE AND EPINEPHRINE 2.5; 5 MG/ML; UG/ML
INJECTION, SOLUTION EPIDURAL; INFILTRATION; INTRACAUDAL; PERINEURAL PRN
Status: DISCONTINUED | OUTPATIENT
Start: 2018-12-07 | End: 2018-12-07 | Stop reason: HOSPADM

## 2018-12-07 RX ORDER — FENTANYL CITRATE 50 UG/ML
25-50 INJECTION, SOLUTION INTRAMUSCULAR; INTRAVENOUS
Status: DISCONTINUED | OUTPATIENT
Start: 2018-12-07 | End: 2018-12-07 | Stop reason: HOSPADM

## 2018-12-07 RX ORDER — SODIUM CHLORIDE 9 MG/ML
INJECTION, SOLUTION INTRAVENOUS CONTINUOUS
Status: DISCONTINUED | OUTPATIENT
Start: 2018-12-07 | End: 2018-12-11 | Stop reason: HOSPADM

## 2018-12-07 RX ORDER — VANCOMYCIN HYDROCHLORIDE 1 G/200ML
1000 INJECTION, SOLUTION INTRAVENOUS EVERY 12 HOURS
Status: COMPLETED | OUTPATIENT
Start: 2018-12-07 | End: 2018-12-08

## 2018-12-07 RX ORDER — DOCUSATE SODIUM 100 MG/1
100 CAPSULE, LIQUID FILLED ORAL 2 TIMES DAILY PRN
Status: DISCONTINUED | OUTPATIENT
Start: 2018-12-07 | End: 2018-12-11 | Stop reason: HOSPADM

## 2018-12-07 RX ORDER — HYDROMORPHONE HYDROCHLORIDE 1 MG/ML
.3-.5 INJECTION, SOLUTION INTRAMUSCULAR; INTRAVENOUS; SUBCUTANEOUS EVERY 10 MIN PRN
Status: DISCONTINUED | OUTPATIENT
Start: 2018-12-07 | End: 2018-12-07 | Stop reason: HOSPADM

## 2018-12-07 RX ORDER — PROMETHAZINE HYDROCHLORIDE 25 MG/ML
25 INJECTION INTRAMUSCULAR; INTRAVENOUS ONCE
Status: COMPLETED | OUTPATIENT
Start: 2018-12-07 | End: 2018-12-07

## 2018-12-07 RX ORDER — HYDROCORTISONE 10 MG/1
10 TABLET ORAL EVERY MORNING
Status: DISCONTINUED | OUTPATIENT
Start: 2018-12-08 | End: 2018-12-11 | Stop reason: HOSPADM

## 2018-12-07 RX ORDER — METOPROLOL TARTRATE 1 MG/ML
1-2 INJECTION, SOLUTION INTRAVENOUS EVERY 5 MIN PRN
Status: DISCONTINUED | OUTPATIENT
Start: 2018-12-07 | End: 2018-12-07 | Stop reason: HOSPADM

## 2018-12-07 RX ORDER — CEFAZOLIN SODIUM 2 G/100ML
2 INJECTION, SOLUTION INTRAVENOUS
Status: DISCONTINUED | OUTPATIENT
Start: 2018-12-07 | End: 2018-12-07

## 2018-12-07 RX ORDER — MEPERIDINE HYDROCHLORIDE 50 MG/ML
12.5 INJECTION INTRAMUSCULAR; INTRAVENOUS; SUBCUTANEOUS
Status: DISCONTINUED | OUTPATIENT
Start: 2018-12-07 | End: 2018-12-07 | Stop reason: HOSPADM

## 2018-12-07 RX ORDER — NALOXONE HYDROCHLORIDE 0.4 MG/ML
.1-.4 INJECTION, SOLUTION INTRAMUSCULAR; INTRAVENOUS; SUBCUTANEOUS
Status: DISCONTINUED | OUTPATIENT
Start: 2018-12-07 | End: 2018-12-11 | Stop reason: HOSPADM

## 2018-12-07 RX ADMIN — FENTANYL CITRATE 50 MCG: 50 INJECTION, SOLUTION INTRAMUSCULAR; INTRAVENOUS at 14:49

## 2018-12-07 RX ADMIN — GLYCOPYRROLATE 0.4 MG: 0.2 INJECTION, SOLUTION INTRAMUSCULAR; INTRAVENOUS at 13:18

## 2018-12-07 RX ADMIN — ONDANSETRON 4 MG: 2 INJECTION INTRAMUSCULAR; INTRAVENOUS at 12:36

## 2018-12-07 RX ADMIN — DEXAMETHASONE SODIUM PHOSPHATE 4 MG: 4 INJECTION, SOLUTION INTRA-ARTICULAR; INTRALESIONAL; INTRAMUSCULAR; INTRAVENOUS; SOFT TISSUE at 11:01

## 2018-12-07 RX ADMIN — PROMETHAZINE HYDROCHLORIDE 25 MG: 25 INJECTION INTRAMUSCULAR; INTRAVENOUS at 15:35

## 2018-12-07 RX ADMIN — HYDROCORTISONE SODIUM SUCCINATE 100 MG: 100 INJECTION, POWDER, FOR SOLUTION INTRAMUSCULAR; INTRAVENOUS at 12:04

## 2018-12-07 RX ADMIN — SODIUM CHLORIDE, POTASSIUM CHLORIDE, SODIUM LACTATE AND CALCIUM CHLORIDE: 600; 310; 30; 20 INJECTION, SOLUTION INTRAVENOUS at 12:04

## 2018-12-07 RX ADMIN — ROCURONIUM BROMIDE 50 MG: 10 INJECTION INTRAVENOUS at 11:01

## 2018-12-07 RX ADMIN — EPHEDRINE SULFATE 25 MG: 50 INJECTION, SOLUTION INTRAVENOUS at 15:35

## 2018-12-07 RX ADMIN — KETAMINE HYDROCHLORIDE 50 MG: 10 INJECTION, SOLUTION INTRAMUSCULAR; INTRAVENOUS at 11:09

## 2018-12-07 RX ADMIN — SODIUM CHLORIDE, POTASSIUM CHLORIDE, SODIUM LACTATE AND CALCIUM CHLORIDE: 600; 310; 30; 20 INJECTION, SOLUTION INTRAVENOUS at 13:32

## 2018-12-07 RX ADMIN — MIDAZOLAM 2 MG: 1 INJECTION INTRAMUSCULAR; INTRAVENOUS at 10:58

## 2018-12-07 RX ADMIN — SODIUM CHLORIDE: 9 INJECTION, SOLUTION INTRAVENOUS at 16:07

## 2018-12-07 RX ADMIN — FENTANYL CITRATE 50 MCG: 50 INJECTION, SOLUTION INTRAMUSCULAR; INTRAVENOUS at 11:21

## 2018-12-07 RX ADMIN — Medication 1 MG: at 13:28

## 2018-12-07 RX ADMIN — GLYCOPYRROLATE 0.2 MG: 0.2 INJECTION, SOLUTION INTRAMUSCULAR; INTRAVENOUS at 11:01

## 2018-12-07 RX ADMIN — GLYCOPYRROLATE 0.1 MG: 0.2 INJECTION, SOLUTION INTRAMUSCULAR; INTRAVENOUS at 13:28

## 2018-12-07 RX ADMIN — VANCOMYCIN HYDROCHLORIDE 1000 MG: 1 INJECTION, SOLUTION INTRAVENOUS at 22:00

## 2018-12-07 RX ADMIN — ROCURONIUM BROMIDE 10 MG: 10 INJECTION INTRAVENOUS at 12:15

## 2018-12-07 RX ADMIN — HYDROMORPHONE HYDROCHLORIDE 0.5 MG: 1 INJECTION, SOLUTION INTRAMUSCULAR; INTRAVENOUS; SUBCUTANEOUS at 11:34

## 2018-12-07 RX ADMIN — LIDOCAINE HYDROCHLORIDE 30 MG: 10 INJECTION, SOLUTION INFILTRATION; PERINEURAL at 11:01

## 2018-12-07 RX ADMIN — FENTANYL CITRATE 50 MCG: 50 INJECTION, SOLUTION INTRAMUSCULAR; INTRAVENOUS at 11:51

## 2018-12-07 RX ADMIN — ACETAMINOPHEN 975 MG: 325 TABLET, FILM COATED ORAL at 17:41

## 2018-12-07 RX ADMIN — ROCURONIUM BROMIDE 10 MG: 10 INJECTION INTRAVENOUS at 11:46

## 2018-12-07 RX ADMIN — Medication 3 MG: at 13:18

## 2018-12-07 RX ADMIN — VANCOMYCIN HYDROCHLORIDE 1000 MG: 1 INJECTION, SOLUTION INTRAVENOUS at 10:49

## 2018-12-07 RX ADMIN — PROPOFOL 300 MG: 10 INJECTION, EMULSION INTRAVENOUS at 11:01

## 2018-12-07 RX ADMIN — ACETAMINOPHEN 975 MG: 325 TABLET, FILM COATED ORAL at 23:55

## 2018-12-07 RX ADMIN — HYDROMORPHONE HYDROCHLORIDE 0.5 MG: 1 INJECTION, SOLUTION INTRAMUSCULAR; INTRAVENOUS; SUBCUTANEOUS at 13:20

## 2018-12-07 RX ADMIN — FENTANYL CITRATE 50 MCG: 50 INJECTION, SOLUTION INTRAMUSCULAR; INTRAVENOUS at 14:04

## 2018-12-07 RX ADMIN — PROPOFOL 70 MCG/KG/MIN: 10 INJECTION, EMULSION INTRAVENOUS at 11:19

## 2018-12-07 RX ADMIN — FENTANYL CITRATE 100 MCG: 50 INJECTION, SOLUTION INTRAMUSCULAR; INTRAVENOUS at 11:01

## 2018-12-07 RX ADMIN — SODIUM CHLORIDE, POTASSIUM CHLORIDE, SODIUM LACTATE AND CALCIUM CHLORIDE: 600; 310; 30; 20 INJECTION, SOLUTION INTRAVENOUS at 10:32

## 2018-12-07 RX ADMIN — HYDROMORPHONE HYDROCHLORIDE 0.5 MG: 1 INJECTION, SOLUTION INTRAMUSCULAR; INTRAVENOUS; SUBCUTANEOUS at 11:38

## 2018-12-07 RX ADMIN — Medication: at 14:38

## 2018-12-07 RX ADMIN — HYDROMORPHONE HYDROCHLORIDE 0.5 MG: 1 INJECTION, SOLUTION INTRAMUSCULAR; INTRAVENOUS; SUBCUTANEOUS at 13:47

## 2018-12-07 NOTE — PHARMACY-ADMISSION MEDICATION HISTORY
Medication reconciliation/reorder completed as per pre-admit SERG Watts    Prior to Admission medications    Medication Sig Last Dose Taking? Auth Provider   amphetamine-dextroamphetamine (ADDERALL) 10 MG tablet Take 1 tablet (10 mg) by mouth in AM and take 2 tablets (20 mg) by mouth in PM 12/3/2018 Yes Sydnie He MD   ascorbic acid 250 MG TABS tablet Take 250 mg by mouth daily Take by mouth twice a day. 12/5/2018 Yes Reported, Patient   aspirin 325 MG EC tablet Take 325 mg by mouth daily Take 325 mg by mouth 11/30/2018 Yes Reported, Patient   buPROPion (WELLBUTRIN SR) 150 MG 12 hr tablet Take 150 mg by mouth daily 12/7/2018 at Unknown time Yes Reported, Patient   butalbital-acetaminophen-caffeine (FIORICET/ESGIC) -40 MG per tablet Take 1 tablet by mouth every 4 hours as needed 11/16/2018 Yes Sydnie He MD   CRANBERRY CONCENTRATE PO  12/6/2018 at Unknown time Yes Reported, Patient   DHEA 50 MG TABS Take 50 mg by mouth 1 tab QD 12/7/2018 at Unknown time Yes Reported, Patient   enoxaparin (LOVENOX) 40 MG/0.4ML syringe Inject 40 mg Subcutaneous 12/6/2018 at 1500 Yes Reported, Patient   etonogestrel-ethinyl estradiol (NUVARING) 0.12-0.015 MG/24HR vaginal ring Place 1 each vaginally every 28 days  Yes Reported, Patient   hydrocortisone (CORTEF) 5 MG tablet Take 5 mg by mouth daily Pt takes 2 tabs (10mg) in Am and 1 tab (5mg) PM 12/7/2018 at Unknown time Yes Reported, Patient   Omega-3 Fatty Acids (FISH OIL) 1000 MG CPDR Take 1,000 mg by mouth daily 12/1/2018 Yes Reported, Patient   VITAMIN D PO Take 2,000 Units by mouth daily. 12/6/2018 at Unknown time Yes Reported, Patient   zolpidem (AMBIEN) 10 MG tablet Take 1 tablet (10 mg) by mouth nightly as needed for sleep Past Week at Unknown time Yes Sydnie He MD   Ibuprofen (ADVIL PO) Take 600 mg by mouth 2 times daily as needed for moderate pain More than a month at Unknown time  Reported, Patient   rizatriptan (MAXALT) 10 MG tablet TAKE 1 TABLET  BY MOUTH ONCE, MAY REPEAT IN 2 HRS. MAX 30MG/24HRS More than a month at Unknown time  Reported, Patient

## 2018-12-07 NOTE — ANESTHESIA POSTPROCEDURE EVALUATION
Patient: Noa Mcgill    Procedure(s):  Incisional Hernia repair with Biologic mesh    Diagnosis:Incisional Hernia repair with mesh  Diagnosis Additional Information: Incisional Hernia repair with mesh     Anesthesia Type:  General, ETT    Note:  Anesthesia Post Evaluation    Patient location during evaluation: PACU  Patient participation: Able to fully participate in evaluation  Level of consciousness: awake  Pain management: adequate  Airway patency: patent  Cardiovascular status: acceptable  Respiratory status: acceptable  Hydration status: acceptable  PONV: controlled     Anesthetic complications: None          Last vitals:  Vitals:    12/07/18 1434 12/07/18 1445 12/07/18 1500   BP: 112/71 125/74 119/62   Resp: 10 12 10   Temp:   97.8  F (36.6  C)   SpO2: 98% 99% 99%         Electronically Signed By: Phillip Larsen DO  December 7, 2018  3:33 PM

## 2018-12-07 NOTE — ANESTHESIA PREPROCEDURE EVALUATION
PAC NOTE:       ANESTHESIA PRE EVALUATION:  Anesthesia Evaluation     .             ROS/MED HX    ENT/Pulmonary:  - neg pulmonary ROS     Neurologic: Comment: Retinal artery occlusion - neg neurologic ROS     Cardiovascular: Comment: ASD, unrepaired - neg cardiovascular ROS   (+) ----. Taking blood thinners : . . . :. .       METS/Exercise Tolerance:     Hematologic:     (+) History of blood clots -      Musculoskeletal:  - neg musculoskeletal ROS       GI/Hepatic:  - neg GI/hepatic ROS       Renal/Genitourinary:  - ROS Renal section negative       Endo: Comment: .Body mass index is 32.44 kg/(m^2).      (+) Other Endocrine Disorder Addisons.      Psychiatric:     (+) psychiatric history anxiety      Infectious Disease:  - neg infectious disease ROS       Malignancy:         Other:    - neg other ROS                 Physical Exam  Normal systems: cardiovascular and pulmonary    Airway   Mallampati: II    Dental     Cardiovascular   Rhythm and rate: regular and normal      Pulmonary    breath sounds clear to auscultation             Anesthesia Plan      History & Physical Review  History and physical reviewed and following examination; no interval change.    ASA Status:  2 .        Plan for General and ETT with Intravenous induction. Maintenance will be Inhalation and Balanced.    PONV prophylaxis:  Ondansetron (or other 5HT-3) and Dexamethasone or Solumedrol       Postoperative Care  Postoperative pain management:  IV analgesics, Oral pain medications and Multi-modal analgesia.      Consents  Anesthetic plan, risks, benefits and alternatives discussed with:  Patient or representative..                            .

## 2018-12-07 NOTE — PROGRESS NOTES
Pt reports she has not had an MRSA infection but has been culture positive in her nares.will change pre-op antibiotics to vancomycin.  Suraj Bergeron MD  12/7/2018 10:33 AM

## 2018-12-07 NOTE — OP NOTE
General Surgery Brief Operative Note    Pre-operative diagnosis:  Incisional Hernia repair with mesh   Post-operative diagnosis:  Same   Procedure: Incisional Herniorrhaphy with Lifecell Strattice 400 cm , transverse abdominis release with bilateral myofascial advancement flap   Surgeon: Suraj Bergeron MD   Assistant(s): Taylor Aiken PA-C The physicians assistant was medically necessary for their expertise in retraction, suturing, hemostasis, and suctioning.     Anesthesia: General    Estimated blood loss: 50 cc's   Drains placed: Diego x 2   Complications:  None   Findings:   Significant scarring at the site of previous mesh placement, scarring at harvest sites for bilateral inferior epigastric arteries   Specimens: * No specimens in log *    Indications: This 43-year-old female has had bilateral free TRAM flaps harvested from her lower abdomen.  She has now developed incisional hernias.  The hernia favors the left side at the level of the umbilicus and the right side in the lower abdomen.  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  We discussed the use of mesh and in particular the use of biologic mesh to reduce the risk of chronic mesh infection.  He understands that this mesh does stretch a bit more.  We have discussed the surgery on several occasions she seems to understand the proposed procedure well and has reviewed literature on the subject.  All her questions have been answered and she would like to proceed with surgery.  Additionally, she has a history of DVTs and would like to be placed on Lovenox and understands that this will increase the risk of postoperative bleeding and potentially prolong the length of drain requirement.    Scription of procedure: Patient brought the operating room placed supine on the table and after induction of anesthetic a Dotson catheter is placed the abdomen is prepped and draped in a sterile manner a pause is performed.   Midline incision is made around the margin of the umbilicus.  The umbilicus appears to be slightly to the left of midline and the incision was therefore placed to the right of the umbilicus and then extended into the lower abdomen.  Incision was carried out down to the midline fascia.  Some small chronic seromas were encountered from her previous panniculectomy.  The abdominal midline is opened adhesions are taken down to both sides.  Muscle was identified at each side and could not be brought into opposition particularly in the mid and lower abdomen.  Then performed a retrorectus dissection on both sides.  We could then almost get the posterior sheath to close in the mid abdomen and the peritoneum in the lower abdomen however this was not without extreme tension.  We therefore incised the posterior rectus sheath just medial to the perforators and extended the dissection to the transversus abdominis muscle which was also then divided from the transversalis fascia has a transverse abdominis release.  We then continued our dissection laterally and posteriorly.  We noted significant adhesions in the region of the previous inferior epigastric harvest.  The posterior sheath was then able to be brought into opposition of the midline without tension.  We dissected caudally behind the pubis and cephalad behind the linea alba.  Seprafilm was placed into the abdominal cavity and the posterior fascia/visceral sac was closed with running 2-0 PDS suture.  A 20 x 20 cm sheet of Stratus mesh was soaked with irricept and the operative site is also soaked with irricept.  After rinsing with saline the mesh was turned diagonally and the corners were trimmed.  The exception was the cephalad portion were tongue was fashioned to slip under the linea alba.  This was positioned about 6 cm above the division of the posterior sheath and sutured in place with heavy PDS suture.  The mesh was then flatly deployed over the visceral sac.  It was  also sutured trans-fascially just above the pubis.  The anterior sheath was then closed with multiple interrupted heavy PDS sutures.  A Diego drain was placed into each side of the pre-mesh space.  Subcutaneous tissues and deep dermis were closed with running 2-0 PDS skin with 4-0 Vicryl followed by Steri-Strips dressings and an abdominal binder.  She is then returned to the recovery room in excellent condition with all sponge and needle counts correct at the time of closure of the posterior sheath, mesh placement, anterior sheath and skin, having tolerated the procedure well  Suraj Bergeron MD

## 2018-12-07 NOTE — ANESTHESIA CARE TRANSFER NOTE
Patient: Noa Mcgill    Procedure(s):  Incisional Hernia repair with Biologic mesh    Diagnosis: Incisional Hernia repair with mesh  Diagnosis Additional Information: No value filed.    Anesthesia Type:   General, ETT     Note:  Airway :Blow-by and Face Mask  Patient transferred to:PACU  Comments: VSS, airway patent, strong, report to RN. Handoff Report: Identifed the Patient, Identified the Reponsible Provider, Reviewed the pertinent medical history, Discussed the surgical course, Reviewed Intra-OP anesthesia mangement and issues during anesthesia and Allowed opportunity for questions and acknowledgement of understanding      Vitals: (Last set prior to Anesthesia Care Transfer)    CRNA VITALS  12/7/2018 1316 - 12/7/2018 1355      12/7/2018             SpO2: 100 %                Electronically Signed By: ZULEMA Harrington CRNA  December 7, 2018  1:55 PM

## 2018-12-08 LAB
BASOPHILS # BLD AUTO: 0 10E9/L (ref 0–0.2)
BASOPHILS NFR BLD AUTO: 0.4 %
DIFFERENTIAL METHOD BLD: ABNORMAL
EOSINOPHIL # BLD AUTO: 0.1 10E9/L (ref 0–0.7)
EOSINOPHIL NFR BLD AUTO: 0.6 %
ERYTHROCYTE [DISTWIDTH] IN BLOOD BY AUTOMATED COUNT: 13.8 % (ref 10–15)
GLUCOSE SERPL-MCNC: 111 MG/DL (ref 70–99)
HCT VFR BLD AUTO: 33.6 % (ref 35–47)
HGB BLD-MCNC: 10.6 G/DL (ref 11.7–15.7)
IMM GRANULOCYTES # BLD: 0 10E9/L (ref 0–0.4)
IMM GRANULOCYTES NFR BLD: 0.4 %
LYMPHOCYTES # BLD AUTO: 1.8 10E9/L (ref 0.8–5.3)
LYMPHOCYTES NFR BLD AUTO: 19.3 %
MCH RBC QN AUTO: 26.7 PG (ref 26.5–33)
MCHC RBC AUTO-ENTMCNC: 31.5 G/DL (ref 31.5–36.5)
MCV RBC AUTO: 85 FL (ref 78–100)
MONOCYTES # BLD AUTO: 0.6 10E9/L (ref 0–1.3)
MONOCYTES NFR BLD AUTO: 6.6 %
NEUTROPHILS # BLD AUTO: 6.8 10E9/L (ref 1.6–8.3)
NEUTROPHILS NFR BLD AUTO: 72.7 %
NRBC # BLD AUTO: 0 10*3/UL
NRBC BLD AUTO-RTO: 0 /100
PLATELET # BLD AUTO: 228 10E9/L (ref 150–450)
RBC # BLD AUTO: 3.97 10E12/L (ref 3.8–5.2)
WBC # BLD AUTO: 9.3 10E9/L (ref 4–11)

## 2018-12-08 PROCEDURE — 85025 COMPLETE CBC W/AUTO DIFF WBC: CPT | Performed by: SURGERY

## 2018-12-08 PROCEDURE — 12000000 ZZH R&B MED SURG/OB

## 2018-12-08 PROCEDURE — 25000132 ZZH RX MED GY IP 250 OP 250 PS 637: Performed by: SURGERY

## 2018-12-08 PROCEDURE — 82947 ASSAY GLUCOSE BLOOD QUANT: CPT | Performed by: SURGERY

## 2018-12-08 PROCEDURE — 36415 COLL VENOUS BLD VENIPUNCTURE: CPT | Performed by: SURGERY

## 2018-12-08 PROCEDURE — 25000128 H RX IP 250 OP 636: Performed by: SURGERY

## 2018-12-08 RX ADMIN — SODIUM CHLORIDE: 9 INJECTION, SOLUTION INTRAVENOUS at 03:05

## 2018-12-08 RX ADMIN — ONDANSETRON 4 MG: 2 INJECTION INTRAMUSCULAR; INTRAVENOUS at 15:10

## 2018-12-08 RX ADMIN — HEPARIN SODIUM 5000 UNITS: 10000 INJECTION, SOLUTION INTRAVENOUS; SUBCUTANEOUS at 17:26

## 2018-12-08 RX ADMIN — HEPARIN SODIUM 5000 UNITS: 10000 INJECTION, SOLUTION INTRAVENOUS; SUBCUTANEOUS at 08:22

## 2018-12-08 RX ADMIN — BUPROPION HYDROCHLORIDE 150 MG: 150 TABLET, EXTENDED RELEASE ORAL at 08:22

## 2018-12-08 RX ADMIN — HYDROCORTISONE 10 MG: 10 TABLET ORAL at 06:44

## 2018-12-08 RX ADMIN — SODIUM CHLORIDE: 9 INJECTION, SOLUTION INTRAVENOUS at 13:37

## 2018-12-08 RX ADMIN — DOCUSATE SODIUM 100 MG: 100 CAPSULE, LIQUID FILLED ORAL at 14:28

## 2018-12-08 RX ADMIN — VANCOMYCIN HYDROCHLORIDE 1000 MG: 1 INJECTION, SOLUTION INTRAVENOUS at 11:23

## 2018-12-08 RX ADMIN — ACETAMINOPHEN 975 MG: 325 TABLET, FILM COATED ORAL at 08:22

## 2018-12-08 RX ADMIN — ACETAMINOPHEN 975 MG: 325 TABLET, FILM COATED ORAL at 17:25

## 2018-12-08 RX ADMIN — DOCUSATE SODIUM 100 MG: 100 CAPSULE, LIQUID FILLED ORAL at 20:26

## 2018-12-08 RX ADMIN — ACETAMINOPHEN 975 MG: 325 TABLET, FILM COATED ORAL at 22:59

## 2018-12-08 RX ADMIN — ONDANSETRON 4 MG: 2 INJECTION INTRAMUSCULAR; INTRAVENOUS at 22:02

## 2018-12-08 RX ADMIN — HEPARIN SODIUM 5000 UNITS: 10000 INJECTION, SOLUTION INTRAVENOUS; SUBCUTANEOUS at 23:44

## 2018-12-08 NOTE — PROGRESS NOTES
"[facility]   General Surgery Progress Note           Assessment and Plan:   Assessment:   POD#1 s/p Procedure(s):  Incisional Hernia repair with Biologic mesh        Plan:   -advance diet SLOWLY as tolerated         Interval History:   Passing a little gas, sore with movement as expected         Physical Exam:   Blood pressure 104/59, pulse 62, temperature 97.5  F (36.4  C), temperature source Oral, resp. rate 14, height 1.689 m (5' 6.5\"), weight 92.5 kg (204 lb), last menstrual period 02/08/2015, SpO2 97 %, unknown if currently breastfeeding.    I/O last 3 completed shifts:  In: 4090 [P.O.:1090; I.V.:3000]  Out: 2940 [Urine:2675; Drains:215; Blood:50]    Abdomen:   soft, non-distended, tenderness noted at incision    Inc(s) - dressing clean, dry, intact      Diego - serosanguinous          Data:       Recent Labs  Lab 12/08/18  0705   WBC 9.3   HGB 10.6*   HCT 33.6*   MCV 85          Suraj Bergeron MD     "

## 2018-12-08 NOTE — PLAN OF CARE
Problem: Patient Care Overview  Goal: Plan of Care/Patient Progress Review  Outcome: Improving  Patient is alert and oriented. Patient says pain is at a tolerable level 5/10 in abdomen with PCA pump in place. Patient's VSS. Patient is intermittently sleepy with the pain medication, but is easy to arouse. Patient as  at bedside. VSS. Patient's dressing on abdomen is CDI. Dressing will be changed after 24 hours per orders. Patient was concerned about a stool softener, so surgeon was paged and prn colace was ordered. Patient denies any nausea and is tolerating her clear liquid diet. Patient has PCDs hooked up, drake still intact. Patient says she has a history of urinary retention for 24 hours after surgery, so does not want drake removed until tomorrow. Patient has hypoactive bowel sounds. Will continue to monitor.

## 2018-12-08 NOTE — PLAN OF CARE
Problem: Patient Care Overview  Goal: Plan of Care/Patient Progress Review  Outcome: No Change  A&Ox4, VSS. Pt requesting to continue on 1L O2 overnight. Pain controlled with PCA pump. Denies SOB. Dressing to abdomen CDI, ice pack applied. Tolerating clears, denies SOB. TABITHA drains intact- R> L, stripping q4h. Drake in place, pt wants to have drake in place for 24 hours post surgery. Contact precautions for MRSA. Needs to get OOB still. IVF infusing. Will continue to monitor.

## 2018-12-09 LAB — GLUCOSE SERPL-MCNC: 100 MG/DL (ref 70–99)

## 2018-12-09 PROCEDURE — 12000000 ZZH R&B MED SURG/OB

## 2018-12-09 PROCEDURE — 25000132 ZZH RX MED GY IP 250 OP 250 PS 637: Performed by: SURGERY

## 2018-12-09 PROCEDURE — 25000128 H RX IP 250 OP 636: Performed by: SURGERY

## 2018-12-09 PROCEDURE — 36415 COLL VENOUS BLD VENIPUNCTURE: CPT | Performed by: SURGERY

## 2018-12-09 PROCEDURE — 82947 ASSAY GLUCOSE BLOOD QUANT: CPT | Performed by: SURGERY

## 2018-12-09 RX ADMIN — SODIUM CHLORIDE: 9 INJECTION, SOLUTION INTRAVENOUS at 08:17

## 2018-12-09 RX ADMIN — ACETAMINOPHEN 975 MG: 325 TABLET, FILM COATED ORAL at 21:01

## 2018-12-09 RX ADMIN — BUPROPION HYDROCHLORIDE 150 MG: 150 TABLET, EXTENDED RELEASE ORAL at 07:38

## 2018-12-09 RX ADMIN — HYDROCORTISONE 10 MG: 10 TABLET ORAL at 07:37

## 2018-12-09 RX ADMIN — HEPARIN SODIUM 5000 UNITS: 10000 INJECTION, SOLUTION INTRAVENOUS; SUBCUTANEOUS at 07:38

## 2018-12-09 RX ADMIN — DOCUSATE SODIUM 100 MG: 100 CAPSULE, LIQUID FILLED ORAL at 07:41

## 2018-12-09 RX ADMIN — DOCUSATE SODIUM 100 MG: 100 CAPSULE, LIQUID FILLED ORAL at 16:41

## 2018-12-09 RX ADMIN — ACETAMINOPHEN 975 MG: 325 TABLET, FILM COATED ORAL at 14:01

## 2018-12-09 RX ADMIN — HYDROCORTISONE 5 MG: 5 TABLET ORAL at 14:01

## 2018-12-09 RX ADMIN — ONDANSETRON 4 MG: 2 INJECTION INTRAMUSCULAR; INTRAVENOUS at 22:51

## 2018-12-09 RX ADMIN — HEPARIN SODIUM 5000 UNITS: 10000 INJECTION, SOLUTION INTRAVENOUS; SUBCUTANEOUS at 16:41

## 2018-12-09 RX ADMIN — ONDANSETRON 4 MG: 2 INJECTION INTRAMUSCULAR; INTRAVENOUS at 06:32

## 2018-12-09 RX ADMIN — ACETAMINOPHEN 975 MG: 325 TABLET, FILM COATED ORAL at 07:37

## 2018-12-09 NOTE — PLAN OF CARE
"Pain Acute  Optimal Pain Control  12/9/2018 0909 - No Change by Laina Dean RN    /56 (BP Location: Right arm)   Pulse 91   Temp 98.8  F (37.1  C) (Oral)   Resp 20   Ht 1.689 m (5' 6.5\")   Wt 92.5 kg (204 lb)   LMP 02/08/2015 (Within Months)   SpO2 97%   BMI 32.44 kg/m    Pt alert and orientated. VSS. PCA in place. Pt complaining of pain 5/10 to her abdomen. Pt ambulated 2 times on shift. Dotson intact and patent. NS @100 ml. NPO except meds and ice chips. No nausea on shift. Bilateral TABITHA drains. R > L for drainage. Will continue to monitor.      "

## 2018-12-09 NOTE — PLAN OF CARE
A&Ox4. Temp 100F at 7:30 PM. Given po Tylenol and encouraged use of IS. Recheck 99.2F. CMS intact. Dressing CDI. Up independently in room. Ambulated hallways x6 today. Encouraged use of IS. Encouraged use of IS. LS clear/equal bilaterally. Pain managed w/ scheduled po Tylenol and PCA (Dilaudid). BS hypoactive, NPO except for ice chips/meds, denies passing flatus. Abdomen appears distended. Indwelling Dotson catheter in place d/t retention, adequate amt. of urine output. TABITHA drains patent x2, stripped q4h. Prn Colace given per pt. request. IVF infusing. Surgery following. Contact precautions maintained. Will continue to monitor.

## 2018-12-09 NOTE — PLAN OF CARE
Problem: Patient Care Overview  Goal: Plan of Care/Patient Progress Review  Vitals are Temp: 100.4  F (38  C) Temp src: Oral BP: 121/65 Pulse: 109   Resp: 16 SpO2: 94 %    Pt A&Ox4, Ax1 for activity, currently rating her pain 5/10 in her lower abd, c/o aching pain. Temp elevated, PRN tylenol given. Pt has dilaudid PCA pump, reports this is effective in reliving her pain. Abd dressing CDI, no redness or signs of infection noted. Two TABITHA drains stripped, bright red bloody drainage, to bulb suction. Pt NPO, unable to tolerate clear liquids d/t nausea, currently declines nausea. Pt due to void, last straight cath at 10pm, plan to bladder scan and straight cath as needed. Pt declines passing gas, abd non distended. Continue to monitor.

## 2018-12-09 NOTE — DOWNTIME EVENT NOTE
The EMR was down for 6 hours on 12/9/2018.    Mary Lindquist RN, was responsible for completing the paper charting during this time period.     The following information was re-entered into the system by Tiburcio Heck: Flowsheet data    The following information will remain in the paper chart: Note    Tiburcio Heck  12/9/2018

## 2018-12-09 NOTE — PROGRESS NOTES
"[facility]   General Surgery Progress Note           Assessment and Plan:   Assessment:   POD#2 s/p Procedure(s):  Incisional Hernia repair with Biologic mesh        Plan:   -minimal PO until passing gas  Heparin and PCDs  Encourage ambulation/IS         Interval History:   Felt distended last night, better now, no flatus, having \"muscle spasms\" with activity, feels fine when resting quietly         Physical Exam:   Blood pressure 121/65, pulse 109, temperature 99.1  F (37.3  C), resp. rate 16, height 1.689 m (5' 6.5\"), weight 92.5 kg (204 lb), last menstrual period 02/08/2015, SpO2 94 %, unknown if currently breastfeeding.    I/O last 3 completed shifts:  In: 1834 [P.O.:1200; I.V.:634]  Out: 3600 [Urine:3500; Drains:100]    Abdomen:   soft, non-distended, tenderness noted at incision    Inc(s) - clean, dry, intact      Diego - serosanguinous          Data:     Recent Labs   Lab 12/08/18  0705 12/07/18  1656   WBC 9.3  --    HGB 10.6*  --    HCT 33.6*  --    MCV 85  --     252       Suraj Bergeron MD     "

## 2018-12-10 LAB
ALBUMIN SERPL-MCNC: 2.5 G/DL (ref 3.4–5)
ALBUMIN UR-MCNC: NEGATIVE MG/DL
ALP SERPL-CCNC: 53 U/L (ref 40–150)
ALT SERPL W P-5'-P-CCNC: 26 U/L (ref 0–50)
ANION GAP SERPL CALCULATED.3IONS-SCNC: 5 MMOL/L (ref 3–14)
APPEARANCE UR: CLEAR
AST SERPL W P-5'-P-CCNC: 13 U/L (ref 0–45)
BACTERIA #/AREA URNS HPF: ABNORMAL /HPF
BASOPHILS # BLD AUTO: 0 10E9/L (ref 0–0.2)
BASOPHILS NFR BLD AUTO: 0.2 %
BILIRUB SERPL-MCNC: 0.5 MG/DL (ref 0.2–1.3)
BILIRUB UR QL STRIP: NEGATIVE
BUN SERPL-MCNC: 4 MG/DL (ref 7–30)
CALCIUM SERPL-MCNC: 7.8 MG/DL (ref 8.5–10.1)
CHLORIDE SERPL-SCNC: 109 MMOL/L (ref 94–109)
CO2 SERPL-SCNC: 25 MMOL/L (ref 20–32)
COLOR UR AUTO: ABNORMAL
CREAT SERPL-MCNC: 0.74 MG/DL (ref 0.52–1.04)
DIFFERENTIAL METHOD BLD: ABNORMAL
EOSINOPHIL # BLD AUTO: 0.2 10E9/L (ref 0–0.7)
EOSINOPHIL NFR BLD AUTO: 3.6 %
ERYTHROCYTE [DISTWIDTH] IN BLOOD BY AUTOMATED COUNT: 13.7 % (ref 10–15)
GFR SERPL CREATININE-BSD FRML MDRD: 87 ML/MIN/1.7M2
GLUCOSE SERPL-MCNC: 92 MG/DL (ref 70–99)
GLUCOSE UR STRIP-MCNC: NEGATIVE MG/DL
HCT VFR BLD AUTO: 32.8 % (ref 35–47)
HGB BLD-MCNC: 10.5 G/DL (ref 11.7–15.7)
HGB UR QL STRIP: NEGATIVE
IMM GRANULOCYTES # BLD: 0 10E9/L (ref 0–0.4)
IMM GRANULOCYTES NFR BLD: 0.5 %
KETONES UR STRIP-MCNC: 5 MG/DL
LEUKOCYTE ESTERASE UR QL STRIP: ABNORMAL
LYMPHOCYTES # BLD AUTO: 1.3 10E9/L (ref 0.8–5.3)
LYMPHOCYTES NFR BLD AUTO: 23.3 %
MCH RBC QN AUTO: 26.8 PG (ref 26.5–33)
MCHC RBC AUTO-ENTMCNC: 32 G/DL (ref 31.5–36.5)
MCV RBC AUTO: 84 FL (ref 78–100)
MONOCYTES # BLD AUTO: 0.3 10E9/L (ref 0–1.3)
MONOCYTES NFR BLD AUTO: 6.1 %
NEUTROPHILS # BLD AUTO: 3.7 10E9/L (ref 1.6–8.3)
NEUTROPHILS NFR BLD AUTO: 66.3 %
NITRATE UR QL: NEGATIVE
NRBC # BLD AUTO: 0 10*3/UL
NRBC BLD AUTO-RTO: 0 /100
PH UR STRIP: 7 PH (ref 5–7)
PLATELET # BLD AUTO: 214 10E9/L (ref 150–450)
POTASSIUM SERPL-SCNC: 3.5 MMOL/L (ref 3.4–5.3)
PROT SERPL-MCNC: 5.6 G/DL (ref 6.8–8.8)
RBC # BLD AUTO: 3.92 10E12/L (ref 3.8–5.2)
RBC #/AREA URNS AUTO: 1 /HPF (ref 0–2)
SODIUM SERPL-SCNC: 139 MMOL/L (ref 133–144)
SOURCE: ABNORMAL
SP GR UR STRIP: 1 (ref 1–1.03)
SQUAMOUS #/AREA URNS AUTO: 1 /HPF (ref 0–1)
UROBILINOGEN UR STRIP-MCNC: 0 MG/DL (ref 0–2)
WBC # BLD AUTO: 5.6 10E9/L (ref 4–11)
WBC #/AREA URNS AUTO: 138 /HPF (ref 0–5)

## 2018-12-10 PROCEDURE — 25000132 ZZH RX MED GY IP 250 OP 250 PS 637: Performed by: SURGERY

## 2018-12-10 PROCEDURE — 12000000 ZZH R&B MED SURG/OB

## 2018-12-10 PROCEDURE — 80053 COMPREHEN METABOLIC PANEL: CPT | Performed by: SURGERY

## 2018-12-10 PROCEDURE — 87086 URINE CULTURE/COLONY COUNT: CPT | Performed by: SURGERY

## 2018-12-10 PROCEDURE — 36415 COLL VENOUS BLD VENIPUNCTURE: CPT | Performed by: SURGERY

## 2018-12-10 PROCEDURE — 25000131 ZZH RX MED GY IP 250 OP 636 PS 637: Performed by: SURGERY

## 2018-12-10 PROCEDURE — 81001 URINALYSIS AUTO W/SCOPE: CPT | Performed by: PHYSICIAN ASSISTANT

## 2018-12-10 PROCEDURE — 87088 URINE BACTERIA CULTURE: CPT | Performed by: SURGERY

## 2018-12-10 PROCEDURE — 85025 COMPLETE CBC W/AUTO DIFF WBC: CPT | Performed by: SURGERY

## 2018-12-10 PROCEDURE — 87186 SC STD MICRODIL/AGAR DIL: CPT | Performed by: SURGERY

## 2018-12-10 PROCEDURE — 25000128 H RX IP 250 OP 636: Performed by: SURGERY

## 2018-12-10 PROCEDURE — 25000132 ZZH RX MED GY IP 250 OP 250 PS 637: Performed by: PHYSICIAN ASSISTANT

## 2018-12-10 PROCEDURE — 40000934 ZZH STATISTIC OUTPATIENT (NON-OBS) DAY

## 2018-12-10 RX ORDER — CEFTRIAXONE 1 G/1
1 INJECTION, POWDER, FOR SOLUTION INTRAMUSCULAR; INTRAVENOUS EVERY 24 HOURS
Status: DISCONTINUED | OUTPATIENT
Start: 2018-12-10 | End: 2018-12-11 | Stop reason: HOSPADM

## 2018-12-10 RX ORDER — ACETAMINOPHEN 325 MG/1
975 TABLET ORAL EVERY 6 HOURS PRN
Status: DISCONTINUED | OUTPATIENT
Start: 2018-12-10 | End: 2018-12-11 | Stop reason: HOSPADM

## 2018-12-10 RX ORDER — AMOXICILLIN 250 MG
1 CAPSULE ORAL AT BEDTIME
Status: DISCONTINUED | OUTPATIENT
Start: 2018-12-10 | End: 2018-12-11 | Stop reason: HOSPADM

## 2018-12-10 RX ADMIN — HYDROCORTISONE 5 MG: 5 TABLET ORAL at 14:19

## 2018-12-10 RX ADMIN — OXYCODONE HYDROCHLORIDE 10 MG: 5 TABLET ORAL at 17:11

## 2018-12-10 RX ADMIN — ONDANSETRON 4 MG: 4 TABLET, ORALLY DISINTEGRATING ORAL at 04:25

## 2018-12-10 RX ADMIN — HEPARIN SODIUM 5000 UNITS: 10000 INJECTION, SOLUTION INTRAVENOUS; SUBCUTANEOUS at 00:23

## 2018-12-10 RX ADMIN — SENNOSIDES AND DOCUSATE SODIUM 1 TABLET: 8.6; 5 TABLET ORAL at 17:11

## 2018-12-10 RX ADMIN — DOCUSATE SODIUM 100 MG: 100 CAPSULE, LIQUID FILLED ORAL at 20:36

## 2018-12-10 RX ADMIN — ACETAMINOPHEN 975 MG: 325 TABLET, FILM COATED ORAL at 14:19

## 2018-12-10 RX ADMIN — BUPROPION HYDROCHLORIDE 150 MG: 150 TABLET, EXTENDED RELEASE ORAL at 08:51

## 2018-12-10 RX ADMIN — ACETAMINOPHEN 975 MG: 325 TABLET, FILM COATED ORAL at 06:28

## 2018-12-10 RX ADMIN — ACETAMINOPHEN 975 MG: 325 TABLET, FILM COATED ORAL at 20:35

## 2018-12-10 RX ADMIN — HYDROCORTISONE 10 MG: 10 TABLET ORAL at 06:28

## 2018-12-10 RX ADMIN — OXYCODONE HYDROCHLORIDE 10 MG: 5 TABLET ORAL at 14:19

## 2018-12-10 RX ADMIN — ENOXAPARIN SODIUM 40 MG: 40 INJECTION SUBCUTANEOUS at 09:48

## 2018-12-10 RX ADMIN — SODIUM CHLORIDE: 9 INJECTION, SOLUTION INTRAVENOUS at 02:37

## 2018-12-10 RX ADMIN — DOCUSATE SODIUM 100 MG: 100 CAPSULE, LIQUID FILLED ORAL at 04:25

## 2018-12-10 RX ADMIN — CEFTRIAXONE SODIUM 1 G: 1 INJECTION, POWDER, FOR SOLUTION INTRAMUSCULAR; INTRAVENOUS at 20:35

## 2018-12-10 RX ADMIN — OXYCODONE HYDROCHLORIDE 10 MG: 5 TABLET ORAL at 20:35

## 2018-12-10 NOTE — PROGRESS NOTES
"[facility]   General Surgery Progress Note         Assessment and Plan:   Assessment:   POD#3 s/p Procedure(s):  Incisional Hernia repair with Biologic mesh  Post-operative ileus as expected - resolving      Plan:   -Diet: ok to restart liquids and ADAT  -VTE PPX: Heparin and PCDs  -Encourage ambulation/IS  -Pain Mgmt: discontinue PCA and start oral meds when tolerating full liquids  -Remove drake  -Disposition: likely discharge 1-2 days if continues to do well and meets criteria         Interval History:   Resting in bed, feels improved. States she started passing quite a bit of flatus last night. Starting to get hungry. Currently NPO. Has some \"muscle spasm\" pain and some tenderness across her abdomen that is worse when getting out of bed. She is using the PCA as needed. She requests to have the drake removed.         Physical Exam:   Blood pressure 120/63, pulse 81, temperature 99.1  F (37.3  C), temperature source Oral, resp. rate 20, height 1.689 m (5' 6.5\"), weight 92.5 kg (204 lb), last menstrual period 02/08/2015, SpO2 95 %, unknown if currently breastfeeding.    I/O last 3 completed shifts:  In: -   Out: 2352 [Urine:2275; Drains:77]    Abdomen:   soft, non-distended, tenderness noted at incision and her right abdomen    Inc(s) - clean, dry, intact with steristrips, no erythema       Diego - serosanguinous          Data:     Recent Labs   Lab 12/10/18  0711 12/08/18  0705 12/07/18  1656   WBC 5.6 9.3  --    HGB 10.5* 10.6*  --    HCT 32.8* 33.6*  --    MCV 84 85  --     228 252       Everardo Saez PA-C       As above, ileus resolving, advance diet SLOWLY as tolerated  Change to senna at pt request  Drains clearing, with switch to lovenox to reduce number of injections  Suraj Bergeron MD  12/10/2018 8:48 AM      "

## 2018-12-10 NOTE — PLAN OF CARE
Pt A&Ox4, VSS on RA. Pt c/o 4-7/10 abd pain, controlled with dilaudid PCA throughout  most of shift. Pt agreeable to transition to PO pain meds, PCA discontinued at 1430. Pt tolerating full liquids at this time, appetite fair, pt reports passing gas, no complaints of nausea. IV SL. Pt ambulating independently in room and in hallway using IV pole for support. Dotson discontinued this AM, pt voiding without difficulty since. Abd incision WDL, steri strips in place, with gauze covering. TABITHA x2 sites WDL, stripped Q4h. Pt assisted with shower earlier this shift. Contact precautions maintained for pt hx of MRSA. Will continue to monitor.

## 2018-12-10 NOTE — PROGRESS NOTES
Infection Prevention:    Patient requires Contact precautions because of MRSA: Nares, unknown date. Please contact Infection Prevention with any questions/concerns at *[phone].    Mervat Cole, ICP

## 2018-12-10 NOTE — PLAN OF CARE
Vitals are Temp: 99.1  F (37.3  C) Temp src: Oral BP: 119/64 Pulse: 84   Resp: 20 SpO2: 94 %  Pt A&Ox4, independently ambulating in the hallways. Dotson in place with adequate output, clear yellow urine. IVF running NS at 100mL/hr. PCA pump utilized for pain relief, pt states she has been using this less than previous days, as she would like to wean off PCA pump for pain control. TABITHA drains to bulb suction, bright red/bloody drainage, stripped Q4h. Abd dressing CDI with no drainage on dressing. Pt has passed gas at 0400 for the first time since surgery, reported her abd discomfort was relieved by this, abd remains distended, pt reports pain as a cramping pain. PRN colace given per pt request. She reports mild nausea after ambulating, PRN zofran given. PRN tylenol given for increased temp. Pt remains NPO except meds and ice chips. Labs scheduled in AM, continue to monitor.

## 2018-12-11 VITALS
BODY MASS INDEX: 32.78 KG/M2 | TEMPERATURE: 97.2 F | SYSTOLIC BLOOD PRESSURE: 119 MMHG | OXYGEN SATURATION: 97 % | HEART RATE: 84 BPM | RESPIRATION RATE: 14 BRPM | WEIGHT: 204 LBS | DIASTOLIC BLOOD PRESSURE: 65 MMHG | HEIGHT: 66 IN

## 2018-12-11 PROCEDURE — 25000128 H RX IP 250 OP 636: Performed by: SURGERY

## 2018-12-11 PROCEDURE — 25000131 ZZH RX MED GY IP 250 OP 636 PS 637: Performed by: SURGERY

## 2018-12-11 PROCEDURE — 25000132 ZZH RX MED GY IP 250 OP 250 PS 637: Performed by: PHYSICIAN ASSISTANT

## 2018-12-11 PROCEDURE — 25000132 ZZH RX MED GY IP 250 OP 250 PS 637: Performed by: SURGERY

## 2018-12-11 RX ORDER — AMOXICILLIN 250 MG
1 CAPSULE ORAL AT BEDTIME
Qty: 100 TABLET | Refills: 0 | Status: SHIPPED | OUTPATIENT
Start: 2018-12-11 | End: 2019-07-12

## 2018-12-11 RX ORDER — SULFAMETHOXAZOLE/TRIMETHOPRIM 800-160 MG
1 TABLET ORAL 2 TIMES DAILY
Qty: 14 TABLET | Refills: 0 | Status: SHIPPED | OUTPATIENT
Start: 2018-12-11 | End: 2019-03-11

## 2018-12-11 RX ORDER — OXYCODONE HYDROCHLORIDE 5 MG/1
5-10 TABLET ORAL EVERY 6 HOURS PRN
Qty: 20 TABLET | Refills: 0 | Status: SHIPPED | OUTPATIENT
Start: 2018-12-11 | End: 2018-12-24

## 2018-12-11 RX ORDER — OXYCODONE HYDROCHLORIDE 5 MG/1
5-10 TABLET ORAL
Qty: 20 TABLET | Refills: 0 | Status: SHIPPED | OUTPATIENT
Start: 2018-12-11 | End: 2018-12-11

## 2018-12-11 RX ADMIN — HYDROCORTISONE 10 MG: 10 TABLET ORAL at 06:27

## 2018-12-11 RX ADMIN — DOCUSATE SODIUM 100 MG: 100 CAPSULE, LIQUID FILLED ORAL at 08:40

## 2018-12-11 RX ADMIN — ACETAMINOPHEN 975 MG: 325 TABLET, FILM COATED ORAL at 06:30

## 2018-12-11 RX ADMIN — OXYCODONE HYDROCHLORIDE 10 MG: 5 TABLET ORAL at 01:06

## 2018-12-11 RX ADMIN — OXYCODONE HYDROCHLORIDE 10 MG: 5 TABLET ORAL at 12:29

## 2018-12-11 RX ADMIN — BUPROPION HYDROCHLORIDE 150 MG: 150 TABLET, EXTENDED RELEASE ORAL at 08:40

## 2018-12-11 RX ADMIN — ENOXAPARIN SODIUM 40 MG: 40 INJECTION SUBCUTANEOUS at 08:40

## 2018-12-11 RX ADMIN — OXYCODONE HYDROCHLORIDE 10 MG: 5 TABLET ORAL at 06:30

## 2018-12-11 RX ADMIN — ACETAMINOPHEN 975 MG: 325 TABLET, FILM COATED ORAL at 12:29

## 2018-12-11 RX ADMIN — ONDANSETRON 4 MG: 4 TABLET, ORALLY DISINTEGRATING ORAL at 01:06

## 2018-12-11 NOTE — PLAN OF CARE
Vitals are Temp: 98.9  F (37.2  C) Temp src: Oral BP: 110/61 Pulse: 57   Resp: 14 SpO2: 96 %  Pt continues to pass gas an void without difficulty. Reports a burning pain in her urethra and mild abd pain, PRN oxycodone given and effective. She reported nausea, PO zofran given. Pt sleeping well throughout the night without concern. TABITHA drains stripped Q4h. PIV SL. Plan: pain and nausea control, advance diet as tolerated from full liquids, continue to monitor and provide supportive cares.

## 2018-12-11 NOTE — DISCHARGE INSTRUCTIONS
HOME CARE FOLLOWING UMBILICAL/VENTRAL HERNIA REPAIR  TINA Lane, ROSA Engle R. O Donnell, MADHAV Castillo  Special instructions for Noa Mcgill:  --Discharge prescriptions: Lovenox x 5 days (discontinue aspirin during this time, switch to aspirin after lovenox completed), oxycodone for moderate to severe pain, pericolace for constipation, Septra DS for UTI (culture results are pending at the time of discharge, we will change the antibiotic as needed depending on culture results)  -Call and schedule appointment at clinic on Friday 12/14/20108 for drain removal.   [phone]     DIET:  No restrictions.  Increased fluid intake is recommended. While taking pain medications, increase dietary fiber or add a fiber supplementation like Metamucil or Citrucel to help prevent constipation - a possible side effect of pain medications.    NAUSEA:  If nauseated from the anesthetic/pain meds; rest in bed, get up cautiously with assistance, and drink clear liquids (juice, tea, broth).    ACTIVITY:  Light Activity -- you may immediately be up and about as tolerated.  Driving -- you may drive when comfortable and off narcotic pain medications.  Light Work -- resume when comfortable off pain medications.  (If you can drive, you probably can work.)  Strenuous Work/Activity -- limit lifting to 20 pounds for 3 weeks.  Active Sports (running, biking, etc.) -- cautiously resume after 4 weeks.    INCISIONAL CARE:    If you have a dressing in place, keep clean and dry for 48 hours after surgery.  After this timeframe, you may replace the gauze daily if it becomes soiled.    You may remove the dressing and shower 48 hours after surgery.  Do not submerse incision in water for 1 week.    If you have a Dermabond dressing (a type of skin glue), you may shower immediately.    Sutures will absorb and need not be removed.    If present, leave the steri-strips (white paper tapes) in place for 14 days after surgery.    If  "present, leave Dermabond glue in place until it wears/flakes off.    Expect a variable amount of swelling/bruising/discoloration that may appear around or below the repair site.    Some numbness around the incision is common.    A lump/\"healing ridge\" under the incision is normal and will gradually resolve over the following 1-2 months.    DISCOMFORT:  Local anesthetic placed at surgery should provide relief for 4-8 hours.  Begin taking pain pills before discomfort is severe.  Take the pain medication with some food, when possible, to minimize side effects.  Intermittent use of ice packs to the hernia repair site may help during the first 1-3 weeks after surgery.  Expect gradual improvement.    Over-the-counter anti-inflammatory medications (i.e. Ibuprofen/Advil/Motrin or Naprosyn/Aleve) may be used per package instructions in addition to or while tapering off the narcotic pain medications to decrease swelling and sensitivity at the repair site.  DO NOT TAKE these Anti-inflammatory medications if your primary physician has advised against doing so, or if you have acid reflux, ulcer, or bleeding disorder, or take blood-thinner medications.  Call your primary physician or the surgery office if you have medication questions.      RETURN APPOINTMENT:  Schedule a follow-up visit 2-3 weeks post-op.  Office Phone:  [phone]     CONTACT US IF THE FOLLOWING DEVELOPS:   1. A fever that is above 101     2. If there is a large amount of drainage, bleeding, or swelling.   3. Severe pain that is not relieved by your prescription.   4. Drainage that is thick, cloudy, yellow, green or white.   5. Any other questions not answered by  Frequently Asked Questions  sheet.      FREQUENTLY ASKED QUESTIONS:    Q:  How should my incision look?    A:  Normally your incision will appear slightly swollen with light redness directly along the incision itself as it heals.  It may feel like a bump or ridge as the healing/scarring happens, and " over time (3-4 months) this bump or ridge feeling should slowly go away.  In general, clear or pink watery drainage can be normal at first as your incision heals, but should decrease over time.    Q:  How do I know if my incision is infected?  A:  Look at your incision for signs of infection, like redness around the incision spreading to surrounding skin, or drainage of cloudy or foul-smelling drainage.  If you feel warm, check your temperature to see if you are running a fever.    **If any of these things occur, please notify the nurse at our office.  We may need you to come into the office for an incision check.      Q:  How do I take care of my incision?  A:  If you have a dressing in place - Starting the day after surgery, replace the dressing 1-2 times a day until there is no further drainage from the incision.  At that time, a dressing is no longer needed.  Try to minimize tape on the skin if irritation is occurring at the tape sites.  If you have significant irritation from tape on the skin, please call the office to discuss other method of dressing your incision.    Small pieces of tape called  steri-strips  may be present directly overlying your incision; these may be removed 10 days after surgery unless otherwise specified by your surgeon.  If these tapes start to loosen at the ends, you may trim them back until they fall off or are removed.    A:  If you had  Dermabond  tissue glue used as a dressing (this causes your incision to look shiny with a clear covering over it) - This type of dressing wears off with time and does not require more dressings over the top unless it is draining around the glue as it wears off.  Do not apply ointments or lotions over the incisions until the glue has completely worn off.    Q:  There is a piece of tape or a sticky  lead  still on my skin.  Can I remove this?  A:  Sometimes the sticky  leads  used for monitoring during surgery or for evaluation in the emergency  department are not all removed while you are in the hospital.  These sometimes have a tab or metal dot on them.  You can easily remove these on your own, like taking off a band-aid.  If there is a gel substance under the  lead , simply wipe/clean it off with a washcloth or paper towel.      Q:  What can I do to minimize constipation (very hard stools, or lack of stools)?  A:  Stay well hydrated.  Increase your dietary fiber intake or take a fiber supplement -with plenty of water.  Walk around frequently.  You may consider an over-the-counter stool-softener.  Your Pharmacist can assist you with choosing one that is stocked at your pharmacy.  Constipation is also one of the most common side effects of pain medication.  If you are using pain medication, be pro-active and try to PREVENT problems with constipation by taking the steps above BEFORE constipation becomes a problem.    Q:  What do I do if I need more pain medications?  A:  Call the office to receive refills.  Be aware that certain pain meds cannot be called into a pharmacy and actually require a paper prescription.  A change may be made in your pain med as you progress thru your recovery period or if you have side effects to certain meds.    --Pain meds are NOT refilled after 5pm on weekdays, and NOT AT ALL on the weekends, so please look ahead to prevent problems.      Q:  Why am I having a hard time sleeping now that I am at home?  A:  Many medications you receive while you are in the hospital can impact your sleep for a number of days after your surgery/hospitalization.  Decreased level of activity and naps during the day may also make sleeping at night difficult.  Try to minimize day-time naps, and get up frequently during the day to walk around your home during your recovery time.  Sleep aides may be of some help, but are not recommended for long-term use.      Q:  I am having some back discomfort.  What should I do?  A:  This may be related to certain  positioning that was required for your surgery, extended periods of time in bed, or other changes in your overall activity level.  You may try ice, heat, acetaminophen, or ibuprofen to treat this temporarily.  Note that many pain medications have acetaminophen in them and would state this on the prescription bottle.  Be sure not to exceed the maximum of 4000mg per day of acetaminophen.     **If the pain you are having does not resolve, is severe, or is a flare of back pain you have had on other occasions prior to surgery, please contact your primary physician for further recommendations or for an appointment to be examined at their office.    Q:  Why am I having headaches?  A:  Headaches can be caused by many things:  caffeine withdrawal, use of pain meds, dehydration, high blood pressure, lack of sleep, over-activity/exhaustion, flare-up of usual migraine headaches.  If you feel this is related to muscle tension (a band-like feeling around the head, or a pressure at the low-back of the head) you may try ice or heat to this area.  You may need to drink more fluids (try electrolyte drink like Gatorade), rest, or take your usual migraine medications.   **If your headaches do not resolve, worsen, are accompanied by other symptoms, or if your blood pressure is high, please call your primary physician for recommendation and/or examination.    Q:  I am unable to urinate.  What do I do?  A:  A small percentage of people can have difficulty urinating initially after surgery.  This includes being able to urinate only a very small amount at a time and feeling discomfort or pressure in the very low abdomen.  This is called  urinary retention , and is actually an urgent situation.  Proceed to your nearest Emergency department for evaluation (not an Urgent Care Center).  Sometimes the bladder does not work correctly after certain medications you receive during surgery, or related to certain procedures.  You may need to have a  catheter placed until your bladder recovers.  When planning to go to an Emergency department, it may help to call the ER to let them know you are coming in for this problem after a surgery.  This may help you get in quicker to be evaluated.  **If you have symptoms of a urinary tract infection, please contact your primary physician for the proper evaluation and treatment.          If you have other questions, please call the office Monday thru Friday between 8am and 5pm to discuss with the nurse or physician assistant.  #[phone]    There is a surgeon ON CALL on weekday evenings and over the weekend in case of urgent need only, and may be contacted at the same number.    If you are having an emergency, call 911 or proceed to your nearest emergency department.

## 2018-12-11 NOTE — PLAN OF CARE
Patient's After Visit Summary was reviewed with patient and/or spouse.   Patient verbalized understanding of After Visit Summary, recommended follow up and was given an opportunity to ask questions.   Discharge medications sent home with patient/family: YES, Oxy, senna, and bactrim (pt. Requested lovenox not be filled since she had enough of the same strength to cover her.    Discharged with spouse    OBSERVATION patient END time: 1:30  VSS. Wheelchair ride provided

## 2018-12-11 NOTE — PLAN OF CARE
PRIMARY DIAGNOSIS: s/p incisional hernia repair (POD#4)  OUTPATIENT/OBSERVATION GOALS TO BE MET BEFORE DISCHARGE:  1. Stable vital signs Yes  2. Tolerating diet:Yes  3. Pain controlled with oral pain medications:  Yes  4. Positive bowel sounds:  Yes  5. Voiding without difficulty:  Yes  6. Able to ambulate:  Yes  7. Provider specific discharge goals met:  Yes    Discharge Planner Nurse   Safe discharge environment identified: Yes  Barriers to discharge: No       Entered by: Daphne Arciniega 12/11/2018 11:18 AM     Please review provider order for any additional goals.   Nurse to notify provider when observation goals have been met and patient is ready for discharge.    Able to tolerate reg diet this am. Pain controlled on orals. Plan to discharge home today with senna, oxy, bactrim, and lovenox. VSS.

## 2018-12-11 NOTE — PLAN OF CARE
"/70 (BP Location: Right arm)   Pulse 73   Temp 99.4  F (37.4  C) (Oral)   Resp 12   Ht 1.689 m (5' 6.5\")   Wt 92.5 kg (204 lb)   LMP 02/08/2015 (Within Months)   SpO2 99%   BMI 32.44 kg/m      A&Ox4. CMS intact. Dressing CDI. Up independently in room. Encouraged use of IS. LS clear/equal bilaterally. PCA (Dilaudid) discontinued at 2:30 PM today. Pain managed w/ po Oxycodone and Tylenol. BS hypoactive, tolerating full liquid diet, passing flatus (since 4 AM today). Indwelling Dotson catheter removed this AM, voiding in adequate amts. Urine cloudy w/ some odor - U/A ordered and sent per pt. request. Started on IV Rocephin. New IV placed.  TABITHA drains patent x2, stripped q4h. Scheduled Senna and prn Colace given per pt. request. Surgery following. Contact precautions maintained. Will continue to monitor.        "

## 2018-12-12 ENCOUNTER — TELEPHONE (OUTPATIENT)
Dept: FAMILY MEDICINE | Facility: CLINIC | Age: 43
End: 2018-12-12

## 2018-12-12 NOTE — TELEPHONE ENCOUNTER
[facility] ER for Incisional Hernia Repair With Mesh, Incisional Hernia      No ER follow up scheduled with provider.     Yanni Price/DANIELA

## 2018-12-13 DIAGNOSIS — N30.00 ACUTE CYSTITIS WITHOUT HEMATURIA: Primary | ICD-10-CM

## 2018-12-13 RX ORDER — NITROFURANTOIN 25; 75 MG/1; MG/1
100 CAPSULE ORAL 2 TIMES DAILY
Qty: 20 CAPSULE | Refills: 0 | Status: SHIPPED | OUTPATIENT
Start: 2018-12-13 | End: 2019-03-11

## 2018-12-13 NOTE — RESULT ENCOUNTER NOTE
Patient was notified of these results. Called pt, she's feeling better but urine is still cloudy. Will switch to Macrobid due to sensitivities. Still awaiting sensitivities on the other strain. Rx sent .   Suraj Bergeron MD  12/13/2018 4:49 PM

## 2018-12-14 LAB
BACTERIA SPEC CULT: ABNORMAL
BACTERIA SPEC CULT: ABNORMAL
Lab: ABNORMAL
SPECIMEN SOURCE: ABNORMAL

## 2018-12-17 NOTE — DISCHARGE SUMMARY
[facility]    Discharge Summary  Surgery    Date of Admission:  12/7/2018  Date of Discharge:  12/11/2018  2:20 PM  Discharging Provider: Taylor Aiken PA-C  Discharge Summary Note completed by: Katelin Landeros PA-C on 12/17/2018  Date of Service: The patient was personally seen by Discharging Providers on the day of discharge.    Discharge Diagnoses   -Incisional hernia s/p bilateral free TRAM procedure  -h/o previous DVT/PE; perioperative anticoagulation  -h/o multiple infections and +MRSA colonization  -h/o postoperative urinary retention   -S/p Incisional hernia repair with biologic Lifecell Strattice, transverse abdominus release with bilateral myofacial advancement flap, placement of drain  -Expected postoperative ileus  -Postoperative urinary retention, straight cath followed by drake placement, removed on POD#3  -UTI, Culture +E.Coli    Procedure/Surgery Information   Incisional hernia repair with biologic Lifecell Strattice, transverse abdominus release with bilateral myofacial advancement flap, placement of drain   Surgeon(s) and Role:     * Suraj Bergeron MD - Primary     History of Present Illness   Noa Mcgill is a 43 year old female who has had bilateral free TRAM flaps harvested from her lower abdomen.  She has now developed incisional hernias.  The hernia favors the left side at the level of the umbilicus and the right side in the lower abdomen.  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  We discussed the use of mesh and in particular the use of biologic mesh to reduce the risk of chronic mesh infection.  She understands that this mesh does stretch a bit more.  We have discussed the surgery on several occasions she seems to understand the proposed procedure well and has reviewed literature on the subject.  All her questions have been answered and she would like to proceed with surgery.  Additionally, she has a history of  DVTs and would like to be placed on Lovenox and understands that this will increase the risk of postoperative bleeding and potentially prolong the length of drain requirement.    Hospital Course   Noa Mcgill was admitted on 12/7/2018.  The following problems were addressed during her hospitalization:  -Incisional hernia s/p bilateral free TRAM procedure  -h/o previous DVT/PE; perioperative anticoagulation  -h/o multiple infections and +MRSA colonization  -h/o postoperative urinary retention   -S/p Incisional hernia repair with biologic Lifecell Strattice, transverse abdominus release with bilateral myofacial advancement flap, placement of drain  -Expected postoperative ileus; resolved prior to release home  -Postoperative urinary retention, straight cath followed by drake placement, removed on POD#3; +urine cloudiness following drake removal  -UTI, Culture +E.Coli    Post-operative antibiotic therapy included: Vanco, rocephin.  Post-operative pain control: was via IV until able to tolerate PO intake and transitioned to PO pain meds.    Remarkable hospital course events: Drake in place for surgery was left in place for 24 hours due to h/o postoperative urinary retention.  After removal, pt unable to void and straight cath done.  Drake replaced after additional issues with postop urinary retention; able to remove on POD#3 and no ongoing issues with voiding.  Pt then had urine cloudiness and tested +for UTI; +E.coli.   Antibiotic Rx for this at discharge.  Pt able to take in minimal liquids orally until POD#3 when more consistent bowel function.  Pt was on dony-operative anticoagulation due to h/o DVT/PE; also discharged home with 5 additional days of lovenox injections.    Noa met all criteria for release on 12/11/2018  2:20 PM.  She was afebrile, tolerating diet, pain controlled on PO meds, ambulating well, and had return of bowel function.    Medications discontinued or adjusted during this hospitalization:  see discharge med list below.    Antibiotics prescribed at discharge: SeptraDS, Duration: 1week     Imaging study follow up needs:   -None performed    Discharge Instructions and Follow-Up:  Discharge diet: Regular   Discharge activity: Lifting restricted to 10 pounds  No driving or operating machinery while on narcotic analgesics   Discharge follow-up: Follow up in office for drain removal when output appropriately decreased  Follow up with Dr. Bergeron in 2-3 weeks   Wound/Incision care: Daily dressing changes  Ice to area for comfort  May get incision wet in shower but do not soak or scrub  Steri-strips off in 10 days       Katelin Landeros PA-C      Discharge Disposition   Discharged to home   Condition at discharge: Stable    Pending Results   Unresulted Labs Ordered in the Past 30 Days of this Admission     No orders found from 10/8/2018 to 12/8/2018.          Primary Care Physician   Sydnie He    Consultations This Hospital Stay   None      Discharge Medications   Discharge Medication List as of 12/11/2018  1:01 PM      START taking these medications    Details   senna-docusate (SENOKOT-S/PERICOLACE) 8.6-50 MG tablet Take 1 tablet by mouth At Bedtime, Disp-100 tablet, R-0, E-Prescribe      sulfamethoxazole-trimethoprim (BACTRIM DS/SEPTRA DS) 800-160 MG tablet Take 1 tablet by mouth 2 times daily for 7 days, Disp-14 tablet, R-0, E-Prescribe         CONTINUE these medications which have CHANGED    Details   enoxaparin (LOVENOX) 40 MG/0.4ML syringe Inject 0.4 mLs (40 mg) Subcutaneous every 24 hours for 5 days, Disp-2 mL, R-0, E-Prescribe      oxyCODONE (ROXICODONE) 5 MG tablet Take 1-2 tablets (5-10 mg) by mouth every 6 hours as needed for moderate to severe pain, Disp-20 tablet, R-0, Local Print         CONTINUE these medications which have NOT CHANGED    Details   amphetamine-dextroamphetamine (ADDERALL) 10 MG tablet Take 1 tablet (10 mg) by mouth in AM and take 2 tablets (20 mg) by mouth in PM, Disp-90  tablet, R-0, Local Print      ascorbic acid 250 MG TABS tablet Take 250 mg by mouth daily Take by mouth twice a day., Historical      aspirin 325 MG EC tablet Take 325 mg by mouth daily Take 325 mg by mouth, Historical      buPROPion (WELLBUTRIN SR) 150 MG 12 hr tablet Take 150 mg by mouth daily, Historical      butalbital-acetaminophen-caffeine (FIORICET/ESGIC) -40 MG per tablet Take 1 tablet by mouth every 4 hours as needed, Disp-30 tablet, R-1, Local Print      CRANBERRY CONCENTRATE PO Historical      DHEA 50 MG TABS Take 50 mg by mouth 1 tab QD, Historical      etonogestrel-ethinyl estradiol (NUVARING) 0.12-0.015 MG/24HR vaginal ring Place 1 each vaginally every 28 days, Historical      hydrocortisone (CORTEF) 5 MG tablet Take 5 mg by mouth daily Pt takes 2 tabs (10mg) in Am and 1 tab (5mg) PM, Historical      Ibuprofen (ADVIL PO) Take 600 mg by mouth 2 times daily as needed for moderate pain, Historical      Omega-3 Fatty Acids (FISH OIL) 1000 MG CPDR Take 1,000 mg by mouth daily, Historical      rizatriptan (MAXALT) 10 MG tablet TAKE 1 TABLET BY MOUTH ONCE, MAY REPEAT IN 2 HRS. MAX 30MG/24HRS, R-3, Historical      VITAMIN D PO Take 2,000 Units by mouth daily., Historical      zolpidem (AMBIEN) 10 MG tablet Take 1 tablet (10 mg) by mouth nightly as needed for sleep, Disp-30 tablet, R-3, Local Print         STOP taking these medications       Nitrofurantoin Monohyd Macro (MACROBID PO) Comments:   Reason for Stopping:             Allergies   Allergies   Allergen Reactions     Tnf Antagonists Hives     Enbrel Hives     Humira [Humira]      Rash hives     Ibuprofen Unknown     Patient states she was told not to take Ibuprofen due to bleeding disorder     Prednisone      Pt sensitive to prednisone--shakey heart racing - only with large doses     Compazine [Prochlorperazine] Anxiety     Shakey per H&P dated 5/21/2018  Shaky and anxiety     Data   Most Recent 3 CBC's:  Recent Labs   Lab Test 12/10/18  0711  12/08/18  0705 12/07/18  1656 11/21/18  1657 08/21/18  1219   WBC 5.6 9.3  --   --  6.9   HGB 10.5* 10.6*  --  12.7 12.4   MCV 84 85  --   --  76*    228 252  --  287      Most Recent 3 BMP's:  Recent Labs   Lab Test 12/10/18  0711 12/09/18  0546 12/08/18  0705 11/21/18  1657 08/21/18  1219     --   --  140 140   POTASSIUM 3.5  --   --  4.0 4.7   CHLORIDE 109  --   --  109 111*   CO2 25  --   --  25 19*   BUN 4*  --   --  13 13   CR 0.74  --   --  0.92 0.82   ANIONGAP 5  --   --  6 10   KENDRICK 7.8*  --   --  9.2 8.9   GLC 92 100* 111* 89 92     Most Recent 2 LFT's:  Recent Labs   Lab Test 12/10/18  0711 08/21/18  1219   AST 13 28   ALT 26 27   ALKPHOS 53 65   BILITOTAL 0.5 0.3     Most Recent 6 Bacteria Isolates From Any Culture (See EPIC Reports for Culture Details):  Recent Labs   Lab Test 12/10/18  1750 04/24/16  0035 09/16/14  1851 11/25/13  2034 03/30/12  1453 01/23/12  1753   CULT >100,000 colonies/mL  Escherichia coli  *  >100,000 colonies/mL  Strain 2  Escherichia coli  * >100,000 colonies/mL Escherichia coli  10,000 to 50,000 colonies/mL Enterococcus species  * 10,000 to 50,000 colonies/mL mixed urogenital nicole 10,000 to 50,000 colonies/mL Mixed gram positive nicole Multiple species present, probable perineal contamination. Susceptibility testing not routinely done 10 to 50,000 colonies/mL Multiple species present, probable perineal  contamination. >100,000 colonies/mL Escherichia coli

## 2018-12-19 ENCOUNTER — OFFICE VISIT (OUTPATIENT)
Dept: SURGERY | Facility: CLINIC | Age: 43
End: 2018-12-19
Payer: COMMERCIAL

## 2018-12-19 ENCOUNTER — TELEPHONE (OUTPATIENT)
Dept: SURGERY | Facility: CLINIC | Age: 43
End: 2018-12-19

## 2018-12-19 VITALS
RESPIRATION RATE: 16 BRPM | DIASTOLIC BLOOD PRESSURE: 68 MMHG | OXYGEN SATURATION: 99 % | SYSTOLIC BLOOD PRESSURE: 116 MMHG | HEART RATE: 89 BPM

## 2018-12-19 DIAGNOSIS — Z09 SURGICAL FOLLOWUP VISIT: Primary | ICD-10-CM

## 2018-12-19 PROCEDURE — 99024 POSTOP FOLLOW-UP VISIT: CPT | Performed by: PHYSICIAN ASSISTANT

## 2018-12-19 NOTE — PROGRESS NOTES
[facility] Note   Subjective:  Noa Mcgill is here for her first postoperative visit.  She underwent Incisional hernia repair with biologic Lifecell Strattice, transverse abdominus release with bilateral myofacial advancement flap, placement of drains by Dr. Bergeron on December/11.  She is now 8 days postop.  There were 2 Diego drains placed at the time of repair.  She has an appt in 2 days with Dr Bergeron for drain removal.  She started to feel more tender at the drain exit points which prompted her to call the office this morning to possible be seen today for removal; output has been: left drain less than 30cc's per day for 2 days, right side even less than that.  She has been compliant with activity restrictions since surgery.  She has no concerns about she recovery today.      Objective:  Abd - soft, non-tender, non-distended  Inc - Steri-strips in place, no drainage, healing well, no erythema/bruising, no seroma/hematoma noted, no hernia noted  Drains - scant serous fluid in bulbs.  Mild erythema/irritation noted around drain exit points, no leakage around drain.  Entire Diego drains removed without difficulty.  Bacitracin/gauze dressing applied.    Assessment:  -Drain removal x2  -s/p Incisional hernia repair with biologic Lifecell Strattice, transverse abdominus release with bilateral myofacial advancement flap, placement of drains    Plan:  I reviewed care of the previous drain site with the patient.  I also reviewed recommendations for continued use of pain meds as needed.      Noa will RTC to see Dr. Bergeron in 2 days for her regular post-operative appointment.  She will get further instruction regarding activity and weight restrictions at that time.  Until then, she was recommended to remain at her current limit.    Katelin Landeros PA-C    Please route or send letter to:  *None*

## 2018-12-19 NOTE — TELEPHONE ENCOUNTER
Name of caller: Noa    Reason for Call:  Pt would like drains removed today. Pt states they are painful and getting red. She states she talked to DJ on Monday and he said to call the office to have them removed. She does have a PO scheduled for Friday to do this but wants to come in today instead.    Surgeon:  Dr. Bergeron    Recent Surgery:  Yes.    If yes, when & what type: 12/12  Incisional Herniorrhaphy with Lifecell Strattice 400 cm , transverse abdominis release with bilateral myofascial advancement flap      Best phone number to reach pt at is: [phone]  Ok to leave a message with medical info? Yes.    Pharmacy preferred (if calling for a refill): NA

## 2018-12-19 NOTE — LETTER
2018    Re: Noa Mcgill - 1975    Noa Mcgill is here for her first postoperative visit.  She underwent Incisional hernia repair with biologic Lifecell Strattice, transverse abdominus release with bilateral myofacial advancement flap, placement of drains by Dr. Bergeron on .  She is now 8 days postop.  There were 2 Diego drains placed at the time of repair.  She has an appt in 2 days with Dr Bergeron for drain removal.  She started to feel more tender at the drain exit points which prompted her to call the office this morning to possible be seen today for removal; output has been: left drain less than 30cc's per day for 2 days, right side even less than that.  She has been compliant with activity restrictions since surgery.  She has no concerns about she recovery today.       Objective:  Abd - soft, non-tender, non-distended  Inc - Steri-strips in place, no drainage, healing well, no erythema/bruising, no seroma/hematoma noted, no hernia noted  Drains - scant serous fluid in bulbs.  Mild erythema/irritation noted around drain exit points, no leakage around drain.  Entire Diego drains removed without difficulty.  Bacitracin/gauze dressing applied.     Assessment:  -Drain removal x2  -s/p Incisional hernia repair with biologic Lifecell Strattice, transverse abdominus release with bilateral myofacial advancement flap, placement of drains     Plan:  I reviewed care of the previous drain site with the patient.  I also reviewed recommendations for continued use of pain meds as needed.       Noa will RTC to see Dr. Bergeron in 2 days for her regular post-operative appointment.  She will get further instruction regarding activity and weight restrictions at that time.  Until then, she was recommended to remain at her current limit.     Katelin Landeros PA-C

## 2018-12-20 ENCOUNTER — HOSPITAL ENCOUNTER (EMERGENCY)
Facility: CLINIC | Age: 43
Discharge: HOME | End: 2018-12-20
Attending: EMERGENCY MEDICINE | Admitting: EMERGENCY MEDICINE
Payer: COMMERCIAL

## 2018-12-20 VITALS
HEART RATE: 93 BPM | SYSTOLIC BLOOD PRESSURE: 129 MMHG | RESPIRATION RATE: 18 BRPM | OXYGEN SATURATION: 97 % | DIASTOLIC BLOOD PRESSURE: 86 MMHG | TEMPERATURE: 98.6 F

## 2018-12-20 DIAGNOSIS — N39.0 COMPLICATED URINARY TRACT INFECTION: ICD-10-CM

## 2018-12-20 LAB
ALBUMIN UR-MCNC: NEGATIVE MG/DL
ANION GAP SERPL CALCULATED.3IONS-SCNC: 8 MMOL/L (ref 3–14)
APPEARANCE UR: ABNORMAL
BACTERIA #/AREA URNS HPF: ABNORMAL /HPF
BASOPHILS # BLD AUTO: 0.1 10E9/L (ref 0–0.2)
BASOPHILS NFR BLD AUTO: 0.8 %
BILIRUB UR QL STRIP: NEGATIVE
BUN SERPL-MCNC: 13 MG/DL (ref 7–30)
CALCIUM SERPL-MCNC: 9.1 MG/DL (ref 8.5–10.1)
CHLORIDE SERPL-SCNC: 109 MMOL/L (ref 94–109)
CO2 SERPL-SCNC: 21 MMOL/L (ref 20–32)
COLOR UR AUTO: YELLOW
CREAT SERPL-MCNC: 0.7 MG/DL (ref 0.52–1.04)
DIFFERENTIAL METHOD BLD: ABNORMAL
EOSINOPHIL # BLD AUTO: 0.3 10E9/L (ref 0–0.7)
EOSINOPHIL NFR BLD AUTO: 2.7 %
ERYTHROCYTE [DISTWIDTH] IN BLOOD BY AUTOMATED COUNT: 13.5 % (ref 10–15)
GFR SERPL CREATININE-BSD FRML MDRD: >90 ML/MIN/{1.73_M2}
GLUCOSE SERPL-MCNC: 83 MG/DL (ref 70–99)
GLUCOSE UR STRIP-MCNC: NEGATIVE MG/DL
HCT VFR BLD AUTO: 40.2 % (ref 35–47)
HGB BLD-MCNC: 12.9 G/DL (ref 11.7–15.7)
HGB UR QL STRIP: NEGATIVE
IMM GRANULOCYTES # BLD: 0 10E9/L (ref 0–0.4)
IMM GRANULOCYTES NFR BLD: 0.4 %
KETONES UR STRIP-MCNC: NEGATIVE MG/DL
LEUKOCYTE ESTERASE UR QL STRIP: ABNORMAL
LYMPHOCYTES # BLD AUTO: 3.2 10E9/L (ref 0.8–5.3)
LYMPHOCYTES NFR BLD AUTO: 31.5 %
MCH RBC QN AUTO: 26.4 PG (ref 26.5–33)
MCHC RBC AUTO-ENTMCNC: 32.1 G/DL (ref 31.5–36.5)
MCV RBC AUTO: 82 FL (ref 78–100)
MONOCYTES # BLD AUTO: 0.5 10E9/L (ref 0–1.3)
MONOCYTES NFR BLD AUTO: 4.7 %
MUCOUS THREADS #/AREA URNS LPF: PRESENT /LPF
NEUTROPHILS # BLD AUTO: 6 10E9/L (ref 1.6–8.3)
NEUTROPHILS NFR BLD AUTO: 59.9 %
NITRATE UR QL: NEGATIVE
NRBC # BLD AUTO: 0 10*3/UL
NRBC BLD AUTO-RTO: 0 /100
PH UR STRIP: 6 PH (ref 5–7)
PLATELET # BLD AUTO: 430 10E9/L (ref 150–450)
POTASSIUM SERPL-SCNC: 5.6 MMOL/L (ref 3.4–5.3)
RBC # BLD AUTO: 4.89 10E12/L (ref 3.8–5.2)
RBC #/AREA URNS AUTO: 3 /HPF (ref 0–2)
SODIUM SERPL-SCNC: 138 MMOL/L (ref 133–144)
SOURCE: ABNORMAL
SP GR UR STRIP: 1.01 (ref 1–1.03)
SQUAMOUS #/AREA URNS AUTO: <1 /HPF (ref 0–1)
UROBILINOGEN UR STRIP-MCNC: 0 MG/DL (ref 0–2)
WBC # BLD AUTO: 10.1 10E9/L (ref 4–11)
WBC #/AREA URNS AUTO: >182 /HPF (ref 0–5)

## 2018-12-20 PROCEDURE — 25000132 ZZH RX MED GY IP 250 OP 250 PS 637: Performed by: EMERGENCY MEDICINE

## 2018-12-20 PROCEDURE — 96375 TX/PRO/DX INJ NEW DRUG ADDON: CPT

## 2018-12-20 PROCEDURE — 87086 URINE CULTURE/COLONY COUNT: CPT | Performed by: EMERGENCY MEDICINE

## 2018-12-20 PROCEDURE — 99284 EMERGENCY DEPT VISIT MOD MDM: CPT | Mod: 25

## 2018-12-20 PROCEDURE — 96365 THER/PROPH/DIAG IV INF INIT: CPT

## 2018-12-20 PROCEDURE — 87186 SC STD MICRODIL/AGAR DIL: CPT | Performed by: EMERGENCY MEDICINE

## 2018-12-20 PROCEDURE — 85025 COMPLETE CBC W/AUTO DIFF WBC: CPT | Performed by: EMERGENCY MEDICINE

## 2018-12-20 PROCEDURE — 80048 BASIC METABOLIC PNL TOTAL CA: CPT | Performed by: EMERGENCY MEDICINE

## 2018-12-20 PROCEDURE — 81001 URINALYSIS AUTO W/SCOPE: CPT | Performed by: EMERGENCY MEDICINE

## 2018-12-20 PROCEDURE — 36415 COLL VENOUS BLD VENIPUNCTURE: CPT | Performed by: EMERGENCY MEDICINE

## 2018-12-20 PROCEDURE — 87088 URINE BACTERIA CULTURE: CPT | Performed by: EMERGENCY MEDICINE

## 2018-12-20 PROCEDURE — 25000128 H RX IP 250 OP 636: Performed by: EMERGENCY MEDICINE

## 2018-12-20 RX ORDER — ONDANSETRON 4 MG/1
4 TABLET, ORALLY DISINTEGRATING ORAL EVERY 8 HOURS PRN
Qty: 20 TABLET | Refills: 0 | Status: SHIPPED | OUTPATIENT
Start: 2018-12-20 | End: 2019-03-11

## 2018-12-20 RX ORDER — PHENAZOPYRIDINE HYDROCHLORIDE 200 MG/1
200 TABLET, FILM COATED ORAL 3 TIMES DAILY
Qty: 9 TABLET | Refills: 0 | Status: SHIPPED | OUTPATIENT
Start: 2018-12-20 | End: 2019-05-02

## 2018-12-20 RX ORDER — CEFDINIR 300 MG/1
300 CAPSULE ORAL 2 TIMES DAILY
Qty: 20 CAPSULE | Refills: 0 | Status: SHIPPED | OUTPATIENT
Start: 2018-12-20 | End: 2019-03-11

## 2018-12-20 RX ORDER — CEFTRIAXONE 1 G/1
1 INJECTION, POWDER, FOR SOLUTION INTRAMUSCULAR; INTRAVENOUS ONCE
Status: COMPLETED | OUTPATIENT
Start: 2018-12-20 | End: 2018-12-20

## 2018-12-20 RX ORDER — PHENAZOPYRIDINE HYDROCHLORIDE 100 MG/1
200 TABLET, FILM COATED ORAL ONCE
Status: COMPLETED | OUTPATIENT
Start: 2018-12-20 | End: 2018-12-20

## 2018-12-20 RX ORDER — ONDANSETRON 2 MG/ML
4 INJECTION INTRAMUSCULAR; INTRAVENOUS EVERY 30 MIN PRN
Status: DISCONTINUED | OUTPATIENT
Start: 2018-12-20 | End: 2018-12-20 | Stop reason: HOSPADM

## 2018-12-20 RX ORDER — OXYCODONE AND ACETAMINOPHEN 5; 325 MG/1; MG/1
2 TABLET ORAL ONCE
Status: COMPLETED | OUTPATIENT
Start: 2018-12-20 | End: 2018-12-20

## 2018-12-20 RX ORDER — OXYCODONE AND ACETAMINOPHEN 5; 325 MG/1; MG/1
1-2 TABLET ORAL EVERY 4 HOURS PRN
Qty: 6 TABLET | Refills: 0 | Status: SHIPPED | OUTPATIENT
Start: 2018-12-20 | End: 2018-12-24

## 2018-12-20 RX ADMIN — PHENAZOPYRIDINE 200 MG: 100 TABLET ORAL at 19:06

## 2018-12-20 RX ADMIN — OXYCODONE HYDROCHLORIDE AND ACETAMINOPHEN 2 TABLET: 5; 325 TABLET ORAL at 19:37

## 2018-12-20 RX ADMIN — ONDANSETRON 4 MG: 2 INJECTION INTRAMUSCULAR; INTRAVENOUS at 19:07

## 2018-12-20 RX ADMIN — CEFTRIAXONE SODIUM 1 G: 1 INJECTION, POWDER, FOR SOLUTION INTRAMUSCULAR; INTRAVENOUS at 19:15

## 2018-12-20 ASSESSMENT — ENCOUNTER SYMPTOMS
ABDOMINAL PAIN: 1
SHORTNESS OF BREATH: 0
DYSURIA: 1

## 2018-12-20 NOTE — ED TRIAGE NOTES
Patient having abdominal pain started last night with pain with urination. Patient states she is on antibiotic for UTI and recently had abdominal surgery. ABC intact alert and no distress.

## 2018-12-20 NOTE — ED AVS SNAPSHOT
[facility]  [address]  [facility] [address]  Phone:  [phone]  Fax:  [phone]                                    Noa Mcgill   MRN: [id number]    Department:  [facility]   Date of Visit:  12/20/2018           After Visit Summary Signature Page    I have received my discharge instructions, and my questions have been answered. I have discussed any challenges I see with this plan with the nurse or doctor.    ..........................................................................................................................................  Patient/Patient Representative Signature      ..........................................................................................................................................  Patient Representative Print Name and Relationship to Patient    ..................................................               ................................................  Date                                   Time    ..........................................................................................................................................  Reviewed by Signature/Title    ...................................................              ..............................................  Date                                               Time          22EPIC Rev 08/18

## 2018-12-21 NOTE — ED PROVIDER NOTES
"  History     Chief Complaint:  Abdominal Pain    The history is provided by the patient and medical records.      Noa Mcgill is a 43 year old female with a history of incisional hernia who presents for evaluation of abdominal pain. Per chart review and patient's history, the patient underwent an abdominal wall reconstruction surgery with Dr. Bergeron on 2018. Notes that she was recovering well until last night when she began to experience some lower abdominal discomfort. States that she took her last pain pill at that point.    The patient reports that she woke up this morning and just \"didn't feel great\" and had a temperature of 99.9. States that she has begun to feel vaginal pain as well as dysuria. The patient does note that while in the hospital she was found to have a UTI that is \"resistant to everything except for oral Macrobid\"; she has been on Macrobid for about a week and this is a 10-day schedule. Patient denies chest pain, shortness of breath, or other complaint. Patient notes that she does have an appointment with Dr. Bergeron upcoming as a post-operative follow up. No vomiting. She notes urinary urgency and was catheterized multiple times during her hospital stay.    Allergies:  Tnf Antagonists  Enbrel  Humira [Humira]  Ibuprofen  Prednisone  Compazine [Prochlorperazine]      Medications:    Adderall  Vitamin C  Aspirin 325 mg  Nuvaring  Cortef  Fish oil  Maxalt  Senokot  Vitamin D  Ambien     Past Medical History:    Psoriatic arthropathy  Herpes simplex virus  Recurrent UTI  Migraine headache  Lumbago  Atrial septal defect  Anxiety  Chronic pain syndrome  Mild major depression  Incisional hernia  Adrenal insufficiency (Crawford's disease)  BRCA1 gene mutation  Clotting disorder  endometriosis    Past Surgical History:    Appendectomy    D&C  Herniorrhaphy  Hysterectomy  Mastectomy  Orthopedic surgery  Left thumb fusion due to arthritis    Family History:  "   CAD  Hypertension  Depression  Thyroid disease  Breast cancer    Social History:  Presents with female    Tobacco use: Never smoker   Alcohol use: Yes (1-3 drinks per week)  PCP: Sydnie He    Marital Status:       Review of Systems   Respiratory: Negative for shortness of breath.    Cardiovascular: Negative for chest pain.   Gastrointestinal: Positive for abdominal pain.   Genitourinary: Positive for dysuria and vaginal pain.   All other systems reviewed and are negative.    Physical Exam     Patient Vitals for the past 24 hrs:   BP Temp Temp src Pulse Resp SpO2   12/20/18 2023 129/86 98.6  F (37  C) Oral 93 18 99 %   12/20/18 1536 (!) 145/108 99.2  F (37.3  C) Oral 99 20 100 %        Physical Exam  General: Well appearing, nontoxic. Resting comfortably  Head:  Scalp, face, and head appear normal  Eyes:  Pupils are equal, round, and reactive to light    Conjunctivae non-injected and sclerae white  ENT:    The external nose is normal    Pinnae are normal    The oropharynx is normal, mucous membranes moist    Uvula is in the midline  Neck:  Normal range of motion    There is no rigidity noted    Trachea is in the midline  CV:  Regular rate and rhythm     Normal S1/S2, no S3/S4    No murmur or rub  Resp:  Lungs are clear and equal bilaterally    There is no tachypnea    No increased work of breathing    No rales, wheezing, or rhonchi  GI:  Abdomen is soft, no rigidity or guarding    No distension, or mass    Mild suprapubic and lower abdominal tenderness. No rebound tenderness. Surgical incisions C/D/I.   MS:  Normal muscular tone    Symmetric motor strength    No lower extremity edema  Skin:  No rash or acute skin lesions noted  Neuro:  Awake and alert    Speech is normal and fluent    Moves all extremities spontaneously  Psych: Normal affect.  Appropriate interactions.     Emergency Department Course     Laboratory:  CBC: AWNL (WBC 10.1, HGB 12.9, )   BMP: Potassium 5.6 (H) o/w WNL  (Creatinine 0.70)    UA with micro: Leukocyte Esterace large, RBC 3 (H), WBC >182 (H), Bacteria few, Mucous present o/w negative    Urine culture: Pending     Interventions:  1906: Pyridium 200 mg tablet  1907: Zofran 4 mg IV    1915: Rocephin 1 g in 100 mL IV infusion  1937: Percocet 5/325 2 tablet PO    Emergency Department Course:  Past medical records, nursing notes, and vitals reviewed.  1840: I performed an exam of the patient and obtained history, as documented above.    IV inserted and blood drawn. Above interventions provided. Blood was sent to the lab for further testing, results above.    The patient provided a urine sample here in the emergency department. This was sent for laboratory testing, findings above.    2028: I rechecked the patient. Findings and plan explained to the Patient. Patient discharged home with instructions regarding supportive care, medications, and reasons to return. The importance of close follow-up was reviewed.       Impression & Plan      Medical Decision Making:  Noa Mcgill is a 43 year old female who presents for evaluation of lower abdominal pain and known UTI following an abdominal wall reconstruction surgery with Dr. Bergeron on 12/7/2018.  This clinically is consistent with a urinary tract infection, complicated by recent urinary catheterization and hospitalization. Prior urine culture data reviewed, isolates appear to be susceptible to cephalosporins. Repeat urinalysis .  Demonstrates persistent pyuria. Given her systemic symptoms and malaise she may have early pyelonephritis. IV ceftriaxone given in the ED. She is otherwise well appearing without evidence of sepsis at this time. Will continue cefdinir as an outpatient. Her abdominal exam is otherwise benign.  Patient was instructed to stop the macrobid. Analgesia provided. Patient reports she has close follow up appointment with her surgeon in 1-2 days already scheduled. I encouraged her to keep this appointment. ED  workup otherwise unremarkable. Return precautions were discussed with patient. The patient's questions were answered and the patient was agreeable with discharge. Patient discharged in stable condition.      Diagnosis:    ICD-10-CM   1. Complicated urinary tract infection N39.0       Disposition:  Discharged to home with plan as outlined.    Discharge Medications:START taking      Dose / Directions   cefdinir 300 MG capsule  Commonly known as:  OMNICEF      Dose:  300 mg  Take 1 capsule (300 mg) by mouth 2 times daily for 10 days  Quantity:  20 capsule  Refills:  0     ondansetron 4 MG ODT tab  Commonly known as:  ZOFRAN ODT      Dose:  4 mg  Take 1 tablet (4 mg) by mouth every 8 hours as needed for nausea or vomiting  Quantity:  20 tablet  Refills:  0     oxyCODONE-acetaminophen 5-325 MG tablet  Commonly known as:  PERCOCET      Dose:  1-2 tablet  Take 1-2 tablets by mouth every 4 hours as needed for pain  Quantity:  6 tablet  Refills:  0     phenazopyridine 200 MG tablet  Commonly known as:  PYRIDIUM      Dose:  200 mg  Take 1 tablet (200 mg) by mouth 3 times daily for 3 days As needed for bladder spasm  Quantity:  9 tablet  Refills:  0           Where to get your medicines      Some of these will need a paper prescription and others can be bought over the counter. Ask your nurse if you have questions.    Bring a paper prescription for each of these medications    cefdinir 300 MG capsule    ondansetron 4 MG ODT tab    oxyCODONE-acetaminophen 5-325 MG tablet    phenazopyridine 200 MG tablet            Scribe Disclosure:  Harvinder WEAVER, am serving as a scribe at 8:31 PM on 12/20/2018 to document services personally performed by Clarence Hooper MD based on my observations and the provider's statements to me.  12/20/2018   EMERGENCY DEPARTMENT      Clarence Hooper MD  12/22/18 [id number]

## 2018-12-22 LAB
BACTERIA SPEC CULT: ABNORMAL
Lab: ABNORMAL
SPECIMEN SOURCE: ABNORMAL

## 2018-12-24 ENCOUNTER — OFFICE VISIT (OUTPATIENT)
Dept: SURGERY | Facility: CLINIC | Age: 43
End: 2018-12-24
Payer: COMMERCIAL

## 2018-12-24 VITALS
TEMPERATURE: 97.7 F | OXYGEN SATURATION: 100 % | SYSTOLIC BLOOD PRESSURE: 112 MMHG | HEART RATE: 82 BPM | DIASTOLIC BLOOD PRESSURE: 68 MMHG

## 2018-12-24 DIAGNOSIS — Z09 SURGICAL FOLLOWUP VISIT: Primary | ICD-10-CM

## 2018-12-24 PROCEDURE — 99024 POSTOP FOLLOW-UP VISIT: CPT | Performed by: SURGERY

## 2018-12-24 NOTE — PROGRESS NOTES
Returns in follow-up after hernia repair with bilateral myofascial advancement flaps using TAR technique and biologic mesh placement on 12/7/2018.  She has continued to have problems with urinary tract infections and was in the emergency room again recently.  They have switched her antibiotics and she notes significant improvement.  Cultures however showed that organisms present were sensitive to the antibiotics that she was already on.    Exam: Incision appears to be healing quite nicely.  There is some tenderness in the left lateral abdomen with palpation.  There is no evidence for seroma hematoma or infection.    Impression: Slow healing after hernia repair, likely influenced by recurrent UTIs.    Plan: Advance activity as tolerated return to work in about 10 days and limit lifting initially.  She has an appointment with urology to discuss recurrent UTIs.    Suraj Bergeron MD  12/24/2018 11:34 AM    Please route or send letter to:  Primary Care Provider (PCP) and Include Progress Note

## 2018-12-24 NOTE — LETTER
2018    RE: Noa Mcgill, : 1975      Returns in follow-up after hernia repair with bilateral myofascial advancement flaps using TAR technique and biologic mesh placement on 2018.  She has continued to have problems with urinary tract infections and was in the emergency room again recently.  They have switched her antibiotics and she notes significant improvement.  Cultures however showed that organisms present were sensitive to the antibiotics that she was already on.     Exam: Incision appears to be healing quite nicely.  There is some tenderness in the left lateral abdomen with palpation.  There is no evidence for seroma hematoma or infection.     Impression: Slow healing after hernia repair, likely influenced by recurrent UTIs.     Plan: Advance activity as tolerated return to work in about 10 days and limit lifting initially.  She has an appointment with urology to discuss recurrent UTIs.     Suraj Bergeron MD

## 2018-12-24 NOTE — LETTER
2018    RE: Noa Mcgill  :  1975    This is to certify that Noa Mcgill has been under my care for 2018 surgery.      Return to work on 2019       Lift, carry, push, and pull no more than:     20 lbs. Full time      Patient is able to return to work/school without restrictions as of       Subject to change due to healing process.  If you have any questions please feel free to call our clinic at [phone] and speak with nursing.        Sincerely,        JOHN GRIFFITH

## 2019-01-13 ENCOUNTER — TRANSFERRED RECORDS (OUTPATIENT)
Dept: HEALTH INFORMATION MANAGEMENT | Facility: CLINIC | Age: 44
End: 2019-01-13

## 2019-01-22 ENCOUNTER — OFFICE VISIT (OUTPATIENT)
Dept: FAMILY MEDICINE | Facility: CLINIC | Age: 44
End: 2019-01-22
Payer: COMMERCIAL

## 2019-01-22 VITALS
HEART RATE: 97 BPM | DIASTOLIC BLOOD PRESSURE: 84 MMHG | SYSTOLIC BLOOD PRESSURE: 132 MMHG | TEMPERATURE: 98.2 F | WEIGHT: 205 LBS | BODY MASS INDEX: 32.95 KG/M2 | HEIGHT: 66 IN | OXYGEN SATURATION: 98 %

## 2019-01-22 DIAGNOSIS — R21 RASH: Primary | ICD-10-CM

## 2019-01-22 DIAGNOSIS — F32.0 MILD MAJOR DEPRESSION (H): ICD-10-CM

## 2019-01-22 PROCEDURE — 99214 OFFICE O/P EST MOD 30 MIN: CPT | Performed by: NURSE PRACTITIONER

## 2019-01-22 RX ORDER — CEFDINIR 300 MG/1
CAPSULE ORAL
Refills: 0 | COMMUNITY
Start: 2019-01-13 | End: 2019-03-11

## 2019-01-22 RX ORDER — IVERMECTIN 3 MG/1
3 TABLET ORAL ONCE
Status: DISCONTINUED | OUTPATIENT
Start: 2019-01-22 | End: 2019-01-22

## 2019-01-22 RX ORDER — IVERMECTIN 3 MG/1
15 TABLET ORAL ONCE
Qty: 5 TABLET | Refills: 0 | Status: SHIPPED | OUTPATIENT
Start: 2019-01-22 | End: 2019-03-11

## 2019-01-22 RX ORDER — BUPROPION HYDROCHLORIDE 300 MG/1
300 TABLET ORAL EVERY MORNING
Qty: 90 TABLET | Refills: 1 | Status: SHIPPED | OUTPATIENT
Start: 2019-01-22 | End: 2019-04-10 | Stop reason: SINTOL

## 2019-01-22 ASSESSMENT — ANXIETY QUESTIONNAIRES
1. FEELING NERVOUS, ANXIOUS, OR ON EDGE: NEARLY EVERY DAY
3. WORRYING TOO MUCH ABOUT DIFFERENT THINGS: NEARLY EVERY DAY
GAD7 TOTAL SCORE: 17
IF YOU CHECKED OFF ANY PROBLEMS ON THIS QUESTIONNAIRE, HOW DIFFICULT HAVE THESE PROBLEMS MADE IT FOR YOU TO DO YOUR WORK, TAKE CARE OF THINGS AT HOME, OR GET ALONG WITH OTHER PEOPLE: VERY DIFFICULT
2. NOT BEING ABLE TO STOP OR CONTROL WORRYING: NEARLY EVERY DAY
7. FEELING AFRAID AS IF SOMETHING AWFUL MIGHT HAPPEN: NEARLY EVERY DAY
5. BEING SO RESTLESS THAT IT IS HARD TO SIT STILL: NOT AT ALL
6. BECOMING EASILY ANNOYED OR IRRITABLE: MORE THAN HALF THE DAYS

## 2019-01-22 ASSESSMENT — MIFFLIN-ST. JEOR: SCORE: 1611.62

## 2019-01-22 ASSESSMENT — PATIENT HEALTH QUESTIONNAIRE - PHQ9
5. POOR APPETITE OR OVEREATING: NEARLY EVERY DAY
SUM OF ALL RESPONSES TO PHQ QUESTIONS 1-9: 16

## 2019-01-22 NOTE — PROGRESS NOTES
"  SUBJECTIVE:   Noa Mcgill is a 43 year old female who presents to clinic today for the following health issues:      Rash  Onset: . 2 weeks    Description:   Location: back, shoulders and chest  Character: linear, blotchy, raised, burning, red, \"really itchy\"  Itching (Pruritis): YES    Progression of Symptoms:  worsening and same    Accompanying Signs & Symptoms:  Fever: no   Body aches or joint pain: no   Sore throat symptoms: no   Recent cold symptoms: no     History:   Previous similar rash: no     Precipitating factors:   Exposure to similar rash: no   New exposures:  medication Cefdinir  Recent travel: no     Alleviating factors:  no    Therapies Tried and outcome: Hydrocortisone cream and tea tree oil    Started on chest and shoulders.  Tried OTC hydrocortisone.  No improvement.  Got worse, so did alisha zamoranooitnemnt.  Thought maybe scabies.  Did the Rx cream.  Some parts are worse and some better. Itching is driving her crazy.  No one at home with rash.  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     Mood: in-laws recently  in a car accident as well as aunt passed away from cancer.  Because of this mood and anxiety have worsened.  She has been seeing her  every other day and has lots of support from friends and family members.  People have been bringing them meals to help out.  Not sleeping well.       Problem list and histories reviewed & adjusted, as indicated.  Additional history: as documented    Current Outpatient Medications   Medication Sig Dispense Refill     ascorbic acid 250 MG TABS tablet Take 250 mg by mouth daily Take by mouth twice a day.       aspirin 325 MG EC tablet Take 325 mg by mouth daily Take 325 mg by mouth       buPROPion (WELLBUTRIN SR) 150 MG 12 hr tablet Take 150 mg by mouth daily       butalbital-acetaminophen-caffeine (FIORICET/ESGIC) -40 MG per tablet Take 1 tablet by mouth every 4 hours as needed 30 tablet 1     cefdinir (OMNICEF) 300 MG capsule TK 1 C PO Q 12 H FOR 10 DAYS " " 0     CRANBERRY CONCENTRATE PO        DHEA 50 MG TABS Take 50 mg by mouth 1 tab QD       etonogestrel-ethinyl estradiol (NUVARING) 0.12-0.015 MG/24HR vaginal ring Place 1 each vaginally every 28 days       Ibuprofen (ADVIL PO) Take 600 mg by mouth 2 times daily as needed for moderate pain       Omega-3 Fatty Acids (FISH OIL) 1000 MG CPDR Take 1,000 mg by mouth daily       rizatriptan (MAXALT) 10 MG tablet TAKE 1 TABLET BY MOUTH ONCE, MAY REPEAT IN 2 HRS. MAX 30MG/24HRS  3     VITAMIN D PO Take 2,000 Units by mouth daily.       zolpidem (AMBIEN) 10 MG tablet Take 1 tablet (10 mg) by mouth nightly as needed for sleep 30 tablet 3     amphetamine-dextroamphetamine (ADDERALL) 10 MG tablet Take 1 tablet (10 mg) by mouth in AM and take 2 tablets (20 mg) by mouth in PM (Patient not taking: Reported on 1/22/2019) 90 tablet 0     hydrocortisone (CORTEF) 5 MG tablet Take 5 mg by mouth daily Pt takes 2 tabs (10mg) in Am and 1 tab (5mg) PM       senna-docusate (SENOKOT-S/PERICOLACE) 8.6-50 MG tablet Take 1 tablet by mouth At Bedtime (Patient not taking: Reported on 1/22/2019) 100 tablet 0     Allergies   Allergen Reactions     Tnf Antagonists Hives     Enbrel Hives     Humira [Humira]      Rash hives     Ibuprofen Unknown     Patient states she was told not to take Ibuprofen due to bleeding disorder     Prednisone      Pt sensitive to prednisone--shakey heart racing - only with large doses     Compazine [Prochlorperazine] Anxiety     Shakey per H&P dated 5/21/2018  Shaky and anxiety       Reviewed and updated as needed this visit by clinical staff       Reviewed and updated as needed this visit by Provider         ROS:  Constitutional, HEENT, cardiovascular, pulmonary, gi and gu systems are negative, except as otherwise noted.    OBJECTIVE:     /84 (BP Location: Right arm, Patient Position: Sitting, Cuff Size: Adult Regular)   Pulse 97   Temp 98.2  F (36.8  C) (Oral)   Ht 1.676 m (5' 6\")   Wt 93 kg (205 lb)   LMP " 02/08/2015 (Within Months)   SpO2 98%   BMI 33.09 kg/m    Body mass index is 33.09 kg/m .  GENERAL: healthy, alert and no distress  SKIN: light erythemtous papular linear rash on the mid back and upper shoulders bilat, some areas are excoriated  PSYCH: mentation appears normal, affect normal/bright    Diagnostic Test Results:  none     ASSESSMENT/PLAN:   1. Mild major depression (H)  Worsened recently due to several deaths within the family.  Will bump up wellbutrin.  Follow up with PCP in 1-2 mos.   - buPROPion (WELLBUTRIN XL) 300 MG 24 hr tablet; Take 1 tablet (300 mg) by mouth every morning  Dispense: 90 tablet; Refill: 1    2. Rash  Does appear scabies like.  Failed topical; will try oral treatment.   - ivermectin (STROMECTOL) 3 MG TABS tablet; Take 5 tablets (15 mg) by mouth once for 1 dose  Dispense: 5 tablet; Refill: 0      ZULEMA [facility]

## 2019-01-23 ASSESSMENT — ANXIETY QUESTIONNAIRES: GAD7 TOTAL SCORE: 17

## 2019-02-23 ENCOUNTER — HOSPITAL ENCOUNTER (EMERGENCY)
Facility: CLINIC | Age: 44
Discharge: HOME | End: 2019-02-23
Attending: EMERGENCY MEDICINE | Admitting: EMERGENCY MEDICINE
Payer: COMMERCIAL

## 2019-02-23 ENCOUNTER — APPOINTMENT (OUTPATIENT)
Dept: CT IMAGING | Facility: CLINIC | Age: 44
End: 2019-02-23
Attending: EMERGENCY MEDICINE
Payer: COMMERCIAL

## 2019-02-23 VITALS
DIASTOLIC BLOOD PRESSURE: 69 MMHG | OXYGEN SATURATION: 99 % | TEMPERATURE: 98 F | SYSTOLIC BLOOD PRESSURE: 116 MMHG | RESPIRATION RATE: 20 BRPM | HEART RATE: 91 BPM

## 2019-02-23 DIAGNOSIS — R55 SYNCOPE, UNSPECIFIED SYNCOPE TYPE: ICD-10-CM

## 2019-02-23 LAB
ANION GAP SERPL CALCULATED.3IONS-SCNC: 8 MMOL/L (ref 3–14)
BASE DEFICIT BLDV-SCNC: 2.2 MMOL/L
BASOPHILS # BLD AUTO: 0.1 10E9/L (ref 0–0.2)
BASOPHILS NFR BLD AUTO: 0.5 %
BUN SERPL-MCNC: 8 MG/DL (ref 7–30)
CALCIUM SERPL-MCNC: 8.4 MG/DL (ref 8.5–10.1)
CHLORIDE SERPL-SCNC: 111 MMOL/L (ref 94–109)
CO2 SERPL-SCNC: 22 MMOL/L (ref 20–32)
CREAT SERPL-MCNC: 1.01 MG/DL (ref 0.52–1.04)
DIFFERENTIAL METHOD BLD: ABNORMAL
EOSINOPHIL # BLD AUTO: 0.1 10E9/L (ref 0–0.7)
EOSINOPHIL NFR BLD AUTO: 0.9 %
ERYTHROCYTE [DISTWIDTH] IN BLOOD BY AUTOMATED COUNT: 13.8 % (ref 10–15)
GFR SERPL CREATININE-BSD FRML MDRD: 69 ML/MIN/{1.73_M2}
GLUCOSE SERPL-MCNC: 108 MG/DL (ref 70–99)
HCO3 BLDV-SCNC: 23 MMOL/L (ref 21–28)
HCT VFR BLD AUTO: 39.9 % (ref 35–47)
HGB BLD-MCNC: 12.5 G/DL (ref 11.7–15.7)
IMM GRANULOCYTES # BLD: 0 10E9/L (ref 0–0.4)
IMM GRANULOCYTES NFR BLD: 0.4 %
LYMPHOCYTES # BLD AUTO: 1.5 10E9/L (ref 0.8–5.3)
LYMPHOCYTES NFR BLD AUTO: 16.2 %
MCH RBC QN AUTO: 26.2 PG (ref 26.5–33)
MCHC RBC AUTO-ENTMCNC: 31.3 G/DL (ref 31.5–36.5)
MCV RBC AUTO: 84 FL (ref 78–100)
MONOCYTES # BLD AUTO: 0.6 10E9/L (ref 0–1.3)
MONOCYTES NFR BLD AUTO: 5.9 %
NEUTROPHILS # BLD AUTO: 7.2 10E9/L (ref 1.6–8.3)
NEUTROPHILS NFR BLD AUTO: 76.1 %
NRBC # BLD AUTO: 0 10*3/UL
NRBC BLD AUTO-RTO: 0 /100
O2/TOTAL GAS SETTING VFR VENT: ABNORMAL %
OXYHGB MFR BLDV: 22 %
PCO2 BLDV: 41 MM HG (ref 40–50)
PH BLDV: 7.36 PH (ref 7.32–7.43)
PLATELET # BLD AUTO: 272 10E9/L (ref 150–450)
PO2 BLDV: 17 MM HG (ref 25–47)
POTASSIUM SERPL-SCNC: 4 MMOL/L (ref 3.4–5.3)
RBC # BLD AUTO: 4.77 10E12/L (ref 3.8–5.2)
SODIUM SERPL-SCNC: 141 MMOL/L (ref 133–144)
WBC # BLD AUTO: 9.5 10E9/L (ref 4–11)

## 2019-02-23 PROCEDURE — 96361 HYDRATE IV INFUSION ADD-ON: CPT

## 2019-02-23 PROCEDURE — 96375 TX/PRO/DX INJ NEW DRUG ADDON: CPT

## 2019-02-23 PROCEDURE — 82805 BLOOD GASES W/O2 SATURATION: CPT | Performed by: EMERGENCY MEDICINE

## 2019-02-23 PROCEDURE — 36415 COLL VENOUS BLD VENIPUNCTURE: CPT | Performed by: EMERGENCY MEDICINE

## 2019-02-23 PROCEDURE — 99285 EMERGENCY DEPT VISIT HI MDM: CPT | Mod: 25

## 2019-02-23 PROCEDURE — 85025 COMPLETE CBC W/AUTO DIFF WBC: CPT | Performed by: EMERGENCY MEDICINE

## 2019-02-23 PROCEDURE — 93005 ELECTROCARDIOGRAM TRACING: CPT

## 2019-02-23 PROCEDURE — 82533 TOTAL CORTISOL: CPT | Performed by: EMERGENCY MEDICINE

## 2019-02-23 PROCEDURE — 25000128 H RX IP 250 OP 636: Performed by: EMERGENCY MEDICINE

## 2019-02-23 PROCEDURE — 80048 BASIC METABOLIC PNL TOTAL CA: CPT | Performed by: EMERGENCY MEDICINE

## 2019-02-23 PROCEDURE — 70450 CT HEAD/BRAIN W/O DYE: CPT

## 2019-02-23 PROCEDURE — 96374 THER/PROPH/DIAG INJ IV PUSH: CPT

## 2019-02-23 RX ORDER — METOCLOPRAMIDE HYDROCHLORIDE 5 MG/ML
10 INJECTION INTRAMUSCULAR; INTRAVENOUS ONCE
Status: COMPLETED | OUTPATIENT
Start: 2019-02-23 | End: 2019-02-23

## 2019-02-23 RX ORDER — DIPHENHYDRAMINE HYDROCHLORIDE 50 MG/ML
25 INJECTION INTRAMUSCULAR; INTRAVENOUS ONCE
Status: COMPLETED | OUTPATIENT
Start: 2019-02-23 | End: 2019-02-23

## 2019-02-23 RX ADMIN — DIPHENHYDRAMINE HYDROCHLORIDE 25 MG: 50 INJECTION, SOLUTION INTRAMUSCULAR; INTRAVENOUS at 19:56

## 2019-02-23 RX ADMIN — METOCLOPRAMIDE 10 MG: 5 INJECTION, SOLUTION INTRAMUSCULAR; INTRAVENOUS at 19:56

## 2019-02-23 RX ADMIN — SODIUM CHLORIDE 1000 ML: 9 INJECTION, SOLUTION INTRAVENOUS at 19:54

## 2019-02-23 ASSESSMENT — ENCOUNTER SYMPTOMS
DIARRHEA: 0
NAUSEA: 0
VOMITING: 0
HEADACHES: 1
DIZZINESS: 0
BLOOD IN STOOL: 0

## 2019-02-23 NOTE — ED AVS SNAPSHOT
[facility] Emergency Department  [address]  [facility] [address]  Phone:  [phone]  Fax:  [phone]                                    Noa Mcgill   MRN: [id number]    Department:  [facility] Emergency Department   Date of Visit:  2/23/2019           After Visit Summary Signature Page    I have received my discharge instructions, and my questions have been answered. I have discussed any challenges I see with this plan with the nurse or doctor.    ..........................................................................................................................................  Patient/Patient Representative Signature      ..........................................................................................................................................  Patient Representative Print Name and Relationship to Patient    ..................................................               ................................................  Date                                   Time    ..........................................................................................................................................  Reviewed by Signature/Title    ...................................................              ..............................................  Date                                               Time          22EPIC Rev 08/18

## 2019-02-24 LAB — CORTIS SERPL-MCNC: 27.4 UG/DL (ref 4–22)

## 2019-02-24 NOTE — ED PROVIDER NOTES
History     Chief Complaint:  Loss of Consciousness    HPI   Noa Mcgill is a 43 year old female who presents to the emergency department today for evaluation after loss of consciousness. The patient's son reports that while playing monopoly the patient's eyes rolled back in her head and she fell to the ground. He told EMS there was faint, fine myoclonic jerking movements and foam coming from her mouth. There was no rigidity during this episode. He states her lips turned blue and he felt a faint pulse. The patient states when she woke up she recognized her son and felt nauseous and confused. EMS reports the patient was hypotensive en route in the 70's. EMS tried to sit her up when she developed a sense of lightheaded and dizzy which led to a recurrent episode of short lived syncope. Here in the ED the patient complains of a migraine, which she has a history of. The headache is mild, frontal, non-radiating and aggravated by the light. The patient denies dizziness, vomiting, diarrhea, chest pain, or palpitations. The patient report she was placed on a daily antibiotic 2 weeks ago after persistent UTIs.     Allergies:  Tnf Antagonists   Enbrel   Humira [Humira]   Ibuprofen   Prednisone   Compazine [Prochlorperazine]      Medications:    amphetamine-dextroamphetamine (ADDERALL)  aspirin   buPROPion (WELLBUTRIN SR)   butalbital-acetaminophen-caffeine (FIORICET/ESGIC)   cefdinir (OMNICEF)   etonogestrel-ethinyl estradiol (NUVARING)   rizatriptan (MAXALT)   senna-docusate (SENOKOT-S/PERICOLACE)  VITAMIN D  zolpidem (AMBIEN)     Past Medical History:    Adrenal insufficiency ([place]oud[place]'s disease)    Allergic rhinitis, cause unspecified   Antiplatelet or antithrombotic long-term use   Arthritis   ASD (atrial septal defect)   Attention deficit disorder without mention of hyperactivity   BRCA1 gene mutation positive   Cerebral infarction    Clotting disorder   Congenital heart disease   Depressive disorder   Endometriosis  of uterus   HERPES SIMPLEX NOS   Insomnia, unspecified   Migraine headaches   Other chronic sinusitis   Postoperative urinary retention   Psoriatic arthropathy   Recurrent UTI   Retinal artery occlusion     Past Surgical History:    APPENDECTOMY     SECTION    DILATION AND CURETTAGE SUCTION (x4)  bladder sling and complete hystorectomy  HERNIORRHAPHY INCISIONAL (LOCATION)   MASTECTOMY SIMPLE BILATERAL    (L) thumb fused  RECONSTRUCT BREAST    Family History:    - Mother: depression, alcoholism, hypertension, thyroid disease  - Father: substance abuse  - Maternal Grandmother: CAD, hypertension  - Sister; breast cancer  - Daughter: breast cancer  - Maternal aunt: breast cancer, ovarian cancer, thyroid cancer  - Maternal uncle: thyroid cancer    Social History:  The patient was accompanied to the ED by her son.  Smoking Status: Never Smoker  Smokeless Tobacco: Never Used  Alcohol Use: Positive   Marital Status:        Review of Systems   Gastrointestinal: Negative for blood in stool, diarrhea, nausea and vomiting.   Neurological: Positive for syncope and headaches. Negative for dizziness.   All other systems reviewed and are negative.    Physical Exam     Patient Vitals for the past 24 hrs:   BP Temp Temp src Pulse Heart Rate Resp SpO2   19 2100 116/69 -- -- 91 94 20 99 %   19 -- 98  F (36.7  C) Temporal -- -- -- --   19 123/82 -- -- 112 112 14 100 %      Physical Exam    GENERAL:  Patient appears in no obvious discomfort   HEENT:   External ears are normal.     Temporal arteries are non-tender.      Oropharynx is moist, without lesions or trismus.  Eyes:   PERRL.  EOMI.      No corneal clouding.   NECK:   Supple, no meningismus.       Negative Brudzinski's sign.  CV:    Regular rate and rhythm.    No murmurs, rubs or gallops.    2+ radial pulses bilateral    No unilateral leg swelling or peripheral edema.  PULM:   Clear to auscultation bilateral.      No respiratory distress.       No stridor or wheezing.  ABD:  Soft, non-tender, non-distended.      No pulsatile masses.      No rebound or guarding.  MSK:    No gross deformity to all four extremities.      No significant joint effusions.  LYMPH:  No cervical lymphadenopathy.  NEURO:  A & O x 3    CN II-XII intact, speech is clear with no aphasia.      Finger to nose within normal limits.  No pronator drift.      Strength is 5/5 in all 4 extremities.  Sensation is intact.      Normal muscular tone, no tremor.  SKIN:   Warm, dry and intact.    PSYCH:   Mood is good and affect is appropriate.      Emergency Department Course     ECG:  ECG taken at 1928, ECG read at 1937  Normal sinus rhythm  Normal ECG  Rate 98 bpm. ID interval 138 ms. QRS duration 90 ms. QT/QTc 374/477 ms. P-R-T axes 39 53 29.  No significant change when compared to EKG dated 5/21/18.     Imaging:  Radiology findings were communicated with the patient who voiced understanding of the findings.    Head CT w/o contrast  No acute pathology. No bleed, mass, or infarcts are seen.  ESTEPHANIE ALFARO MD  Reading per radiology     Laboratory:  Laboratory findings were communicated with the patient who voiced understanding of the findings.    CBC: WBC 9.5, HGB 12.5,   BMP: chloride 111, glucose 108, calcium 8.4 o/w WNL (Creatinine 1.01)  Cortisol: pending  Blood Gas venous and oxyhgb: pH 7.36, PCO2 41, PO2 17, Bicarbonate 23, oxyhgb 22    Interventions:  1954 NS 1000 mL IV  1956 Reglan 10 mg IV  1956 Benadryl 25 mg IV    Emergency Department Course:    ED Course as of Feb 23 2115   Sat Feb 23, 2019 1928 EKG obtained as noted above.     1933 I performed a thorough exam of the patient.     2014 IV was inserted and blood was drawn for laboratory testing, results above.     2018 The patient was sent for a head CT while in the emergency department, results above.      2115 I personally reviewed the laboratory and imaging results with the patient and answered all related questions prior  to discharge.          Impression & Plan      Medical Decision Making:  Noa Mcgill is a 43 year old female who presents to the emergency department today for evaluation of syncope.  She has no evidence of dysrhythmia or cardiac conduction defects noted on EKG.  No evidence of acute anemia, intravascular volume depletion or cardiac outflow obstruction.  She has no chest pain or shortness of breath to draw concern for aortic dissection, aortic aneurysm or PE.  She did have a mild headache reminiscent of previous migraine headaches although less severe.  Head CT undertaken which reveals no evidence of intracranial hemorrhage.  Low suspicion for subarachnoid hemorrhage.  Head CT taken with within 6 hours of symptoms thus likelihood of occult subarachnoid hemorrhage very low.  I do not feel that lumbar puncture is warranted at this time.  Her symptoms are most suggestive of vasovagal syncope.  She does report decreased oral intake which may have contributed to her symptoms.  Patient feels improved in the ED and was able to ambulate without any recurrence of symptoms.  She is safe for discharge home with close follow-up primary care physician.    Diagnosis:    ICD-10-CM    1. Syncope, unspecified syncope type R55      Disposition:   The patient is discharged to home.     Discharge Medications:  No discharge medications      Scribe Disclosure:  I, Val Olvera, am serving as a scribe at 7:44 PM on 2/23/2019 to document services personally performed by Hunter García MD based on my observations and the provider's statements to me.         [facility] EMERGENCY DEPARTMENT       Hunter García MD  02/23/19 [id number]

## 2019-02-24 NOTE — ED TRIAGE NOTES
Pt presents to ED via EMS from home after having a witnessed LOC with possible seizure-like activity. Pt was playing Monopoly with her children when son says pt's eyes suddenly rolled back and she fell back and was shaking on the ground with her eyes open. EMS states pt was alert and oriented on arrival although sleepy. Hypotensive initially with BP 70s systolic. EMS says pt almost passed out when went to sit up. Pt is now complaining of headache and light sensitivity- hx of migraines. A&Ox4.

## 2019-02-24 NOTE — ED TRIAGE NOTES
EMS also says pt was very diaphoretic and pt was nauseous.  EMS gave 4mg zofran and about 500mL of fluid PTA with improvement in BP and nausea

## 2019-02-25 LAB — INTERPRETATION ECG - MUSE: NORMAL

## 2019-03-11 ENCOUNTER — OFFICE VISIT (OUTPATIENT)
Dept: FAMILY MEDICINE | Facility: CLINIC | Age: 44
End: 2019-03-11
Payer: COMMERCIAL

## 2019-03-11 VITALS — BODY MASS INDEX: 32.95 KG/M2 | HEIGHT: 66 IN | WEIGHT: 205 LBS

## 2019-03-11 DIAGNOSIS — R40.20 LOSS OF CONSCIOUSNESS (H): Primary | ICD-10-CM

## 2019-03-11 PROCEDURE — 36415 COLL VENOUS BLD VENIPUNCTURE: CPT | Performed by: FAMILY MEDICINE

## 2019-03-11 PROCEDURE — 80076 HEPATIC FUNCTION PANEL: CPT | Performed by: FAMILY MEDICINE

## 2019-03-11 PROCEDURE — 99214 OFFICE O/P EST MOD 30 MIN: CPT | Performed by: FAMILY MEDICINE

## 2019-03-11 PROCEDURE — 84443 ASSAY THYROID STIM HORMONE: CPT | Performed by: FAMILY MEDICINE

## 2019-03-11 ASSESSMENT — MIFFLIN-ST. JEOR: SCORE: 1611.62

## 2019-03-11 NOTE — PROGRESS NOTES
SUBJECTIVE:   Noa Mcgill is a 43 year old female who presents to clinic today for the following health issues:    2 weeks ago she fainted without warning while playing monopoly.   She seemed shocked suddenly and her hand started to shake and then she fell right over.   Her eyes were open and she was convulsing and her mouth was foaming.  She was stiff.   It took about at least 2-3 minutes for her to start to come back to consciousness.   She was confused initially.       She got IV and her blood pressure was low.   She had a lump on her head from falling.   Her head CT was fine.   She had BMP and cortisol and CBC done and they were fine.   Her grandfather had seizure disorder but other than that she does not know of any family members who had seizures.       ED/UC Followup:    Facility:    Date of visit: 2/23/19  Reason for visit: Syncope   Current Status: stable       Past Medical History:   Diagnosis Date     Adrenal insufficiency (Hugh's disease) (H)     ACTH stim test failed to increase cortisol     Allergic rhinitis, cause unspecified      Antiplatelet or antithrombotic long-term use      Arthritis      ASD (atrial septal defect) 02/15/2013    ASD dx by echo, pt declined ASD closure     Attention deficit disorder without mention of hyperactivity 7/2/2014     BRCA1 gene mutation positive 1/24/2018    Reported by patient, no report available at this time Records requested from [facility] 1/23/18     Cerebral infarction (H)      Clotting disorder (H) birth    undefined clotting disorder - sees Dr. 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       Past Surgical History:   Procedure Laterality Date     APPENDECTOMY        SECTION  2013    Procedure:  SECTION;  Primary  Section;  Surgeon: Chad Hughes MD;  Location: [facility] L+D     DILATION AND CURETTAGE SUCTION N/A 2014    Procedure: DILATION AND CURETTAGE SUCTION;  Surgeon: Srini Martinez MD;  Location:  OR     DILATION AND CURETTAGE SUCTION N/A 2014    Procedure: DILATION AND CURETTAGE SUCTION;  Surgeon: Connor Kang MD;  Location:  OR     GENITOURINARY SURGERY      bladder sling and complete historectomy     HC DILATION/CURETTAGE DIAG/THER NON OB       HC DILATION/CURETTAGE DIAG/THER NON OB       HERNIORRHAPHY INCISIONAL (LOCATION) N/A 2018    Procedure: Incisional Hernia repair with Biologic mesh;  Surgeon: Suraj Bergeron MD;  Location:  OR     HYSTERECTOMY, [facility]  2016    [facility]     MASTECTOMY SIMPLE BILATERAL Bilateral 2018    Procedure: MASTECTOMY SIMPLE BILATERAL;  PROPHYLACTIC BILATERAL MASTECTOMY (GLADIS) BILATERAL BREAST RECONSTRUCTION [facility] TISSUE EXPANDERS (WILLOW)   ;  Surgeon: Suraj Bergeron MD;  Location:  OR     ORTHOPEDIC SURGERY      (L) thumb fused     RECONSTRUCT BREAST, INSERT TISSUE EXPANDER BILATERAL, COMBINED Bilateral 2018    expander and tram flap with drains - had weekly surgery to have I & D and removal of infected tissue     SURGICAL HISTORY OF -       left thumb fusion due to arthritis       MEDICATIONS:  Current Outpatient Medications   Medication     amphetamine-dextroamphetamine (ADDERALL) 10 MG tablet     ascorbic acid 250 MG TABS tablet     aspirin 325 MG EC tablet     buPROPion (WELLBUTRIN SR) 150 MG 12 hr tablet     buPROPion (WELLBUTRIN XL) 300 MG 24 hr tablet     butalbital-acetaminophen-caffeine (FIORICET/ESGIC) -40  MG per tablet     CRANBERRY CONCENTRATE PO     DHEA 50 MG TABS     etonogestrel-ethinyl estradiol (NUVARING) 0.12-0.015 MG/24HR vaginal ring     Ibuprofen (ADVIL PO)     Omega-3 Fatty Acids (FISH OIL) 1000 MG CPDR     rizatriptan (MAXALT) 10 MG tablet     senna-docusate (SENOKOT-S/PERICOLACE) 8.6-50 MG tablet     VITAMIN D PO     zolpidem (AMBIEN) 10 MG tablet     No current facility-administered medications for this visit.        SOCIAL HISTORY:  Social History     Tobacco Use     Smoking status: Never Smoker     Smokeless tobacco: Never Used   Substance Use Topics     Alcohol use: Yes     Alcohol/week: 0.0 oz     Comment: 1-3drink per week       Family History   Problem Relation Age of Onset     C.A.D. Maternal Grandmother      Hypertension Maternal Grandmother      Psychotic Disorder Mother         Depression, alcoholism     Diabetes Mother      Hereditary Breast and Ovarian Cancer Syndrome Mother         BRCA1+, no hx of cancer, s/p prophylactic surgery to remove breast tissue, ovaries, fallopian tubes     Hypertension Mother      Thyroid Disease Mother         thyroid nodules     Substance Abuse Father      Hereditary Breast and Ovarian Cancer Syndrome Sister         matrnal half sister, BRCA1+     Hereditary Breast and Ovarian Cancer Syndrome Daughter 23        BRCA1+     Breast Cancer Maternal Aunt 40        lumpectomy, then 2nd primary breast cancer later in 40s, treated with mastectomy     Hereditary Breast and Ovarian Cancer Syndrome Maternal Aunt         BRCA1+     Ovarian Cancer Maternal Aunt 70        surgry     Thyroid Cancer Maternal Aunt         medullary thyroid cancer     Hereditary Breast and Ovarian Cancer Syndrome Maternal Aunt         BRCA1+     Breast Cancer Other 41        maternal great aunt     Ovarian Cancer Other 45         in 40s     Thyroid Cancer Maternal Uncle 66        papillary thyroid cancer     Breast Cancer Cousin 45     Hereditary Breast and Ovarian Cancer Syndrome Cousin   "       BRCA1+     Breast Cancer Other 43        maternal great aunt     Other Cancer Other         ovarian cancer       Objective:  Height 1.676 m (5' 6\"), weight 93 kg (205 lb), last menstrual period 02/08/2015, not currently breastfeeding.  HEENT:  TM's are clear bilaterally.  Oropharynx is clear without tonsillar hypertrophy or exudate.  Conjuctiva are clear.  Neck:  There is no lymphadenopathy or thyroid tenderness or enlargement  Chest: Clear to auscultation bilaterally.  No wheezes, rales or retractions.  CV: Regular rate and rhythm without murmurs, rubs or gallops.  Neuro: CN 2 - 12 intact bilaterally, strength 5/5 throughout, reflexes symmetric, sensation to light touch equal bilaterally, finger nose finger intact, rapid alternating movements intact bilaterally, no pronater drift, Romberg is negative        Assessment:  1. loss of consciousness = sounds like seizure - generalized - she did bite tongue, could have be heart but sounds less like this      Plan:  1. Will refer to neuro and get EEG  2. See [facility] orders for labs  3. If work up is NEG will consider stress test and zio patch      "

## 2019-03-12 LAB
ALBUMIN SERPL-MCNC: 3.9 G/DL (ref 3.4–5)
ALP SERPL-CCNC: 59 U/L (ref 40–150)
ALT SERPL W P-5'-P-CCNC: 21 U/L (ref 0–50)
AST SERPL W P-5'-P-CCNC: 14 U/L (ref 0–45)
BILIRUB DIRECT SERPL-MCNC: <0.1 MG/DL (ref 0–0.2)
BILIRUB SERPL-MCNC: 0.2 MG/DL (ref 0.2–1.3)
PROT SERPL-MCNC: 7.1 G/DL (ref 6.8–8.8)
TSH SERPL DL<=0.005 MIU/L-ACNC: 2.63 MU/L (ref 0.4–4)

## 2019-04-02 ENCOUNTER — TRANSFERRED RECORDS (OUTPATIENT)
Dept: HEALTH INFORMATION MANAGEMENT | Facility: CLINIC | Age: 44
End: 2019-04-02

## 2019-04-06 ENCOUNTER — TRANSFERRED RECORDS (OUTPATIENT)
Dept: HEALTH INFORMATION MANAGEMENT | Facility: CLINIC | Age: 44
End: 2019-04-06

## 2019-04-10 ENCOUNTER — MYC MEDICAL ADVICE (OUTPATIENT)
Dept: FAMILY MEDICINE | Facility: CLINIC | Age: 44
End: 2019-04-10

## 2019-04-19 ENCOUNTER — HEALTH MAINTENANCE LETTER (OUTPATIENT)
Age: 44
End: 2019-04-19

## 2019-04-23 ENCOUNTER — TELEPHONE (OUTPATIENT)
Dept: FAMILY MEDICINE | Facility: CLINIC | Age: 44
End: 2019-04-23

## 2019-04-23 DIAGNOSIS — Z11.1 TUBERCULOSIS SCREENING: Primary | ICD-10-CM

## 2019-04-23 NOTE — TELEPHONE ENCOUNTER
Pt calls, class starts May 29. They require TB screen that does not  during the course.  Last quant gold 2018 expires before course is complete.     Offered nurse only PPD with [facility] read 48-72 hours later but this is difficult for pt (busy schedule) so she prefers lab only quantiferon tb gold plus.    She noted she will be seeing her gyn for annual exam soon.     Dr. Daley, t'd up lab order OK? Or OV PV or EV?   [phone] OK detailed message on VM.    Yoni Diaz RN

## 2019-04-26 PROBLEM — Z90.13 ACQUIRED ABSENCE OF BREAST AND ABSENT NIPPLE, BILATERAL: Status: ACTIVE | Noted: 2018-06-04

## 2019-04-26 PROBLEM — H34.11 CENTRAL RETINAL ARTERY OCCLUSION OF RIGHT EYE: Status: ACTIVE | Noted: 2018-06-30

## 2019-04-26 PROBLEM — Z79.620 INFLIXIMAB (REMICADE) LONG-TERM USE: Status: ACTIVE | Noted: 2018-06-30

## 2019-04-26 PROBLEM — Z15.01 BRCA GENE MUTATION POSITIVE: Status: ACTIVE | Noted: 2018-06-30

## 2019-04-26 PROBLEM — E34.9 ENDOCRINE DISORDER: Status: ACTIVE | Noted: 2018-06-28

## 2019-04-26 PROBLEM — E27.1 ADRENAL INSUFFICIENCY, PRIMARY (H): Status: ACTIVE | Noted: 2018-06-30

## 2019-04-26 PROBLEM — Z87.42 HISTORY OF ENDOMETRIOSIS: Status: ACTIVE | Noted: 2018-06-30

## 2019-04-26 PROBLEM — Z86.718 PERSONAL HISTORY OF VENOUS THROMBOSIS AND EMBOLUS: Status: ACTIVE | Noted: 2018-06-30

## 2019-04-26 PROBLEM — L40.52: Status: ACTIVE | Noted: 2018-06-30

## 2019-04-26 PROBLEM — I74.9 EMBOLISM AND THROMBOSIS OF UNSPECIFIED ARTERY (H): Status: ACTIVE | Noted: 2018-06-30

## 2019-04-26 PROBLEM — T81.328A: Status: ACTIVE | Noted: 2018-07-15

## 2019-04-26 PROBLEM — Z15.09 BRCA GENE MUTATION POSITIVE: Status: ACTIVE | Noted: 2018-06-30

## 2019-04-26 PROBLEM — Z90.710 S/P COMPLETE HYSTERECTOMY: Status: ACTIVE | Noted: 2018-06-30

## 2019-05-02 ENCOUNTER — OFFICE VISIT (OUTPATIENT)
Dept: CARDIOLOGY | Facility: CLINIC | Age: 44
End: 2019-05-02
Payer: COMMERCIAL

## 2019-05-02 VITALS
HEART RATE: 78 BPM | WEIGHT: 205 LBS | DIASTOLIC BLOOD PRESSURE: 70 MMHG | SYSTOLIC BLOOD PRESSURE: 110 MMHG | HEIGHT: 67 IN | BODY MASS INDEX: 32.18 KG/M2

## 2019-05-02 DIAGNOSIS — Q21.11 OSTIUM SECUNDUM TYPE ATRIAL SEPTAL DEFECT: Primary | ICD-10-CM

## 2019-05-02 PROCEDURE — 99215 OFFICE O/P EST HI 40 MIN: CPT | Performed by: INTERNAL MEDICINE

## 2019-05-02 ASSESSMENT — MIFFLIN-ST. JEOR: SCORE: 1614.56

## 2019-05-02 NOTE — PROGRESS NOTES
"Service Date: 05/02/2019      CARDIOLOGY NOTE      HISTORY OF PRESENT ILLNESS:  I had the pleasure of following up on our mutual patient, Noa Mcgill.  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  Please see my full consult note.  The last full note from me was 10/16/2018.  The patient has a \"stretched\" PFO or tunnel-like PFO ASD.  She probably has some undiagnosed hypercoagulable state.  She has indeed seen Dr. Sanchez for this and she has had 1 or 2 workups for hypercoagulable state with nothing found.  However, as we have outlined in her history, in 2013, she had a retinal artery occlusion following a pregnancy.  She had 5 pregnancies and 2 miscarriages.  She is not lupus anticoagulant syndrome positive.  There is a question of her right heart being slightly enlarged but again, the left-to-right portion of the shunt through the PFO is trivial.  We were going to do the PFO closure back in 2014 but she canceled that appointment.  After that, it turned out she was BRCA1 gene positive and had a family history of breast cancer.  She had elective bilateral mastectomy in 2018.  Unfortunately, she had significant issues with that.  She underwent a TRAM with flap reconstruction.  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  She also developed a venous thrombosis of the left TRAM flap, treated with tPA.  She was then placed on Lovenox.  She had problems with reconstructive failure of the flap.  She actually required blood transfusion during that procedure.  Eventually late last year, Dr. Bergeron was able to reconstruct her.  Of note, I believe she was indeed on Lovenox and heparin post surgery and also of note, there and also to my team, she was MRSA positive in her nose and I believe they used vancomycin as a perioperative antibiotic.  She had a remote history of psoriatic arthritis, treated with Remicade.  That is not a current issue.  " She had a transesophageal echo done with Dr. Mcrae but the patient woke up in the middle of the procedure and pulled out the tube, so it was not a complete study but I did review the study with her today.  She indeed has a stretch-type tunnel PFO with trivial left-to-right shunt.  They did not get to the bubble study but I believe a large-diameter [place] Cardioform device will work.  We went through yet again how the procedure is done and the risks and benefits including bleeding, infection, device migration, etc.  She is well aware of this.  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  I have given her the phone number of my nurse.  I have asked the patient to call our office in about June when she is more so on a potential date.  We will then find out what PFO dates are available for closure since we only do them a few days a month and then she will need to be seen by my nurse practitioner, Pennie, within 30 days of that PFO closure date.  A special note:  I would probably start her on the Plavix well in advance of the procedure and we will drop the aspirin down to 81 mg.  The patient will automatically be getting IV heparin during the procedure.  We will not give protamine post procedure which we sometimes do electively when we pull the sheath out, and also I think we will probably consider giving her vancomycin as a perioperative antibiotic and then we will think about a different oral rather than Keflex, perhaps Bactrim, etc.      Today's visit was [id number] minutes.  We again looked at the echo together.  We looked at all of her old studies together and we went through the timing.      As a completely separate issue, in 02/2019, she had a syncopal event.  Looking at the chart, she was seen in the ER and discharged.  It sounds, for all the world, to me like a small seizure and indeed she had been on Wellbutrin.  She is now off that medicine.  She takes Adderall p.r.n. but she was  not on Adderall at the time.  Both of those drugs can lower the seizure threshold.  We talked about the fact that she is not supposed to be driving a car because of this.  There is nothing cardiac-wise more to do.  She is not orthostatic.  She does not have orthostatic hypotension, although her blood pressure was a little bit low immediately post whatever the event was in February.  We have looked at her EKG.  It was unremarkable.  Other lab tests were fine.  Therefore, as far as I know, this is a one-time event, again suspicious for seizure by the description but I do not know that she actually had a neuro followup.  Today's visit was [id number] minutes, greater than 50% counseling.      Victoriano Mclaughlin MD       cc:   Sydnie Daley MD    [facility]    [id number] [place] [id number]      Suraj Bergeron MD    [facility]    [address]   [place] [address]      Stas Sanchez MD    [facility]   [address]   [place] [address]         VICTORIANO MCLAUGHLIN MD             D: 2019   T: 2019   MT: TG      Name:     REZA VINCENT   MRN:      [id number]        Account:      [id number]   :      1975           Service Date: 2019      Document: [id number]

## 2019-05-02 NOTE — LETTER
5/2/2019    Sydnie Daley MD  [address]  [facility] [address]    RE: Noa Mcgill       Dear Colleague,    I had the pleasure of seeing Noa Mcgill in the [facility].    HPI and Plan:   See dictation    No orders of the defined types were placed in this encounter.    No orders of the defined types were placed in this encounter.    Medications Discontinued During This Encounter   Medication Reason     phenazopyridine (PYRIDIUM) 200 MG tablet          No diagnosis found.    CURRENT MEDICATIONS:  Current Outpatient Medications   Medication Sig Dispense Refill     amphetamine-dextroamphetamine (ADDERALL) 10 MG tablet Take 1 tablet (10 mg) by mouth in AM and take 2 tablets (20 mg) by mouth in PM 90 tablet 0     ascorbic acid 250 MG TABS tablet Take 250 mg by mouth daily Take by mouth twice a day.       aspirin 325 MG EC tablet Take 325 mg by mouth daily Take 325 mg by mouth       butalbital-acetaminophen-caffeine (FIORICET/ESGIC) -40 MG per tablet Take 1 tablet by mouth every 4 hours as needed 30 tablet 1     CRANBERRY CONCENTRATE PO        DHEA 50 MG TABS Take 50 mg by mouth 1 tab QD       etonogestrel-ethinyl estradiol (NUVARING) 0.12-0.015 MG/24HR vaginal ring Place 1 each vaginally every 28 days       rizatriptan (MAXALT) 10 MG tablet TAKE 1 TABLET BY MOUTH ONCE, MAY REPEAT IN 2 HRS. MAX 30MG/24HRS  3     VITAMIN D PO Take 2,000 Units by mouth daily.       zolpidem (AMBIEN) 10 MG tablet Take 1 tablet (10 mg) by mouth nightly as needed for sleep 30 tablet 3     Ibuprofen (ADVIL PO) Take 600 mg by mouth 2 times daily as needed for moderate pain       Omega-3 Fatty Acids (FISH OIL) 1000 MG CPDR Take 1,000 mg by mouth daily       senna-docusate (SENOKOT-S/PERICOLACE) 8.6-50 MG tablet Take 1 tablet by mouth At Bedtime (Patient not taking: Reported on 5/2/2019) 100 tablet 0       ALLERGIES     Allergies   Allergen Reactions     Tnf Antagonists Hives     Enbrel Hives      Humira [Humira]      Rash hives     Ibuprofen Unknown     Patient states she was told not to take Ibuprofen due to bleeding disorder     Prednisone      Pt sensitive to prednisone--shakey heart racing - only with large doses     Compazine [Prochlorperazine] Anxiety     Shakey per H&P dated 2018  Shaky and anxiety       PAST MEDICAL HISTORY:  Past Medical History:   Diagnosis Date     Adrenal insufficiency (Hugh's disease) (H)     ACTH stim test failed to increase cortisol     Allergic rhinitis, cause unspecified      Antiplatelet or antithrombotic long-term use      Arthritis      ASD (atrial septal defect) 02/15/2013    ASD dx by echo, pt declined ASD closure     Attention deficit disorder without mention of hyperactivity 2014     BRCA1 gene mutation positive 2018    Reported by patient, no report available at this time Records requested from [id number] 18     Cerebral infarction (H)      Clotting disorder (H) birth    undefined clotting disorder - sees Dr. 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       PAST SURGICAL HISTORY:  Past Surgical History:   Procedure Laterality Date     APPENDECTOMY  2006      SECTION  2013    Procedure:  SECTION;  Primary  Section;  Surgeon: Chad Hughes MD;  Location: [facility] L+D     DILATION AND CURETTAGE SUCTION N/A 2014    Procedure: DILATION AND CURETTAGE SUCTION;  Surgeon: Srini Martinez MD;  Location: [facility] OR     DILATION  AND CURETTAGE SUCTION N/A 12/20/2014    Procedure: DILATION AND CURETTAGE SUCTION;  Surgeon: Connor Kang MD;  Location: [facility] OR     GENITOURINARY SURGERY  2016    bladder sling and complete historectomy     HC DILATION/CURETTAGE DIAG/THER NON OB  1998     HC DILATION/CURETTAGE DIAG/THER NON OB  2006     HERNIORRHAPHY INCISIONAL (LOCATION) N/A 12/7/2018    Procedure: Incisional Hernia repair with Biologic mesh;  Surgeon: Suraj Bergeron MD;  Location:  OR     HYSTERECTOMY, [facility]  04/18/2016    [facility]     MASTECTOMY SIMPLE BILATERAL Bilateral 5/25/2018    Procedure: MASTECTOMY SIMPLE BILATERAL;  PROPHYLACTIC BILATERAL MASTECTOMY (GLADIS) BILATERAL BREAST RECONSTRUCTION [facility] TISSUE EXPANDERS (WILLOW)   ;  Surgeon: Suraj Bergeron MD;  Location:  OR     ORTHOPEDIC SURGERY      (L) thumb fused     RECONSTRUCT BREAST, INSERT TISSUE EXPANDER BILATERAL, COMBINED Bilateral 5/25/2018    expander and tram flap with drains - had weekly surgery to have I & D and removal of infected tissue     SURGICAL HISTORY OF -   2006    left thumb fusion due to arthritis       FAMILY HISTORY:  Family History   Problem Relation Age of Onset     C.A.D. Maternal Grandmother      Hypertension Maternal Grandmother      Psychotic Disorder Mother         Depression, alcoholism     Diabetes Mother      Hereditary Breast and Ovarian Cancer Syndrome Mother         BRCA1+, no hx of cancer, s/p prophylactic surgery to remove breast tissue, ovaries, fallopian tubes     Hypertension Mother      Thyroid Disease Mother         thyroid nodules     Substance Abuse Father      Hereditary Breast and Ovarian Cancer Syndrome Sister         matrnal half sister, BRCA1+     Hereditary Breast and Ovarian Cancer Syndrome Daughter 23        BRCA1+     Breast Cancer Maternal Aunt 40        lumpectomy, then 2nd primary breast cancer later in 40s, treated with mastectomy     Hereditary Breast and Ovarian Cancer Syndrome Maternal Aunt         BRCA1+      Ovarian Cancer Maternal Aunt 70        surgry     Thyroid Cancer Maternal Aunt         medullary thyroid cancer     Hereditary Breast and Ovarian Cancer Syndrome Maternal Aunt         BRCA1+     Breast Cancer Other 41        maternal great aunt     Ovarian Cancer Other 45         in 40s     Thyroid Cancer Maternal Uncle 66        papillary thyroid cancer     Breast Cancer Cousin 45     Hereditary Breast and Ovarian Cancer Syndrome Cousin         BRCA1+     Breast Cancer Other 43        maternal great aunt     Other Cancer Other         ovarian cancer       SOCIAL HISTORY:  Social History     Socioeconomic History     Marital status:      Spouse name: Ciro     Number of children: 3     Years of education: 14     Highest education level: None   Occupational History     Occupation: LPN     Employer: [employer]     Employer: [employer]   Social Needs     Financial resource strain: None     Food insecurity:     Worry: None     Inability: None     Transportation needs:     Medical: None     Non-medical: None   Tobacco Use     Smoking status: Never Smoker     Smokeless tobacco: Never Used   Substance and Sexual Activity     Alcohol use: Yes     Alcohol/week: 0.0 oz     Comment: 1-3drink per week     Drug use: No     Sexual activity: Yes     Partners: Male     Birth control/protection: Female Surgical   Lifestyle     Physical activity:     Days per week: None     Minutes per session: None     Stress: None   Relationships     Social connections:     Talks on phone: None     Gets together: None     Attends Faith service: None     Active member of club or organization: None     Attends meetings of clubs or organizations: None     Relationship status: None     Intimate partner violence:     Fear of current or ex partner: None     Emotionally abused: None     Physically abused: None     Forced sexual activity: None   Other Topics Concern     Parent/sibling w/ CABG, MI or angioplasty before 65F  "55M? No   Social History Narrative     None       Review of Systems:  Skin:  Negative for     Eyes:  Negative for    ENT:  Negative for    Respiratory:  Negative for    Cardiovascular:    palpitations;Positive for  Gastroenterology: Positive for heartburn  Genitourinary:  not assessed    Musculoskeletal:       Neurologic:  Positive for migraine headaches  Psychiatric:  Positive for sleep disturbances;anxiety  Heme/Lymph/Imm:  Negative for    Endocrine:         Physical Exam:  Vitals: /70   Pulse 78   Ht 1.689 m (5' 6.5\")   Wt 93 kg (205 lb)   LMP 02/08/2015 (Within Months)   BMI 32.59 kg/m       Constitutional:           Skin:             Head:           Eyes:           Lymph:      ENT:           Neck:           Respiratory:            Cardiac:                                                           GI:           Extremities and Muscular Skeletal:                 Neurological:           Psych:         Recent Lab Results:  LIPID RESULTS:  Lab Results   Component Value Date    CHOL 188 02/10/2018    HDL 58 02/10/2018    LDL 84 02/10/2018    TRIG 231 (H) 02/10/2018    CHOLHDLRATIO 2.7 05/08/2014       LIVER ENZYME RESULTS:  Lab Results   Component Value Date    AST 14 03/11/2019    ALT 21 03/11/2019       CBC RESULTS:  Lab Results   Component Value Date    WBC 9.5 02/23/2019    RBC 4.77 02/23/2019    HGB 12.5 02/23/2019    HCT 39.9 02/23/2019    MCV 84 02/23/2019    MCH 26.2 (L) 02/23/2019    MCHC 31.3 (L) 02/23/2019    RDW 13.8 02/23/2019     02/23/2019       BMP RESULTS:  Lab Results   Component Value Date     02/23/2019    POTASSIUM 4.0 02/23/2019    CHLORIDE 111 (H) 02/23/2019    CO2 22 02/23/2019    ANIONGAP 8 02/23/2019     (H) 02/23/2019    BUN 8 02/23/2019    CR 1.01 02/23/2019    GFRESTIMATED 69 02/23/2019    GFRESTBLACK 80 02/23/2019    KENDRICK 8.4 (L) 02/23/2019        A1C RESULTS:  Lab Results   Component Value Date    A1C 5.2 05/08/2014       INR RESULTS:  Lab Results "   Component Value Date    INR 0.92 05/21/2013    INR 0.94 04/22/2013           CC  Sydnie Daley MD  [address] [place] [address]    Thank you for allowing me to participate in the care of your patient.      Sincerely,     Victoriano Cornejo MD     [facility]    cc:   Sydnie Daley MD  [address] [place] [address]

## 2019-05-02 NOTE — LETTER
"5/2/2019       Sydnie Daley MD  [address]  [facility] [address]      RE: Noa Mcgill       Dear Colleague,    I had the pleasure of seeing Noa Mcgill in the [facility].    Service Date: 05/02/2019      CARDIOLOGY NOTE      HISTORY OF PRESENT ILLNESS:  I had the pleasure of following up on our mutual patient, Noa Mcgill.  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  Please see my full consult note.  The last full note from me was 10/16/2018.  The patient has a \"stretched\" PFO or tunnel-like PFO ASD.  She probably has some undiagnosed hypercoagulable state.  She has indeed seen Dr. Sanchez for this and she has had 1 or 2 workups for hypercoagulable state with nothing found.  However, as we have outlined in her history, in 2013, she had a retinal artery occlusion following a pregnancy.  She had 5 pregnancies and 2 miscarriages.  She is not lupus anticoagulant syndrome positive.  There is a question of her right heart being slightly enlarged but again, the left-to-right portion of the shunt through the PFO is trivial.  We were going to do the PFO closure back in 2014 but she canceled that appointment.  After that, it turned out she was BRCA1 gene positive and had a family history of breast cancer.  She had elective bilateral mastectomy in 2018.  Unfortunately, she had significant issues with that.  She underwent a TRAM with flap reconstruction.  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  She also developed a venous thrombosis of the left TRAM flap, treated with tPA.  She was then placed on Lovenox.  She had problems with reconstructive failure of the flap.  She actually required blood transfusion during that procedure.  Eventually late last year, Dr. Bergeron was able to reconstruct her.  Of note, I believe she was indeed on Lovenox and heparin post surgery and also of note, there and " also to my team, she was MRSA positive in her nose and I believe they used vancomycin as a perioperative antibiotic.  She had a remote history of psoriatic arthritis, treated with Remicade.  That is not a current issue.  She had a transesophageal echo done with Dr. Mcrae but the patient woke up in the middle of the procedure and pulled out the tube, so it was not a complete study but I did review the study with her today.  She indeed has a stretch-type tunnel PFO with trivial left-to-right shunt.  They did not get to the bubble study but I believe a large-diameter [place] Cardioform device will work.  We went through yet again how the procedure is done and the risks and benefits including bleeding, infection, device migration, etc.  She is well aware of this.  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  I have given her the phone number of my nurse.  I have asked the patient to call our office in about June when she is more so on a potential date.  We will then find out what PFO dates are available for closure since we only do them a few days a month and then she will need to be seen by my nurse practitioner, Pennie, within 30 days of that PFO closure date.  A special note:  I would probably start her on the Plavix well in advance of the procedure and we will drop the aspirin down to 81 mg.  The patient will automatically be getting IV heparin during the procedure.  We will not give protamine post procedure which we sometimes do electively when we pull the sheath out, and also I think we will probably consider giving her vancomycin as a perioperative antibiotic and then we will think about a different oral rather than Keflex, perhaps Bactrim, etc.      Today's visit was [id number] minutes.  We again looked at the echo together.  We looked at all of her old studies together and we went through the timing.      As a completely separate issue, in 02/2019, she had a syncopal event.  Looking  at the chart, she was seen in the ER and discharged.  It sounds, for all the world, to me like a small seizure and indeed she had been on Wellbutrin.  She is now off that medicine.  She takes Adderall p.r.n. but she was not on Adderall at the time.  Both of those drugs can lower the seizure threshold.  We talked about the fact that she is not supposed to be driving a car because of this.  There is nothing cardiac-wise more to do.  She is not orthostatic.  She does not have orthostatic hypotension, although her blood pressure was a little bit low immediately post whatever the event was in February.  We have looked at her EKG.  It was unremarkable.  Other lab tests were fine.  Therefore, as far as I know, this is a one-time event, again suspicious for seizure by the description but I do not know that she actually had a neuro followup.  Today's visit was [id number] minutes, greater than 50% counseling.      Victoriano Mclaughlin MD       cc:   Sydnie Daley MD    [facility]    [address] [place] [address]      Suraj Bergeron MD    [facility]    [address]   [place] [address]      Stas Sanchez MD    [facility]   [address]   [place] [address]         VICTORIANO MCLAUGHLIN MD             D: 2019   T: 2019   MT: DW      Name:     REZA VINCENT   MRN:      [id number]        Account:      [id number]   :      1975           Service Date: 2019      Document: [id number]           Outpatient Encounter Medications as of 2019   Medication Sig Dispense Refill     amphetamine-dextroamphetamine (ADDERALL) 10 MG tablet Take 1 tablet (10 mg) by mouth in AM and take 2 tablets (20 mg) by mouth in PM 90 tablet 0     ascorbic acid 250 MG TABS tablet Take 250 mg by mouth daily Take by mouth twice a day.       aspirin 325 MG EC tablet Take 325 mg by mouth daily Take 325 mg by mouth       butalbital-acetaminophen-caffeine  (FIORICET/ESGIC) -40 MG per tablet Take 1 tablet by mouth every 4 hours as needed 30 tablet 1     CRANBERRY CONCENTRATE PO        DHEA 50 MG TABS Take 50 mg by mouth 1 tab QD       etonogestrel-ethinyl estradiol (NUVARING) 0.12-0.015 MG/24HR vaginal ring Place 1 each vaginally every 28 days       rizatriptan (MAXALT) 10 MG tablet TAKE 1 TABLET BY MOUTH ONCE, MAY REPEAT IN 2 HRS. MAX 30MG/24HRS  3     VITAMIN D PO Take 2,000 Units by mouth daily.       zolpidem (AMBIEN) 10 MG tablet Take 1 tablet (10 mg) by mouth nightly as needed for sleep 30 tablet 3     Ibuprofen (ADVIL PO) Take 600 mg by mouth 2 times daily as needed for moderate pain       Omega-3 Fatty Acids (FISH OIL) 1000 MG CPDR Take 1,000 mg by mouth daily       senna-docusate (SENOKOT-S/PERICOLACE) 8.6-50 MG tablet Take 1 tablet by mouth At Bedtime (Patient not taking: Reported on 5/2/2019) 100 tablet 0     [DISCONTINUED] phenazopyridine (PYRIDIUM) 200 MG tablet Take 1 tablet (200 mg) by mouth 3 times daily for 3 days As needed for bladder spasm 9 tablet 0     No facility-administered encounter medications on file as of 5/2/2019.        Again, thank you for allowing me to participate in the care of your patient.      Sincerely,    Victoriano Cornejo MD     [facility]

## 2019-05-02 NOTE — PROGRESS NOTES
HPI and Plan:   See dictation    No orders of the defined types were placed in this encounter.    No orders of the defined types were placed in this encounter.    Medications Discontinued During This Encounter   Medication Reason     phenazopyridine (PYRIDIUM) 200 MG tablet          No diagnosis found.    CURRENT MEDICATIONS:  Current Outpatient Medications   Medication Sig Dispense Refill     amphetamine-dextroamphetamine (ADDERALL) 10 MG tablet Take 1 tablet (10 mg) by mouth in AM and take 2 tablets (20 mg) by mouth in PM 90 tablet 0     ascorbic acid 250 MG TABS tablet Take 250 mg by mouth daily Take by mouth twice a day.       aspirin 325 MG EC tablet Take 325 mg by mouth daily Take 325 mg by mouth       butalbital-acetaminophen-caffeine (FIORICET/ESGIC) -40 MG per tablet Take 1 tablet by mouth every 4 hours as needed 30 tablet 1     CRANBERRY CONCENTRATE PO        DHEA 50 MG TABS Take 50 mg by mouth 1 tab QD       etonogestrel-ethinyl estradiol (NUVARING) 0.12-0.015 MG/24HR vaginal ring Place 1 each vaginally every 28 days       rizatriptan (MAXALT) 10 MG tablet TAKE 1 TABLET BY MOUTH ONCE, MAY REPEAT IN 2 HRS. MAX 30MG/24HRS  3     VITAMIN D PO Take 2,000 Units by mouth daily.       zolpidem (AMBIEN) 10 MG tablet Take 1 tablet (10 mg) by mouth nightly as needed for sleep 30 tablet 3     Ibuprofen (ADVIL PO) Take 600 mg by mouth 2 times daily as needed for moderate pain       Omega-3 Fatty Acids (FISH OIL) 1000 MG CPDR Take 1,000 mg by mouth daily       senna-docusate (SENOKOT-S/PERICOLACE) 8.6-50 MG tablet Take 1 tablet by mouth At Bedtime (Patient not taking: Reported on 5/2/2019) 100 tablet 0       ALLERGIES     Allergies   Allergen Reactions     Tnf Antagonists Hives     Enbrel Hives     Humira [Humira]      Rash hives     Ibuprofen Unknown     Patient states she was told not to take Ibuprofen due to bleeding disorder     Prednisone      Pt sensitive to prednisone--shakey heart racing - only with  large doses     Compazine [Prochlorperazine] Anxiety     Shakey per H&P dated 2018  Shaky and anxiety       PAST MEDICAL HISTORY:  Past Medical History:   Diagnosis Date     Adrenal insufficiency (Boone's disease) (H)     ACTH stim test failed to increase cortisol     Allergic rhinitis, cause unspecified      Antiplatelet or antithrombotic long-term use      Arthritis      ASD (atrial septal defect) 02/15/2013    ASD dx by echo, pt declined ASD closure     Attention deficit disorder without mention of hyperactivity 2014     BRCA1 gene mutation positive 2018    Reported by patient, no report available at this time Records requested from [facility]     Cerebral infarction (H)      Clotting disorder (H) birth    undefined clotting disorder - sees Dr. 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       PAST SURGICAL HISTORY:  Past Surgical History:   Procedure Laterality Date     APPENDECTOMY  2006      SECTION  2013    Procedure:  SECTION;  Primary  Section;  Surgeon: Chad Hughes MD;  Location:  L+D     DILATION AND CURETTAGE SUCTION N/A 2014    Procedure: DILATION AND CURETTAGE SUCTION;  Surgeon: Srini Martinez MD;  Location:  OR     DILATION AND CURETTAGE SUCTION N/A 2014    Procedure: DILATION AND CURETTAGE SUCTION;  Surgeon: Connor Kang MD;  Location:  OR     GENITOURINARY SURGERY  2016    bladder sling and complete historectomy     HC  DILATION/CURETTAGE DIAG/THER NON OB  1998     HC DILATION/CURETTAGE DIAG/THER NON OB  2006     HERNIORRHAPHY INCISIONAL (LOCATION) N/A 12/7/2018    Procedure: Incisional Hernia repair with Biologic mesh;  Surgeon: Suraj Bergeron MD;  Location:  OR     HYSTERECTOMY, [facility]  04/18/2016    [facility]     MASTECTOMY SIMPLE BILATERAL Bilateral 5/25/2018    Procedure: MASTECTOMY SIMPLE BILATERAL;  PROPHYLACTIC BILATERAL MASTECTOMY (GLADIS) BILATERAL BREAST RECONSTRUCTION [facility] TISSUE EXPANDERS (WILLOW)   ;  Surgeon: Suraj Bergeron MD;  Location:  OR     ORTHOPEDIC SURGERY      (L) thumb fused     RECONSTRUCT BREAST, INSERT TISSUE EXPANDER BILATERAL, COMBINED Bilateral 5/25/2018    expander and tram flap with drains - had weekly surgery to have I & D and removal of infected tissue     SURGICAL HISTORY OF -   2006    left thumb fusion due to arthritis       FAMILY HISTORY:  Family History   Problem Relation Age of Onset     C.A.D. Maternal Grandmother      Hypertension Maternal Grandmother      Psychotic Disorder Mother         Depression, alcoholism     Diabetes Mother      Hereditary Breast and Ovarian Cancer Syndrome Mother         BRCA1+, no hx of cancer, s/p prophylactic surgery to remove breast tissue, ovaries, fallopian tubes     Hypertension Mother      Thyroid Disease Mother         thyroid nodules     Substance Abuse Father      Hereditary Breast and Ovarian Cancer Syndrome Sister         matrnal half sister, BRCA1+     Hereditary Breast and Ovarian Cancer Syndrome Daughter 23        BRCA1+     Breast Cancer Maternal Aunt 40        lumpectomy, then 2nd primary breast cancer later in 40s, treated with mastectomy     Hereditary Breast and Ovarian Cancer Syndrome Maternal Aunt         BRCA1+     Ovarian Cancer Maternal Aunt 70        surgry     Thyroid Cancer Maternal Aunt         medullary thyroid cancer     Hereditary Breast and Ovarian Cancer Syndrome Maternal Aunt         BRCA1+     Breast Cancer  Other 41        maternal great aunt     Ovarian Cancer Other 45         in 40s     Thyroid Cancer Maternal Uncle 66        papillary thyroid cancer     Breast Cancer Cousin 45     Hereditary Breast and Ovarian Cancer Syndrome Cousin         BRCA1+     Breast Cancer Other 43        maternal great aunt     Other Cancer Other         ovarian cancer       SOCIAL HISTORY:  Social History     Socioeconomic History     Marital status:      Spouse name: Ciro     Number of children: 3     Years of education: 14     Highest education level: None   Occupational History     Occupation: LPN     Employer: [employer]     Employer: [employer]   Social Needs     Financial resource strain: None     Food insecurity:     Worry: None     Inability: None     Transportation needs:     Medical: None     Non-medical: None   Tobacco Use     Smoking status: Never Smoker     Smokeless tobacco: Never Used   Substance and Sexual Activity     Alcohol use: Yes     Alcohol/week: 0.0 oz     Comment: 1-3drink per week     Drug use: No     Sexual activity: Yes     Partners: Male     Birth control/protection: Female Surgical   Lifestyle     Physical activity:     Days per week: None     Minutes per session: None     Stress: None   Relationships     Social connections:     Talks on phone: None     Gets together: None     Attends Restorationism service: None     Active member of club or organization: None     Attends meetings of clubs or organizations: None     Relationship status: None     Intimate partner violence:     Fear of current or ex partner: None     Emotionally abused: None     Physically abused: None     Forced sexual activity: None   Other Topics Concern     Parent/sibling w/ CABG, MI or angioplasty before 65F 55M? No   Social History Narrative     None       Review of Systems:  Skin:  Negative for     Eyes:  Negative for    ENT:  Negative for    Respiratory:  Negative for    Cardiovascular:    palpitations;Positive  "for  Gastroenterology: Positive for heartburn  Genitourinary:  not assessed    Musculoskeletal:       Neurologic:  Positive for migraine headaches  Psychiatric:  Positive for sleep disturbances;anxiety  Heme/Lymph/Imm:  Negative for    Endocrine:         Physical Exam:  Vitals: /70   Pulse 78   Ht 1.689 m (5' 6.5\")   Wt 93 kg (205 lb)   LMP 02/08/2015 (Within Months)   BMI 32.59 kg/m      Constitutional:           Skin:             Head:           Eyes:           Lymph:      ENT:           Neck:           Respiratory:            Cardiac:                                                           GI:           Extremities and Muscular Skeletal:                 Neurological:           Psych:         Recent Lab Results:  LIPID RESULTS:  Lab Results   Component Value Date    CHOL 188 02/10/2018    HDL 58 02/10/2018    LDL 84 02/10/2018    TRIG 231 (H) 02/10/2018    CHOLHDLRATIO 2.7 05/08/2014       LIVER ENZYME RESULTS:  Lab Results   Component Value Date    AST 14 03/11/2019    ALT 21 03/11/2019       CBC RESULTS:  Lab Results   Component Value Date    WBC 9.5 02/23/2019    RBC 4.77 02/23/2019    HGB 12.5 02/23/2019    HCT 39.9 02/23/2019    MCV 84 02/23/2019    MCH 26.2 (L) 02/23/2019    MCHC 31.3 (L) 02/23/2019    RDW 13.8 02/23/2019     02/23/2019       BMP RESULTS:  Lab Results   Component Value Date     02/23/2019    POTASSIUM 4.0 02/23/2019    CHLORIDE 111 (H) 02/23/2019    CO2 22 02/23/2019    ANIONGAP 8 02/23/2019     (H) 02/23/2019    BUN 8 02/23/2019    CR 1.01 02/23/2019    GFRESTIMATED 69 02/23/2019    GFRESTBLACK 80 02/23/2019    KENDRICK 8.4 (L) 02/23/2019        A1C RESULTS:  Lab Results   Component Value Date    A1C 5.2 05/08/2014       INR RESULTS:  Lab Results   Component Value Date    INR 0.92 05/21/2013    INR 0.94 04/22/2013           CC  Sydnie Daley [place]  [address] [place] [address]  "

## 2019-05-22 DIAGNOSIS — Z11.1 TUBERCULOSIS SCREENING: ICD-10-CM

## 2019-05-22 PROCEDURE — 36415 COLL VENOUS BLD VENIPUNCTURE: CPT | Performed by: FAMILY MEDICINE

## 2019-05-22 PROCEDURE — 86481 TB AG RESPONSE T-CELL SUSP: CPT | Performed by: FAMILY MEDICINE

## 2019-05-24 LAB
GAMMA INTERFERON BACKGROUND BLD IA-ACNC: 0.04 IU/ML
M TB IFN-G BLD-IMP: NEGATIVE
M TB IFN-G CD4+ BCKGRND COR BLD-ACNC: >10 IU/ML
MITOGEN IGNF BCKGRD COR BLD-ACNC: 0 IU/ML
MITOGEN IGNF BCKGRD COR BLD-ACNC: 0 IU/ML

## 2019-05-28 DIAGNOSIS — G47.00 INSOMNIA, UNSPECIFIED TYPE: ICD-10-CM

## 2019-05-29 NOTE — TELEPHONE ENCOUNTER
Requested Prescriptions   Pending Prescriptions Disp Refills     zolpidem (AMBIEN) 10 MG tablet [Pharmacy Med Name: ZOLPIDEM TARTRATE 10MG TABS] 30 tablet 3     Sig: TAKE ONE TABLET BY MOUTH NIGHTLY AS NEEDED FOR SLEEP       There is no refill protocol information for this order        zolpidem (AMBIEN) 10 MG tablet     Last Written Prescription Date: 5/21/18   Last Fill Quantity: 30 tablet,   # refills: 3  Last Office Visit: 3/11/19 Neri  Future Office visit:       Routing refill request to provider for review/approval because:  Drug not on the [facility], P or [facility] refill protocol or controlled substance

## 2019-05-30 ENCOUNTER — HOSPITAL ENCOUNTER (EMERGENCY)
Facility: CLINIC | Age: 44
Discharge: HOME | End: 2019-05-30
Attending: EMERGENCY MEDICINE | Admitting: EMERGENCY MEDICINE
Payer: COMMERCIAL

## 2019-05-30 VITALS
TEMPERATURE: 98.4 F | DIASTOLIC BLOOD PRESSURE: 76 MMHG | HEART RATE: 82 BPM | OXYGEN SATURATION: 98 % | SYSTOLIC BLOOD PRESSURE: 126 MMHG | RESPIRATION RATE: 16 BRPM

## 2019-05-30 DIAGNOSIS — N39.0 UTI (URINARY TRACT INFECTION) WITH PYURIA: ICD-10-CM

## 2019-05-30 LAB
ALBUMIN UR-MCNC: 30 MG/DL
APPEARANCE UR: ABNORMAL
BACTERIA #/AREA URNS HPF: ABNORMAL /HPF
BILIRUB UR QL STRIP: NEGATIVE
COLOR UR AUTO: YELLOW
GLUCOSE UR STRIP-MCNC: NEGATIVE MG/DL
HCG UR QL: NEGATIVE
HGB UR QL STRIP: ABNORMAL
KETONES UR STRIP-MCNC: ABNORMAL MG/DL
LEUKOCYTE ESTERASE UR QL STRIP: ABNORMAL
MUCOUS THREADS #/AREA URNS LPF: PRESENT /LPF
NITRATE UR QL: NEGATIVE
PH UR STRIP: 5.5 PH (ref 5–7)
RBC #/AREA URNS AUTO: 83 /HPF (ref 0–2)
SOURCE: ABNORMAL
SP GR UR STRIP: 1.03 (ref 1–1.03)
SQUAMOUS #/AREA URNS AUTO: 3 /HPF (ref 0–1)
UROBILINOGEN UR STRIP-MCNC: NORMAL MG/DL (ref 0–2)
WBC #/AREA URNS AUTO: >182 /HPF (ref 0–5)
WBC CLUMPS #/AREA URNS HPF: PRESENT /HPF

## 2019-05-30 PROCEDURE — 81001 URINALYSIS AUTO W/SCOPE: CPT | Performed by: EMERGENCY MEDICINE

## 2019-05-30 PROCEDURE — 87186 SC STD MICRODIL/AGAR DIL: CPT | Performed by: EMERGENCY MEDICINE

## 2019-05-30 PROCEDURE — 81025 URINE PREGNANCY TEST: CPT | Performed by: EMERGENCY MEDICINE

## 2019-05-30 PROCEDURE — 87088 URINE BACTERIA CULTURE: CPT | Performed by: EMERGENCY MEDICINE

## 2019-05-30 PROCEDURE — 87086 URINE CULTURE/COLONY COUNT: CPT | Performed by: EMERGENCY MEDICINE

## 2019-05-30 PROCEDURE — 99283 EMERGENCY DEPT VISIT LOW MDM: CPT

## 2019-05-30 RX ORDER — NITROFURANTOIN 25; 75 MG/1; MG/1
100 CAPSULE ORAL 2 TIMES DAILY
Qty: 20 CAPSULE | Refills: 0 | Status: SHIPPED | OUTPATIENT
Start: 2019-05-30 | End: 2019-07-12

## 2019-05-30 RX ORDER — ZOLPIDEM TARTRATE 10 MG/1
TABLET ORAL
Qty: 30 TABLET | Refills: 3 | Status: SHIPPED | OUTPATIENT
Start: 2019-05-30 | End: 2019-09-09

## 2019-05-30 RX ORDER — PHENAZOPYRIDINE HYDROCHLORIDE 200 MG/1
200 TABLET, FILM COATED ORAL 3 TIMES DAILY
Qty: 9 TABLET | Refills: 0 | Status: SHIPPED | OUTPATIENT
Start: 2019-05-30 | End: 2019-07-12

## 2019-05-30 ASSESSMENT — ENCOUNTER SYMPTOMS
ABDOMINAL PAIN: 1
CHILLS: 0
DYSURIA: 1
FLANK PAIN: 0
VOMITING: 0
FEVER: 0

## 2019-05-30 NOTE — ED AVS SNAPSHOT
[facility] Emergency Department  [address]  [facility] [address]  Phone:  [phone]  Fax:  [phone]                                    Noa Mcgill   MRN: [id number]    Department:  [facility] Emergency Department   Date of Visit:  5/30/2019           After Visit Summary Signature Page    I have received my discharge instructions, and my questions have been answered. I have discussed any challenges I see with this plan with the nurse or doctor.    ..........................................................................................................................................  Patient/Patient Representative Signature      ..........................................................................................................................................  Patient Representative Print Name and Relationship to Patient    ..................................................               ................................................  Date                                   Time    ..........................................................................................................................................  Reviewed by Signature/Title    ...................................................              ..............................................  Date                                               Time          22EPIC Rev 08/18

## 2019-05-31 NOTE — DISCHARGE INSTRUCTIONS
Discharge Instructions  Urinary Tract Infection  You have urinary tract infection, or UTI. The urinary tract includes the kidneys (which make urine), ureters (the tubes that carry urine from the kidneys to the bladder), the bladder (which stores urine), and urethra (the tube that carries urine out of the bladder).  Urinary tract infections occur when bacteria travel up the urethra into the bladder. We suspect a UTI based on chemical and microscopic findings in your urine, but if there is a question about your findings, we will do a culture to see if bacteria grow. A urine culture takes several days. You should always follow-up with your primary physician to find out about results of your culture if one was done.   Return to the Emergency Department if:  You have severe back pain.  You are vomiting so that you can?t take your medicine, or have signs of dehydration (such as urinating less than 3 times per day).  You have fever over 101.5 degrees F.  You have significant confusion or are very weak, or feel very ill.  Your child seems much more ill, won?t wake up, won?t respond right, or is crying for a long time and won?t calm down.  Your child is showing signs of dehydration, Signs of dehydration can be:  Your infant has had no wet diapers in 4-5 hours.  Your older child has not passed urine in 6-8 hours.  Your infant or child starts to have dry mouth and lips, or no saliva or tears.    Follow-up with your doctor:   Children under 24 months need to be seen by their regular doctor within one week after a diagnosis of a UTI. It may be necessary to do some imaging tests to look at the child?s kidney or bladder.  You should begin to feel better within 24 - 48 hours of starting your antibiotic.  If you do not, you need to be seen again.      Treatment:   You will be treated with an antibiotic to kill the bacteria. We have to make an educated guess as to which antibiotic will work for your infection. In most healthy people,  "we can guess right almost all of the time. Sometimes a culture is done to show which antibiotics will work. This usually takes 2-3 days. When the culture is done, we may have to contact you to put you on a different antibiotic.  Take a pain medication such as Tylenol  (acetaminophen), Advil  (ibuprofen), Nuprin  (ibuprofen), or Aleve  (naproxen). If you have been given a narcotic such as Vicodin  (hydrocodone with acetaminophen), Percocet  (oxycodone with acetaminophen), or codeine, do not drive for four hours after you have taken it. If the narcotic contains Tylenol  (acetaminophen), do not take Tylenol  with it. All narcotics will cause constipation, so eat a high fiber diet.    Pyridium  (phenazopyridine) or Uristat  (phenazopyridine) is a prescription medication that numbs the bladder to reduce the burning pain of some UTIs.  The same medication is available in a non-prescription version called Azo-Standard  (phenazopyridine), Urodol  (phenazopyridine), or other brand names. This medication will change the color of the urine and tears (usually blue or orange). If you wear contacts, do not wear them while taking this medication as they may be stained by the medication.    Antibiotic Warning:   If you have been placed on antibiotics - watch for signs of allergic reaction.  These include rash, lip swelling, difficulty breathing, wheezing, and dizziness.  If you develop any of these symptoms, stop the antibiotic immediately and go to an emergency room or urgent care for evaluation.    Probiotics: If you have been given an antibiotic, you may want to also take a probiotic pill or eat yogurt with live cultures. Probiotics have \"good bacteria\" to help your intestines stay healthy. Studies have shown that probiotics help prevent diarrhea and other intestine problems (including C. diff infection) when you take antibiotics. You can buy these without a prescription in the pharmacy section of the store.   If you were given " a prescription for medicine here today, be sure to read all of the information (including the package insert) that comes with your prescription.  This will include important information about the medicine, its side effects, and any warnings that you need to know about.  The pharmacist who fills the prescription can provide more information and answer questions you may have about the medicine.  If you have questions or concerns that the pharmacist cannot address, please call or return to the Emergency Department.   Opioid Medication Information    Pain medications are among the most commonly prescribed medicines, so we are including this information for all our patients. If you did not receive pain medication or get a prescription for pain medicine, you can ignore it.     You may have been given a prescription for an opioid (narcotic) pain medicine and/or have received a pain medicine while here in the Emergency Department. These medicines can make you drowsy or impaired. You must not drive, operate dangerous equipment, or engage in any other dangerous activities while taking these medications. If you drive while taking these medications, you could be arrested for DUI, or driving under the influence. Do not drink any alcohol while you are taking these medications.     Opioid pain medications can cause addiction. If you have a history of chemical dependency of any type, you are at a higher risk of becoming addicted to pain medications.  Only take these prescribed medications to treat your pain when all other options have been tried. Take it for as short a time and as few doses as possible. Store your pain pills in a secure place, as they are frequently stolen and provide a dangerous opportunity for children or visitors in your house to start abusing these powerful medications. We will not replace any lost or stolen medicine.  As soon as your pain is better, you should flush all your remaining medication.     Many  prescription pain medications contain Tylenol  (acetaminophen), including Vicodin , Tylenol #3 , Norco , Lortab , and Percocet .  You should not take any extra pills of Tylenol  if you are using these prescription medications or you can get very sick.  Do not ever take more than 3000 mg of acetaminophen in any 24 hour period.    All opioids tend to cause constipation. Drink plenty of water and eat foods that have a lot of fiber, such as fruits, vegetables, prune juice, apple juice and high fiber cereal.  Take a laxative if you don?t move your bowels at least every other day. Miralax , Milk of Magnesia, Colace , or Senna  can be used to keep you regular.      Remember that you can always come back to the Emergency Department if you are not able to see your regular doctor in the amount of time listed above, if you get any new symptoms, or if there is anything that worries you.

## 2019-05-31 NOTE — ED PROVIDER NOTES
History     Chief Complaint:  Dysuria and urgency      HPI   Noa Mcgill is a 44 year old female with a history of recurrent UTI and urinary retention with bladder sling who presents to the emergency department with her  for evaluation of dysuria and urgency. The patient reports that she has frequent UTIs that have become resistant to Ceftin, Macrobid, and Cipro, and for her last UTI she needed a third generation cephalosporin or IV antibiotics. She states that one hour ago she urinated and had severe dysuria followed by urgency and abdominal pressure and states this feels exactly like her previous UTIs which prompted her evaluation to the ED. She took one Pyridium after onset. She denies fevers, chills, vomiting, or flank pain.    Per her chart, her last culture grew E. coli and was sensitive to Macrobid.    Allergies:  Tnf antagonists  Enbrel  Humira  Ibuprofen  Prednisone  Compazine     Medications:    Adderall  Aspirin 325 mg   Fioricet  Dhea  Nuvaring  Advil  Maxalt  Senna-docusate  Ambien      Past Medical History:    Hugh's disease  Allergic rhinitis  Arthritis  Anemia  Endometriosis   Atrial septal defect  ADD  BRCA1 gene mutation positive  Cerebral infarction  Clotting disorder  Congenital heart disease  Depression  Endometriosis of uterus  Herpes simplex   Insomnia   Migraines  Chronic pain syndrome   Chronic sinusitis  Urinary retention  Psoriatic arthropathy   Recurrent UTI  Retinal artery occlusion     Past Surgical History:    Appendectomy  C section  D&C  Bladder sling   Hysterectomy   Herniorrhaphy incisional   Mastectomy bilateral   Thumb fusion    Family History:    CAD: maternal grandmother  Hypertension  Depression  Alcoholism  Diabetes  Thyroid disease  Substance abuse  Ovarian cancer  Thyroid cancer   Breast cancer    Social History:  Tobacco Use: Never  Alcohol Use: Yes  PCP: Sydnie Daley  Marital Status:       Review of Systems   Constitutional: Negative for chills and  fever.   Gastrointestinal: Positive for abdominal pain. Negative for vomiting.   Genitourinary: Positive for dysuria and urgency. Negative for flank pain.   All other systems reviewed and are negative.        Physical Exam     Patient Vitals for the past 24 hrs:   BP Temp Temp src Pulse Resp SpO2   05/30/19 2230 126/76 -- -- 82 16 98 %   05/30/19 2130 (!) 135/95 98.4  F (36.9  C) Oral 109 18 97 %     Physical Exam    General: The patient is alert, in no respiratory distress.    HENT: Mucous membranes moist.    Cardiovascular: Regular rate and rhythm. Good pulses in all four extremities. Normal capillary refill and skin turgor.     Respiratory: Lungs are clear. No nasal flaring. No retractions. No wheezing, no crackles.    Gastrointestinal: Abdomen soft. No guarding, no rebound. No palpable hernias.     Musculoskeletal: No gross deformity.     Skin: No rashes or petechiae.     Neurologic: The patient is alert and oriented x3. GCS 15. No testable cranial nerve deficit. Follows commands with clear and appropriate speech. Gives appropriate answers. Good strength in all extremities. No gross neurologic deficit. Gross sensation intact. Pupils are round and reactive. No meningismus.     Lymphatic: No cervical adenopathy. No lower extremity swelling.    Psychiatric: The patient is non-tearful.    Emergency Department Course   Laboratory:  UA with micro: Slightly cloudy yellow urine, ketones: trace, blood: trace, protein albumin: 30, leukocyte esterase: large, RBC: 83 (H), WBC: >182 (H), WBC clumps: present, bacteria: few, squamous epithelial: 3 (H), mucous: present, otherwise WNL  Urine culture: In process    HCG qualitative urine: Negative    Emergency Department Course:  2156 Nursing notes and vitals reviewed. I performed an exam of the patient as documented above.     The patient provided a urine sample here in the emergency department. This was sent for laboratory testing, findings above.     2215 I rechecked the  patient and discussed the results of her workup thus far.     Findings and plan explained to the Patient. Patient discharged home with instructions regarding supportive care, medications, and reasons to return. The importance of close follow-up was reviewed. The patient was prescribed Macrobid and Pyridium.     I personally reviewed the laboratory results with the Patient and answered all related questions prior to discharge.     Impression & Plan    Medical Decision Making:  Noa Mcgill is a 44 year old female has a history of recurrent UTIs, complaining of symptoms very suggestive to UTI and her urine definitely shows white blood cells and clumps. I was concerned because she has multiple resistances in her previous cultures. The last two were E. coli and sensitive to Macrobid and I felt that was definitely a good choice for her. She was concerned it might not work but I did send off a culture and started her on Pyridium. She shows no signs of pyelonephritis, is non toxic, and I did not push any IV antibiotics. She was discharged home in good condition.     Diagnosis:    ICD-10-CM    1. UTI (urinary tract infection) with pyuria N39.0        Disposition:  Discharged to home    Discharge Medications:     Medication List      Started    nitroFURantoin macrocrystal-monohydrate 100 MG capsule  Commonly known as:  MACROBID  100 mg, Oral, 2 TIMES DAILY     phenazopyridine 200 MG tablet  Commonly known as:  PYRIDIUM  200 mg, Oral, 3 TIMES DAILY          Scribe Disclosure:  IRoosevelt, am serving as a scribe on 5/30/2019 at 9:56 PM to personally document services performed by Dr. Chiki MD based on my observations and the provider's statements to me.     Roosevelt Barrera  5/30/2019   [facility] EMERGENCY DEPARTMENT       Daron Monet MD  05/30/19 7024

## 2019-05-31 NOTE — ED TRIAGE NOTES
History of frequent UTIs, about every 3 weeks.  Has been seen by urology, was not offered any resources.  Symptoms of frequency, burning, urgency started tonight.  ABCDs intact.

## 2019-06-02 LAB
BACTERIA SPEC CULT: ABNORMAL
Lab: ABNORMAL
SPECIMEN SOURCE: ABNORMAL

## 2019-06-04 ENCOUNTER — MYC MEDICAL ADVICE (OUTPATIENT)
Dept: FAMILY MEDICINE | Facility: CLINIC | Age: 44
End: 2019-06-04

## 2019-06-04 DIAGNOSIS — G43.909 MIGRAINE WITHOUT STATUS MIGRAINOSUS, NOT INTRACTABLE, UNSPECIFIED MIGRAINE TYPE: Primary | ICD-10-CM

## 2019-06-04 NOTE — TELEPHONE ENCOUNTER
Routing refill request to provider for review/approval because:  Medication is reported/historical    rizatriptan (MAXALT) 10 MG tablet  3 11/19/2016  --   Sig: TAKE 1 TABLET BY MOUTH ONCE, MAY REPEAT IN 2 HRS. MAX 30MG/24HRS   Class: Historical     Yoni Diza RN

## 2019-06-05 RX ORDER — RIZATRIPTAN BENZOATE 10 MG/1
TABLET ORAL
Qty: 12 TABLET | Refills: 3 | Status: SHIPPED | OUTPATIENT
Start: 2019-06-05 | End: 2021-06-09

## 2019-07-12 ENCOUNTER — TELEPHONE (OUTPATIENT)
Dept: FAMILY MEDICINE | Facility: CLINIC | Age: 44
End: 2019-07-12

## 2019-07-12 ENCOUNTER — OFFICE VISIT (OUTPATIENT)
Dept: FAMILY MEDICINE | Facility: CLINIC | Age: 44
End: 2019-07-12
Payer: COMMERCIAL

## 2019-07-12 VITALS
SYSTOLIC BLOOD PRESSURE: 119 MMHG | DIASTOLIC BLOOD PRESSURE: 74 MMHG | OXYGEN SATURATION: 99 % | TEMPERATURE: 98.5 F | WEIGHT: 214.9 LBS | RESPIRATION RATE: 18 BRPM | HEIGHT: 67 IN | BODY MASS INDEX: 33.73 KG/M2 | HEART RATE: 110 BPM

## 2019-07-12 DIAGNOSIS — R30.0 DYSURIA: Primary | ICD-10-CM

## 2019-07-12 DIAGNOSIS — Z87.440 PERSONAL HISTORY OF URINARY TRACT INFECTION: ICD-10-CM

## 2019-07-12 LAB
ALBUMIN UR-MCNC: NEGATIVE MG/DL
APPEARANCE UR: CLEAR
BILIRUB UR QL STRIP: NEGATIVE
COLOR UR AUTO: YELLOW
GLUCOSE UR STRIP-MCNC: NEGATIVE MG/DL
HGB UR QL STRIP: NEGATIVE
KETONES UR STRIP-MCNC: NEGATIVE MG/DL
LEUKOCYTE ESTERASE UR QL STRIP: ABNORMAL
NITRATE UR QL: NEGATIVE
NON-SQ EPI CELLS #/AREA URNS LPF: NORMAL /LPF
PH UR STRIP: 6 PH (ref 5–7)
RBC #/AREA URNS AUTO: NORMAL /HPF
SOURCE: ABNORMAL
SP GR UR STRIP: 1.01 (ref 1–1.03)
UROBILINOGEN UR STRIP-ACNC: 0.2 EU/DL (ref 0.2–1)
WBC #/AREA URNS AUTO: NORMAL /HPF

## 2019-07-12 PROCEDURE — 87186 SC STD MICRODIL/AGAR DIL: CPT | Performed by: PHYSICIAN ASSISTANT

## 2019-07-12 PROCEDURE — 99213 OFFICE O/P EST LOW 20 MIN: CPT | Performed by: PHYSICIAN ASSISTANT

## 2019-07-12 PROCEDURE — 87088 URINE BACTERIA CULTURE: CPT | Performed by: PHYSICIAN ASSISTANT

## 2019-07-12 PROCEDURE — 87086 URINE CULTURE/COLONY COUNT: CPT | Performed by: PHYSICIAN ASSISTANT

## 2019-07-12 PROCEDURE — 81001 URINALYSIS AUTO W/SCOPE: CPT | Performed by: PHYSICIAN ASSISTANT

## 2019-07-12 ASSESSMENT — ANXIETY QUESTIONNAIRES
7. FEELING AFRAID AS IF SOMETHING AWFUL MIGHT HAPPEN: NEARLY EVERY DAY
5. BEING SO RESTLESS THAT IT IS HARD TO SIT STILL: NEARLY EVERY DAY
3. WORRYING TOO MUCH ABOUT DIFFERENT THINGS: NEARLY EVERY DAY
2. NOT BEING ABLE TO STOP OR CONTROL WORRYING: NEARLY EVERY DAY
6. BECOMING EASILY ANNOYED OR IRRITABLE: NEARLY EVERY DAY
1. FEELING NERVOUS, ANXIOUS, OR ON EDGE: NEARLY EVERY DAY
GAD7 TOTAL SCORE: 21
IF YOU CHECKED OFF ANY PROBLEMS ON THIS QUESTIONNAIRE, HOW DIFFICULT HAVE THESE PROBLEMS MADE IT FOR YOU TO DO YOUR WORK, TAKE CARE OF THINGS AT HOME, OR GET ALONG WITH OTHER PEOPLE: EXTREMELY DIFFICULT

## 2019-07-12 ASSESSMENT — PATIENT HEALTH QUESTIONNAIRE - PHQ9
SUM OF ALL RESPONSES TO PHQ QUESTIONS 1-9: 18
5. POOR APPETITE OR OVEREATING: NEARLY EVERY DAY

## 2019-07-12 ASSESSMENT — MIFFLIN-ST. JEOR: SCORE: 1659.47

## 2019-07-12 NOTE — PROGRESS NOTES
Subjective     Noa Mcgill is a 44 year old female who presents to clinic today for the following health issues:    HPI   URINARY TRACT SYMPTOMS      Duration: Today     Description  odor and hurting after urinating     Intensity:  mild    Accompanying signs and symptoms:  Fever/chills: no   Flank pain no   Nausea and vomiting: no   Vaginal symptoms: none  Abdominal/Pelvic Pain: yes, after urinating      History  History of frequent UTI's: YES  History of kidney stones: no   Sexually Active: YES  Possibility of pregnancy: No    Precipitating or alleviating factors: None    Therapies tried and outcome: none      -Patient is a 43yo female who presents with new onset urinary symptoms  -She has a hx of recurrent uti, with ultimately limited success with prophylactic and post-intercourse therapy  -this morning she noted a new odor, and pain with urination (but after the fact)  -there is some lower abd pressure following urination  -no current febrile symptoms, no flank pain      Patient Active Problem List   Diagnosis     Acute reaction to stress     Psoriatic arthropathy (H)     Other psoriasis     Herpes simplex virus (HSV) infection     Obesity     Recurrent UTI     Insomnia     CARDIOVASCULAR SCREENING; LDL GOAL LESS THAN 160     Migraine headache     Lumbago     Nonallopathic lesion of sacral region     Phobia, flying     Vaginal bleeding in pregnancy     Retinal artery branch occlusion of right eye     Vision loss of right eye     ASD (atrial septal defect)     Bleeding     Attention deficit disorder     Anxiety     Neck pain     Chronic pain syndrome     BRCA1 gene mutation positive     Mild major depression (H)     S/P mastectomy, bilateral     Anemia due to blood loss, acute     Postoperative urinary retention     Incisional hernia     Adrenal insufficiency, primary (H)     Central retinal artery occlusion of right eye     Embolism and thrombosis of unspecified artery (H)     History of endometriosis      Personal history of venous thrombosis and embolus     S/P complete hysterectomy     BRCA gene mutation positive     Psoriatic arthritis, destructive type (H)     Acquired absence of breast and absent nipple, bilateral     Infliximab (Remicade) long-term use     Endocrine disorder     Dehiscence of closure of muscle or muscle flap, initial encounter     Past Surgical History:   Procedure Laterality Date     APPENDECTOMY  2006      SECTION  2013    Procedure:  SECTION;  Primary  Section;  Surgeon: Chad Hughes MD;  Location:  L+D     DILATION AND CURETTAGE SUCTION N/A 2014    Procedure: DILATION AND CURETTAGE SUCTION;  Surgeon: Srini Martinez MD;  Location: [facility]     DILATION AND CURETTAGE SUCTION N/A 2014    Procedure: DILATION AND CURETTAGE SUCTION;  Surgeon: Cnonor Kang MD;  Location:  OR     GENITOURINARY SURGERY      bladder sling and complete historectomy     HC DILATION/CURETTAGE DIAG/THER NON OB       HC DILATION/CURETTAGE DIAG/THER NON OB       HERNIORRHAPHY INCISIONAL (LOCATION) N/A 2018    Procedure: Incisional Hernia repair with Biologic mesh;  Surgeon: Suraj Bergeron MD;  Location:  OR     HYSTERECTOMY, [facility]  2016    [facility]     MASTECTOMY SIMPLE BILATERAL Bilateral 2018    Procedure: MASTECTOMY SIMPLE BILATERAL;  PROPHYLACTIC BILATERAL MASTECTOMY (GLADIS) BILATERAL BREAST RECONSTRUCTION [facility] TISSUE EXPANDERS (WILLOW)   ;  Surgeon: Suraj Bergeron MD;  Location:  OR     ORTHOPEDIC SURGERY      (L) thumb fused     RECONSTRUCT BREAST, INSERT TISSUE EXPANDER BILATERAL, COMBINED Bilateral 2018    expander and tram flap with drains - had weekly surgery to have I & D and removal of infected tissue     SURGICAL HISTORY OF -       left thumb fusion due to arthritis       Social History     Tobacco Use     Smoking status: Never Smoker     Smokeless tobacco: Never Used   Substance Use Topics      "Alcohol use: Yes     Alcohol/week: 0.0 oz     Comment: 1-3drink per week     Family History   Problem Relation Age of Onset     C.A.D. Maternal Grandmother      Hypertension Maternal Grandmother      Psychotic Disorder Mother         Depression, alcoholism     Diabetes Mother      Hereditary Breast and Ovarian Cancer Syndrome Mother         BRCA1+, no hx of cancer, s/p prophylactic surgery to remove breast tissue, ovaries, fallopian tubes     Hypertension Mother      Thyroid Disease Mother         thyroid nodules     Lung Cancer Mother      Substance Abuse Father      Hereditary Breast and Ovarian Cancer Syndrome Sister         matrnal half sister, BRCA1+     Hereditary Breast and Ovarian Cancer Syndrome Daughter 23        BRCA1+     Breast Cancer Maternal Aunt 40        lumpectomy, then 2nd primary breast cancer later in 40s, treated with mastectomy     Hereditary Breast and Ovarian Cancer Syndrome Maternal Aunt         BRCA1+     Ovarian Cancer Maternal Aunt 70        surgry     Thyroid Cancer Maternal Aunt         medullary thyroid cancer     Hereditary Breast and Ovarian Cancer Syndrome Maternal Aunt         BRCA1+     Breast Cancer Other 41        maternal great aunt     Ovarian Cancer Other 45         in 40s     Thyroid Cancer Maternal Uncle 66        papillary thyroid cancer     Breast Cancer Cousin 45     Hereditary Breast and Ovarian Cancer Syndrome Cousin         BRCA1+     Breast Cancer Other 43        maternal great aunt     Other Cancer Other         ovarian cancer           Reviewed and updated as needed this visit by Provider         Review of Systems   ROS COMP: Constitutional, HEENT, cardiovascular, pulmonary, gi and gu systems are negative, except as otherwise noted.      Objective    /74   Pulse 110   Temp 98.5  F (36.9  C) (Tympanic)   Resp 18   Ht 1.689 m (5' 6.5\")   Wt 97.5 kg (214 lb 14.4 oz)   LMP 2015 (Within Months)   SpO2 99%   BMI 34.17 kg/m    Body mass index is " "34.17 kg/m .     Physical Exam   GENERAL: healthy, alert and no distress  ABDOMEN: soft, nontender, no hepatosplenomegaly, no masses and bowel sounds normal    Diagnostic Test Results:  Results for orders placed or performed in visit on 07/12/19 (from the past 24 hour(s))   UA reflex to Microscopic and Culture   Result Value Ref Range    Color Urine Yellow     Appearance Urine Clear     Glucose Urine Negative NEG^Negative mg/dL    Bilirubin Urine Negative NEG^Negative    Ketones Urine Negative NEG^Negative mg/dL    Specific Gravity Urine 1.010 1.003 - 1.035    Blood Urine Negative NEG^Negative    pH Urine 6.0 5.0 - 7.0 pH    Protein Albumin Urine Negative NEG^Negative mg/dL    Urobilinogen Urine 0.2 0.2 - 1.0 EU/dL    Nitrite Urine Negative NEG^Negative    Leukocyte Esterase Urine Trace (A) NEG^Negative    Source Midstream Urine    Urine Microscopic   Result Value Ref Range    WBC Urine 0 - 5 OTO5^0 - 5 /HPF    RBC Urine O - 2 OTO2^O - 2 /HPF    Squamous Epithelial /LPF Urine Few FEW^Few /LPF           Assessment & Plan     1. Dysuria  2. Personal history of urinary tract infection  UA looks good. With her hx will send culture. Push fluids. Follow-up as needed.  - UA reflex to Microscopic and Culture; Future  - Urine Culture Aerobic Bacterial  - UA reflex to Microscopic and Culture  - Urine Microscopic       BMI:   Estimated body mass index is 34.17 kg/m  as calculated from the following:    Height as of this encounter: 1.689 m (5' 6.5\").    Weight as of this encounter: 97.5 kg (214 lb 14.4 oz).       No follow-ups on file.    Carl So PA-C  [facility]      "

## 2019-07-13 ENCOUNTER — HOSPITAL ENCOUNTER (EMERGENCY)
Facility: CLINIC | Age: 44
Discharge: HOME | End: 2019-07-13
Attending: EMERGENCY MEDICINE | Admitting: EMERGENCY MEDICINE
Payer: COMMERCIAL

## 2019-07-13 VITALS
SYSTOLIC BLOOD PRESSURE: 129 MMHG | TEMPERATURE: 98.6 F | HEART RATE: 68 BPM | DIASTOLIC BLOOD PRESSURE: 84 MMHG | BODY MASS INDEX: 33.91 KG/M2 | OXYGEN SATURATION: 98 % | WEIGHT: 211 LBS | HEIGHT: 66 IN | RESPIRATION RATE: 16 BRPM

## 2019-07-13 DIAGNOSIS — R11.0 NAUSEA: ICD-10-CM

## 2019-07-13 DIAGNOSIS — N39.0 ACUTE UTI: ICD-10-CM

## 2019-07-13 DIAGNOSIS — M54.9 ACUTE RIGHT-SIDED BACK PAIN, UNSPECIFIED BACK LOCATION: ICD-10-CM

## 2019-07-13 LAB
ANION GAP SERPL CALCULATED.3IONS-SCNC: 7 MMOL/L (ref 3–14)
BASOPHILS # BLD AUTO: 0.1 10E9/L (ref 0–0.2)
BASOPHILS NFR BLD AUTO: 1 %
BUN SERPL-MCNC: 10 MG/DL (ref 7–30)
CALCIUM SERPL-MCNC: 8.8 MG/DL (ref 8.5–10.1)
CHLORIDE SERPL-SCNC: 111 MMOL/L (ref 94–109)
CO2 SERPL-SCNC: 24 MMOL/L (ref 20–32)
CREAT SERPL-MCNC: 0.78 MG/DL (ref 0.52–1.04)
DIFFERENTIAL METHOD BLD: NORMAL
EOSINOPHIL # BLD AUTO: 0.1 10E9/L (ref 0–0.7)
EOSINOPHIL NFR BLD AUTO: 1.7 %
ERYTHROCYTE [DISTWIDTH] IN BLOOD BY AUTOMATED COUNT: 13 % (ref 10–15)
GFR SERPL CREATININE-BSD FRML MDRD: >90 ML/MIN/{1.73_M2}
GLUCOSE SERPL-MCNC: 91 MG/DL (ref 70–99)
HCT VFR BLD AUTO: 42.1 % (ref 35–47)
HGB BLD-MCNC: 13.3 G/DL (ref 11.7–15.7)
IMM GRANULOCYTES # BLD: 0 10E9/L (ref 0–0.4)
IMM GRANULOCYTES NFR BLD: 0.6 %
LYMPHOCYTES # BLD AUTO: 1.6 10E9/L (ref 0.8–5.3)
LYMPHOCYTES NFR BLD AUTO: 22.7 %
MCH RBC QN AUTO: 26.9 PG (ref 26.5–33)
MCHC RBC AUTO-ENTMCNC: 31.6 G/DL (ref 31.5–36.5)
MCV RBC AUTO: 85 FL (ref 78–100)
MONOCYTES # BLD AUTO: 0.4 10E9/L (ref 0–1.3)
MONOCYTES NFR BLD AUTO: 5.3 %
NEUTROPHILS # BLD AUTO: 4.9 10E9/L (ref 1.6–8.3)
NEUTROPHILS NFR BLD AUTO: 68.7 %
NRBC # BLD AUTO: 0 10*3/UL
NRBC BLD AUTO-RTO: 0 /100
PLATELET # BLD AUTO: 278 10E9/L (ref 150–450)
POTASSIUM SERPL-SCNC: 4.3 MMOL/L (ref 3.4–5.3)
RBC # BLD AUTO: 4.95 10E12/L (ref 3.8–5.2)
SODIUM SERPL-SCNC: 142 MMOL/L (ref 133–144)
WBC # BLD AUTO: 7.2 10E9/L (ref 4–11)

## 2019-07-13 PROCEDURE — 25000128 H RX IP 250 OP 636: Performed by: EMERGENCY MEDICINE

## 2019-07-13 PROCEDURE — 96375 TX/PRO/DX INJ NEW DRUG ADDON: CPT

## 2019-07-13 PROCEDURE — 96361 HYDRATE IV INFUSION ADD-ON: CPT

## 2019-07-13 PROCEDURE — 85025 COMPLETE CBC W/AUTO DIFF WBC: CPT | Performed by: EMERGENCY MEDICINE

## 2019-07-13 PROCEDURE — 96365 THER/PROPH/DIAG IV INF INIT: CPT

## 2019-07-13 PROCEDURE — 99284 EMERGENCY DEPT VISIT MOD MDM: CPT | Mod: 25

## 2019-07-13 PROCEDURE — 80048 BASIC METABOLIC PNL TOTAL CA: CPT | Performed by: EMERGENCY MEDICINE

## 2019-07-13 RX ORDER — ONDANSETRON 4 MG/1
4 TABLET, ORALLY DISINTEGRATING ORAL EVERY 8 HOURS PRN
Qty: 10 TABLET | Refills: 0 | Status: SHIPPED | OUTPATIENT
Start: 2019-07-13 | End: 2019-10-31

## 2019-07-13 RX ORDER — CEFTRIAXONE 1 G/1
1 INJECTION, POWDER, FOR SOLUTION INTRAMUSCULAR; INTRAVENOUS ONCE
Status: COMPLETED | OUTPATIENT
Start: 2019-07-13 | End: 2019-07-13

## 2019-07-13 RX ORDER — CEPHALEXIN 500 MG/1
500 CAPSULE ORAL 2 TIMES DAILY
Qty: 28 CAPSULE | Refills: 0 | Status: SHIPPED | OUTPATIENT
Start: 2019-07-13 | End: 2019-09-09

## 2019-07-13 RX ORDER — ONDANSETRON 2 MG/ML
4 INJECTION INTRAMUSCULAR; INTRAVENOUS ONCE
Status: COMPLETED | OUTPATIENT
Start: 2019-07-13 | End: 2019-07-13

## 2019-07-13 RX ADMIN — ONDANSETRON HYDROCHLORIDE 4 MG: 2 INJECTION, SOLUTION INTRAMUSCULAR; INTRAVENOUS at 12:07

## 2019-07-13 RX ADMIN — CEFTRIAXONE SODIUM 1 G: 1 INJECTION, POWDER, FOR SOLUTION INTRAMUSCULAR; INTRAVENOUS at 13:17

## 2019-07-13 RX ADMIN — SODIUM CHLORIDE 1000 ML: 9 INJECTION, SOLUTION INTRAVENOUS at 12:07

## 2019-07-13 ASSESSMENT — ANXIETY QUESTIONNAIRES: GAD7 TOTAL SCORE: 21

## 2019-07-13 ASSESSMENT — ENCOUNTER SYMPTOMS
BACK PAIN: 1
DIARRHEA: 0
ABDOMINAL PAIN: 1
BLOOD IN STOOL: 0
NAUSEA: 1
FREQUENCY: 1
FEVER: 1
DYSURIA: 1

## 2019-07-13 ASSESSMENT — MIFFLIN-ST. JEOR: SCORE: 1633.84

## 2019-07-13 NOTE — ED PROVIDER NOTES
History     Chief Complaint:  Dysuria     The history is provided by the patient.      Noa Mcgill is a 44 year old female, with a history of recurrent UTI for ten years that has been worsening, who presents with dysuria. Since yesterday, patient has had lower abdominal pain, back pain, strong urine odor, increased urinary frequency, dysuria, fever, and dysuria. Patient was concerned for another UTI, prompting her to urgent care. There, a urine culture and urinalysis were done but patient was prompted her to the ED due to her history of resistant urine cultures. Here, patient states her nausea is worse than usual. She denies abnormal bowel movements. She notes that the last time she had Macrobid she had an onset of a rash on day 7. No history of kidney stones.    UA micro from today:  UA with micro: yellow, cloudy, Nitrite: positive, leukocyte esterase: moderate, WBC: 10-20, bacteria: many, squamous epithelial: moderate, o/w negative. Culture reportedly sent.    Allergies:  Tnf antagonists  Enbrel   Humira  Ibuprofen  Macrobid  Prednisone  COmpazine     Medications:    Adderall   Absorbic acid  Aspirin 325 mg tablet   Fioricet/esgic   Cranberry  Fish oil   Maxalt  Vitamin D  Ambien   Keflex     Past Medical History:    Incisional hernia   Post operative urinary retention  Dishiscence of closure of muscle   Anemia  Adrenal insufficiency   Central retinal artery occlusion  Embolism and thrombosis of unspecified artery   Endometriosis   BRCA gene mutation positive   Psoriatic arthritis   Absence of bilateral breast and nipple   Mild major depressin  Chronic pain syndrome   Anxiety  ADD  Retinal artery branch occlusion  Vision loss of right eye  Flying phobiia  Lumbago   Nonallopathic lesion of sacral region  Migraine  Insomnia  Recurrent UTI   Obesity   HSV infection   Psoriatic arthropathy  Adrenal insufficency   Allergic rhinitis   Arthritis   Cerebral infarction  clotting disorder  Congenital heart disease  "  Chronic sinitus     Past Surgical History:    Appendectomy    D and C x4  Genitourinary surgery   Herniorrhaphy   Bilateral mastectomy  Orthopedic surgery   Breast reconstruction    Family History:    Depression  Alcoholism   Hereditary breast and ovarian cancer syndrome  HTN   Thyroid disease  Lung cancer  Substance abuse     Social History:  Negative for tobacco use.  Positive for alcohol use.   Negative for drug use.  Marital Status:  .     Review of Systems   Constitutional: Positive for fever.   Gastrointestinal: Positive for abdominal pain and nausea. Negative for blood in stool and diarrhea.   Genitourinary: Positive for dysuria and frequency.   Musculoskeletal: Positive for back pain.   All other systems reviewed and are negative.      Physical Exam     Patient Vitals for the past 24 hrs:   BP Temp Temp src Pulse Resp SpO2 Height Weight   19 1245 -- -- -- -- -- 100 % -- --   19 1230 113/72 -- -- 81 -- 100 % -- --   19 1215 121/70 -- -- -- -- 97 % -- --   19 1152 134/84 98.6  F (37  C) Oral 79 18 100 % 1.676 m (5' 6\") 95.7 kg (211 lb)   19 1145 134/84 -- -- 87 -- 100 % -- --     Physical Exam  General: Adult female sitting upright  Eyes: PERRL, Conjunctive within normal limits  ENT: Moist mucous membranes, oropharynx clear.   CV: Normal S1S2, no murmur, rub or gallop. Regular rate and rhythm  Resp: Clear to auscultation bilaterally, no wheezes, rales or rhonchi. Normal respiratory effort.  GI: Abdomen is soft and nondistended. Mild suprapubic tenderness to palpation. No palpable masses. No rebound or guarding. CVA tenderness to percussion on the right.  MSK: No edema. Nontender. Normal active range of motion.  Skin: Warm and dry. No rashes or lesions or ecchymoses on visible skin.  Neuro: Alert and oriented. Responds appropriately to all questions and commands. No focal findings appreciated. Normal muscle tone.  Psych: Normal mood and affect. " Jeri.      Emergency Department Course   Laboratory:  CBC: WBC: 72, HGB: 13.3, PLT: 278  CMP: Chloride 111 (H), o/w WNL (Creatinine: 0.78)    Interventions:  1207 Zofran 4 mg IV   1317 Rocephin 1 g IV     Emergency Department Course:  1140 Nursing notes and vitals reviewed. I performed an exam of the patient as documented above.     IV inserted. Medicine administered as documented above. Blood drawn. This was sent to the lab for further testing, results above.    1252 I rechecked the patient and discussed the results of her workup thus far. She denies any new concerns.     Findings and plan explained to the Patient. Patient discharged home with instructions regarding supportive care, medications, and reasons to return. The importance of close follow-up was reviewed. The patient was prescribed Keflex and Zofran.    I personally reviewed the laboratory results with the Patient and answered all related questions prior to discharge.     Impression & Plan    Medical Decision Making:  Noa Mcgill is a 44 year old female who presents for evaluation of dysuria. Patient reported low grade fever and right back pain but otherwise there is no evidence of pyelitis.  This clinically is consistent with a urinary tract infection.  Urinalysis confirms the infection.  She reported low grade fever yesterday but here today with antipyretics she is afebrile. Pyelonephritis is considered and she is given a longer course of antibiotics but she has no fever or leukocytosis.  There is no clinical evidence of appendicitis, colitis, diverticulitis or any intraabdominal catastrophe. The patient will be started on antibiotics for the infection. Return if increasing pain, vomiting, fever, or inability to tolerate the oral antibiotic.  Follow up with primary physician is indicated if not improving in 2-3 days.       Diagnosis:    ICD-10-CM    1. Acute UTI N39.0    2. Acute right-sided back pain, unspecified back location M54.9    3. Nausea  R11.0      Disposition:  discharged to home    Discharge Medications:     Medication List      Started    cephALEXin 500 MG capsule  Commonly known as:  KEFLEX  500 mg, Oral, 2 TIMES DAILY     ondansetron 4 MG ODT tab  Commonly known as:  ZOFRAN ODT  4 mg, Oral, EVERY 8 HOURS PRN          Scribe Disposition  OWEN, Mayi Benitez, am serving as a scribe on 7/13/2019 at 12:24 PM to personally document services performed by Tiff Angeles MD based on my observations and the provider's statements to me.     Mayi Benitez  7/13/2019   [facility] EMERGENCY DEPARTMENT       Tiff Angeles MD  07/14/19 1131

## 2019-07-13 NOTE — ED TRIAGE NOTES
Hx of multiple UTI's c/o malodorous urine, frequency, burning with urination. Also c/o nausea, fever and body aches. Seen at clinic yesterday- MD did not treat because the urine did not show enough reason to treat. Seen at urgent care today, they declined to treat due to the high level of resistance to antibiotics from culture in May.

## 2019-07-13 NOTE — DISCHARGE INSTRUCTIONS
Discharge Instructions  Urinary Tract Infection  You or your child have been diagnosed with a urinary tract infection, or UTI. The urinary tract includes the kidneys (which make urine/pee), ureters (the tubes that carry urine/pee from the kidneys to the bladder), the bladder (which stores urine/pee), and urethra (the tube that carries urine/pee out of the bladder). Urinary tract infections occur when bacteria travel up the urethra into the bladder (bladder infection) and, in some cases, from there into the kidneys (kidney infection).  Generally, every Emergency Department visit should have a follow-up clinic visit with either a primary or a specialty clinic/provider. Please follow-up as instructed by your emergency provider today.  Return to the Emergency Department if:  You or your child have severe back pain.  You or your child are vomiting (throwing up) so that you cannot take your medicine.  You or your child have a new fever (had not previously had a fever) over 101 F.  You or your child have confusion or are very weak, or feel very ill.  Your child seems much more ill, will not wake up, will not respond right, or is crying for a long time and will not calm down.  You or your child are showing signs of dehydration. These signs may include decreased urination (pee), dry mouth/gums/tongue, or decreased activity.    Follow-up with your provider:   Children under 24 months need to be seen by their regular provider within one week after a diagnosis of a UTI. It may be necessary to do some more tests to look at the child s kidney or bladder.  You should begin to feel better within 24 - 48 hours of starting your antibiotic; follow-up with your regular clinic/doctor/provider if this is not the case.    Treatment:   You will be treated with an antibiotic to kill the bacteria. We have to make an educated guess, based on what we know about common bacteria and antibiotics, as to which antibiotic will work for your  "infection. We will be correct most times but there will be some cases where the antibiotic chosen is not correct (see urine cultures below).  Take a pain medication such as acetaminophen (Tylenol ) or ibuprofen (Advil , Motrin , Nuprin ).  Phenazopyridine (Pyridium , Uristat ) is a prescription medication that numbs the bladder to reduce the burning pain of some UTIs.  The same medication is available in a non-prescription version (Azo-Standard , Urodol ). This medication will change the color of the urine and tears (usually blue or orange). If you wear contacts, do not wear them while taking this medication as they may be stained by the medication.    Urine Cultures:  If indicated, a urine culture may have been performed today. This test generally takes 24-48 hours to complete so the results are not known at this time. The results can confirm that an infection is present but also determine which antibiotic is effective for the specific bacteria that is causing the infection. If your urine culture shows that the antibiotic you were given today will not work to treat your infection, we will attempt to contact you to make arrangements to change the antibiotic. If the culture confirms that the antibiotic is effective for your infection, you will not be contacted. We often recommend follow-up with your regular physician/provider on the culture results regardless of this process.    Antibiotic Warning:   If you have been placed on antibiotics - watch for signs of allergic reaction.  These include rash, lip swelling, difficulty breathing, wheezing, and dizziness.  If you develop any of these symptoms, stop the antibiotic immediately and go to an emergency room or urgent care for evaluation.    Probiotics: If you have been given an antibiotic, you may want to also take a probiotic pill or eat yogurt with live cultures. Probiotics have \"good bacteria\" to help your intestines stay healthy. Studies have shown that probiotics " help prevent diarrhea and other intestine problems (including C. diff infection) when you take antibiotics. You can buy these without a prescription in the pharmacy section of the store.   If you were given a prescription for medicine here today, be sure to read all of the information (including the package insert) that comes with your prescription.  This will include important information about the medicine, its side effects, and any warnings that you need to know about.  The pharmacist who fills the prescription can provide more information and answer questions you may have about the medicine.  If you have questions or concerns that the pharmacist cannot address, please call or return to the Emergency Department.   Remember that you can always come back to the Emergency Department if you are not able to see your regular provider in the amount of time listed above, if you get any new symptoms, or if there is anything that worries you.

## 2019-07-13 NOTE — ED AVS SNAPSHOT
[facility] Emergency Department  [address]  [facility] [address]  Phone:  [phone]  Fax:  [phone]                                    Noa Mcgill   MRN: [id number]    Department:  [facility] Emergency Department   Date of Visit:  7/13/2019           After Visit Summary Signature Page    I have received my discharge instructions, and my questions have been answered. I have discussed any challenges I see with this plan with the nurse or doctor.    ..........................................................................................................................................  Patient/Patient Representative Signature      ..........................................................................................................................................  Patient Representative Print Name and Relationship to Patient    ..................................................               ................................................  Date                                   Time    ..........................................................................................................................................  Reviewed by Signature/Title    ...................................................              ..............................................  Date                                               Time          22EPIC Rev 08/18

## 2019-07-14 DIAGNOSIS — N39.0 URINARY TRACT INFECTION WITHOUT HEMATURIA, SITE UNSPECIFIED: Primary | ICD-10-CM

## 2019-07-14 LAB
BACTERIA SPEC CULT: ABNORMAL
SPECIMEN SOURCE: ABNORMAL

## 2019-07-14 RX ORDER — SULFAMETHOXAZOLE/TRIMETHOPRIM 800-160 MG
1 TABLET ORAL 2 TIMES DAILY
Qty: 6 TABLET | Refills: 0 | Status: SHIPPED | OUTPATIENT
Start: 2019-07-14 | End: 2019-10-31

## 2019-09-09 ENCOUNTER — VIRTUAL VISIT (OUTPATIENT)
Dept: FAMILY MEDICINE | Facility: CLINIC | Age: 44
End: 2019-09-09
Payer: COMMERCIAL

## 2019-09-09 DIAGNOSIS — F41.9 ANXIETY: ICD-10-CM

## 2019-09-09 DIAGNOSIS — F32.0 MILD MAJOR DEPRESSION (H): Primary | ICD-10-CM

## 2019-09-09 DIAGNOSIS — F98.8 ATTENTION DEFICIT DISORDER (ADD) WITHOUT HYPERACTIVITY: ICD-10-CM

## 2019-09-09 DIAGNOSIS — G47.00 INSOMNIA, UNSPECIFIED TYPE: ICD-10-CM

## 2019-09-09 PROCEDURE — 99442 ZZC PHYSICIAN TELEPHONE EVALUATION 11-20 MIN: CPT | Performed by: FAMILY MEDICINE

## 2019-09-09 RX ORDER — CLONAZEPAM 0.5 MG/1
TABLET ORAL
Qty: 20 TABLET | Refills: 0 | Status: SHIPPED | OUTPATIENT
Start: 2019-09-09 | End: 2019-11-23

## 2019-09-09 RX ORDER — DEXTROAMPHETAMINE SACCHARATE, AMPHETAMINE ASPARTATE, DEXTROAMPHETAMINE SULFATE AND AMPHETAMINE SULFATE 2.5; 2.5; 2.5; 2.5 MG/1; MG/1; MG/1; MG/1
TABLET ORAL
Qty: 90 TABLET | Refills: 0 | Status: SHIPPED | OUTPATIENT
Start: 2019-09-09 | End: 2019-12-30

## 2019-09-09 RX ORDER — ZOLPIDEM TARTRATE 10 MG/1
TABLET ORAL
Qty: 30 TABLET | Refills: 5 | Status: SHIPPED | OUTPATIENT
Start: 2019-09-09 | End: 2020-06-23

## 2019-09-09 ASSESSMENT — ANXIETY QUESTIONNAIRES
5. BEING SO RESTLESS THAT IT IS HARD TO SIT STILL: SEVERAL DAYS
3. WORRYING TOO MUCH ABOUT DIFFERENT THINGS: MORE THAN HALF THE DAYS
GAD7 TOTAL SCORE: 13
6. BECOMING EASILY ANNOYED OR IRRITABLE: NEARLY EVERY DAY
1. FEELING NERVOUS, ANXIOUS, OR ON EDGE: MORE THAN HALF THE DAYS
IF YOU CHECKED OFF ANY PROBLEMS ON THIS QUESTIONNAIRE, HOW DIFFICULT HAVE THESE PROBLEMS MADE IT FOR YOU TO DO YOUR WORK, TAKE CARE OF THINGS AT HOME, OR GET ALONG WITH OTHER PEOPLE: VERY DIFFICULT
7. FEELING AFRAID AS IF SOMETHING AWFUL MIGHT HAPPEN: SEVERAL DAYS
2. NOT BEING ABLE TO STOP OR CONTROL WORRYING: SEVERAL DAYS

## 2019-09-09 ASSESSMENT — PATIENT HEALTH QUESTIONNAIRE - PHQ9
SUM OF ALL RESPONSES TO PHQ QUESTIONS 1-9: 18
5. POOR APPETITE OR OVEREATING: NEARLY EVERY DAY
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
## 2019-09-10 ASSESSMENT — ANXIETY QUESTIONNAIRES: GAD7 TOTAL SCORE: 13

## 2019-09-29 ENCOUNTER — HEALTH MAINTENANCE LETTER (OUTPATIENT)
Age: 44
End: 2019-09-29

## 2019-10-22 ENCOUNTER — MYC MEDICAL ADVICE (OUTPATIENT)
Dept: FAMILY MEDICINE | Facility: CLINIC | Age: 44
End: 2019-10-22

## 2019-10-22 DIAGNOSIS — F40.243 PHOBIA, FLYING: ICD-10-CM

## 2019-10-23 RX ORDER — ALPRAZOLAM 0.5 MG
TABLET ORAL
Qty: 8 TABLET | Refills: 1 | Status: SHIPPED | OUTPATIENT
Start: 2019-10-23 | End: 2019-11-23

## 2019-10-23 NOTE — TELEPHONE ENCOUNTER
Dr. Daley- see [employer] message below.  Do you want E-Visit?  Or telephone visit?  Please advise.  Juanita Wallace RN

## 2019-10-31 ENCOUNTER — TRANSFERRED RECORDS (OUTPATIENT)
Dept: HEALTH INFORMATION MANAGEMENT | Facility: CLINIC | Age: 44
End: 2019-10-31

## 2019-10-31 ENCOUNTER — OFFICE VISIT (OUTPATIENT)
Dept: SURGERY | Facility: CLINIC | Age: 44
End: 2019-10-31
Payer: COMMERCIAL

## 2019-10-31 VITALS
WEIGHT: 211 LBS | DIASTOLIC BLOOD PRESSURE: 88 MMHG | OXYGEN SATURATION: 98 % | BODY MASS INDEX: 33.91 KG/M2 | HEART RATE: 100 BPM | RESPIRATION RATE: 16 BRPM | SYSTOLIC BLOOD PRESSURE: 122 MMHG | HEIGHT: 66 IN

## 2019-10-31 DIAGNOSIS — R10.84 ABDOMINAL PAIN, GENERALIZED: Primary | ICD-10-CM

## 2019-10-31 PROCEDURE — 99214 OFFICE O/P EST MOD 30 MIN: CPT | Performed by: SURGERY

## 2019-10-31 ASSESSMENT — MIFFLIN-ST. JEOR: SCORE: 1633.84

## 2019-10-31 NOTE — PROGRESS NOTES
Noa is a 44 year old White female who presents for hernia evaluation. The patient has not noticed a bulge. Pain has been present.  Symptoms began 2 months ago.  Symptoms are described as aching and pressure  located in the LUQ, lateral to her umbilicus.  Associated with this  is none.  Pt has had previous ABD surgery including TRAM flap reconstruction of her breast and later hernia repair with bilateral myocutaneous advancement flaps (5/25/2018).  Patient does report that increased activity/lifting causes pain.  She feels better with an abdominal binder in place employment does not require lifting.    Constipation No  Dysuria No  Cough No  Smoking No  Diabetes No    Pt's chart has been reviewed for FH,  PMH, PSH, allergies, medications and social history.    ROS:  Pulm:  No shortness of breath, dyspnea on exertion, cough, or hemoptysis  CV:  negative  ABD:  See chief complaint  :  Negative  All other systems negative    Physical exam:  Patient able to get up on table without difficulty.  Head eyes, nose and mouth within normal limits.  Skin - no jaundice  Sclera are clear  No supraclavicular or cervical adenopathy noted.  Neck shows no gross mass  Neurologic: alert, speech is clear, moves all extremities with good strength  Psychiatric: Mood and affect are appropriate  Respirations are regular and non labored  Abdomen is abdomen is soft without significant masses, organomegaly or guarding  bowel sounds are positive and no caput medusa noted.  Hernia is not clinically present on exam with or without Valsalva.  There is some tenderness about 4 to 10 cm lateral to the umbilicus but there is no palpable fascial defect or mass suggestive of hernia    Imaging  CT No      Assesment: Possible subclinical hernia versus muscle injury versus nerve entrapment within scar    Plan: Discussed observation, external support, possible progression, incarceration and strangulation signs and symptoms and need for immediate  treatment if they develop.  We discussed CT scan with Valsalva to search for a possible subclinical hernia.  If this negative, we would then consider injection with local anesthetic and steroid.  She seems understand the above plan and agrees with it.  We will help her with scheduling a CT scan today.  Time spent with the patient with greater that 50% of the time in discussion was 30 minutes.    Suraj Bergeron MD  10/31/2019 2:57 PM    Please route or send letter to:  Primary Care Provider (PCP) and Include Progress Note    Level 4

## 2019-10-31 NOTE — LETTER
2019    RE: Noa Mcgill, : 1975      Noa is a 44 year old White female who presents for hernia evaluation. The patient has not noticed a bulge. Pain has been present.  Symptoms began 2 months ago.  Symptoms are described as aching and pressure  located in the LUQ, lateral to her umbilicus.  Associated with this  is none.  Pt has had previous ABD surgery including TRAM flap reconstruction of her breast and later hernia repair with bilateral myocutaneous advancement flaps (2018).  Patient does report that increased activity/lifting causes pain.  She feels better with an abdominal binder in place employment does not require lifting.     Constipation No  Dysuria No  Cough No  Smoking No  Diabetes No     Pt's chart has been reviewed for FH,  PMH, PSH, allergies, medications and social history.     ROS:  Pulm:  No shortness of breath, dyspnea on exertion, cough, or hemoptysis  CV:  negative  ABD:  See chief complaint  :  Negative  All other systems negative     Physical exam:  Patient able to get up on table without difficulty.  Head eyes, nose and mouth within normal limits.  Skin - no jaundice  Sclera are clear  No supraclavicular or cervical adenopathy noted.  Neck shows no gross mass  Neurologic: alert, speech is clear, moves all extremities with good strength  Psychiatric: Mood and affect are appropriate  Respirations are regular and non labored  Abdomen is abdomen is soft without significant masses, organomegaly or guarding  bowel sounds are positive and no caput medusa noted.  Hernia is not clinically present on exam with or without Valsalva.  There is some tenderness about 4 to 10 cm lateral to the umbilicus but there is no palpable fascial defect or mass suggestive of hernia     Imaging  CT No     Assesment: Possible subclinical hernia versus muscle injury versus nerve entrapment within scar     Plan: Discussed observation, external support, possible progression, incarceration and  strangulation signs and symptoms and need for immediate treatment if they develop.  We discussed CT scan with Valsalva to search for a possible subclinical hernia.  If this negative, we would then consider injection with local anesthetic and steroid.  She seems understand the above plan and agrees with it.  We will help her with scheduling a CT scan today.    Suraj Bergeron MD

## 2019-10-31 NOTE — PATIENT INSTRUCTIONS
CT ABDOMEN AND PELVIS WITHOUT CONTRAST and with valsalva    Date: 11-4-19   Time: 12:40 PM     Location: [facility]  [address] [address]  [address] [place] [address]  [id number]        Please check in at 12:25 PM       Preparation for CT scanning      Do not eat or drink anything TWO hours prior to your exam

## 2019-11-04 ENCOUNTER — HOSPITAL ENCOUNTER (OUTPATIENT)
Dept: CT IMAGING | Facility: CLINIC | Age: 44
Discharge: HOME | End: 2019-11-04
Attending: SURGERY | Admitting: SURGERY
Payer: COMMERCIAL

## 2019-11-04 DIAGNOSIS — R10.84 ABDOMINAL PAIN, GENERALIZED: ICD-10-CM

## 2019-11-04 PROCEDURE — 74176 CT ABD & PELVIS W/O CONTRAST: CPT

## 2019-11-04 NOTE — RESULT ENCOUNTER NOTE
Patient was notified of these results.  There is no hernia at the site of her symptoms to the left of her umbilicus.  There is some bulging of the abdominal wall in the suprapubic region without a fascial defect.  A hernia could develop here in the future and this was discussed with her.    We also discussed the 4 mm nodule in her left lower lung.  She is not a smoker and per protocol would not need additional follow-up however, her mother did have a lung cancer so she will bring this up with her primary physician.

## 2019-11-07 ENCOUNTER — OFFICE VISIT (OUTPATIENT)
Dept: URGENT CARE | Facility: URGENT CARE | Age: 44
End: 2019-11-07
Payer: COMMERCIAL

## 2019-11-07 VITALS
OXYGEN SATURATION: 97 % | TEMPERATURE: 98.3 F | SYSTOLIC BLOOD PRESSURE: 121 MMHG | DIASTOLIC BLOOD PRESSURE: 78 MMHG | HEART RATE: 84 BPM

## 2019-11-07 DIAGNOSIS — Z87.440 PERSONAL HISTORY OF URINARY TRACT INFECTION: ICD-10-CM

## 2019-11-07 DIAGNOSIS — R30.0 DYSURIA: Primary | ICD-10-CM

## 2019-11-07 LAB
ALBUMIN UR-MCNC: NEGATIVE MG/DL
APPEARANCE UR: CLEAR
BILIRUB UR QL STRIP: ABNORMAL
COLOR UR AUTO: YELLOW
GLUCOSE UR STRIP-MCNC: NEGATIVE MG/DL
HGB UR QL STRIP: NEGATIVE
KETONES UR STRIP-MCNC: NEGATIVE MG/DL
LEUKOCYTE ESTERASE UR QL STRIP: NEGATIVE
NITRATE UR QL: NEGATIVE
NON-SQ EPI CELLS #/AREA URNS LPF: ABNORMAL /LPF
PH UR STRIP: 5 PH (ref 5–7)
RBC #/AREA URNS AUTO: ABNORMAL /HPF
SOURCE: ABNORMAL
SP GR UR STRIP: 1.03 (ref 1–1.03)
UROBILINOGEN UR STRIP-ACNC: 0.2 EU/DL (ref 0.2–1)
WBC #/AREA URNS AUTO: ABNORMAL /HPF

## 2019-11-07 PROCEDURE — 99213 OFFICE O/P EST LOW 20 MIN: CPT | Performed by: PHYSICIAN ASSISTANT

## 2019-11-07 PROCEDURE — 87086 URINE CULTURE/COLONY COUNT: CPT | Performed by: PHYSICIAN ASSISTANT

## 2019-11-07 PROCEDURE — 81001 URINALYSIS AUTO W/SCOPE: CPT | Performed by: PHYSICIAN ASSISTANT

## 2019-11-07 RX ORDER — CEPHALEXIN 500 MG/1
500 CAPSULE ORAL 4 TIMES DAILY
Qty: 40 CAPSULE | Refills: 0 | Status: SHIPPED | OUTPATIENT
Start: 2019-11-07 | End: 2019-11-23

## 2019-11-08 LAB
BACTERIA SPEC CULT: NO GROWTH
SPECIMEN SOURCE: NORMAL

## 2019-11-08 NOTE — PROGRESS NOTES
SUBJECTIVE:   Noa Mcgill is a 44 year old female who  presents today for a possible UTI. Symptoms of dysuria, urgency and frequency have been going on for 1day(s).  Hematuria no.  sudden onsetand mild.  There is no history of fever, chills, nausea or vomiting.   This patient does  have a history of urinary tract infections. Patient denies long duration, rigors, flank pain, temperature > 101 degrees F. and Vomiting, significant nausea or diarrhea or vaginal discharge     Past Medical History:   Diagnosis Date     Adrenal insufficiency ([place]'s disease) (H)     ACTH stim test failed to increase cortisol     Allergic rhinitis, cause unspecified      Antiplatelet or antithrombotic long-term use      Arthritis      ASD (atrial septal defect) 02/15/2013    ASD dx by echo, pt declined ASD closure     Attention deficit disorder without mention of hyperactivity 7/2/2014     BRCA1 gene mutation positive 1/24/2018    Reported by patient, no report available at this time Records requested from [employer] 1/23/18     Cerebral infarction (H)      Clotting disorder (H) birth    undefined clotting disorder - sees Dr. 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     Allergies   Allergen Reactions     Tnf Antagonists Hives     Enbrel Hives     Humira [Humira]      Rash hives     Ibuprofen Unknown     Patient states she was told not to take Ibuprofen due to bleeding disorder     Macrobid [Nitrofurantoin] Hives     Prednisone      Pt  sensitive to prednisone--shakey heart racing - only with large doses     Compazine [Prochlorperazine] Anxiety     Shakey per H&P dated 2018  Shaky and anxiety     Social History     Tobacco Use     Smoking status: Never Smoker     Smokeless tobacco: Never Used   Substance Use Topics     Alcohol use: Yes     Alcohol/week: 0.0 standard drinks     Comment: 1-3drink per week     Family History   Problem Relation Age of Onset     C.A.D. Maternal Grandmother      Hypertension Maternal Grandmother      Psychotic Disorder Mother         Depression, alcoholism     Diabetes Mother      Hereditary Breast and Ovarian Cancer Syndrome Mother         BRCA1+, no hx of cancer, s/p prophylactic surgery to remove breast tissue, ovaries, fallopian tubes     Hypertension Mother      Thyroid Disease Mother         thyroid nodules     Lung Cancer Mother      Substance Abuse Father      Hereditary Breast and Ovarian Cancer Syndrome Sister         matrnal half sister, BRCA1+     Hereditary Breast and Ovarian Cancer Syndrome Daughter 23        BRCA1+     Breast Cancer Maternal Aunt 40        lumpectomy, then 2nd primary breast cancer later in 40s, treated with mastectomy     Hereditary Breast and Ovarian Cancer Syndrome Maternal Aunt         BRCA1+     Ovarian Cancer Maternal Aunt 70        surgry     Thyroid Cancer Maternal Aunt         medullary thyroid cancer     Hereditary Breast and Ovarian Cancer Syndrome Maternal Aunt         BRCA1+     Breast Cancer Other 41        maternal great aunt     Ovarian Cancer Other 45         in 40s     Thyroid Cancer Maternal Uncle 66        papillary thyroid cancer     Breast Cancer Cousin 45     Hereditary Breast and Ovarian Cancer Syndrome Cousin         BRCA1+     Breast Cancer Other 43        maternal great aunt     Other Cancer Other         ovarian cancer       ROS:   CONSTITUTIONAL:NEGATIVE for fever, chills, change in weight  INTEGUMENTARY/SKIN: NEGATIVE for worrisome rashes, moles or  lesions  ENT/MOUTH: NEGATIVE for ear, mouth and throat problems  RESP:NEGATIVE for significant cough or SOB  CV: NEGATIVE for chest pain, palpitations or peripheral edema  GI: NEGATIVE for nausea, abdominal pain, heartburn, or change in bowel habits  : dysuria and frequency of urination  MUSCULOSKELETAL: NEGATIVE for significant arthralgias or myalgia  NEURO: NEGATIVE for weakness, dizziness or paresthesias    OBJECTIVE:  /78   Pulse 84   Temp 98.3  F (36.8  C) (Oral)   LMP 02/08/2015 (Within Months)   SpO2 97%   GENERAL APPEARANCE: healthy, alert and no distress  RESP: lungs clear to auscultation - no rales, rhonchi or wheezes  CV: regular rates and rhythm, normal S1 S2, no murmur noted  ABDOMEN:  soft, nontender, no HSM or masses and bowel sounds normal  BACK: No CVA tenderness  SKIN: no suspicious lesions or rashes    Results for orders placed or performed in visit on 11/07/19   UA with Microscopic reflex to Culture     Status: Abnormal   Result Value Ref Range    Color Urine Yellow     Appearance Urine Clear     Glucose Urine Negative NEG^Negative mg/dL    Bilirubin Urine Small (A) NEG^Negative    Ketones Urine Negative NEG^Negative mg/dL    Specific Gravity Urine 1.030 1.003 - 1.035    pH Urine 5.0 5.0 - 7.0 pH    Protein Albumin Urine Negative NEG^Negative mg/dL    Urobilinogen Urine 0.2 0.2 - 1.0 EU/dL    Nitrite Urine Negative NEG^Negative    Blood Urine Negative NEG^Negative    Leukocyte Esterase Urine Negative NEG^Negative    Source Midstream Urine     WBC Urine 0 - 5 OTO5^0 - 5 /HPF    RBC Urine O - 2 OTO2^O - 2 /HPF    Squamous Epithelial /LPF Urine Few FEW^Few /LPF     ASSESSMENT/PLAN      ICD-10-CM    1. Dysuria R30.0 UA with Microscopic reflex to Culture     Urine Culture Aerobic Bacterial     cephALEXin (KEFLEX) 500 MG capsule   2. Personal history of urinary tract infection Z87.440 cephALEXin (KEFLEX) 500 MG capsule       Orders Placed This Encounter     UA with Microscopic reflex to  Culture     cephALEXin (KEFLEX) 500 MG capsule       Urine culture pending  Drink plenty of fluids.  Prevention and treatment of UTI's discussed.  Signs and symptoms of pyelonephritis mentioned.    Follow up with primary care physician if not improving

## 2019-11-23 ENCOUNTER — OFFICE VISIT (OUTPATIENT)
Dept: URGENT CARE | Facility: URGENT CARE | Age: 44
End: 2019-11-23
Payer: COMMERCIAL

## 2019-11-23 VITALS
HEART RATE: 68 BPM | SYSTOLIC BLOOD PRESSURE: 120 MMHG | OXYGEN SATURATION: 98 % | DIASTOLIC BLOOD PRESSURE: 70 MMHG | TEMPERATURE: 98 F

## 2019-11-23 DIAGNOSIS — R05.8 POST-VIRAL COUGH SYNDROME: ICD-10-CM

## 2019-11-23 DIAGNOSIS — N39.0 RECURRENT UTI: Primary | ICD-10-CM

## 2019-11-23 PROBLEM — Z79.620 INFLIXIMAB (REMICADE) LONG-TERM USE: Status: RESOLVED | Noted: 2018-06-30 | Resolved: 2019-11-23

## 2019-11-23 PROBLEM — E34.9 ENDOCRINE DISORDER: Status: RESOLVED | Noted: 2018-06-28 | Resolved: 2019-11-23

## 2019-11-23 PROBLEM — E27.1 ADRENAL INSUFFICIENCY, PRIMARY (H): Status: RESOLVED | Noted: 2018-06-30 | Resolved: 2019-11-23

## 2019-11-23 LAB
ALBUMIN UR-MCNC: NEGATIVE MG/DL
APPEARANCE UR: CLEAR
BACTERIA #/AREA URNS HPF: ABNORMAL /HPF
BILIRUB UR QL STRIP: NEGATIVE
COLOR UR AUTO: YELLOW
GLUCOSE UR STRIP-MCNC: NEGATIVE MG/DL
HGB UR QL STRIP: ABNORMAL
KETONES UR STRIP-MCNC: NEGATIVE MG/DL
LEUKOCYTE ESTERASE UR QL STRIP: ABNORMAL
MUCOUS THREADS #/AREA URNS LPF: PRESENT /LPF
NITRATE UR QL: NEGATIVE
NON-SQ EPI CELLS #/AREA URNS LPF: ABNORMAL /LPF
PH UR STRIP: 6 PH (ref 5–7)
RBC #/AREA URNS AUTO: ABNORMAL /HPF
SOURCE: ABNORMAL
SP GR UR STRIP: >1.03 (ref 1–1.03)
UROBILINOGEN UR STRIP-ACNC: 0.2 EU/DL (ref 0.2–1)
WBC #/AREA URNS AUTO: ABNORMAL /HPF

## 2019-11-23 PROCEDURE — 87086 URINE CULTURE/COLONY COUNT: CPT | Performed by: PHYSICIAN ASSISTANT

## 2019-11-23 PROCEDURE — 81001 URINALYSIS AUTO W/SCOPE: CPT | Performed by: PHYSICIAN ASSISTANT

## 2019-11-23 PROCEDURE — 87186 SC STD MICRODIL/AGAR DIL: CPT | Performed by: PHYSICIAN ASSISTANT

## 2019-11-23 PROCEDURE — 99214 OFFICE O/P EST MOD 30 MIN: CPT | Performed by: PHYSICIAN ASSISTANT

## 2019-11-23 PROCEDURE — 87088 URINE BACTERIA CULTURE: CPT | Performed by: PHYSICIAN ASSISTANT

## 2019-11-23 RX ORDER — ALBUTEROL SULFATE 90 UG/1
1-2 AEROSOL, METERED RESPIRATORY (INHALATION) EVERY 4 HOURS PRN
Qty: 1 INHALER | Refills: 1 | Status: SHIPPED | OUTPATIENT
Start: 2019-11-23 | End: 2020-02-11

## 2019-11-23 RX ORDER — CEPHALEXIN 500 MG/1
500 CAPSULE ORAL 4 TIMES DAILY
Qty: 40 CAPSULE | Refills: 0 | Status: SHIPPED | OUTPATIENT
Start: 2019-11-23 | End: 2020-01-08

## 2019-11-23 RX ORDER — BENZONATATE 100 MG/1
100 CAPSULE ORAL 3 TIMES DAILY PRN
Qty: 16 CAPSULE | Refills: 0 | Status: SHIPPED | OUTPATIENT
Start: 2019-11-23 | End: 2019-11-30

## 2019-11-23 ASSESSMENT — ENCOUNTER SYMPTOMS
DYSURIA: 1
DIARRHEA: 0
HEADACHES: 0
FOCAL WEAKNESS: 0
COUGH: 1
SHORTNESS OF BREATH: 0
VOMITING: 0
NAUSEA: 0
CHILLS: 0
FEVER: 0
ABDOMINAL PAIN: 0

## 2019-11-23 NOTE — PROGRESS NOTES
HPI  November 23, 2019    HPI: Noa Mcgill is a 44 year old female who complains of moderate dysuria, urinary frequency & urgency onset this AM. Pt has a hx of recurrent UTIs, sees urology for this. Her UTIs are often resistant to fluoroquinolones & Bactrim, and she is allergic to Macrobid. She states she is usually treated with Keflex and requires a longer course. Denies flank pain, N/V, hematuria.    Pt also reports dry cough onset 6 weeks ago. She initially also had congestion & facial pressure but that has resolved. She was seen at [facility] on 11/18 for this and diagnosed with right otitis media, prescribed amoxicillin for 10 days. She was also given Tessalon for cough. These have helped somewhat. She is also taking Flonase. She reports ear pain resolved within 2 days of starting the amoxicillin. Denies fever/chills, HA, CP, SOB, abd pain, N/V/D, rash, or any other symptoms. Patient denies hx asthma or lung disease.    Hx adrenal insufficiency a few years ago was temporary- reports this is no longer a problem.      Past Medical History:   Diagnosis Date     Adrenal insufficiency ([place]'s disease) (H)     ACTH stim test failed to increase cortisol     Allergic rhinitis, cause unspecified      Antiplatelet or antithrombotic long-term use      Arthritis      ASD (atrial septal defect) 02/15/2013    ASD dx by echo, pt declined ASD closure     Attention deficit disorder without mention of hyperactivity 7/2/2014     BRCA1 gene mutation positive 1/24/2018    Reported by patient, no report available at this time Records requested from [employer] 1/23/18     Cerebral infarction (H)      Clotting disorder (H) birth    undefined clotting disorder - sees Dr. 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     Past Surgical History:   Procedure Laterality Date     APPENDECTOMY  2006      SECTION  2013    Procedure:  SECTION;  Primary  Section;  Surgeon: Chad Hughes MD;  Location:  L+D     DILATION AND CURETTAGE SUCTION N/A 2014    Procedure: DILATION AND CURETTAGE SUCTION;  Surgeon: Srini Martinez MD;  Location:  OR     DILATION AND CURETTAGE SUCTION N/A 2014    Procedure: DILATION AND CURETTAGE SUCTION;  Surgeon: Connor Kang MD;  Location:  OR     GENITOURINARY SURGERY      bladder sling and complete historectomy     HC DILATION/CURETTAGE DIAG/THER NON OB       HC DILATION/CURETTAGE DIAG/THER NON OB       HERNIORRHAPHY INCISIONAL (LOCATION) N/A 2018    Procedure: Incisional Hernia repair with Biologic mesh;  Surgeon: Suraj Bergeron MD;  Location:  OR     HYSTERECTOMY, [facility]  2016    [facility]     MASTECTOMY SIMPLE BILATERAL Bilateral 2018    Procedure: MASTECTOMY SIMPLE BILATERAL;  PROPHYLACTIC BILATERAL MASTECTOMY (GLADIS) BILATERAL BREAST RECONSTRUCTION [facility] TISSUE EXPANDERS (WILLOW)   ;  Surgeon: Suraj Bergeron MD;  Location:  OR     ORTHOPEDIC SURGERY      (L) thumb fused     RECONSTRUCT BREAST, INSERT TISSUE EXPANDER BILATERAL, COMBINED Bilateral 2018    expander and tram flap with drains - had weekly surgery to have I & D and removal of infected tissue     SURGICAL HISTORY OF -       left thumb fusion due to arthritis     Social History     Tobacco Use     Smoking status: Never Smoker     Smokeless tobacco: Never Used   Substance Use Topics     Alcohol use: Yes     Alcohol/week: 0.0 standard drinks     Comment:  1-3drink per week     Drug use: No     Patient Active Problem List   Diagnosis     Acute reaction to stress     Psoriatic arthropathy (H)     Other psoriasis     Herpes simplex virus (HSV) infection     Obesity     Recurrent UTI     Insomnia     CARDIOVASCULAR SCREENING; LDL GOAL LESS THAN 160     Migraine headache     Lumbago     Nonallopathic lesion of sacral region     Phobia, flying     Vaginal bleeding in pregnancy     Retinal artery branch occlusion of right eye     Vision loss of right eye     ASD (atrial septal defect)     Bleeding     Attention deficit disorder     Anxiety     Neck pain     Chronic pain syndrome     BRCA1 gene mutation positive     Mild major depression (H)     S/P mastectomy, bilateral     Anemia due to blood loss, acute     Postoperative urinary retention     Incisional hernia     Central retinal artery occlusion of right eye     Embolism and thrombosis of unspecified artery (H)     History of endometriosis     Personal history of venous thrombosis and embolus     S/P complete hysterectomy     BRCA gene mutation positive     Psoriatic arthritis, destructive type (H)     Acquired absence of breast and absent nipple, bilateral     Dehiscence of closure of muscle or muscle flap, initial encounter     Family History   Problem Relation Age of Onset     C.A.D. Maternal Grandmother      Hypertension Maternal Grandmother      Psychotic Disorder Mother         Depression, alcoholism     Diabetes Mother      Hereditary Breast and Ovarian Cancer Syndrome Mother         BRCA1+, no hx of cancer, s/p prophylactic surgery to remove breast tissue, ovaries, fallopian tubes     Hypertension Mother      Thyroid Disease Mother         thyroid nodules     Lung Cancer Mother      Substance Abuse Father      Hereditary Breast and Ovarian Cancer Syndrome Sister         matrnal half sister, BRCA1+     Hereditary Breast and Ovarian Cancer Syndrome Daughter 23        BRCA1+     Breast Cancer Maternal Aunt 40         lumpectomy, then 2nd primary breast cancer later in 40s, treated with mastectomy     Hereditary Breast and Ovarian Cancer Syndrome Maternal Aunt         BRCA1+     Ovarian Cancer Maternal Aunt 70        surgry     Thyroid Cancer Maternal Aunt         medullary thyroid cancer     Hereditary Breast and Ovarian Cancer Syndrome Maternal Aunt         BRCA1+     Breast Cancer Other 41        maternal great aunt     Ovarian Cancer Other 45         in 40s     Thyroid Cancer Maternal Uncle 66        papillary thyroid cancer     Breast Cancer Cousin 45     Hereditary Breast and Ovarian Cancer Syndrome Cousin         BRCA1+     Breast Cancer Other 43        maternal great aunt     Other Cancer Other         ovarian cancer        Problem list, Medication list, Allergies, and Medical/Social/Surgical histories reviewed in [facility] and updated as appropriate.      Review of Systems   Constitutional: Negative for chills and fever.   HENT: Positive for congestion (resolved) and ear pain (resolved).    Respiratory: Positive for cough. Negative for shortness of breath.    Cardiovascular: Negative for chest pain.   Gastrointestinal: Negative for abdominal pain, diarrhea, nausea and vomiting.   Genitourinary: Positive for dysuria.   Skin: Negative for rash.   Neurological: Negative for focal weakness and headaches.   All other systems reviewed and are negative.        Physical Exam  Vitals signs and nursing note reviewed.   HENT:      Head: Normocephalic and atraumatic.      Right Ear: Tympanic membrane and external ear normal.      Left Ear: Tympanic membrane and external ear normal.      Nose: Nose normal.      Mouth/Throat:      Mouth: Mucous membranes are moist.      Pharynx: Oropharynx is clear.   Cardiovascular:      Rate and Rhythm: Normal rate and regular rhythm.      Heart sounds: Normal heart sounds.   Pulmonary:      Effort: Pulmonary effort is normal.      Breath sounds: Normal breath sounds.   Abdominal:      Tenderness:  There is no right CVA tenderness or left CVA tenderness.   Musculoskeletal: Normal range of motion.   Skin:     General: Skin is warm and dry.   Neurological:      Mental Status: She is alert and oriented to person, place, and time.         Vital Signs  /70 (BP Location: Right arm, Patient Position: Chair, Cuff Size: Adult Large)   Pulse 68   Temp 98  F (36.7  C) (Oral)   LMP 02/08/2015 (Within Months)   SpO2 98%      Diagnostic Test Results:  Results for orders placed or performed in visit on 11/23/19 (from the past 24 hour(s))   UA reflex to Microscopic and Culture   Result Value Ref Range    Color Urine Yellow     Appearance Urine Clear     Glucose Urine Negative NEG^Negative mg/dL    Bilirubin Urine Negative NEG^Negative    Ketones Urine Negative NEG^Negative mg/dL    Specific Gravity Urine >1.030 1.003 - 1.035    Blood Urine Trace (A) NEG^Negative    pH Urine 6.0 5.0 - 7.0 pH    Protein Albumin Urine Negative NEG^Negative mg/dL    Urobilinogen Urine 0.2 0.2 - 1.0 EU/dL    Nitrite Urine Negative NEG^Negative    Leukocyte Esterase Urine Small (A) NEG^Negative    Source Midstream Urine    Urine Microscopic   Result Value Ref Range    WBC Urine 25-50 (A) OTO5^0 - 5 /HPF    RBC Urine O - 2 OTO2^O - 2 /HPF    Squamous Epithelial /LPF Urine Few FEW^Few /LPF    Bacteria Urine Few (A) NEG^Negative /HPF    Mucous Urine Present (A) NEG^Negative /LPF       ASSESSMENT/PLAN      ICD-10-CM    1. Recurrent UTI N39.0 UA reflex to Microscopic and Culture     Urine Microscopic     Urine Culture Aerobic Bacterial     cephALEXin (KEFLEX) 500 MG capsule   2. Post-viral cough syndrome R05 albuterol (PROAIR HFA/PROVENTIL HFA/VENTOLIN HFA) 108 (90 Base) MCG/ACT inhaler     benzonatate (TESSALON) 100 MG capsule      UA c/w probable UTI, no s/sx pyelonephritis. Rx longer course of Keflex, culture pending.   Lungs CTAB, afebrile- suspect post viral cough syndrome. Rx more Tessalon as well as albuterol inhaler. Continue  amoxicillin for otitis media- appears resolved on exam.      I have discussed any lab or imaging results, the patient's diagnosis, and my plan of treatment with the patient and/or family. Patient is aware to come back in if with worsening symptoms or if no relief despite treatment plan.  Patient voiced understanding and had no further questions.       Follow Up: Return in about 3 days (around 11/26/2019) for Follow up w/ primary care provider if not better.    CHETAN Naranjo, PA-C  [facility]

## 2019-11-25 LAB
BACTERIA SPEC CULT: ABNORMAL
SPECIMEN SOURCE: ABNORMAL

## 2019-11-30 ENCOUNTER — OFFICE VISIT (OUTPATIENT)
Dept: FAMILY MEDICINE | Facility: CLINIC | Age: 44
End: 2019-11-30
Payer: COMMERCIAL

## 2019-11-30 VITALS
BODY MASS INDEX: 34.06 KG/M2 | RESPIRATION RATE: 16 BRPM | DIASTOLIC BLOOD PRESSURE: 64 MMHG | WEIGHT: 211 LBS | HEART RATE: 111 BPM | SYSTOLIC BLOOD PRESSURE: 118 MMHG | TEMPERATURE: 99.3 F | OXYGEN SATURATION: 97 %

## 2019-11-30 DIAGNOSIS — F41.9 ANXIETY: ICD-10-CM

## 2019-11-30 DIAGNOSIS — Z87.440 PERSONAL HISTORY OF URINARY TRACT INFECTION: Primary | ICD-10-CM

## 2019-11-30 DIAGNOSIS — G43.909 MIGRAINE WITHOUT STATUS MIGRAINOSUS, NOT INTRACTABLE, UNSPECIFIED MIGRAINE TYPE: ICD-10-CM

## 2019-11-30 DIAGNOSIS — R05.9 COUGH: ICD-10-CM

## 2019-11-30 DIAGNOSIS — G89.4 CHRONIC PAIN SYNDROME: ICD-10-CM

## 2019-11-30 PROBLEM — I74.9 EMBOLISM AND THROMBOSIS OF UNSPECIFIED ARTERY (H): Status: RESOLVED | Noted: 2018-06-30 | Resolved: 2019-11-30

## 2019-11-30 LAB
ALBUMIN UR-MCNC: NEGATIVE MG/DL
APPEARANCE UR: CLEAR
BILIRUB UR QL STRIP: NEGATIVE
COLOR UR AUTO: YELLOW
GLUCOSE UR STRIP-MCNC: NEGATIVE MG/DL
HGB UR QL STRIP: NEGATIVE
KETONES UR STRIP-MCNC: NEGATIVE MG/DL
LEUKOCYTE ESTERASE UR QL STRIP: NEGATIVE
NITRATE UR QL: NEGATIVE
PH UR STRIP: 6.5 PH (ref 5–7)
SOURCE: NORMAL
SP GR UR STRIP: 1.02 (ref 1–1.03)
UROBILINOGEN UR STRIP-ACNC: 0.2 EU/DL (ref 0.2–1)

## 2019-11-30 PROCEDURE — 81003 URINALYSIS AUTO W/O SCOPE: CPT | Performed by: FAMILY MEDICINE

## 2019-11-30 PROCEDURE — 99214 OFFICE O/P EST MOD 30 MIN: CPT | Performed by: FAMILY MEDICINE

## 2019-11-30 RX ORDER — BUSPIRONE HYDROCHLORIDE 10 MG/1
10 TABLET ORAL 3 TIMES DAILY
Qty: 90 TABLET | Refills: 3 | Status: SHIPPED | OUTPATIENT
Start: 2019-11-30 | End: 2019-12-30

## 2019-11-30 RX ORDER — BENZONATATE 200 MG/1
200 CAPSULE ORAL 3 TIMES DAILY PRN
Qty: 30 CAPSULE | Refills: 0 | Status: SHIPPED | OUTPATIENT
Start: 2019-11-30 | End: 2020-02-11

## 2019-11-30 RX ORDER — BUTALBITAL, ACETAMINOPHEN AND CAFFEINE 50; 325; 40 MG/1; MG/1; MG/1
1 TABLET ORAL EVERY 4 HOURS PRN
Qty: 30 TABLET | Refills: 1 | Status: SHIPPED | OUTPATIENT
Start: 2019-11-30 | End: 2020-03-09

## 2019-11-30 ASSESSMENT — ANXIETY QUESTIONNAIRES
GAD7 TOTAL SCORE: 15
5. BEING SO RESTLESS THAT IT IS HARD TO SIT STILL: NOT AT ALL
3. WORRYING TOO MUCH ABOUT DIFFERENT THINGS: MORE THAN HALF THE DAYS
7. FEELING AFRAID AS IF SOMETHING AWFUL MIGHT HAPPEN: NEARLY EVERY DAY
6. BECOMING EASILY ANNOYED OR IRRITABLE: NEARLY EVERY DAY
2. NOT BEING ABLE TO STOP OR CONTROL WORRYING: NEARLY EVERY DAY
6. BECOMING EASILY ANNOYED OR IRRITABLE: NEARLY EVERY DAY
2. NOT BEING ABLE TO STOP OR CONTROL WORRYING: NEARLY EVERY DAY
1. FEELING NERVOUS, ANXIOUS, OR ON EDGE: NEARLY EVERY DAY
IF YOU CHECKED OFF ANY PROBLEMS ON THIS QUESTIONNAIRE, HOW DIFFICULT HAVE THESE PROBLEMS MADE IT FOR YOU TO DO YOUR WORK, TAKE CARE OF THINGS AT HOME, OR GET ALONG WITH OTHER PEOPLE: VERY DIFFICULT
GAD7 TOTAL SCORE: 15
5. BEING SO RESTLESS THAT IT IS HARD TO SIT STILL: NOT AT ALL
3. WORRYING TOO MUCH ABOUT DIFFERENT THINGS: MORE THAN HALF THE DAYS
1. FEELING NERVOUS, ANXIOUS, OR ON EDGE: NEARLY EVERY DAY
7. FEELING AFRAID AS IF SOMETHING AWFUL MIGHT HAPPEN: NEARLY EVERY DAY
IF YOU CHECKED OFF ANY PROBLEMS ON THIS QUESTIONNAIRE, HOW DIFFICULT HAVE THESE PROBLEMS MADE IT FOR YOU TO DO YOUR WORK, TAKE CARE OF THINGS AT HOME, OR GET ALONG WITH OTHER PEOPLE: VERY DIFFICULT

## 2019-11-30 ASSESSMENT — PATIENT HEALTH QUESTIONNAIRE - PHQ9
5. POOR APPETITE OR OVEREATING: SEVERAL DAYS
SUM OF ALL RESPONSES TO PHQ QUESTIONS 1-9: 19
5. POOR APPETITE OR OVEREATING: SEVERAL DAYS

## 2019-11-30 NOTE — PROGRESS NOTES
Subjective     Noa Mcgill is a 44 year old female who presents to clinic today for the following health issues:    Patient has 2 main issues:  1- still struggling with depression and anxiety.   She was on wellbutrin in the past and had seizure on it.   This was at a time when she had surgeries as well and anesthesia was involved.   The neurologist could not say what caused it and advised no antidepressants unless she was suicidal.    Things are tough but she has lost 6 dear family members in the last year.   Some is grieving.     2-   She is being tx for UTI right now with keflex but also 2 month cough that got amox 2 weeks ago for 10 days and now has cough syrup with codeine and prednisone.    It is still going,.   She does NOT have fever.       HPI   Depression and Anxiety Follow-Up    How are you doing with your depression since your last visit? Worsened     How are you doing with your anxiety since your last visit?  Worsened     Are you having other symptoms that might be associated with depression or anxiety? Yes:  insomnia    Have you had a significant life event? Grief or Loss     Do you have any concerns with your use of alcohol or other drugs? No    Social History     Tobacco Use     Smoking status: Never Smoker     Smokeless tobacco: Never Used   Substance Use Topics     Alcohol use: Yes     Alcohol/week: 0.0 standard drinks     Comment: 1-3drink per week     Drug use: No     PHQ 1/22/2019 7/12/2019 9/9/2019   PHQ-9 Total Score 16 18 18   Q9: Thoughts of better off dead/self-harm past 2 weeks Not at all Not at all Not at all     BASSAM-7 SCORE 1/22/2019 7/12/2019 9/9/2019   Total Score - - -   Total Score - - -   Total Score 17 21 13     Last PHQ-9 11/30/2019   1.  Little interest or pleasure in doing things 2   2.  Feeling down, depressed, or hopeless 3   3.  Trouble falling or staying asleep, or sleeping too much 3   4.  Feeling tired or having little energy 2   5.  Poor appetite or overeating 3   6.   Feeling bad about yourself 3   7.  Trouble concentrating 2   8.  Moving slowly or restless 0   Q9: Thoughts of better off dead/self-harm past 2 weeks 1   PHQ-9 Total Score 19   Difficulty at work, home, or with people Very difficult   In the past two weeks have you had thoughts of suicide or self harm? No   Do you have concerns about your personal safety or the safety of others? No     BASSAM-7  11/30/2019   1. Feeling nervous, anxious, or on edge 3   2. Not being able to stop or control worrying 3   3. Worrying too much about different things 2   4. Trouble relaxing 1   5. Being so restless that it is hard to sit still 0   6. Becoming easily annoyed or irritable 3   7. Feeling afraid, as if something awful might happen 3   BASSAM-7 Total Score 15   If you checked any problems, how difficult have they made it for you to do your work, take care of things at home, or get along with other people? Very difficult     In the past two weeks have you had thoughts of suicide or self-harm?  No.    Do you have concerns about your personal safety or the safety of others?   No    Suicide Assessment Five-step Evaluation and Treatment (SAFE-T)    Past Medical History:   Diagnosis Date     Adrenal insufficiency ([place]'s disease) (H)     ACTH stim test failed to increase cortisol     Allergic rhinitis, cause unspecified      Antiplatelet or antithrombotic long-term use      Arthritis      ASD (atrial septal defect) 02/15/2013    ASD dx by echo, pt declined ASD closure     Attention deficit disorder without mention of hyperactivity 7/2/2014     BRCA1 gene mutation positive 1/24/2018    Reported by patient, no report available at this time Records requested from [facility] 1/23/18     Cerebral infarction (H)      Clotting disorder (H) birth    undefined clotting disorder - sees Dr. 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       Past Surgical History:   Procedure Laterality Date     APPENDECTOMY  2006      SECTION  2013    Procedure:  SECTION;  Primary  Section;  Surgeon: Chad Hughes MD;  Location: [facility]     DILATION AND CURETTAGE SUCTION N/A 2014    Procedure: DILATION AND CURETTAGE SUCTION;  Surgeon: Srini Martinez MD;  Location:  OR     DILATION AND CURETTAGE SUCTION N/A 2014    Procedure: DILATION AND CURETTAGE SUCTION;  Surgeon: Connor Kang MD;  Location:  OR     GENITOURINARY SURGERY      bladder sling and complete historectomy     HC DILATION/CURETTAGE DIAG/THER NON OB       HC DILATION/CURETTAGE DIAG/THER NON OB       HERNIORRHAPHY INCISIONAL (LOCATION) N/A 2018    Procedure: Incisional Hernia repair with Biologic mesh;  Surgeon: Suraj Bergeron MD;  Location:  OR     HYSTERECTOMY, [facility]  2016    [facility]     MASTECTOMY SIMPLE BILATERAL Bilateral 2018    Procedure: MASTECTOMY SIMPLE BILATERAL;  PROPHYLACTIC BILATERAL MASTECTOMY (GLADIS) BILATERAL BREAST RECONSTRUCTION [facility] TISSUE EXPANDERS (WILLOW)   ;  Surgeon: Suraj Bergeron MD;  Location:  OR     ORTHOPEDIC SURGERY      (L) thumb fused     RECONSTRUCT BREAST, INSERT TISSUE EXPANDER BILATERAL, COMBINED Bilateral 2018    expander and tram flap with drains - had weekly surgery to have I & D and removal of infected tissue     SURGICAL HISTORY OF -   2006    left  thumb fusion due to arthritis       MEDICATIONS:  Current Outpatient Medications   Medication     albuterol (PROAIR HFA/PROVENTIL HFA/VENTOLIN HFA) 108 (90 Base) MCG/ACT inhaler     amphetamine-dextroamphetamine (ADDERALL) 10 MG tablet     ascorbic acid 250 MG TABS tablet     aspirin 325 MG EC tablet     benzonatate (TESSALON) 200 MG capsule     busPIRone (BUSPAR) 10 MG tablet     butalbital-acetaminophen-caffeine (FIORICET/ESGIC) -40 MG tablet     cephALEXin (KEFLEX) 500 MG capsule     CRANBERRY CONCENTRATE PO     etonogestrel-ethinyl estradiol (NUVARING) 0.12-0.015 MG/24HR vaginal ring     Ibuprofen (ADVIL PO)     Omega-3 Fatty Acids (FISH OIL) 1000 MG CPDR     rizatriptan (MAXALT) 10 MG tablet     VITAMIN D PO     zolpidem (AMBIEN) 10 MG tablet     No current facility-administered medications for this visit.        SOCIAL HISTORY:  Social History     Tobacco Use     Smoking status: Never Smoker     Smokeless tobacco: Never Used   Substance Use Topics     Alcohol use: Yes     Alcohol/week: 0.0 standard drinks     Comment: 1-3drink per week       Family History   Problem Relation Age of Onset     C.A.D. Maternal Grandmother      Hypertension Maternal Grandmother      Psychotic Disorder Mother         Depression, alcoholism     Diabetes Mother      Hereditary Breast and Ovarian Cancer Syndrome Mother         BRCA1+, no hx of cancer, s/p prophylactic surgery to remove breast tissue, ovaries, fallopian tubes     Hypertension Mother      Thyroid Disease Mother         thyroid nodules     Lung Cancer Mother      Substance Abuse Father      Hereditary Breast and Ovarian Cancer Syndrome Sister         matrnal half sister, BRCA1+     Hereditary Breast and Ovarian Cancer Syndrome Daughter 23        BRCA1+     Breast Cancer Maternal Aunt 40        lumpectomy, then 2nd primary breast cancer later in 40s, treated with mastectomy     Hereditary Breast and Ovarian Cancer Syndrome Maternal Aunt         BRCA1+     Ovarian  Cancer Maternal Aunt 70        surgry     Thyroid Cancer Maternal Aunt         medullary thyroid cancer     Hereditary Breast and Ovarian Cancer Syndrome Maternal Aunt         BRCA1+     Breast Cancer Other 41        maternal great aunt     Ovarian Cancer Other 45         in 40s     Thyroid Cancer Maternal Uncle 66        papillary thyroid cancer     Breast Cancer Cousin 45     Hereditary Breast and Ovarian Cancer Syndrome Cousin         BRCA1+     Breast Cancer Other 43        maternal great aunt     Other Cancer Other         ovarian cancer           Reviewed and updated as needed this visit by Provider         Review of Systems   ROS COMP: Constitutional, HEENT, cardiovascular, pulmonary, gi and gu systems are negative, except as otherwise noted.      Objective    /64 (BP Location: Right arm, Patient Position: Chair, Cuff Size: Adult Large)   Pulse 111   Temp 99.3  F (37.4  C) (Oral)   Resp 16   Wt 95.7 kg (211 lb)   LMP 2015 (Within Months)   SpO2 97%   BMI 34.06 kg/m    Body mass index is 34.06 kg/m .  Physical Exam   GENERAL: healthy, alert and no distress  EYES: Eyes grossly normal to inspection, PERRL and conjunctivae and sclerae normal  HENT: ear canals and TM's normal, nose and mouth without ulcers or lesions  NECK: no adenopathy, no asymmetry, masses, or scars and thyroid normal to palpation  RESP: occasional inspiratory rhonchi throughout  CV: regular rate and rhythm, normal S1 S2, no S3 or S4, no murmur, click or rub, no peripheral edema and peripheral pulses strong  ABDOMEN: soft, nontender, no hepatosplenomegaly, no masses and bowel sounds normal  MS: no gross musculoskeletal defects noted, no edema  SKIN: no suspicious lesions or rashes  NEURO: Normal strength and tone, mentation intact and speech normal  PSYCH: mentation appears normal, affect normal/bright  LYMPH: no cervical, supraclavicular, axillary, or inguinal adenopathy  Affect:  full  Mood: good - tearful when  discussing loss of both parents in law in car accident  The patient denies suidal thought    PHQ-9: see above      Diagnostic Test Results:  Labs reviewed in Epic        Assessment:  1. Depression which is some grieving - could use serotonin specific reuptake inhibitor as lowest for seizure issues  2. Anxiety - buspar is not issue with seizures - will try this  3. Cough       Plan:  1. buspar  2. The potential side effects of the prescription medication were discussed with the patient.  3. If cough not better in 2 weeks consider CT as she already had CXR and would give azithromycin too  4. Recheck on buspar in 4 weeks  5. Will do UDS at next visit

## 2019-11-30 NOTE — PATIENT INSTRUCTIONS
Thank you for choosing [facility] today for your health care needs.     [facility] is transforming primary care  At [facility], we re dedicated to constantly improve how we serve the health care needs of our patients and communities. We re currently making changes to the way we deliver care.     Changes you ll notice include:    An emphasis on building a relationship with a primary care provider    Access to a PAL (personal advocate and liaison) to help guide you with your care needs    Appointment lengths tailored to your specific needs and greater access to a care team to help you and your provider improve and maintain your health and well-being    Improved online access to your care team    Benefits of a primary care provider  If you don t have a designated primary care provider, we encourage you to get to know our care team online and find a provider you d like to see. Most of our providers have a short video on their online provider page. Visit [email or web address] to explore our providers and locations.    Benefits of having a primary care provider include:      They get to know you - your health history, family history and goals, making it easier to make a health plan together.     You get to know them - making health-related conversations and decisions easier      Primary care doctors help you when you re sick or hurt - but also focus on keeping you healthy with preventive care and screenings.      A doctor who sees you regularly is more likely to notice changes in your health.     You ll be connected to a broad care team who partners with your provider to support you.    Patient Advocate Liaison (PAL)   To help make sure you get the right care, at the right time, we include PALs, or Patient Advocate Liaisons, as part of your care team. Your PAL will be your first line of contact. They ll advocate for your needs and help you navigate our services, connecting you with care team members and community resources to ensure  your care is well coordinated. You ll be introduced to a PAL in an upcoming visit.     Expanded care team access with tailored appointment lengths  Depending on your health care needs, you may have longer or shorter appointments and see additional care team providers - including Medication Therapy Management (MTM) pharmacists, diabetes educators, behavioral health clinicians, or social workers. At times, they may be included in your visit with your provider, or you may see them individually.     Online access to your health care records and care team  Pocketbook is our online tool that makes it easy to see your health care information and communicate with your care team.     Pocketbook allows you to:     View your health maintenance plan so you know when you re due for a preventive screening    Send secure messages to your care team    View your health history and visit summaries     Schedule appointments     Complete questionnaires and eCheck-in before appointments      Get care from your provider with an e-visit      View and pay your bill     Sign up at [email or web address]. Once you have an account, you also can download the mobile lona.

## 2019-12-01 ASSESSMENT — ANXIETY QUESTIONNAIRES: GAD7 TOTAL SCORE: 15

## 2019-12-04 ENCOUNTER — TRANSFERRED RECORDS (OUTPATIENT)
Dept: HEALTH INFORMATION MANAGEMENT | Facility: CLINIC | Age: 44
End: 2019-12-04

## 2019-12-30 ENCOUNTER — OFFICE VISIT (OUTPATIENT)
Dept: FAMILY MEDICINE | Facility: CLINIC | Age: 44
End: 2019-12-30
Payer: COMMERCIAL

## 2019-12-30 VITALS
RESPIRATION RATE: 16 BRPM | TEMPERATURE: 98.7 F | BODY MASS INDEX: 33.43 KG/M2 | SYSTOLIC BLOOD PRESSURE: 118 MMHG | HEART RATE: 102 BPM | HEIGHT: 66 IN | DIASTOLIC BLOOD PRESSURE: 69 MMHG | WEIGHT: 208 LBS | OXYGEN SATURATION: 97 %

## 2019-12-30 DIAGNOSIS — F41.9 ANXIETY: ICD-10-CM

## 2019-12-30 DIAGNOSIS — L40.50 PSORIATIC ARTHROPATHY (H): ICD-10-CM

## 2019-12-30 DIAGNOSIS — J20.9 ACUTE BRONCHITIS, UNSPECIFIED ORGANISM: ICD-10-CM

## 2019-12-30 DIAGNOSIS — F98.8 ATTENTION DEFICIT DISORDER (ADD) WITHOUT HYPERACTIVITY: ICD-10-CM

## 2019-12-30 DIAGNOSIS — G89.4 CHRONIC PAIN SYNDROME: Primary | ICD-10-CM

## 2019-12-30 PROCEDURE — 80307 DRUG TEST PRSMV CHEM ANLYZR: CPT | Mod: 90 | Performed by: FAMILY MEDICINE

## 2019-12-30 PROCEDURE — 99000 SPECIMEN HANDLING OFFICE-LAB: CPT | Performed by: FAMILY MEDICINE

## 2019-12-30 PROCEDURE — 99214 OFFICE O/P EST MOD 30 MIN: CPT | Performed by: FAMILY MEDICINE

## 2019-12-30 RX ORDER — BUSPIRONE HYDROCHLORIDE 10 MG/1
10 TABLET ORAL 2 TIMES DAILY
Qty: 90 TABLET | Refills: 3 | COMMUNITY
Start: 2019-12-30 | End: 2020-03-09

## 2019-12-30 RX ORDER — AZITHROMYCIN 250 MG/1
TABLET, FILM COATED ORAL
Qty: 6 TABLET | Refills: 0 | Status: SHIPPED | OUTPATIENT
Start: 2019-12-30 | End: 2020-01-08

## 2019-12-30 RX ORDER — DEXTROAMPHETAMINE SACCHARATE, AMPHETAMINE ASPARTATE, DEXTROAMPHETAMINE SULFATE AND AMPHETAMINE SULFATE 2.5; 2.5; 2.5; 2.5 MG/1; MG/1; MG/1; MG/1
TABLET ORAL
Qty: 90 TABLET | Refills: 0 | Status: SHIPPED | OUTPATIENT
Start: 2020-01-20 | End: 2020-12-09

## 2019-12-30 ASSESSMENT — ANXIETY QUESTIONNAIRES
2. NOT BEING ABLE TO STOP OR CONTROL WORRYING: NEARLY EVERY DAY
GAD7 TOTAL SCORE: 12
6. BECOMING EASILY ANNOYED OR IRRITABLE: NEARLY EVERY DAY
3. WORRYING TOO MUCH ABOUT DIFFERENT THINGS: SEVERAL DAYS
5. BEING SO RESTLESS THAT IT IS HARD TO SIT STILL: NOT AT ALL
7. FEELING AFRAID AS IF SOMETHING AWFUL MIGHT HAPPEN: NEARLY EVERY DAY
1. FEELING NERVOUS, ANXIOUS, OR ON EDGE: SEVERAL DAYS
IF YOU CHECKED OFF ANY PROBLEMS ON THIS QUESTIONNAIRE, HOW DIFFICULT HAVE THESE PROBLEMS MADE IT FOR YOU TO DO YOUR WORK, TAKE CARE OF THINGS AT HOME, OR GET ALONG WITH OTHER PEOPLE: VERY DIFFICULT

## 2019-12-30 ASSESSMENT — PATIENT HEALTH QUESTIONNAIRE - PHQ9: 5. POOR APPETITE OR OVEREATING: SEVERAL DAYS

## 2019-12-30 ASSESSMENT — MIFFLIN-ST. JEOR: SCORE: 1620.23

## 2019-12-30 NOTE — PROGRESS NOTES
Subjective     Noa Mcgill is a 44 year old female who presents to clinic today for the following health issues:    Recheck on medication.  Is on buspar 5 mg twice daily.  The evening dose made her tired.   She is doing a little better.   The ambien helps her sleep.         Also cough for 2.5 months.   She got course of prednisone and tessalon.   [facility] which was clear.   Now coughing up more green mucous.   No fever.       HPI   Medication Followup     Taking Medication as prescribed: yes    Side Effects:  None    Medication Helping Symptoms:  yes         Past Medical History:   Diagnosis Date     Adrenal insufficiency ([place]'s disease) (H)     ACTH stim test failed to increase cortisol     Allergic rhinitis, cause unspecified      Antiplatelet or antithrombotic long-term use      Arthritis      ASD (atrial septal defect) 02/15/2013    ASD dx by echo, pt declined ASD closure     Attention deficit disorder without mention of hyperactivity 7/2/2014     BRCA1 gene mutation positive 1/24/2018    Reported by patient, no report available at this time Records requested from [facility] 1/23/18     Cerebral infarction (H)      Clotting disorder (H) birth    undefined clotting disorder - sees Dr. 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       Past Surgical History:   Procedure  Laterality Date     APPENDECTOMY  2006      SECTION  2013    Procedure:  SECTION;  Primary  Section;  Surgeon: Chad Hughes MD;  Location: [facility]     DILATION AND CURETTAGE SUCTION N/A 2014    Procedure: DILATION AND CURETTAGE SUCTION;  Surgeon: Srini Martinez MD;  Location:  OR     DILATION AND CURETTAGE SUCTION N/A 2014    Procedure: DILATION AND CURETTAGE SUCTION;  Surgeon: Connor Kang MD;  Location:  OR     GENITOURINARY SURGERY      bladder sling and complete historectomy     HC DILATION/CURETTAGE DIAG/THER NON OB       HC DILATION/CURETTAGE DIAG/THER NON OB       HERNIORRHAPHY INCISIONAL (LOCATION) N/A 2018    Procedure: Incisional Hernia repair with Biologic mesh;  Surgeon: Suraj Bergeron MD;  Location:  OR     HYST[facility]CTOMY, [facility]  2016    [facility]     MASTECTOMY SIMPLE BILATERAL Bilateral 2018    Procedure: MASTECTOMY SIMPLE BILATERAL;  PROPHYLACTIC BILATERAL MASTECTOMY (GLADIS) BILATERAL BREAST RECONSTRUCTION [facility] TISSUE EXPANDERS (WILLOW)   ;  Surgeon: Suraj Bergeron MD;  Location:  OR     ORTHOPEDIC SURGERY      (L) thumb fused     RECONSTRUCT BREAST, INSERT TISSUE EXPANDER BILATERAL, COMBINED Bilateral 2018    expander and tram flap with drains - had weekly surgery to have I & D and removal of infected tissue     SURGICAL HISTORY OF -       left thumb fusion due to arthritis       MEDICATIONS:  Current Outpatient Medications   Medication     [START ON 2020] amphetamine-dextroamphetamine (ADDERALL) 10 MG tablet     busPIRone (BUSPAR) 10 MG tablet     albuterol (PROAIR HFA/PROVENTIL HFA/VENTOLIN HFA) 108 (90 Base) MCG/ACT inhaler     ascorbic acid 250 MG TABS tablet     aspirin 325 MG EC tablet     benzonatate (TESSALON) 200 MG capsule     butalbital-acetaminophen-caffeine (FIORICET/ESGIC) -40 MG tablet     CRANBERRY CONCENTRATE PO     etonogestrel-ethinyl estradiol (NUVARING)  0.12-0.015 MG/24HR vaginal ring     Ibuprofen (ADVIL PO)     Omega-3 Fatty Acids (FISH OIL) 1000 MG CPDR     rizatriptan (MAXALT) 10 MG tablet     STATIN NOT PRESCRIBED (INTENTIONAL)     VITAMIN D PO     zolpidem (AMBIEN) 10 MG tablet     No current facility-administered medications for this visit.        SOCIAL HISTORY:  Social History     Tobacco Use     Smoking status: Never Smoker     Smokeless tobacco: Never Used   Substance Use Topics     Alcohol use: Yes     Alcohol/week: 0.0 standard drinks     Comment: 1-3drink per week       Family History   Problem Relation Age of Onset     C.A.D. Maternal Grandmother      Hypertension Maternal Grandmother      Psychotic Disorder Mother         Depression, alcoholism     Diabetes Mother      Hereditary Breast and Ovarian Cancer Syndrome Mother         BRCA1+, no hx of cancer, s/p prophylactic surgery to remove breast tissue, ovaries, fallopian tubes     Hypertension Mother      Thyroid Disease Mother         thyroid nodules     Lung Cancer Mother      Substance Abuse Father      Hereditary Breast and Ovarian Cancer Syndrome Sister         matrnal half sister, BRCA1+     Hereditary Breast and Ovarian Cancer Syndrome Daughter 23        BRCA1+     Breast Cancer Maternal Aunt 40        lumpectomy, then 2nd primary breast cancer later in 40s, treated with mastectomy     Hereditary Breast and Ovarian Cancer Syndrome Maternal Aunt         BRCA1+     Ovarian Cancer Maternal Aunt 70        surgry     Thyroid Cancer Maternal Aunt         medullary thyroid cancer     Hereditary Breast and Ovarian Cancer Syndrome Maternal Aunt         BRCA1+     Breast Cancer Other 41        maternal great aunt     Ovarian Cancer Other 45         in 40s     Thyroid Cancer Maternal Uncle 66        papillary thyroid cancer     Breast Cancer Cousin 45     Hereditary Breast and Ovarian Cancer Syndrome Cousin         BRCA1+     Breast Cancer Other 43        maternal great aunt     Other Cancer Other   "       ovarian cancer              Review of Systems   ROS COMP: Constitutional, HEENT, cardiovascular, pulmonary, gi and gu systems are negative, except as otherwise noted.      Objective    /69 (BP Location: Right arm, Patient Position: Chair, Cuff Size: Adult Large)   Pulse 102   Temp 98.7  F (37.1  C) (Oral)   Resp 16   Ht 1.676 m (5' 6\")   Wt 94.3 kg (208 lb)   LMP 02/08/2015 (Within Months)   SpO2 97%   Breastfeeding No   BMI 33.57 kg/m    Body mass index is 33.57 kg/m .  Physical Exam   GENERAL: healthy, alert and no distress  EYES: Eyes grossly normal to inspection, PERRL and conjunctivae and sclerae normal  HENT: ear canals and TM's normal, nose and mouth without ulcers or lesions  NECK: no adenopathy, no asymmetry, masses, or scars and thyroid normal to palpation  RESP: rhonchi R lower posterior  CV: regular rate and rhythm, normal S1 S2, no S3 or S4, no murmur, click or rub, no peripheral edema and peripheral pulses strong  ABDOMEN: soft, nontender, no hepatosplenomegaly, no masses and bowel sounds normal  MS: no gross musculoskeletal defects noted, no edema  SKIN: no suspicious lesions or rashes  NEURO: Normal strength and tone, mentation intact and speech normal  PSYCH: mentation appears normal, affect flat and fatigued    Diagnostic Test Results:  Labs [facility] in [facility]        Assessment:  1. ADHD - starting school again late January and will need refill  2. Anxiety -better  But not great  3. Bronchitis  4. Arthritis - cannot start on anything yet due to cough    Plan:  1. zithromax  2. The potential side effects of the prescription medication were discussed with the patient.  3. Refills per epicare  4. Increase buspar to 10 bid  5. Recheck if cough does not get better and in 6 months for buspar - can do evisit prior to that if wanting to go up on dose        "

## 2019-12-31 ASSESSMENT — ANXIETY QUESTIONNAIRES
6. BECOMING EASILY ANNOYED OR IRRITABLE: NEARLY EVERY DAY
7. FEELING AFRAID AS IF SOMETHING AWFUL MIGHT HAPPEN: NEARLY EVERY DAY
3. WORRYING TOO MUCH ABOUT DIFFERENT THINGS: SEVERAL DAYS
GAD7 TOTAL SCORE: 12
2. NOT BEING ABLE TO STOP OR CONTROL WORRYING: NEARLY EVERY DAY
GAD7 TOTAL SCORE: 12
5. BEING SO RESTLESS THAT IT IS HARD TO SIT STILL: NOT AT ALL
1. FEELING NERVOUS, ANXIOUS, OR ON EDGE: SEVERAL DAYS
IF YOU CHECKED OFF ANY PROBLEMS ON THIS QUESTIONNAIRE, HOW DIFFICULT HAVE THESE PROBLEMS MADE IT FOR YOU TO DO YOUR WORK, TAKE CARE OF THINGS AT HOME, OR GET ALONG WITH OTHER PEOPLE: VERY DIFFICULT

## 2019-12-31 ASSESSMENT — PATIENT HEALTH QUESTIONNAIRE - PHQ9
5. POOR APPETITE OR OVEREATING: SEVERAL DAYS
SUM OF ALL RESPONSES TO PHQ QUESTIONS 1-9: 13

## 2019-12-31 NOTE — PATIENT INSTRUCTIONS
Thank you for choosing [facility] today for your health care needs.     [facility] is transforming primary care  At [facility], we re dedicated to constantly improve how we serve the health care needs of our patients and communities. We re currently making changes to the way we deliver care.     Changes you ll notice include:    An emphasis on building a relationship with a primary care provider    Access to a PAL (personal advocate and liaison) to help guide you with your care needs    Appointment lengths tailored to your specific needs and greater access to a care team to help you and your provider improve and maintain your health and well-being    Improved online access to your care team    Benefits of a primary care provider  If you don t have a designated primary care provider, we encourage you to get to know our care team online and find a provider you d like to see. Most of our providers have a short video on their online provider page. Visit [email or web address] to explore our providers and locations.    Benefits of having a primary care provider include:      They get to know you - your health history, family history and goals, making it easier to make a health plan together.     You get to know them - making health-related conversations and decisions easier      Primary care doctors help you when you re sick or hurt - but also focus on keeping you healthy with preventive care and screenings.      A doctor who sees you regularly is more likely to notice changes in your health.     You ll be connected to a broad care team who partners with your provider to support you.    Patient Advocate Liaison (PAL)   To help make sure you get the right care, at the right time, we include PALs, or Patient Advocate Liaisons, as part of your care team. Your PAL will be your first line of contact. They ll advocate for your needs and help you navigate our services, connecting you with care team members and community resources to ensure  your care is well coordinated. You ll be introduced to a PAL in an upcoming visit.     Expanded care team access with tailored appointment lengths  Depending on your health care needs, you may have longer or shorter appointments and see additional care team providers - including Medication Therapy Management (MTM) pharmacists, diabetes educators, behavioral health clinicians, or social workers. At times, they may be included in your visit with your provider, or you may see them individually.     Online access to your health care records and care team  [employer] is our online tool that makes it easy to see your health care information and communicate with your care team.     [employer] allows you to:     View your health maintenance plan so you know when you re due for a preventive screening    Send secure messages to your care team    View your health history and visit summaries     Schedule appointments     Complete questionnaires and eCheck-in before appointments      Get care from your provider with an e-visit      View and pay your bill     Sign up at [email or web address]. Once you have an account, you also can download the mobile lona.

## 2020-01-01 NOTE — PLAN OF CARE
Interdisciplinary team rounds were held at baby's bedside with the following team members:Nursing and Physician. Plan of Care options were discussed with the team.     Continue to work on PO skills. No advance on volume of feeds today, continuing PO caffeine. Problem: Patient Care Overview  Goal: Plan of Care/Patient Progress Review  Outcome: No Change  Pt alert and orientated. Able to make needs known. Dotson removed at 1:35 PM. Pt ambulated in hallway. PCA pump for pain management. Bilateral TABITHA drains--Stripped Q4. Output R> L. Dsg on abdomen--Clean, Dry and Intact. NS running at 100ml. Tolerating Clears. Voiced increased pain and spasms to abdomen. MRSA in nares. Precautions maintained. Surgery managing care. Plan: monitor output, pain management, advance diet as tolerated and continue to ambulate.

## 2020-01-02 ENCOUNTER — HOSPITAL ENCOUNTER (EMERGENCY)
Facility: CLINIC | Age: 45
Discharge: HOME | End: 2020-01-02
Attending: EMERGENCY MEDICINE | Admitting: EMERGENCY MEDICINE
Payer: COMMERCIAL

## 2020-01-02 VITALS
HEIGHT: 65 IN | HEART RATE: 89 BPM | SYSTOLIC BLOOD PRESSURE: 126 MMHG | WEIGHT: 208 LBS | OXYGEN SATURATION: 96 % | BODY MASS INDEX: 34.66 KG/M2 | RESPIRATION RATE: 18 BRPM | TEMPERATURE: 98.4 F | DIASTOLIC BLOOD PRESSURE: 77 MMHG

## 2020-01-02 DIAGNOSIS — J01.90 ACUTE NON-RECURRENT SINUSITIS, UNSPECIFIED LOCATION: ICD-10-CM

## 2020-01-02 LAB
ANION GAP SERPL CALCULATED.3IONS-SCNC: 8 MMOL/L (ref 3–14)
BASOPHILS # BLD AUTO: 0.1 10E9/L (ref 0–0.2)
BASOPHILS NFR BLD AUTO: 0.4 %
BUN SERPL-MCNC: 16 MG/DL (ref 7–30)
CALCIUM SERPL-MCNC: 8.7 MG/DL (ref 8.5–10.1)
CHLORIDE SERPL-SCNC: 110 MMOL/L (ref 94–109)
CO2 SERPL-SCNC: 20 MMOL/L (ref 20–32)
COMPREHEN DRUG ANALYSIS UR: NORMAL
CREAT SERPL-MCNC: 0.8 MG/DL (ref 0.52–1.04)
DIFFERENTIAL METHOD BLD: ABNORMAL
EOSINOPHIL # BLD AUTO: 0.1 10E9/L (ref 0–0.7)
EOSINOPHIL NFR BLD AUTO: 0.7 %
ERYTHROCYTE [DISTWIDTH] IN BLOOD BY AUTOMATED COUNT: 13.4 % (ref 10–15)
GFR SERPL CREATININE-BSD FRML MDRD: >90 ML/MIN/{1.73_M2}
GLUCOSE SERPL-MCNC: 100 MG/DL (ref 70–99)
HCT VFR BLD AUTO: 42.9 % (ref 35–47)
HGB BLD-MCNC: 13.7 G/DL (ref 11.7–15.7)
IMM GRANULOCYTES # BLD: 0.1 10E9/L (ref 0–0.4)
IMM GRANULOCYTES NFR BLD: 1.1 %
LYMPHOCYTES # BLD AUTO: 2.1 10E9/L (ref 0.8–5.3)
LYMPHOCYTES NFR BLD AUTO: 16.9 %
MCH RBC QN AUTO: 26.2 PG (ref 26.5–33)
MCHC RBC AUTO-ENTMCNC: 31.9 G/DL (ref 31.5–36.5)
MCV RBC AUTO: 82 FL (ref 78–100)
MONOCYTES # BLD AUTO: 0.8 10E9/L (ref 0–1.3)
MONOCYTES NFR BLD AUTO: 6.4 %
NEUTROPHILS # BLD AUTO: 9.1 10E9/L (ref 1.6–8.3)
NEUTROPHILS NFR BLD AUTO: 74.5 %
NRBC # BLD AUTO: 0 10*3/UL
NRBC BLD AUTO-RTO: 0 /100
PLATELET # BLD AUTO: 296 10E9/L (ref 150–450)
POTASSIUM SERPL-SCNC: 4.1 MMOL/L (ref 3.4–5.3)
RBC # BLD AUTO: 5.22 10E12/L (ref 3.8–5.2)
SODIUM SERPL-SCNC: 138 MMOL/L (ref 133–144)
WBC # BLD AUTO: 12.2 10E9/L (ref 4–11)

## 2020-01-02 PROCEDURE — 99283 EMERGENCY DEPT VISIT LOW MDM: CPT

## 2020-01-02 PROCEDURE — 80048 BASIC METABOLIC PNL TOTAL CA: CPT | Performed by: EMERGENCY MEDICINE

## 2020-01-02 PROCEDURE — 85025 COMPLETE CBC W/AUTO DIFF WBC: CPT | Performed by: EMERGENCY MEDICINE

## 2020-01-02 ASSESSMENT — ENCOUNTER SYMPTOMS
SINUS PAIN: 1
FEVER: 1
DIAPHORESIS: 1
COUGH: 1

## 2020-01-02 ASSESSMENT — MIFFLIN-ST. JEOR: SCORE: 1604.36

## 2020-01-02 NOTE — ED TRIAGE NOTES
PT arrives with multiple complaints including:    Nausea vomiting starting last night  Cough for months, pt placed on Z pack on Monday by PCP, which she states is improving  Facial/ sinus pain that started last night  Fever starting last night, 100.6 at home, last ibuprofen at 430 am.     ABCs intact.

## 2020-01-02 NOTE — ED PROVIDER NOTES
History     Chief Complaint:  Multiple Symptoms      HPI   Noa Mcgill is a 44 year old female who presents to the emergency department for evaluation of multiple symptoms. The patient reports that one month ago she had the onset of a URI with a cough after traveling to [place]. She had a chest XR on 11/27 and chest CT on 12/4, findings below, and had a negative influenza and bordetella pertussis test so she was started on prednisone and Tessalon. Her cough persisted so she presented to her PCP again and she was started on a Z-pack three days ago (12/30). Her cough was improving, however last night she developed severe facial pain with congestion, tongue soreness, sweats, and fever of 100.6. She last took ibuprofen at 0430 and has been using a bennett pot.     EXAM: XR CHEST 2 VIEWS 11/27/2019  IMPRESSION: Heart size is normal. The lungs are clear. Clips overlie the katty on the frontal view. No pneumothorax or pleural effusion.    EXAM: CTA chest 12/4/2019  IMPRESSION:  1.  The pulmonary arteries are well-opacified and there is no evidence for  pulmonary embolus in either lung.  2.  No thoracic aortic aneurysm or thoracic aortic dissection.  3. 3 mm smoothly marginated nodule left lower lobe. If the patient is low risk  for malignancy no additional follow-up is needed. In a high risk patient a  follow-up CT in 12 months would be recommended per the attached reference.    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  Lumbago   Anemia  Chronic pain syndrome   Anxiety   Psoriasis     Past Surgical History:    Bilateral mastectomy   Orthopedic surgery  Herniorrhaphy incisional   D&C  Bladder sling and complete hysterectomy   C section   Appendectomy     Family History:    CAD: maternal grandmother  Hypertension  Depression   Diabetes  Breast cancer  Ovarian cancer  Lung cancer  Substance abuse  Thyroid cancer     Social History:  Smoking Status: Never Smoker  Smokeless Tobacco: Never Used  Alcohol Use: Positive  Drug Use: Negative  PCP: Sydnie Daley   Occupation: Nurse in pediatric clinic  Marital Status:        Review of Systems   Constitutional: Positive for diaphoresis and fever.   HENT: Positive for congestion and sinus pain.    Respiratory: Positive for cough.    All other systems reviewed and are negative.    Cough and facial pain . Fatigue.  Subjective fevers.    Physical Exam     Patient Vitals for the past 24 hrs:   BP Temp Temp src Pulse Heart Rate Resp SpO2 Height Weight   01/02/20 0945 -- -- -- -- -- -- 96 % -- --   01/02/20 0930 126/77 -- -- 89 -- -- 96 % -- --   01/02/20 0915 133/81 -- -- 91 -- -- 97 % -- --   01/02/20 0900 (!) 139/94 -- -- 90 -- -- 97 % -- --   01/02/20 0845 121/83 -- -- 93 -- -- 96 % -- --   01/02/20 0830 122/79 -- -- 95 -- -- 98 % -- --   01/02/20 0755 (!) 140/85 98.4  F (36.9  C) Oral -- 97 18 100 % 1.651 m (5' 5\") 94.3 kg (208 lb)        Physical Exam  GEN: patient appears tired  HEAD: atraumatic, normocephalic  EYES: pupils reactive, conjunctivae normal  ENT: TMs flat and white bilaterally, oropharynx normal with no erythema or exudate, mucus membranes moist, floor of mouth is soft, midface stable, nose mucosa pink with no exudate bilaterally, positive frontal and maxillary sinus transillumination with drainage  NECK: no cervical LAD, no meningeal signs  RESPIRATORY: no tachypnea, breath sounds clear to auscultation (no rales, wheezes, rhonchi)  CVS: normal S1/S2, no " murmurs/rubs/gallops  ABDOMEN: soft, nontender, no masses or organomegaly, no rebound, decreased bowel sounds  EXTREMITIES: intact pulses x 2 (radial pulses intact), no edema  SKIN: warm and dry  NEURO:   Overall symmetrical exam  HEME: no bruising or petechiae/contusions  LYMPH: no lymphadenopathy    Emergency Department Course   Laboratory:  CBC: WBC: 12.2 (H), HGB: 13.7, PLT: 296  BMP: Glucose 100 (H), Chloride: 110 (H), o/w WNL (Creatinine: 0.80)    Emergency Department Course:  0831 Nursing notes and vitals reviewed. I performed an exam of the patient as documented above.     Blood drawn. This was sent to the lab for further testing, results above.    Findings and plan explained to the Patient. Patient discharged home with instructions regarding supportive care, medications, and reasons to return. The importance of close follow-up was reviewed. The patient was prescribed Augmentin.    I personally reviewed the laboratory results with the Patient and answered all related questions prior to discharge.     Impression & Plan    Medical Decision Making:  Noa is a 44 year old female who had a cough starting before Thanksgiving. It was gradually getting worse and then started to get better most recently. She was just placed on a Z-Antonio a couple days ago for possible sinusitis. She does have positive sinus frontal transillumination and a slight headache, but otherwise her neurological exam is intact. Her white cell count is 12.2. Her chemistry panel is normal. Her lungs are otherwise clear. She also completed a course of steroids. We are going to place her on a course of Augmentin for 10 days for likely sinusitis and bronchitis.     Disposition home.     Diagnosis:    ICD-10-CM    1. Acute non-recurrent sinusitis, unspecified location J01.90 CBC with platelets differential     Basic metabolic panel       Disposition:  Discharged to home    Discharge Medications:  Discharge Medication List as of 1/2/2020  9:51 AM       START taking these medications    Details   amoxicillin-clavulanate (AUGMENTIN) 875-125 MG tablet Take 1 tablet by mouth 2 times daily for 10 days, Disp-20 tablet, R-0, Local Print           Scribe Disclosure:  I, Roosevelt Barrera, am serving as a scribe on 1/2/2020 at 8:31 AM to personally document services performed by Lakisha Sung MD based on my observations and the provider's statements to me.     Magdy Gee  1/2/2020   [facility] EMERGENCY DEPARTMENT       Lakisha Gruber MD  01/05/20 4180

## 2020-01-08 ENCOUNTER — E-VISIT (OUTPATIENT)
Dept: FAMILY MEDICINE | Facility: CLINIC | Age: 45
End: 2020-01-08
Payer: COMMERCIAL

## 2020-01-08 DIAGNOSIS — J01.90 ACUTE SINUSITIS WITH SYMPTOMS > 10 DAYS: Primary | ICD-10-CM

## 2020-01-08 PROCEDURE — 99207 ZZC NO BILLABLE SERVICE THIS VISIT: CPT | Performed by: FAMILY MEDICINE

## 2020-01-08 RX ORDER — DOXYCYCLINE HYCLATE 100 MG
100 TABLET ORAL 2 TIMES DAILY
Qty: 20 TABLET | Refills: 0 | Status: SHIPPED | OUTPATIENT
Start: 2020-01-08 | End: 2020-02-11

## 2020-01-08 NOTE — PATIENT INSTRUCTIONS
Thank you for choosing us for your care. I have placed an order for a prescription so that you can start treatment. View your full visit summary for details by clicking on the link below. Your pharmacist will able to address any questions you may have about the medication.     If you're not feeling better within 5-7 days, please schedule an appointment.  You can schedule an appointment right here in [facility], or call [phone]  If the visit is for the same symptoms as your e-visit, we'll refund the cost of your e-visit if seen within seven days.      Sinusitis (Antibiotic Treatment)    The sinuses are air-filled spaces within the bones of the face. They connect to the inside of the nose. Sinusitis is an inflammation of the tissue that lines the sinuses. Sinusitis can occur during a cold. It can also happen due to allergies to pollens and other particles in the air. Sinusitis can cause symptoms of sinus congestion and a feeling of fullness. A sinus infection causes fever, headache, and facial pain. There is often green or yellow fluid draining from the nose or into the back of the throat (post-nasal drip). You have been given antibiotics to treat this condition.  Home care    Take the full course of antibiotics as instructed. Do not stop taking them, even when you feel better.    Drink plenty of water, hot tea, and other liquids. This may help thin nasal mucus. It also may help your sinuses drain fluids.    Heat may help soothe painful areas of your face. Use a towel soaked in hot water. Or,  the shower and direct the warm spray onto your face. Using a vaporizer along with a menthol rub at night may also help soothe symptoms.     An expectorant with guaifenesin may help thin nasal mucus and help your sinuses drain fluids.    You can use an over-the-counter decongestant, unless a similar medicine was prescribed to you. Nasal sprays work the fastest. Use one that contains phenylephrine or oxymetazoline.  First blow your nose gently. Then use the spray. Do not use these medicines more often than directed on the label. If you do, your symptoms may get worse. You may also take pills that contain pseudoephedrine. Don t use products that combine multiple medicines. This is because side effects may be increased. Read labels. You can also ask the pharmacist for help. (People with high blood pressure should not use decongestants. They can raise blood pressure.)    Over-the-counter antihistamines may help if allergies contributed to your sinusitis.      Do not use nasal rinses or irrigation during an acute sinus infection, unless your healthcare provider tells you to. Rinsing may spread the infection to other areas in your sinuses.    Use acetaminophen or ibuprofen to control pain, unless another pain medicine was prescribed to you. If you have chronic liver or kidney disease or ever had a stomach ulcer, talk with your healthcare provider before using these medicines. (Aspirin should never be taken by anyone under age 18 who is ill with a fever. It may cause severe liver damage.)    Don't smoke. This can make symptoms worse.  Follow-up care  Follow up with your healthcare provider or our staff if you are not better in 1 week.  When to seek medical advice  Call your healthcare provider if any of these occur:    Facial pain or headache that gets worse    Stiff neck    Unusual drowsiness or confusion    Swelling of your forehead or eyelids    Vision problems, such as blurred or double vision    Fever of 100.4 F (38 C) or higher, or as directed by your healthcare provider    Seizure    Breathing problems    Symptoms don't go away in 10 days  Prevention  Here are steps you can take to help prevent an infection:    Keep good hand washing habits.    Don t have close contact with people who have sore throats, colds, or other upper respiratory infections.    Don t smoke, and stay away from secondhand smoke.    Stay up to date with of  your vaccines.  Date Last Reviewed: 11/1/2017 2000-2019 [employer]. [address] [place] [address], [place] [address]. All rights reserved. This information is not intended as a substitute for professional medical care. Always follow your healthcare professional's instructions.

## 2020-01-28 ENCOUNTER — OFFICE VISIT (OUTPATIENT)
Dept: URGENT CARE | Facility: URGENT CARE | Age: 45
End: 2020-01-28
Payer: COMMERCIAL

## 2020-01-28 VITALS
SYSTOLIC BLOOD PRESSURE: 120 MMHG | OXYGEN SATURATION: 99 % | HEART RATE: 98 BPM | RESPIRATION RATE: 18 BRPM | DIASTOLIC BLOOD PRESSURE: 80 MMHG | TEMPERATURE: 98.6 F

## 2020-01-28 DIAGNOSIS — R39.9 UTI SYMPTOMS: Primary | ICD-10-CM

## 2020-01-28 DIAGNOSIS — R30.0 DYSURIA: ICD-10-CM

## 2020-01-28 DIAGNOSIS — R82.90 ABNORMAL URINE FINDINGS: ICD-10-CM

## 2020-01-28 LAB
ALBUMIN UR-MCNC: NEGATIVE MG/DL
APPEARANCE UR: CLEAR
BACTERIA #/AREA URNS HPF: ABNORMAL /HPF
BILIRUB UR QL STRIP: NEGATIVE
COLOR UR AUTO: YELLOW
GLUCOSE UR STRIP-MCNC: NEGATIVE MG/DL
HGB UR QL STRIP: ABNORMAL
KETONES UR STRIP-MCNC: NEGATIVE MG/DL
LEUKOCYTE ESTERASE UR QL STRIP: ABNORMAL
NITRATE UR QL: NEGATIVE
PH UR STRIP: 7 PH (ref 5–7)
RBC #/AREA URNS AUTO: ABNORMAL /HPF
SOURCE: ABNORMAL
SP GR UR STRIP: 1.02 (ref 1–1.03)
UROBILINOGEN UR STRIP-ACNC: 0.2 EU/DL (ref 0.2–1)
WBC #/AREA URNS AUTO: ABNORMAL /HPF

## 2020-01-28 PROCEDURE — 87086 URINE CULTURE/COLONY COUNT: CPT | Performed by: FAMILY MEDICINE

## 2020-01-28 PROCEDURE — 87088 URINE BACTERIA CULTURE: CPT | Performed by: FAMILY MEDICINE

## 2020-01-28 PROCEDURE — 81001 URINALYSIS AUTO W/SCOPE: CPT | Performed by: FAMILY MEDICINE

## 2020-01-28 PROCEDURE — 87186 SC STD MICRODIL/AGAR DIL: CPT | Performed by: FAMILY MEDICINE

## 2020-01-28 PROCEDURE — 99213 OFFICE O/P EST LOW 20 MIN: CPT | Performed by: FAMILY MEDICINE

## 2020-01-28 RX ORDER — CEFDINIR 300 MG/1
300 CAPSULE ORAL 2 TIMES DAILY
Qty: 14 CAPSULE | Refills: 0 | Status: SHIPPED | OUTPATIENT
Start: 2020-01-28 | End: 2020-02-11

## 2020-01-29 NOTE — PROGRESS NOTES
SUBJECTIVE:   Noa Mcgill is a 44 year old female who  presents today for a possible UTI. Symptoms of dysuria and frequency have been going on for 1.5day(s).  Hematuria no.  sudden onsetand moderate.  There is no history of fever, chills, nausea or vomiting.  No history of vaginal discharge. This patient does  have a history of urinary tract infections. Patient denies rigors, flank pain, temperature > 101 degrees F. and Vomiting, significant nausea or diarrhea or vaginal discharge     Past Medical History:   Diagnosis Date     Adrenal insufficiency ([place]'s disease) (H)     ACTH stim test failed to increase cortisol     Allergic rhinitis, cause unspecified      Antiplatelet or antithrombotic long-term use      Arthritis      ASD (atrial septal defect) 02/15/2013    ASD dx by echo, pt declined ASD closure     Attention deficit disorder without mention of hyperactivity 7/2/2014     BRCA1 gene mutation positive 1/24/2018    Reported by patient, no report available at this time Records requested from [facility] 1/23/18     Cerebral infarction (H)      Clotting disorder (H) birth    undefined clotting disorder - sees Dr. 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     Current Outpatient Medications   Medication Sig Dispense Refill     amphetamine-dextroamphetamine (ADDERALL) 10 MG tablet Take 2 tablet  (20 mg) by mouth in AM and take 1 tablet (10 mg) by mouth in PM 90 tablet 0     aspirin 325 MG EC tablet Take 325 mg by mouth daily Take 325 mg by mouth       busPIRone (BUSPAR) 10 MG tablet Take 1 tablet (10 mg) by mouth 2 times daily 90 tablet 3     butalbital-acetaminophen-caffeine (FIORICET/ESGIC) -40 MG tablet Take 1 tablet by mouth every 4 hours as needed 30 tablet 1     CRANBERRY CONCENTRATE PO        etonogestrel-ethinyl estradiol (NUVARING) 0.12-0.015 MG/24HR vaginal ring Place 1 each vaginally every 28 days       Omega-3 Fatty Acids (FISH OIL) 1000 MG CPDR Take 1,000 mg by mouth daily       VITAMIN D PO Take 2,000 Units by mouth daily.       zolpidem (AMBIEN) 10 MG tablet TAKE ONE TABLET BY MOUTH NIGHTLY AS NEEDED FOR SLEEP 30 tablet 5     albuterol (PROAIR HFA/PROVENTIL HFA/VENTOLIN HFA) 108 (90 Base) MCG/ACT inhaler Inhale 1-2 puffs into the lungs every 4 hours as needed for shortness of breath / dyspnea 1 Inhaler 1     ascorbic acid 250 MG TABS tablet Take 250 mg by mouth daily Take by mouth twice a day.       benzonatate (TESSALON) 200 MG capsule Take 1 capsule (200 mg) by mouth 3 times daily as needed for cough 30 capsule 0     doxycycline hyclate (VIBRA-TABS) 100 MG tablet Take 1 tablet (100 mg) by mouth 2 times daily For infection (Patient not taking: Reported on 1/28/2020) 20 tablet 0     Ibuprofen (ADVIL PO) Take 600 mg by mouth 2 times daily as needed for moderate pain       rizatriptan (MAXALT) 10 MG tablet TAKE 1 TABLET BY MOUTH ONCE, MAY REPEAT IN 2 HRS. MAX 30MG/24HRS 12 tablet 3     STATIN NOT PRESCRIBED (INTENTIONAL) Please choose reason not prescribed, below       Social History     Tobacco Use     Smoking status: Never Smoker     Smokeless tobacco: Never Used   Substance Use Topics     Alcohol use: Yes     Alcohol/week: 0.0 standard drinks     Comment: 1-3drink per week       ROS:   10 point ROS of systems including Constitutional, Eyes, Respiratory, Cardiovascular,  Gastroenterology,  Integumentary, Muscularskeletal, Psychiatric were all negative except for pertinent positives noted in my HPI           OBJECTIVE:  /80 (BP Location: Right arm, Patient Position: Chair, Cuff Size: Adult Large)   Pulse 98   Temp 98.6  F (37  C) (Oral)   Resp 18   LMP 02/08/2015 (Within Months)   SpO2 99%   GENERAL APPEARANCE: healthy, alert and no distress  RESP: lungs clear to auscultation - no rales, rhonchi or wheezes  CV: regular rates and rhythm, normal S1 S2, no murmur noted  ABDOMEN:  soft, nontender, no HSM or masses and bowel sounds normal  BACK: No CVA tenderness  SKIN: no suspicious lesions or rashes  PSYCH: mentation appears normal    ASSESSMENT:   Lower, uncomplicated urinary tract infection.  Noa was seen today for urgent care and urinary problem.    Diagnoses and all orders for this visit:    UTI symptoms  -     *UA reflex to Microscopic and Culture ([facility] (except [place] and [place])  -     Urine Microscopic    Abnormal urine findings  -     Urine Culture Aerobic Bacterial    Dysuria  -     cefdinir (OMNICEF) 300 MG capsule; Take 1 capsule (300 mg) by mouth 2 times daily for 7 days          PLAN:  As per ordered above  Drink plenty of fluids.  Prevention and treatment of UTI's discussed.Signs and symptoms of pyelonephritis mentioned.  Follow up with primary care physician if not improving  Discussed with pt about the ua findings  Suggested patient if symptoms do not get better over the next 48 hours should follow-up.  In the meantime suggested to push more fluids.  Patient did understand and agreed with the plan  Follow up if  symptoms fail to improve or worsens   Pt understood and agreed with plan     Melanie Stokes MD

## 2020-01-30 LAB
BACTERIA SPEC CULT: ABNORMAL
SPECIMEN SOURCE: ABNORMAL

## 2020-02-11 ENCOUNTER — OFFICE VISIT (OUTPATIENT)
Dept: URGENT CARE | Facility: URGENT CARE | Age: 45
End: 2020-02-11
Payer: COMMERCIAL

## 2020-02-11 VITALS
HEIGHT: 65 IN | SYSTOLIC BLOOD PRESSURE: 100 MMHG | RESPIRATION RATE: 16 BRPM | OXYGEN SATURATION: 100 % | TEMPERATURE: 97.7 F | HEART RATE: 98 BPM | BODY MASS INDEX: 36.17 KG/M2 | WEIGHT: 217.1 LBS | DIASTOLIC BLOOD PRESSURE: 70 MMHG

## 2020-02-11 DIAGNOSIS — R82.90 ABNORMAL URINE FINDINGS: ICD-10-CM

## 2020-02-11 DIAGNOSIS — N39.0 RECURRENT UTI: ICD-10-CM

## 2020-02-11 DIAGNOSIS — N30.00 ACUTE CYSTITIS WITHOUT HEMATURIA: Primary | ICD-10-CM

## 2020-02-11 DIAGNOSIS — R30.0 DYSURIA: ICD-10-CM

## 2020-02-11 LAB
ALBUMIN UR-MCNC: NEGATIVE MG/DL
APPEARANCE UR: CLEAR
BACTERIA #/AREA URNS HPF: ABNORMAL /HPF
BILIRUB UR QL STRIP: NEGATIVE
COLOR UR AUTO: YELLOW
GLUCOSE UR STRIP-MCNC: NEGATIVE MG/DL
HGB UR QL STRIP: NEGATIVE
KETONES UR STRIP-MCNC: NEGATIVE MG/DL
LEUKOCYTE ESTERASE UR QL STRIP: ABNORMAL
NITRATE UR QL: NEGATIVE
NON-SQ EPI CELLS #/AREA URNS LPF: ABNORMAL /LPF
PH UR STRIP: 6.5 PH (ref 5–7)
RBC #/AREA URNS AUTO: ABNORMAL /HPF
SOURCE: ABNORMAL
SP GR UR STRIP: 1.01 (ref 1–1.03)
UROBILINOGEN UR STRIP-ACNC: 0.2 EU/DL (ref 0.2–1)
WBC #/AREA URNS AUTO: ABNORMAL /HPF

## 2020-02-11 PROCEDURE — 87186 SC STD MICRODIL/AGAR DIL: CPT | Performed by: FAMILY MEDICINE

## 2020-02-11 PROCEDURE — 99213 OFFICE O/P EST LOW 20 MIN: CPT | Performed by: FAMILY MEDICINE

## 2020-02-11 PROCEDURE — 81001 URINALYSIS AUTO W/SCOPE: CPT | Performed by: FAMILY MEDICINE

## 2020-02-11 PROCEDURE — 87086 URINE CULTURE/COLONY COUNT: CPT | Performed by: FAMILY MEDICINE

## 2020-02-11 PROCEDURE — 87088 URINE BACTERIA CULTURE: CPT | Performed by: FAMILY MEDICINE

## 2020-02-11 RX ORDER — CEPHALEXIN 500 MG/1
500 CAPSULE ORAL 4 TIMES DAILY
Qty: 40 CAPSULE | Refills: 0 | Status: SHIPPED | OUTPATIENT
Start: 2020-02-11 | End: 2020-03-09

## 2020-02-11 ASSESSMENT — MIFFLIN-ST. JEOR: SCORE: 1645.64

## 2020-02-12 NOTE — PATIENT INSTRUCTIONS
Take medication Cephalexin every 6 hours a day for 10 days    Symptoms should improve within the next 2-3 days. If no improvement, call clinic to discuss or return for re-evaluation    Drink approximately 60-80 ounces of water a day to stay hydrated.     If you develop flank pain, fevers, nausea/vomiting call immediately for assistance
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
## 2020-02-13 LAB
BACTERIA SPEC CULT: ABNORMAL
SPECIMEN SOURCE: ABNORMAL

## 2020-03-05 NOTE — PROGRESS NOTES
"Pre-Visit Planning     Future Appointments   Date Time Provider Department Center   3/9/2020  5:25 PM Sydnie Daley MD [facility]     Arrival Time for this Appointment:  5:05 PM   Appointment Notes for this encounter:   Medication follow up    Questionnaires Reviewed/Assigned  Additional questionnaires assigned      Spoke to patient via phone. Patient does not have additional questions or concerns.        Visit is not preventive.    Health Maintenance Due   Topic Date Due     PREVENTIVE CARE VISIT  1975     Patient is due for:  na  No appointment needed.    MyChart  Patient is active on MyChart.    Questionnaire Review   na    Call Summary  \"Thank you for your time today.  If anything comes up before your appointment, please feel free to contact us at [phone].\"          HPI   Anxiety Follow-Up    How are you doing with your anxiety since your last visit? No change    Are you having other symptoms that might be associated with anxiety? Yes:  not sleeping, panic attacks, crying    Have you had a significant life event? Health Concerns  - dad was just dx with pancrease cancer    Are you feeling depressed? Yes:  her father was dx last week with metastatic pancreatic cancer and mom is dying of metastatic lung cancer    Do you have any concerns with your use of alcohol or other drugs? No    Social History     Tobacco Use     Smoking status: Never Smoker     Smokeless tobacco: Never Used   Substance Use Topics     Alcohol use: Not Currently     Alcohol/week: 0.0 standard drinks     Comment: 1-3drink per week     Drug use: No     BASSAM-7 SCORE 12/30/2019 12/31/2019 3/9/2020   Total Score - - -   Total Score - - 11 (moderate anxiety)   Total Score 12 12 11     PHQ 12/30/2019 12/31/2019 3/9/2020   PHQ-9 Total Score 13 13 14   Q9: Thoughts of better off dead/self-harm past 2 weeks Not at all Not at all Not at all   F/U: Thoughts of suicide or self-harm No - -   F/U: Safety concerns No No -     Last PHQ-9 3/9/2020   1.  " Little interest or pleasure in doing things 1   2.  Feeling down, depressed, or hopeless 3   3.  Trouble falling or staying asleep, or sleeping too much 3   4.  Feeling tired or having little energy 2   5.  Poor appetite or overeating 3   6.  Feeling bad about yourself 0   7.  Trouble concentrating 2   8.  Moving slowly or restless 0   Q9: Thoughts of better off dead/self-harm past 2 weeks 0   PHQ-9 Total Score 14   Difficulty at work, home, or with people -   In the past two weeks have you had thoughts of suicide or self harm? -   Do you have concerns about your personal safety or the safety of others? -     BASSAM-7  3/9/2020   1. Feeling nervous, anxious, or on edge 2   2. Not being able to stop or control worrying 3   3. Worrying too much about different things 1   4. Trouble relaxing 2   5. Being so restless that it is hard to sit still 0   6. Becoming easily annoyed or irritable 2   7. Feeling afraid, as if something awful might happen 1   BASSAM-7 Total Score 11   If you checked any problems, how difficult have they made it for you to do your work, take care of things at home, or get along with other people? -       H and P:    SUBJECTIVE:  Noa Mcgill is a 44 year old woman who presents for recheck on her depression and anxiety.   She cannot take antidepressants due to seizure disorder.   She is on buspar 1/2 tab twice daily.   Her father was dx 2 weeks ago with pancreatic cancer.   She does not think he will live much longer.   Her mom is fighting metastatic lung cancer.   She is so stressed and her kids are have panic attacks.   They feel like everyone is dying.       Past Medical History:   Diagnosis Date     Adrenal insufficiency ([place]'s disease) (H)     ACTH stim test failed to increase cortisol     Allergic rhinitis, cause unspecified      Antiplatelet or antithrombotic long-term use      Arthritis      ASD (atrial septal defect) 02/15/2013    ASD dx by echo, pt declined ASD closure     Attention  deficit disorder without mention of hyperactivity 2014     BRCA1 gene mutation positive 2018    Reported by patient, no report available at this time Records requested from [employer]     Cerebral infarction (H)      Clotting disorder (H) birth    undefined clotting disorder - sees Dr. Sanchez Retinal artery occlusion, R int mammary artery occlusion post breastCA surg and recd TPA     Congenital heart disease      Depressive disorder      Endometriosis of uterus      HERPES SIMPLEX NOS 2007    cold sores on remicade     Insomnia, unspecified 2/10/2005     Lung nodule < 6cm on CT 2019    consider repeat in 2020 - low risk but around a lot of second hand smoke growing up     Migraine headaches      Other chronic sinusitis      Postoperative urinary retention     every surgery     Psoriatic arthropathy (H) 2007     Recurrent UTI      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       Past Surgical History:   Procedure Laterality Date     APPENDECTOMY  2006      SECTION  2013    Procedure:  SECTION;  Primary  Section;  Surgeon: Chad Hughes MD;  Location: [facility] L+D     DILATION AND CURETTAGE SUCTION N/A 2014    Procedure: DILATION AND CURETTAGE SUCTION;  Surgeon: Srini Martinez MD;  Location:  OR     DILATION AND CURETTAGE SUCTION N/A 2014    Procedure: DILATION AND CURETTAGE SUCTION;  Surgeon: Connor Kang MD;  Location:  OR     GENITOURINARY SURGERY      bladder sling and complete historectomy     HC DILATION/CURETTAGE DIAG/THER NON OB       HC DILATION/CURETTAGE DIAG/THER NON OB       HERNIORRHAPHY INCISIONAL (LOCATION) N/A 2018    Procedure: Incisional Hernia repair with Biologic mesh;  Surgeon: Suraj Bergeron MD;  Location: [facility] OR     HYSTERECTOMY, CARLOS  2016    does not need pap     MASTECTOMY SIMPLE BILATERAL Bilateral 2018    Procedure:  MASTECTOMY SIMPLE BILATERAL;  PROPHYLACTIC BILATERAL MASTECTOMY (GLADIS) BILATERAL BREAST RECONSTRUCTION [facility] TISSUE EXPANDERS (WILLOW)   ;  Surgeon: Suraj Bergeron MD;  Location:  OR     ORTHOPEDIC SURGERY      (L) thumb fused     RECONSTRUCT BREAST, INSERT TISSUE EXPANDER BILATERAL, COMBINED Bilateral 5/25/2018    expander and tram flap with drains - had weekly surgery to have I & D and removal of infected tissue     SURGICAL HISTORY OF -   2006    left thumb fusion due to arthritis       MEDICATIONS:  Current Outpatient Medications   Medication     amphetamine-dextroamphetamine (ADDERALL) 10 MG tablet     ascorbic acid 250 MG TABS tablet     aspirin 325 MG EC tablet     busPIRone (BUSPAR) 10 MG tablet     butalbital-acetaminophen-caffeine (ESGIC) -40 MG tablet     CRANBERRY CONCENTRATE PO     diazepam (VALIUM) 5 MG tablet     etonogestrel-ethinyl estradiol (NUVARING) 0.12-0.015 MG/24HR vaginal ring     Ibuprofen (ADVIL PO)     Omega-3 Fatty Acids (FISH OIL) 1000 MG CPDR     rizatriptan (MAXALT) 10 MG tablet     VITAMIN D PO     zolpidem (AMBIEN) 10 MG tablet     STATIN NOT PRESCRIBED (INTENTIONAL)     No current facility-administered medications for this visit.        SOCIAL HISTORY:  Social History     Tobacco Use     Smoking status: Never Smoker     Smokeless tobacco: Never Used   Substance Use Topics     Alcohol use: Not Currently     Alcohol/week: 0.0 standard drinks     Comment: 1-3drink per week       Family History   Problem Relation Age of Onset     C.A.D. Maternal Grandmother      Hypertension Maternal Grandmother      Psychotic Disorder Mother         Depression, alcoholism     Diabetes Mother      Hereditary Breast and Ovarian Cancer Syndrome Mother         BRCA1+, no hx of cancer, s/p prophylactic surgery to remove breast tissue, ovaries, fallopian tubes     Hypertension Mother      Thyroid Disease Mother         thyroid nodules     Lung Cancer Mother      Substance Abuse Father       Hereditary Breast and Ovarian Cancer Syndrome Sister         matrnal half sister, BRCA1+     Hereditary Breast and Ovarian Cancer Syndrome Daughter 23        BRCA1+     Breast Cancer Maternal Aunt 40        lumpectomy, then 2nd primary breast cancer later in 40s, treated with mastectomy     Hereditary Breast and Ovarian Cancer Syndrome Maternal Aunt         BRCA1+     Ovarian Cancer Maternal Aunt 70        surgry     Thyroid Cancer Maternal Aunt         medullary thyroid cancer     Hereditary Breast and Ovarian Cancer Syndrome Maternal Aunt         BRCA1+     Breast Cancer Other 41        maternal great aunt     Ovarian Cancer Other 45         in 40s     Thyroid Cancer Maternal Uncle 66        papillary thyroid cancer     Breast Cancer Cousin 45     Hereditary Breast and Ovarian Cancer Syndrome Cousin         BRCA1+     Breast Cancer Other 43        maternal great aunt     Other Cancer Other         ovarian cancer       Review of Systems   ROS COMP: Constitutional, HEENT, cardiovascular, pulmonary, gi and gu systems are negative, except as otherwise noted.      Objective    /78 (BP Location: Right arm, Patient Position: Sitting, Cuff Size: Adult Large)   Pulse 66   Temp 98.3  F (36.8  C) (Oral)   Wt 98.4 kg (217 lb)   LMP 2015 (Within Months)   SpO2 99%   BMI 36.11 kg/m    Body mass index is 36.11 kg/m .  Physical Exam   GENERAL: healthy, alert and no distress  EYES: Eyes grossly normal to inspection, PERRL and conjunctivae and sclerae normal  HENT: ear canals and TM's normal, nose and mouth without ulcers or lesions  NECK: no adenopathy, no asymmetry, masses, or scars and thyroid normal to palpation  RESP: lungs clear to auscultation - no rales, rhonchi or wheezes  CV: regular rate and rhythm, normal S1 S2, no S3 or S4, no murmur, click or rub, no peripheral edema and peripheral pulses strong  ABDOMEN: soft, nontender, no hepatosplenomegaly, no masses and bowel sounds normal  MS: no gross  musculoskeletal defects noted, no edema  SKIN: no suspicious lesions or rashes  NEURO: Normal strength and tone, mentation intact and speech normal  PSYCH: mentation appears normal, affect normal/bright  LYMPH: no cervical, supraclavicular, axillary, or inguinal adenopathy    Diagnostic Test Results:  Labs reviewed in [facility]        Assessment:  1. Anxiety   2. Migraine - stable - maybe a little worse  3. insomnia      Plan:  1. Will try temporary valium intermittent  2. Refills per epicare  3. Increase buspar to 10 in am and 5 in pm  4. Recheck in 3 months      Answers for HPI/ROS submitted by the patient on 3/9/2020   If you checked off any problems, how difficult have these problems made it for you to do your work, take care of things at home, or get along with other people?: Very difficult  PHQ9 TOTAL SCORE: 14  BASSAM 7 TOTAL SCORE: 11

## 2020-03-09 ENCOUNTER — OFFICE VISIT (OUTPATIENT)
Dept: FAMILY MEDICINE | Facility: CLINIC | Age: 45
End: 2020-03-09
Payer: COMMERCIAL

## 2020-03-09 VITALS
WEIGHT: 217 LBS | OXYGEN SATURATION: 99 % | TEMPERATURE: 98.3 F | SYSTOLIC BLOOD PRESSURE: 114 MMHG | HEART RATE: 66 BPM | DIASTOLIC BLOOD PRESSURE: 78 MMHG | BODY MASS INDEX: 36.11 KG/M2

## 2020-03-09 DIAGNOSIS — F32.0 MILD MAJOR DEPRESSION (H): ICD-10-CM

## 2020-03-09 DIAGNOSIS — F41.9 ANXIETY: ICD-10-CM

## 2020-03-09 DIAGNOSIS — G43.909 MIGRAINE WITHOUT STATUS MIGRAINOSUS, NOT INTRACTABLE, UNSPECIFIED MIGRAINE TYPE: ICD-10-CM

## 2020-03-09 PROCEDURE — 99214 OFFICE O/P EST MOD 30 MIN: CPT | Performed by: FAMILY MEDICINE

## 2020-03-09 RX ORDER — BUTALBITAL, ACETAMINOPHEN AND CAFFEINE 50; 325; 40 MG/1; MG/1; MG/1
1 TABLET ORAL EVERY 4 HOURS PRN
Qty: 30 TABLET | Refills: 1 | Status: SHIPPED | OUTPATIENT
Start: 2020-03-09 | End: 2021-12-22

## 2020-03-09 RX ORDER — DIAZEPAM 5 MG
5 TABLET ORAL
Qty: 30 TABLET | Refills: 0 | Status: SHIPPED | OUTPATIENT
Start: 2020-03-09 | End: 2020-12-09

## 2020-03-09 RX ORDER — BUSPIRONE HYDROCHLORIDE 10 MG/1
10 TABLET ORAL 2 TIMES DAILY
Qty: 90 TABLET | Refills: 3 | Status: SHIPPED | OUTPATIENT
Start: 2020-03-09 | End: 2020-12-09

## 2020-03-09 ASSESSMENT — ANXIETY QUESTIONNAIRES
2. NOT BEING ABLE TO STOP OR CONTROL WORRYING: NEARLY EVERY DAY
6. BECOMING EASILY ANNOYED OR IRRITABLE: MORE THAN HALF THE DAYS
1. FEELING NERVOUS, ANXIOUS, OR ON EDGE: MORE THAN HALF THE DAYS
GAD7 TOTAL SCORE: 11
GAD7 TOTAL SCORE: 11
5. BEING SO RESTLESS THAT IT IS HARD TO SIT STILL: NOT AT ALL
3. WORRYING TOO MUCH ABOUT DIFFERENT THINGS: SEVERAL DAYS
GAD7 TOTAL SCORE: 11
7. FEELING AFRAID AS IF SOMETHING AWFUL MIGHT HAPPEN: SEVERAL DAYS
4. TROUBLE RELAXING: MORE THAN HALF THE DAYS
7. FEELING AFRAID AS IF SOMETHING AWFUL MIGHT HAPPEN: SEVERAL DAYS

## 2020-03-09 ASSESSMENT — PATIENT HEALTH QUESTIONNAIRE - PHQ9
SUM OF ALL RESPONSES TO PHQ QUESTIONS 1-9: 14
SUM OF ALL RESPONSES TO PHQ QUESTIONS 1-9: 14
10. IF YOU CHECKED OFF ANY PROBLEMS, HOW DIFFICULT HAVE THESE PROBLEMS MADE IT FOR YOU TO DO YOUR WORK, TAKE CARE OF THINGS AT HOME, OR GET ALONG WITH OTHER PEOPLE: VERY DIFFICULT

## 2020-03-09 NOTE — LETTER
[facility]  [address] [place], [address]  [phone]        March 9, 2020    RE: Noa Mcgill                                                                                                                                                       [id number] [facility] [address]            To Whom It May Concern,   Noa Mcgill is a patient of mine.   She is under my care.   She will need to reduce her work hours to 9 hours per week for the next 8 weeks.           Sincerely,    Sydnie Daley M.D.

## 2020-03-09 NOTE — PATIENT INSTRUCTIONS
Thank you for choosing [facility] today for your health care needs.     [facility] is transforming primary care  At [facility], we re dedicated to constantly improve how we serve the health care needs of our patients and communities. We re currently making changes to the way we deliver care.     Changes you ll notice include:    An emphasis on building a relationship with a primary care provider    Access to a PAL (personal advocate and liaison) to help guide you with your care needs    Appointment lengths tailored to your specific needs and greater access to a care team to help you and your provider improve and maintain your health and well-being    Improved online access to your care team    Benefits of a primary care provider  If you don t have a designated primary care provider, we encourage you to get to know our care team online and find a provider you d like to see. Most of our providers have a short video on their online provider page. Visit [email or web address] to explore our providers and locations.    Benefits of having a primary care provider include:      They get to know you - your health history, family history and goals, making it easier to make a health plan together.     You get to know them - making health-related conversations and decisions easier      Primary care doctors help you when you re sick or hurt - but also focus on keeping you healthy with preventive care and screenings.      A doctor who sees you regularly is more likely to notice changes in your health.     You ll be connected to a broad care team who partners with your provider to support you.    Patient Advocate Liaison (PAL)   To help make sure you get the right care, at the right time, we include PALs, or Patient Advocate Liaisons, as part of your care team. Your PAL will be your first line of contact. They ll advocate for your needs and help you navigate our services, connecting you with care team members and community resources to ensure  your care is well coordinated. You ll be introduced to a PAL in an upcoming visit.     Expanded care team access with tailored appointment lengths  Depending on your health care needs, you may have longer or shorter appointments and see additional care team providers - including Medication Therapy Management (MTM) pharmacists, diabetes educators, behavioral health clinicians, or social workers. At times, they may be included in your visit with your provider, or you may see them individually.     Online access to your health care records and care team  [employer] is our online tool that makes it easy to see your health care information and communicate with your care team.     [employer] allows you to:     View your health maintenance plan so you know when you re due for a preventive screening    Send secure messages to your care team    View your health history and visit summaries     Schedule appointments     Complete questionnaires and eCheck-in before appointments      Get care from your provider with an e-visit      View and pay your bill     Sign up at [email or web address]. Once you have an account, you also can download the mobile [email or web address].

## 2020-03-10 ASSESSMENT — PATIENT HEALTH QUESTIONNAIRE - PHQ9: SUM OF ALL RESPONSES TO PHQ QUESTIONS 1-9: 14

## 2020-03-10 ASSESSMENT — ANXIETY QUESTIONNAIRES: GAD7 TOTAL SCORE: 11

## 2020-03-14 ENCOUNTER — VIRTUAL VISIT (OUTPATIENT)
Dept: FAMILY MEDICINE | Facility: OTHER | Age: 45
End: 2020-03-14

## 2020-03-14 NOTE — PROGRESS NOTES
"Date: 2020 16:26:51  Clinician: Daphne Dumont  Clinician NPI: [id number]  Patient: Noa Mcgill  Patient : 1975  Patient Address: [address] [place], [place] [id number]  Patient Phone: [phone]  Visit Protocol: URI  Patient Summary:  Noa is a 44 year old ( : 1975 ) female who initiated a Visit for COVID-19 (Coronavirus) evaluation and screening. When asked the question \"Please sign me up to receive news, health information and promotions from [employer].\", Noa responded \"No\".    Noa states her symptoms started suddenly 3-6 days ago.   Her symptoms consist of malaise, rhinitis, enlarged lymph nodes, myalgia, chills, wheezing, a sore throat, a cough, nasal congestion, and a headache. She is experiencing mild difficulty breathing with activities but can speak normally in full sentences. Noa also feels feverish.   Symptom details     Nasal secretions: The color of her mucus is clear and yellow.    Cough: Noa coughs every 5-10 minutes and her cough is not more bothersome at night. Phlegm comes into her throat when she coughs. She does not believe her cough is caused by post-nasal drip. The color of the phlegm is clear and yellow.     Sore throat: Noa reports having moderate throat pain (4-6 on a 10 point pain scale), does not have exudate on her tonsils, and can swallow liquids. The lymph nodes in her neck are enlarged. A rash has not appeared on the skin since the sore throat started.     Temperature: Her current temperature is 99.3 degrees Fahrenheit.     Wheezing: Noa has not ever been diagnosed with asthma. The wheezing does not interfere with her normal daily activities.    Headache: She states the headache is moderate (4-6 on a 10 point pain scale).      Noa denies having ear pain, facial pain or pressure, and teeth pain. She also denies double sickening (worsening symptoms after initial improvement) and having recent facial or sinus surgery in the past 60 days.   " Precipitating events  Within the past week, Noa has not been exposed to someone with strep throat. She has not recently been exposed to someone with influenza. Noa has been in close contact with the following high risk individuals: immunocompromised people and children under the age of 5.   Pertinent COVID-19 (Coronavirus) information  Noa has not traveled internationally or to the areas where COVID-19 (Coronavirus) is widespread in the last 14 days before the start of her symptoms.   Noa has not had close contact with a suspected or laboratory-confirmed COVID-19 patient within 14 days of symptom onset.   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   Pertinent medical history  Noa had 1 sinus infection within the past year.   Noa has taken an antibiotic medication in the past month. Antibiotic details as reported by the patient (free text): cefdiner for UTI   Noa does not get yeast infections when she takes antibiotics.   Noa does not need a return to work/school note.   Weight: 211 lbs   Noa does not smoke or use smokeless tobacco.   She denies pregnancy and denies breastfeeding. She does not menstruate.   Additional information as reported by the patient (free text): fatigue   Weight: 211 lbs    MEDICATIONS: Advil oral, buspirone oral, cholecalciferol (vitamin D3) oral, cefdinir oral, Sudafed oral, Mucinex DM oral, ALLERGIES: Humira, Compazine, Macrobid  Clinician Response:  Dear Noa,  Based on the information provided, you have a viral upper respiratory infection, otherwise known as a cold. Symptoms vary from person to person, but can include sneezing, coughing, a runny nose, sore throat, and headache and range from mild to severe.  Unfortunately, there are no medications that can cure a cold, so treatment is focused on controlling symptoms as much as possible. Most people gradually feel better until symptoms are gone in 1-2 weeks.  Medication information   Because you have a viral infection, antibiotics will not help you get better. Treating a viral infection with antibiotics could actually make you feel worse.  Unless you are allergic to the over-the-counter medication(s) below, I recommend using:       Acetaminophen (Tylenol or store brand) oral tablet. Take 1-2 tablets by mouth every 4-6 hours to help with the discomfort.      Ibuprofen (Advil or store brand) 200 mg oral tablet. Take 1-3 tablets (200-600 mg) by mouth every 8 hours to help with the discomfort. Make sure to take the ibuprofen with food. Do not exceed 2400 mg in 24 hours.    An antihistamine such as Benadryl, Claritin, or store brand.     Over-the-counter medications do not require a prescription. Ask the pharmacist if you have any questions.  Self care  The following tips will keep you as comfortable as possible while you recover:     Rest    Drink plenty of water and other liquids    Take a hot shower to loosen congestion    Use throat lozenges    Gargle with warm salt water (1/4 teaspoon of salt per 8 ounce glass of water)    Suck on frozen items such as popsicles or ice cubes    Drink hot tea with lemon and honey    Take a spoonful of honey to reduce your cough     When to seek care  Please be seen in a clinic or urgent care if new symptoms develop, or symptoms become worse.  Call 911 or go to the emergency room if you feel that your throat is closing off, you suddenly develop a rash, you are unable to swallow fluids, you are drooling, or you are having difficulty breathing.  Additional treatment plan   Dear Noa,  Based on the information you have provided, it does not appear you need Coronavirus (COVID-19) testing.   At this time, we recommend testing primarily for those people who have symptoms of cough and fever and have either traveled to a known area of infection or have been exposed to someone with laboratory confirmed Coronavirus by close contact.   Coronavirus - General Information:    The coronavirus infection starts within 14 days of an exposure.  Symptoms are those of a respiratory infection (such as fever, cough).   If you have not had symptoms by day 15, you should be considered uninfected by coronavirus.   Coronavirus - Symptoms:    The coronavirus can cause a respiratory illness, such as bronchitis or pneumonia.  The most common symptoms are: cough, fever, and shortness of breath.   Other symptoms are: body aches, chills, diarrhea, fatigue, headache, runny nose, and sore throat   Coronavirus - Exposure Risk Factors:   Exposure to a person who has been diagnosed with coronavirus.  Travel from an area with recent local transmission of coronavirus.  The CDC (www.cdc.gov) has the most up-to-date list of where the coronavirus outbreak is occurring.   Coronavirus - Spreading:    The virus likely spreads through respiratory droplets produced when a person coughs or sneezes. These respiratory droplets can travel approximately 6 feet and can remain on surfaces. Common disinfectants will kill the virus.  The CDC currently does not recommend healthy people wear masks.   Coronavirus - Protect Yourself:    Avoid close contact with people known to have this new coronavirus infection.  Wash hands often with soap and water or alcohol-based hand .  Avoid touching the eyes, nose or mouth.   Thank you for limiting contact with others, wearing a simple mask to cover your cough, practice good hand hygiene habits and accessing our virtual services where possible to limit the spread of this virus.  For more information about COVID19 and options for caring for yourself at home, please visit the CDC website at https://www.cdc.gov/coronavirus/2019-ncov/about/steps-when-sick.html   For more options for care at [facility], please visit our website at [email or web address]        Diagnosis: Cough  Diagnosis ICD: R05

## 2020-03-14 NOTE — PROGRESS NOTES
"Date: 2020 15:06:39  Clinician: Carmita Garcia  Clinician NPI: [id number]  Patient: Noa Mcgill  Patient : 1975  Patient Address: [address] [place] [id number]  Patient Phone: [phone]  Visit Protocol: URI  Patient Summary:  Noa is a 44 year old ( : 1975 ) female who initiated a Visit for COVID-19 (Coronavirus) evaluation and screening. When asked the question \"Please sign me up to receive news, health information and promotions from [employer].\", Noa responded \"No\".    Noa states her symptoms started suddenly 3-6 days ago.   Her symptoms consist of malaise, rhinitis, enlarged lymph nodes, myalgia, chills, wheezing, a sore throat, a cough, nasal congestion, and a headache. She is experiencing mild difficulty breathing with activities but can speak normally in full sentences. Noa also feels feverish.   Symptom details     Nasal secretions: The color of her mucus is clear and yellow.    Cough: Noa coughs a few times an hour and her cough is not more bothersome at night. Phlegm comes into her throat when she coughs. She does not believe her cough is caused by post-nasal drip. The color of the phlegm is clear and yellow.     Sore throat: Noa reports having moderate throat pain (4-6 on a 10 point pain scale), does not have exudate on her tonsils, and can swallow liquids. The lymph nodes in her neck are enlarged. A rash has not appeared on the skin since the sore throat started.     Temperature: Her current temperature is 99.2 degrees Fahrenheit.     Wheezing: Noa has not ever been diagnosed with asthma. The wheezing does not interfere with her normal daily activities.    Headache: She states the headache is moderate (4-6 on a 10 point pain scale).      Noa denies having ear pain, facial pain or pressure, and teeth pain. She also denies double sickening (worsening symptoms after initial improvement) and having recent facial or sinus surgery in the past 60 days.   " Precipitating events  Within the past week, Noa has not been exposed to someone with strep throat. She has not recently been exposed to someone with influenza. Noa has been in close contact with the following high risk individuals: immunocompromised people and adults 65 or older.   Pertinent COVID-19 (Coronavirus) information  Noa has not traveled internationally or to the areas where COVID-19 (Coronavirus) is widespread in the last 14 days before the start of her symptoms.   Noa has not had close contact with a suspected or laboratory-confirmed COVID-19 patient within 14 days of symptom onset.   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   Pertinent medical history  Noa had 1 sinus infection within the past year.   Noa has taken an antibiotic medication in the past month. Antibiotic details as reported by the patient (free text): cefdiner for a UTI   Noa does not get yeast infections when she takes antibiotics.   Noa does not need a return to work/school note.   Weight: 211 lbs   Noa does not smoke or use smokeless tobacco.   She denies pregnancy and denies breastfeeding. She does not menstruate.   Additional information as reported by the patient (free text): Very fatigued   Weight: 211 lbs    MEDICATIONS: Advil oral, buspirone oral, cholecalciferol (vitamin D3) oral, cefdinir oral, Sudafed oral, Mucinex DM oral, ALLERGIES: Humira, Compazine, Macrobid  Clinician Response:  Dear Noa,    Diagnosis: Cough  Diagnosis ICD: R05  Addendum created: March 14 16:20:53, 2020 created by: Lauren Alas body: Hi there  I'm an oncare provider. I'm not sure why your treatment plan is blank, but you DO need COVID testing. Please re-submit and I'll try to respond to your visit and authorize testing. I'll also submit your information to our supervisor to be sure this was resolved.     Lauren Soliman, RUFINO

## 2020-04-11 ENCOUNTER — OFFICE VISIT (OUTPATIENT)
Dept: URGENT CARE | Facility: URGENT CARE | Age: 45
End: 2020-04-11
Payer: COMMERCIAL

## 2020-04-11 VITALS
BODY MASS INDEX: 36.11 KG/M2 | SYSTOLIC BLOOD PRESSURE: 116 MMHG | WEIGHT: 217 LBS | RESPIRATION RATE: 14 BRPM | TEMPERATURE: 98.7 F | DIASTOLIC BLOOD PRESSURE: 78 MMHG | OXYGEN SATURATION: 99 % | HEART RATE: 107 BPM

## 2020-04-11 DIAGNOSIS — R30.0 DYSURIA: Primary | ICD-10-CM

## 2020-04-11 DIAGNOSIS — R82.90 NONSPECIFIC FINDING ON EXAMINATION OF URINE: ICD-10-CM

## 2020-04-11 DIAGNOSIS — N30.00 ACUTE CYSTITIS WITHOUT HEMATURIA: ICD-10-CM

## 2020-04-11 LAB

## 2020-04-11 PROCEDURE — 87086 URINE CULTURE/COLONY COUNT: CPT | Performed by: PHYSICIAN ASSISTANT

## 2020-04-11 PROCEDURE — 81001 URINALYSIS AUTO W/SCOPE: CPT | Performed by: PHYSICIAN ASSISTANT

## 2020-04-11 PROCEDURE — 87088 URINE BACTERIA CULTURE: CPT | Performed by: PHYSICIAN ASSISTANT

## 2020-04-11 PROCEDURE — 99213 OFFICE O/P EST LOW 20 MIN: CPT | Performed by: PHYSICIAN ASSISTANT

## 2020-04-11 PROCEDURE — 87186 SC STD MICRODIL/AGAR DIL: CPT | Performed by: PHYSICIAN ASSISTANT

## 2020-04-11 RX ORDER — CEPHALEXIN 500 MG/1
500 CAPSULE ORAL 4 TIMES DAILY
Qty: 40 CAPSULE | Refills: 0 | Status: SHIPPED | OUTPATIENT
Start: 2020-04-11 | End: 2020-05-01

## 2020-04-11 NOTE — PROGRESS NOTES
SUBJECTIVE:  Noa is a 45 year old female who presents to urgent care with extensive history of urinary tract infections who presents with 1 day of dysuria, incomplete emptying and frequency.  She denies any flank pain, back pain, fevers, abdominal pain.  She does have history of E. coli with numerous resistance.  Most previously treated with Omnicef 3/13/2020 for UTI.  She does take cranberry juice and tries to stay hydrated.  She also sees a urologist for this issue.  She often required extended course of antibiotics to treat this as well.    Chief Complaint   Patient presents with     Urgent Care     UTI     ROS: as stated in HPI, otherwise negative    Past Medical History:   Diagnosis Date     Adrenal insufficiency ([place]'s disease) (H)     ACTH stim test failed to increase cortisol     Allergic rhinitis, cause unspecified      Antiplatelet or antithrombotic long-term use      Arthritis      ASD (atrial septal defect) 02/15/2013    ASD dx by echo, pt declined ASD closure     Attention deficit disorder without mention of hyperactivity 7/2/2014     BRCA1 gene mutation positive 1/24/2018    Reported by patient, no report available at this time Records requested from [employer] 1/23/18     Cerebral infarction (H)      Clotting disorder (H) birth    undefined clotting disorder - sees Dr. 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      Social History     Socioeconomic History     Marital status:      Spouse name: Ciro     Number of children: 3     Years of education: 14     Highest education level: Not on file   Occupational History     Occupation: LPN     Employer: [employer]     Employer: [employer]   Social Needs     Financial resource strain: Not on file     Food insecurity     Worry: Not on file     Inability: Not on file     Transportation needs     Medical: Not on file     Non-medical: Not on file   Tobacco Use     Smoking status: Never Smoker     Smokeless tobacco: Never Used   Substance and Sexual Activity     Alcohol use: Not Currently     Alcohol/week: 0.0 standard drinks     Comment: 1-3drink per week     Drug use: No     Sexual activity: Yes     Partners: Male     Birth control/protection: Female Surgical   Lifestyle     Physical activity     Days per week: Not on file     Minutes per session: Not on file     Stress: Not on file   Relationships     Social connections     Talks on phone: Not on file     Gets together: Not on file     Attends Orthodox service: Not on file     Active member of club or organization: Not on file     Attends meetings of clubs or organizations: Not on file     Relationship status: Not on file     Intimate partner violence     Fear of current or ex partner: Not on file     Emotionally abused: Not on file     Physically abused: Not on file     Forced sexual activity: Not on file   Other Topics Concern     Parent/sibling w/ CABG, MI or angioplasty before 65F 55M? No   Social History Narrative     Not on file          Current Outpatient Medications:      amphetamine-dextroamphetamine (ADDERALL) 10 MG tablet, Take 2 tablet (20 mg) by mouth in AM and take 1 tablet (10 mg) by mouth in PM, Disp: 90 tablet, Rfl: 0     ascorbic acid 250 MG TABS tablet, Take 250 mg by mouth daily Take by mouth twice a day., Disp: , Rfl:      aspirin 325 MG EC tablet,  Take 325 mg by mouth daily Take 325 mg by mouth, Disp: , Rfl:      busPIRone (BUSPAR) 10 MG tablet, Take 1 tablet (10 mg) by mouth 2 times daily, Disp: 90 tablet, Rfl: 3     butalbital-acetaminophen-caffeine (ESGIC) -40 MG tablet, Take 1 tablet by mouth every 4 hours as needed, Disp: 30 tablet, Rfl: 1     CRANBERRY CONCENTRATE PO, , Disp: , Rfl:      diazepam (VALIUM) 5 MG tablet, Take 1 tablet (5 mg) by mouth nightly as needed for anxiety, Disp: 30 tablet, Rfl: 0     etonogestrel-ethinyl estradiol (NUVARING) 0.12-0.015 MG/24HR vaginal ring, Place 1 each vaginally every 28 days, Disp: , Rfl:      Ibuprofen (ADVIL PO), Take 600 mg by mouth 2 times daily as needed for moderate pain, Disp: , Rfl:      Omega-3 Fatty Acids (FISH OIL) 1000 MG CPDR, Take 1,000 mg by mouth daily, Disp: , Rfl:      rizatriptan (MAXALT) 10 MG tablet, TAKE 1 TABLET BY MOUTH ONCE, MAY REPEAT IN 2 HRS. MAX 30MG/24HRS, Disp: 12 tablet, Rfl: 3     STATIN NOT PRESCRIBED (INTENTIONAL), Please choose reason not prescribed, below, Disp: , Rfl:      VITAMIN D PO, Take 2,000 Units by mouth daily., Disp: , Rfl:      zolpidem (AMBIEN) 10 MG tablet, TAKE ONE TABLET BY MOUTH NIGHTLY AS NEEDED FOR SLEEP, Disp: 30 tablet, Rfl: 5     OBJECTIVE:  /78   Pulse 107   Temp 98.7  F (37.1  C) (Oral)   Resp 14   Wt 98.4 kg (217 lb)   LMP 02/08/2015 (Within Months)   SpO2 99%   BMI 36.11 kg/m     GENERAL APPEARANCE: Appears well and in no acute distress  HEENT: HEAD: NCAT, EOMI, Moist mucous membranes  LUNGS: No respiratory distress   NUERO: AOx3, normal mentation  PSYCH: normal mood and affect    No results found for any visits on 04/11/20.     ASSESSMENT/PLAN:  Noa was seen today for urgent care and uti.    Diagnoses and all orders for this visit:    Dysuria  -     *UA reflex to Microscopic and Culture ([facility] [facility] (except [place] and [place])      1) UA is suggestive of acute cystitis. Lack of flank pain, fever, nausea make  me less concerned for an ascending infection. Patient will be treated with Keflex for 10 days until cultures return and will adjust as necessary. Side effects reviewed and discussed. Consider OTC pyridium, cranberry juice and plenty of fluids.  We discussed signs and symptoms that would warrant further evaluation from a health care provider. The plan of care was reviewed and discussed.They understand and agree with the plan. A printed summary was given including instructions and medications.    Chalo Hartman PA-C

## 2020-04-15 LAB
BACTERIA SPEC CULT: ABNORMAL
BACTERIA SPEC CULT: ABNORMAL
SPECIMEN SOURCE: ABNORMAL

## 2020-04-28 ENCOUNTER — TRANSFERRED RECORDS (OUTPATIENT)
Dept: HEALTH INFORMATION MANAGEMENT | Facility: CLINIC | Age: 45
End: 2020-04-28

## 2020-05-01 ENCOUNTER — INFUSION THERAPY VISIT (OUTPATIENT)
Dept: INFUSION THERAPY | Facility: CLINIC | Age: 45
End: 2020-05-01
Attending: UROLOGY
Payer: COMMERCIAL

## 2020-05-01 ENCOUNTER — TRANSFERRED RECORDS (OUTPATIENT)
Dept: HEALTH INFORMATION MANAGEMENT | Facility: CLINIC | Age: 45
End: 2020-05-01

## 2020-05-01 VITALS
RESPIRATION RATE: 18 BRPM | SYSTOLIC BLOOD PRESSURE: 124 MMHG | TEMPERATURE: 98.5 F | DIASTOLIC BLOOD PRESSURE: 84 MMHG | HEART RATE: 89 BPM

## 2020-05-01 DIAGNOSIS — N39.0 URINARY TRACT INFECTION, SITE NOT SPECIFIED: ICD-10-CM

## 2020-05-01 DIAGNOSIS — N39.0 URINARY TRACT INFECTION, SITE NOT SPECIFIED: Primary | ICD-10-CM

## 2020-05-01 PROCEDURE — 25000128 H RX IP 250 OP 636: Performed by: UROLOGY

## 2020-05-01 PROCEDURE — 25000125 ZZHC RX 250: Performed by: UROLOGY

## 2020-05-01 PROCEDURE — 96374 THER/PROPH/DIAG INJ IV PUSH: CPT

## 2020-05-01 RX ORDER — HEPARIN SODIUM,PORCINE 10 UNIT/ML
5 VIAL (ML) INTRAVENOUS
Status: CANCELLED | OUTPATIENT
Start: 2020-05-01

## 2020-05-01 RX ORDER — HEPARIN SODIUM (PORCINE) LOCK FLUSH IV SOLN 100 UNIT/ML 100 UNIT/ML
5 SOLUTION INTRAVENOUS
Status: CANCELLED | OUTPATIENT
Start: 2020-05-01

## 2020-05-01 RX ORDER — HEPARIN SODIUM (PORCINE) LOCK FLUSH IV SOLN 100 UNIT/ML 100 UNIT/ML
5 SOLUTION INTRAVENOUS
Status: CANCELLED | OUTPATIENT
Start: 2020-05-02

## 2020-05-01 RX ORDER — HEPARIN SODIUM,PORCINE 10 UNIT/ML
5 VIAL (ML) INTRAVENOUS
Status: CANCELLED | OUTPATIENT
Start: 2020-05-02

## 2020-05-01 RX ORDER — PHENAZOPYRIDINE HYDROCHLORIDE 200 MG/1
200 TABLET, FILM COATED ORAL 3 TIMES DAILY PRN
COMMUNITY
End: 2023-10-24

## 2020-05-01 RX ADMIN — WATER 1 G: 1 INJECTION INTRAMUSCULAR; INTRAVENOUS; SUBCUTANEOUS at 15:07

## 2020-05-01 NOTE — PROGRESS NOTES
Infusion Nursing Note:  Noa Mcgill presents today for Invanz.    Patient seen by provider today: No   present during visit today: Not Applicable.    Note: N/A.    Intravenous Access:  Peripheral IV placed.    Treatment Conditions:  Not Applicable.      Post Infusion Assessment:  Patient tolerated infusion without incident.  Blood return noted pre and post infusion.  Site patent and intact, free from redness, edema or discomfort.  No evidence of extravasations.  Access discontinued per protocol.       Discharge Plan:   Discharge instructions reviewed with: Patient.  Patient and/or family verbalized understanding of discharge instructions and all questions answered.  Copy of AVS reviewed with patient and/or family.  Patient will return 5/2/20 for next appointment.  Patient discharged in stable condition accompanied by: self.  Departure Mode: Ambulatory.    Lauro Kendall RN

## 2020-05-02 ENCOUNTER — INFUSION THERAPY VISIT (OUTPATIENT)
Dept: INFUSION THERAPY | Facility: CLINIC | Age: 45
End: 2020-05-02
Attending: INTERNAL MEDICINE
Payer: COMMERCIAL

## 2020-05-02 VITALS
RESPIRATION RATE: 20 BRPM | DIASTOLIC BLOOD PRESSURE: 79 MMHG | OXYGEN SATURATION: 99 % | SYSTOLIC BLOOD PRESSURE: 119 MMHG | HEART RATE: 86 BPM | TEMPERATURE: 97.6 F

## 2020-05-02 DIAGNOSIS — N39.0 URINARY TRACT INFECTION, SITE NOT SPECIFIED: Primary | ICD-10-CM

## 2020-05-02 PROCEDURE — 96374 THER/PROPH/DIAG INJ IV PUSH: CPT

## 2020-05-02 PROCEDURE — 25000125 ZZHC RX 250: Performed by: UROLOGY

## 2020-05-02 PROCEDURE — 25000128 H RX IP 250 OP 636: Performed by: UROLOGY

## 2020-05-02 RX ORDER — HEPARIN SODIUM (PORCINE) LOCK FLUSH IV SOLN 100 UNIT/ML 100 UNIT/ML
5 SOLUTION INTRAVENOUS
Status: DISCONTINUED | OUTPATIENT
Start: 2020-05-02 | End: 2020-05-02 | Stop reason: HOSPADM

## 2020-05-02 RX ORDER — HEPARIN SODIUM,PORCINE 10 UNIT/ML
5 VIAL (ML) INTRAVENOUS
Status: CANCELLED | OUTPATIENT
Start: 2020-05-03

## 2020-05-02 RX ORDER — HEPARIN SODIUM,PORCINE 10 UNIT/ML
5 VIAL (ML) INTRAVENOUS
Status: DISCONTINUED | OUTPATIENT
Start: 2020-05-02 | End: 2020-05-02 | Stop reason: HOSPADM

## 2020-05-02 RX ORDER — HEPARIN SODIUM (PORCINE) LOCK FLUSH IV SOLN 100 UNIT/ML 100 UNIT/ML
5 SOLUTION INTRAVENOUS
Status: CANCELLED | OUTPATIENT
Start: 2020-05-03

## 2020-05-02 RX ADMIN — WATER 1 G: 1 INJECTION INTRAMUSCULAR; INTRAVENOUS; SUBCUTANEOUS at 08:36

## 2020-05-02 ASSESSMENT — PAIN SCALES - GENERAL: PAINLEVEL: NO PAIN (0)

## 2020-05-02 NOTE — PROGRESS NOTES
Infusion Nursing Note:  Noa Mcgill presents today for Invanz.    Patient seen by provider today: No   present during visit today: Not Applicable.    Note: N/A.    Intravenous Access:  Peripheral IV placed.    Treatment Conditions:  Not Applicable.      Post Infusion Assessment:  Patient tolerated infusion without incident.  Blood return noted pre and post infusion.  Site patent and intact, free from redness, edema or discomfort.  No evidence of extravasations.  Access discontinued per protocol.       Discharge Plan:   Patient declined prescription refills.  Discharge instructions reviewed with: Patient.  Patient verbalized understanding of discharge instructions and all questions answered.  AVS to patient via [employer].  Patient will return 5/3/20 for next appointment.   Patient discharged in stable condition accompanied by: self.  Departure Mode: Ambulatory.    Rosmery Johnston RN

## 2020-05-03 ENCOUNTER — INFUSION THERAPY VISIT (OUTPATIENT)
Dept: INFUSION THERAPY | Facility: CLINIC | Age: 45
End: 2020-05-03
Attending: INTERNAL MEDICINE
Payer: COMMERCIAL

## 2020-05-03 VITALS
OXYGEN SATURATION: 98 % | SYSTOLIC BLOOD PRESSURE: 117 MMHG | HEART RATE: 84 BPM | RESPIRATION RATE: 20 BRPM | DIASTOLIC BLOOD PRESSURE: 80 MMHG | TEMPERATURE: 98.7 F

## 2020-05-03 DIAGNOSIS — N39.0 URINARY TRACT INFECTION, SITE NOT SPECIFIED: Primary | ICD-10-CM

## 2020-05-03 PROCEDURE — 96374 THER/PROPH/DIAG INJ IV PUSH: CPT

## 2020-05-03 PROCEDURE — 25000128 H RX IP 250 OP 636: Performed by: UROLOGY

## 2020-05-03 PROCEDURE — 25000125 ZZHC RX 250: Performed by: UROLOGY

## 2020-05-03 RX ORDER — HEPARIN SODIUM (PORCINE) LOCK FLUSH IV SOLN 100 UNIT/ML 100 UNIT/ML
5 SOLUTION INTRAVENOUS
Status: CANCELLED | OUTPATIENT
Start: 2020-05-04

## 2020-05-03 RX ORDER — HEPARIN SODIUM,PORCINE 10 UNIT/ML
5 VIAL (ML) INTRAVENOUS
Status: CANCELLED | OUTPATIENT
Start: 2020-05-04

## 2020-05-03 RX ADMIN — WATER 1 G: 1 INJECTION INTRAMUSCULAR; INTRAVENOUS; SUBCUTANEOUS at 11:05

## 2020-05-03 ASSESSMENT — PAIN SCALES - GENERAL: PAINLEVEL: NO PAIN (0)

## 2020-05-03 NOTE — PROGRESS NOTES
Infusion Nursing Note:  Noa Mcgill presents today for Invanz.    Patient seen by provider today: No   present during visit today: Not Applicable.    Note: N/A.    Intravenous Access:  Peripheral IV placed.    Treatment Conditions:  Not Applicable.      Post Infusion Assessment:  Patient tolerated infusion without incident.  Blood return noted pre and post infusion.  Site patent and intact, free from redness, edema or discomfort.  No evidence of extravasations.  Access discontinued per protocol.       Discharge Plan:   Patient declined prescription refills.  Discharge instructions reviewed with: Patient.  Patient and/or family verbalized understanding of discharge instructions and all questions answered.  AVS to patient via [employer].  Patient will return 5/4/20 for next appointment.   Patient discharged in stable condition accompanied by: self.  Departure Mode: Ambulatory.    Rosmery Johnston RN

## 2020-05-04 ENCOUNTER — INFUSION THERAPY VISIT (OUTPATIENT)
Dept: INFUSION THERAPY | Facility: CLINIC | Age: 45
End: 2020-05-04
Attending: INTERNAL MEDICINE
Payer: COMMERCIAL

## 2020-05-04 VITALS
DIASTOLIC BLOOD PRESSURE: 71 MMHG | HEART RATE: 89 BPM | TEMPERATURE: 98 F | SYSTOLIC BLOOD PRESSURE: 120 MMHG | RESPIRATION RATE: 16 BRPM

## 2020-05-04 DIAGNOSIS — N39.0 URINARY TRACT INFECTION, SITE NOT SPECIFIED: Primary | ICD-10-CM

## 2020-05-04 PROCEDURE — 25000128 H RX IP 250 OP 636: Performed by: UROLOGY

## 2020-05-04 PROCEDURE — 25000125 ZZHC RX 250: Performed by: UROLOGY

## 2020-05-04 PROCEDURE — 96374 THER/PROPH/DIAG INJ IV PUSH: CPT

## 2020-05-04 RX ORDER — HEPARIN SODIUM (PORCINE) LOCK FLUSH IV SOLN 100 UNIT/ML 100 UNIT/ML
5 SOLUTION INTRAVENOUS
Status: CANCELLED | OUTPATIENT
Start: 2020-05-05

## 2020-05-04 RX ORDER — HEPARIN SODIUM,PORCINE 10 UNIT/ML
5 VIAL (ML) INTRAVENOUS
Status: CANCELLED | OUTPATIENT
Start: 2020-05-05

## 2020-05-04 RX ADMIN — WATER 1 G: 1 INJECTION INTRAMUSCULAR; INTRAVENOUS; SUBCUTANEOUS at 14:17

## 2020-05-04 NOTE — PROGRESS NOTES
Infusion Nursing Note:  Noa Mcgill presents today for Invanz.    Patient seen by provider today: No   present during visit today: Not Applicable.    Note: pt reported she is having diarrhea (went 5 time yesterday) but getting better with Imodium.    Intravenous Access:  Peripheral IV placed.    Treatment Conditions:  Not Applicable.      Post Infusion Assessment:  Patient tolerated infusion without incident.  Blood return noted pre and post infusion.  Site patent and intact, free from redness, edema or discomfort.  No evidence of extravasations.  Access discontinued per protocol.       Discharge Plan:   Discharge instructions reviewed with: Patient.  Patient and/or family verbalized understanding of discharge instructions and all questions answered.  AVS to patient via [employer]  Patient will return 5/5/20 for next appointment.   Patient discharged in stable condition accompanied by: self.  Departure Mode: Ambulatory.    Kali Coronel RN

## 2020-05-05 ENCOUNTER — INFUSION THERAPY VISIT (OUTPATIENT)
Dept: INFUSION THERAPY | Facility: CLINIC | Age: 45
End: 2020-05-05
Attending: INTERNAL MEDICINE
Payer: COMMERCIAL

## 2020-05-05 VITALS
TEMPERATURE: 98 F | RESPIRATION RATE: 18 BRPM | SYSTOLIC BLOOD PRESSURE: 122 MMHG | DIASTOLIC BLOOD PRESSURE: 85 MMHG | OXYGEN SATURATION: 100 % | HEART RATE: 92 BPM

## 2020-05-05 DIAGNOSIS — N39.0 URINARY TRACT INFECTION, SITE NOT SPECIFIED: Primary | ICD-10-CM

## 2020-05-05 PROCEDURE — 25000125 ZZHC RX 250: Performed by: UROLOGY

## 2020-05-05 PROCEDURE — 25000128 H RX IP 250 OP 636: Performed by: UROLOGY

## 2020-05-05 PROCEDURE — 96374 THER/PROPH/DIAG INJ IV PUSH: CPT

## 2020-05-05 RX ORDER — HEPARIN SODIUM (PORCINE) LOCK FLUSH IV SOLN 100 UNIT/ML 100 UNIT/ML
5 SOLUTION INTRAVENOUS
Status: CANCELLED | OUTPATIENT
Start: 2020-05-05

## 2020-05-05 RX ORDER — HEPARIN SODIUM,PORCINE 10 UNIT/ML
5 VIAL (ML) INTRAVENOUS
Status: CANCELLED | OUTPATIENT
Start: 2020-05-05

## 2020-05-05 RX ADMIN — WATER 1 G: 1 INJECTION INTRAMUSCULAR; INTRAVENOUS; SUBCUTANEOUS at 15:30

## 2020-05-05 NOTE — PROGRESS NOTES
Infusion Nursing Note:  Noa Mcgill presents today for Invanz.    Patient seen by provider today: No    Note: Reports some nausea especially after Invanz dose given.  Reports that diarrhea and UTI symptoms have resolved.  Last dose today.    Intravenous Access:  Peripheral IV placed.      Treatment Conditions:  Not Applicable.      Post Infusion Assessment:  Patient tolerated infusion without incident.  Blood return noted pre and post infusion.  Site patent and intact, free from redness, edema or discomfort.  No evidence of extravasations.  Access discontinued per protocol.    Discharge Plan:   Patient declined prescription refills.  Discharge instructions reviewed with: Patient.  Patient and/or family verbalized understanding of discharge instructions and all questions answered.  AVS to patient via [employer].  Patient has no future appointments at this clinic currently.  Patient discharged in stable condition accompanied by: self.  Departure Mode: Ambulatory.    Val Jordan, RN, RN

## 2020-06-13 ENCOUNTER — HOSPITAL ENCOUNTER (EMERGENCY)
Facility: CLINIC | Age: 45
Discharge: HOME | End: 2020-06-13
Attending: EMERGENCY MEDICINE | Admitting: EMERGENCY MEDICINE
Payer: COMMERCIAL

## 2020-06-13 VITALS
OXYGEN SATURATION: 99 % | RESPIRATION RATE: 20 BRPM | SYSTOLIC BLOOD PRESSURE: 139 MMHG | DIASTOLIC BLOOD PRESSURE: 69 MMHG | TEMPERATURE: 98.2 F

## 2020-06-13 DIAGNOSIS — N30.00 ACUTE CYSTITIS WITHOUT HEMATURIA: ICD-10-CM

## 2020-06-13 LAB
ALBUMIN UR-MCNC: 10 MG/DL
APPEARANCE UR: CLEAR
BILIRUB UR QL STRIP: NEGATIVE
COLOR UR AUTO: ABNORMAL
GLUCOSE UR STRIP-MCNC: NEGATIVE MG/DL
HGB UR QL STRIP: NEGATIVE
KETONES UR STRIP-MCNC: ABNORMAL MG/DL
LEUKOCYTE ESTERASE UR QL STRIP: ABNORMAL
MUCOUS THREADS #/AREA URNS LPF: PRESENT /LPF
NITRATE UR QL: NEGATIVE
PH UR STRIP: 5.5 PH (ref 5–7)
RBC #/AREA URNS AUTO: 2 /HPF (ref 0–2)
SOURCE: ABNORMAL
SP GR UR STRIP: 1.03 (ref 1–1.03)
SQUAMOUS #/AREA URNS AUTO: 4 /HPF (ref 0–1)
TRANS CELLS #/AREA URNS HPF: 2 /HPF (ref 0–1)
UROBILINOGEN UR STRIP-MCNC: NORMAL MG/DL (ref 0–2)
WBC #/AREA URNS AUTO: 18 /HPF (ref 0–5)

## 2020-06-13 PROCEDURE — 25000132 ZZH RX MED GY IP 250 OP 250 PS 637: Performed by: EMERGENCY MEDICINE

## 2020-06-13 PROCEDURE — 81001 URINALYSIS AUTO W/SCOPE: CPT | Performed by: EMERGENCY MEDICINE

## 2020-06-13 PROCEDURE — 87086 URINE CULTURE/COLONY COUNT: CPT | Performed by: EMERGENCY MEDICINE

## 2020-06-13 PROCEDURE — 99283 EMERGENCY DEPT VISIT LOW MDM: CPT

## 2020-06-13 PROCEDURE — 87088 URINE BACTERIA CULTURE: CPT | Performed by: EMERGENCY MEDICINE

## 2020-06-13 PROCEDURE — 87186 SC STD MICRODIL/AGAR DIL: CPT | Performed by: EMERGENCY MEDICINE

## 2020-06-13 RX ORDER — CEFDINIR 300 MG/1
300 CAPSULE ORAL 2 TIMES DAILY
Qty: 20 CAPSULE | Refills: 0 | Status: SHIPPED | OUTPATIENT
Start: 2020-06-13 | End: 2020-07-14

## 2020-06-13 RX ORDER — CEFDINIR 300 MG/1
300 CAPSULE ORAL 2 TIMES DAILY
Qty: 14 CAPSULE | Refills: 0 | Status: SHIPPED | OUTPATIENT
Start: 2020-06-13 | End: 2020-06-13

## 2020-06-13 RX ORDER — CEFDINIR 300 MG/1
300 CAPSULE ORAL ONCE
Status: COMPLETED | OUTPATIENT
Start: 2020-06-13 | End: 2020-06-13

## 2020-06-13 RX ADMIN — CEFDINIR 300 MG: 300 CAPSULE ORAL at 23:38

## 2020-06-13 ASSESSMENT — ENCOUNTER SYMPTOMS
DIAPHORESIS: 1
VOMITING: 0
NAUSEA: 1
FATIGUE: 1

## 2020-06-14 NOTE — ED PROVIDER NOTES
History   Chief Complaint  UTI    The history is provided by the patient.      Noa Mcgill is a 45 year old female with a history of recurrent UTI's who presents for evaluation of symptoms consistent with her recurrent UTI's. Noa endorses a general un-well feeling and a burning sensation around her vagina. Feels like prior UTI. Has been on omnicef x2 in last month. Was on invanz in early May. Was on keflex in April. Did get better after her last UTI but symptoms started again. Most recent culture in  from May appears to have grown urogenital nicole though culture results from  not well visualized in care everywhere. No fever, vomiting or flank pain.    Allergies  Tnf Antagonists  Enbrel  Humira [Humira]  Prednisone  Compazine [Prochlorperazine]  Nitrofurantoin    Medications  amphetamine-dextroamphetamine  ascorbic acid   buspirone  butalbital-acetaminophen-caffeine   CRANBERRY CONCENTRATE PO  diazepam   etonogestrel-ethinyl estradiol  Omega-3 Fatty Acids  phenazopyridine  rizatriptan  VITAMIN D  zolpidem    Past Medical History  Adrenal insufficiency (Woodson's disease)   Allergic rhinitis, cause unspecified   Antiplatelet or antithrombotic long-term use   Arthritis   ASD (atrial septal defect)   Attention deficit disorder without mention of hyperactivity   BRCA1 gene mutation positive   Cerebral infarction  Clotting disorder   Congenital heart disease   Depressive disorder   Endometriosis of uterus   HERPES SIMPLEX NOS   Insomnia, unspecified   Lung nodule < 6cm on CT   Migraine headaches    Psoriatic arthropathy   Recurrent UTI   Retinal artery occlusion     Past Surgical History  Appendectomy   section  Dilation and curettage suction x2  Genitourinary surgery  Dilation/Curettage diag/ther non OB x2  Herniorrhaphy incisional  Hysterectomy  Mastectomy simple, bilateral  Fused thumb  Reconstruct breast    Family History  Mother - Depression, alcoholism, diabetes, hereditary breast and ovarian  cancer syndrome, hypertension, lung cancer  Father - Substance Abuse  Sister - Hereditary breast and Ovarian cancer syndrome    Social History  Tobacco use: Never smoker  Alcohol use: No  Drug use: no  Marital Status:   Occupational History: LPN for [employer]    Review of Systems   Constitutional: Positive for diaphoresis and fatigue.   Gastrointestinal: Positive for nausea. Negative for vomiting.   Genitourinary: Positive for vaginal pain.   All other systems reviewed and are negative.    Physical Exam     Patient Vitals for the past 24 hrs:   BP Temp Temp src Heart Rate Resp SpO2   06/13/20 2222 139/69 98.2  F (36.8  C) Oral 80 20 99 %       Physical Exam    General: Resting comfortably on the gurney  Eyes:  The pupils are equal and round    Conjunctivae and sclerae are normal  ENT:    Moist mucous membranes  Neck:  Normal range of motion  CV:  Regular rate     Skin warm and well perfused   Resp:  Non labored breathing on room air  GI:  Abdomen is soft, there is no rigidity    No distension    No rebound tenderness     No abdominal tenderness    No flank tenderness  MS:  Normal muscular tone  Skin:  No rash or acute skin lesions noted  Neuro:   Awake, alert.      Speech is normal and fluent.    Face is symmetric.     Moves all extremities equally  Psych: Normal affect.  Appropriate interactions.    Emergency Department Course     Laboratory:  Laboratory findings were communicated with the patient who voiced understanding of the findings.    UA with Microscopic: Ketones: Trace (A), Protein Albumin: 10 (A), Leukocyte Esterase: Small (A), WBC: 18 (high), Squamous Epithelial/HPF: 4 (high), Transitional Epithelial/HPF: 2 (high), Mucous: Present (A), o/w WNL    Urine Culture: pending    Interventions:  2338 Omnicef 300 mg PO      Emergency Department Course:  Past medical records, nursing notes, and vitals reviewed.    2310 I physically examined the patient as documented above.    The patient provided  a urine sample here in the emergency department. This was sent for laboratory testing, findings above.    2330 I rechecked the patient and discussed the findings of their workup thus far including plans for discharge.     Findings and plan explained to the Patient. Patient discharged home with instructions regarding supportive care, medications, and reasons to return. The importance of close follow-up was reviewed. The patient was prescribed Omnicef.    I personally reviewed the laboratory results with the Patient and answered all related questions prior to discharge.     Impression & Plan   Medical Decision Making:  Noa Mcgill is a 45 year old female with urinary symptoms. Consistent with her prior UTI symptoms. Has had hysterectomy. UA abnormal today and with her symptoms, will treat for UTI. Urine culture in process. I do not have access to all of her urine cultures from last several months given some in urology clinic, some through  clinic, one in [place]. Has known resistance to many antibiotics. Looks well in ED with normal vital signs, no fever, no flank pain so will try oral antibiotics given doubt pyelonephritis. Knows that culture will not be back today so antibiotic given now may not treat current infection and may need to switch antibiotic.      Diagnosis:    ICD-10-CM    1. Acute cystitis without hematuria  N30.00        Disposition:  Discharged to home.    Discharge Medications:  Omnicef 300 mg BID for 10 days    Scribe Disclosure:  I, Kedar Tello, am serving as a scribe at 10:24 PM on 6/13/2020 to document services personally performed by Vivi Renteria MD  based on my observations and the provider's statements to me.      Vivi Renteria MD  06/14/20 0259

## 2020-06-15 LAB
BACTERIA SPEC CULT: ABNORMAL
Lab: ABNORMAL
SPECIMEN SOURCE: ABNORMAL

## 2020-06-23 DIAGNOSIS — G47.00 INSOMNIA, UNSPECIFIED TYPE: ICD-10-CM

## 2020-06-23 NOTE — TELEPHONE ENCOUNTER
Due for med check.  Not [facility] med.  Sent to provider.  Please advise.  Juanita Wallace RN    3/9/2020  [facility]    Instructions         Return in about 3 months (around 6/9/2020) for medication check on buspar and valium and stimulants.

## 2020-06-24 RX ORDER — ZOLPIDEM TARTRATE 10 MG/1
TABLET ORAL
Qty: 30 TABLET | Refills: 0 | Status: SHIPPED | OUTPATIENT
Start: 2020-06-24 | End: 2020-08-21

## 2020-07-01 ENCOUNTER — TRANSFERRED RECORDS (OUTPATIENT)
Dept: HEALTH INFORMATION MANAGEMENT | Facility: CLINIC | Age: 45
End: 2020-07-01

## 2020-07-08 ENCOUNTER — TRANSFERRED RECORDS (OUTPATIENT)
Dept: HEALTH INFORMATION MANAGEMENT | Facility: CLINIC | Age: 45
End: 2020-07-08

## 2020-07-08 ENCOUNTER — TRANSCRIBE ORDERS (OUTPATIENT)
Dept: OTHER | Age: 45
End: 2020-07-08

## 2020-07-08 DIAGNOSIS — N39.0 RECURRENT UTI: Primary | ICD-10-CM

## 2020-07-09 NOTE — TELEPHONE ENCOUNTER
RECORDS RECEIVED FROM: External - Recurrent UTI   DATE RECEIVED: 07.14.2020   NOTES (Gather within 2 years) STATUS DETAILS   OFFICE NOTE from referring provider   Received 05.01.2020 Kevan Pascual from [facility]    OFFICE NOTE from other specialist Internal 01.28.2020   DISCHARGE SUMMARY from hospital N/A    DISCHARGE REPORT from the ER N/A    LABS (any labs) Internal / CE  Received     05.01.2020 Kevan Pascual from [facility]    MEDICATION LIST Internal    IMAGING  (NEED IMAGES AND REPORTS)     Osteomyelitis: Foot imaging  N/A    Liver Abscess: Abdominal imaging N/A    Other (anything related to diagnoses N/A       Action    Action Taken

## 2020-07-14 ENCOUNTER — TELEPHONE (OUTPATIENT)
Dept: INFECTIOUS DISEASES | Facility: CLINIC | Age: 45
End: 2020-07-14

## 2020-07-14 ENCOUNTER — PRE VISIT (OUTPATIENT)
Dept: INFECTIOUS DISEASES | Facility: CLINIC | Age: 45
End: 2020-07-14

## 2020-07-14 ENCOUNTER — VIRTUAL VISIT (OUTPATIENT)
Dept: INFECTIOUS DISEASES | Facility: CLINIC | Age: 45
End: 2020-07-14
Attending: INTERNAL MEDICINE
Payer: COMMERCIAL

## 2020-07-14 DIAGNOSIS — N39.0 RECURRENT UTI: Primary | ICD-10-CM

## 2020-07-14 RX ORDER — METHENAMINE HIPPURATE 1000 MG/1
1 TABLET ORAL 2 TIMES DAILY
Status: DISCONTINUED | OUTPATIENT
Start: 2020-07-14 | End: 2020-07-14

## 2020-07-14 RX ORDER — GRANULES FOR ORAL 3 G/1
3 POWDER ORAL ONCE
Qty: 1 PACKET | Refills: 0 | Status: SHIPPED | OUTPATIENT
Start: 2020-07-14 | End: 2020-07-14

## 2020-07-14 ASSESSMENT — PAIN SCALES - GENERAL: PAINLEVEL: NO PAIN (0)

## 2020-07-14 NOTE — LETTER
7/14/2020       RE: Noa Mcgill  [address]  [place] [address]     Dear Colleague,    Thank you for referring your patient, Noa Mcgill, to the [facility] at [facility]. Please see a copy of my visit note below.    Left voicemail for patient to call back to set up telemedicine visit, will call again before appointment time.  Mary Alice Alston, ARTHUR on 7/14/2020 at 10:57 AM      Noa Mcgill is a 45 year old female who is being evaluated via a billable telephone visit.          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 She reports she has had UTIs 'her whole life.' However she usually gets them 4x a year and has time without a UTI in between. She had severe incontinence so she had a bladder sling in 2016 at the time of her hysterectomy. She says that really improved her incontinence and she had not had UTIs for a while afterwards. However, over the last year she has had a UTI every month. In between the UTIs is only a few days and then it recurs. It is always E coli and over the last year she has developed resistance to most oral drugs including Ciprofloxacin, Levofloxacin, ampicillin, gentamycin, bactrim, and often augmentin. She was on nitrofurantoin for a long time but developed severe itching to it. She has never been on fosfomycin as it was ordered once and she could not get the authorization in time.     She has tried urinating often, drinking a lot of water, using pH wipes, and cranberry supplements without help. She also always wipes front to back.    She has been seen by Urology who do not see any clear anatomic issues. She was just scoped last week. She had imaging in Nov 2019 and there was not an anatomic issue.     She has since developed another UTI and so is on Cefdinir for 21 days and she thinks it is working.     She says she gets  septic if not treated but to her knowledge has never had a kidney infection.     Color Urine (no units)   Date Value   06/13/2020 Light Yellow     Appearance Urine (no units)   Date Value   06/13/2020 Clear     Glucose Urine (mg/dL)   Date Value   06/13/2020 Negative     Bilirubin Urine (no units)   Date Value   06/13/2020 Negative     Ketones Urine (mg/dL)   Date Value   06/13/2020 Trace (A)     Specific Gravity Urine (no units)   Date Value   06/13/2020 1.030     pH Urine (pH)   Date Value   06/13/2020 5.5     Protein Albumin Urine (mg/dL)   Date Value   06/13/2020 10 (A)     Urobilinogen Urine (EU/dL)   Date Value   04/11/2020 0.2     Nitrite Urine (no units)   Date Value   06/13/2020 Negative     Leukocyte Esterase Urine (no units)   Date Value   06/13/2020 Small (A)         6/13 Urine culture >100,000 E coli  Susceptibility      Escherichia coli      DENISE      AMPICILLIN  >=32 ug/mL  Resistant      AMPICILLIN/SULBACTAM  >=32 ug/mL  Resistant      CEFAZOLIN  <=4 ug/mL  Sensitive1      CEFEPIME  <=1 ug/mL  Sensitive      CEFOXITIN  <=4 ug/mL  Sensitive      CEFTAZIDIME  <=1 ug/mL  Sensitive      CEFTRIAXONE  <=1 ug/mL  Sensitive      CIPROFLOXACIN  >=4 ug/mL  Resistant      GENTAMICIN  >=16 ug/mL  Resistant      LEVOFLOXACIN  >=8 ug/mL  Resistant      NITROFURANTOIN  <=16 ug/mL  Sensitive      Piperacillin/Tazo  <=4 ug/mL  Sensitive      TOBRAMYCIN  8 ug/mL  Intermediate      Trimethoprim/Sulfa  >=16/304 ug/mL  Resistant       5/1/2020 E coli  4/11 E coli  3/15 E coli  2/11 E coli  1/28 E coli  7/12 E coli  5/30 E coli  Most recent have been same pattern as above or S to augmentin otherwise the same.     A&P  Patient is a 45 year old with a hx of incontinence s/p bladder sling in 2016 who has had many UTIs.     Recurrent UTIs: she almost always has an infection. She is doing the behavioral modifications that would be expected to help. She is drinking water, urinating often, and wiping front to back. However, she  continues to get UTIs. He asked about methenamine (hiprex) for prevention. I think this is a reasonable option. I will order her this to help acidify her urine. Hopefully she can go longer without infection. I am concerned that there could still be an anatomic reason for infection. I will repeat imaging from Nov 2019 as her infections have increased in frequency since then. I will also order fosfomycin for her UTI. It is the only oral that has good bioavailability and would likely clear her urine. Until then she should complete the cefdinir which is a 2nd line antibiotic.      Complete cefdinir  methenamine  Fosfomycin  Re-image to ensure no anatomic reason for the great increase in UTIs.    If she worsens she could get IV ceftriaxone and the narrow per culture.     RTC about a month    Phone call duration: 20 minutes    Bridget Lopez MD

## 2020-07-14 NOTE — PROGRESS NOTES
Left voicemail for patient to call back to set up telemedicine visit, will call again before appointment time.  Mary Alice Alston CMA on 7/14/2020 at 10:57 AM

## 2020-07-14 NOTE — TELEPHONE ENCOUNTER
Prior Authorization Not Needed per Insurance    Medication: fosfomycin (MONUROL) 3 g- PA Not Needed  Insurance Company: [employer] - Phone [phone] Fax [phone]  Expected CoPay:      Pharmacy Filling the Rx: [id number] - [place], [place] - [address] [facility].  Pharmacy Notified: Yes  Patient Notified: No     Confirmed with pharmacy, PA is not needed. Scripts went through insurance for co-pay of $10. Medication is ready for pick and patient will be notified.

## 2020-07-14 NOTE — TELEPHONE ENCOUNTER
.Prior Authorization Retail Medication Request    Medication/Dose: fosfomycin (MONUROL) 3 g   ICD code (if different than what is on RX):   Recurrent UTI [N39.0]  Previously Tried and Failed: Allergic to previously tried med  Rationale:  fosfomycin is the only oral drug that is susceptible and she is not allergic to    Insurance Name:  [address] [place] [address] FULLY INSURED   Insurance ID:  [id number]       Pharmacy Information (if different than what is on RX)  Name:  [facility] [address] [place] [address].   [facility] [id number]  Phone:  [phone]

## 2020-07-14 NOTE — PROGRESS NOTES
"Noa Mcgill is a 45 year old female who is being evaluated via a billable telephone visit.      The patient has been notified of following:     \"This telephone visit will be conducted via a call between you and your physician/provider. We have found that certain health care needs can be provided without the need for a physical exam.  This service lets us provide the care you need with a short phone conversation.  If a prescription is necessary we can send it directly to your pharmacy.  If lab work is needed we can place an order for that and you can then stop by our lab to have the test done at a later time.    Telephone visits are billed at different rates depending on your insurance coverage. During this emergency period, for some insurers they may be billed the same as an in-person visit.  Please reach out to your insurance provider with any questions.    If during the course of the call the physician/provider feels a telephone visit is not appropriate, you will not be charged for this service.\"    Patient has given verbal consent for Telephone visit?  Yes    What phone number would you like to be contacted at? [phone]    How would you like to obtain your AVS? 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 She reports she has had UTIs 'her whole life.' However she usually gets them 4x a year and has time without a UTI in between. She had severe incontinence so she had a bladder sling in 2016 at the time of her hysterectomy. She says that really improved her incontinence and she had not had UTIs for a while afterwards. However, over the last year she has had a UTI every month. In between the UTIs is only a few days and then it recurs. It is always E coli and over the last year she has developed resistance to most oral drugs including Ciprofloxacin, Levofloxacin, ampicillin, gentamycin, bactrim, and often " augmentin. She was on nitrofurantoin for a long time but developed severe itching to it. She has never been on fosfomycin as it was ordered once and she could not get the authorization in time.     She has tried urinating often, drinking a lot of water, using pH wipes, and cranberry supplements without help. She also always wipes front to back.    She has been seen by Urology who do not see any clear anatomic issues. She was just scoped last week. She had imaging in Nov 2019 and there was not an anatomic issue.     She has since developed another UTI and so is on Cefdinir for 21 days and she thinks it is working.     She says she gets septic if not treated but to her knowledge has never had a kidney infection.     Color Urine (no units)   Date Value   06/13/2020 Light Yellow     Appearance Urine (no units)   Date Value   06/13/2020 Clear     Glucose Urine (mg/dL)   Date Value   06/13/2020 Negative     Bilirubin Urine (no units)   Date Value   06/13/2020 Negative     Ketones Urine (mg/dL)   Date Value   06/13/2020 Trace (A)     Specific Gravity Urine (no units)   Date Value   06/13/2020 1.030     pH Urine (pH)   Date Value   06/13/2020 5.5     Protein Albumin Urine (mg/dL)   Date Value   06/13/2020 10 (A)     Urobilinogen Urine (EU/dL)   Date Value   04/11/2020 0.2     Nitrite Urine (no units)   Date Value   06/13/2020 Negative     Leukocyte Esterase Urine (no units)   Date Value   06/13/2020 Small (A)         6/13 Urine culture >100,000 E coli  Susceptibility      Escherichia coli      DENISE      AMPICILLIN  >=32 ug/mL  Resistant      AMPICILLIN/SULBACTAM  >=32 ug/mL  Resistant      CEFAZOLIN  <=4 ug/mL  Sensitive1      CEFEPIME  <=1 ug/mL  Sensitive      CEFOXITIN  <=4 ug/mL  Sensitive      CEFTAZIDIME  <=1 ug/mL  Sensitive      CEFTRIAXONE  <=1 ug/mL  Sensitive      CIPROFLOXACIN  >=4 ug/mL  Resistant      GENTAMICIN  >=16 ug/mL  Resistant      LEVOFLOXACIN  >=8 ug/mL  Resistant      NITROFURANTOIN  <=16 ug/mL   Sensitive      Piperacillin/Tazo  <=4 ug/mL  Sensitive      TOBRAMYCIN  8 ug/mL  Intermediate      Trimethoprim/Sulfa  >=16/304 ug/mL  Resistant       5/1/2020 E coli  4/11 E coli  3/15 E coli  2/11 E coli  1/28 E coli  7/12 E coli  5/30 E coli  Most recent have been same pattern as above or S to augmentin otherwise the same.     A&P  Patient is a 45 year old with a hx of incontinence s/p bladder sling in 2016 who has had many UTIs.     Recurrent UTIs: she almost always has an infection. She is doing the behavioral modifications that would be expected to help. She is drinking water, urinating often, and wiping front to back. However, she continues to get UTIs. He asked about methenamine (hiprex) for prevention. I think this is a reasonable option. I will order her this to help acidify her urine. Hopefully she can go longer without infection. I am concerned that there could still be an anatomic reason for infection. I will repeat imaging from Nov 2019 as her infections have increased in frequency since then. I will also order fosfomycin for her UTI. It is the only oral that has good bioavailability and would likely clear her urine. Until then she should complete the cefdinir which is a 2nd line antibiotic.      Complete cefdinir  methenamine  Fosfomycin  Re-image to ensure no anatomic reason for the great increase in UTIs.    If she worsens she could get IV ceftriaxone and the narrow per culture.     RTC about a month    Phone call duration: 20 minutes    Bridget Lopez MD

## 2020-07-20 ENCOUNTER — TELEPHONE (OUTPATIENT)
Dept: INFECTIOUS DISEASES | Facility: CLINIC | Age: 45
End: 2020-07-20

## 2020-07-20 DIAGNOSIS — N39.0 RECURRENT UTI: Primary | ICD-10-CM

## 2020-07-20 RX ORDER — METHENAMINE HIPPURATE 1000 MG/1
1 TABLET ORAL 2 TIMES DAILY
Qty: 60 TABLET | Refills: 3 | Status: SHIPPED | OUTPATIENT
Start: 2020-07-20

## 2020-07-20 NOTE — TELEPHONE ENCOUNTER
[facility] Call Center    Phone Message    May a detailed message be left on voicemail: yes     Reason for Call: pt states she was suppose to get a prescription for Methenamine. Pt had an appt with Dr Lopez 07/14/20.     Action Taken: Message routed to:  [facility] ([facility]): id    Travel Screening: Not Applicable

## 2020-07-21 ENCOUNTER — TELEPHONE (OUTPATIENT)
Dept: INFECTIOUS DISEASES | Facility: CLINIC | Age: 45
End: 2020-07-21

## 2020-07-21 NOTE — TELEPHONE ENCOUNTER
"Prior Authorization Retail Medication Request    Medication/Dose: methenamine (HIPREX) 1 g tablet   ICD code (if different than what is on RX):    Previously Tried and Failed:    Rationale:  Note by Dr. 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 She reports she has had UTIs 'her whole life.' However she usually gets them 4x a year and has time without a UTI in between. She had severe incontinence so she had a bladder sling in 2016 at the time of her hysterectomy. She says that really improved her incontinence and she had not had UTIs for a while afterwards. However, over the last year she has had a UTI every month. In between the UTIs is only a few days and then it recurs. It is always E coli and over the last year she has developed resistance to most oral drugs including Ciprofloxacin, Levofloxacin, ampicillin, gentamycin, bactrim, and often augmentin. She was on nitrofurantoin for a long time but developed severe itching to it. She has never been on fosfomycin as it was ordered once and she could not get the authorization in time.      She has tried urinating often, drinking a lot of water, using pH wipes, and cranberry supplements without help. She also always wipes front to back.     She has been seen by Urology who do not see any clear anatomic issues. She was just scoped last week. She had imaging in Nov 2019 and there was not an anatomic issue.    she almost always has an infection. She is doing the behavioral modifications that would be expected to help. She is drinking water, urinating often, and wiping front to back. However, she continues to get UTIs. He asked about methenamine (hiprex) for prevention. I think this is a reasonable option. I will order her this to help acidify her urine. Hopefully she can go longer without infection. I am concerned that there could still be an anatomic reason for infection. " "I will repeat imaging from Nov 2019 as her infections have increased in frequency since then. I will also order fosfomycin for her UTI. It is the only oral that has good bioavailability and would likely clear her urine. Until then she should complete the cefdinir which is a 2nd line antibiotic.\"        Insurance Name:  [employer]   Insurance ID:  [id number]       Pharmacy Information (if different than what is on RX)  Name:    Phone:      "

## 2020-07-21 NOTE — TELEPHONE ENCOUNTER
Central Prior Authorization Team   Phone: [phone]      PA Initiation    Medication: methenamine (HIPREX) 1 g tablet   Insurance Company: [employer] - Phone [phone] Fax [phone]  Pharmacy [facility]illing the Rx: [id number] [place], [place] - [address] [facility].  Filling Pharmacy Phone: [phone]  Filling Pharmacy Fax:    Start Date: 7/21/2020

## 2020-07-21 NOTE — TELEPHONE ENCOUNTER
Prior Authorization Approval    Authorization Effective Date: 6/21/2020  Authorization Expiration Date: 7/21/2025  Medication: methenamine (HIPREX) 1 g tablet - APPROVED  Approved Dose/Quantity:   Reference #:     Insurance Company: [employer] - Phone [phone] Fax [phone]  Expected CoPay:       CoPay Card Available:      Foundation Assistance Needed:    Which Pharmacy is filling the prescription (Not needed for infusion/clinic administered): [id number] - [place], [place] - [address] [facility].  Pharmacy Notified: Yes-Pharmacy will notify patient when ready.  Patient Notified: No

## 2020-07-21 NOTE — TELEPHONE ENCOUNTER
Bridget Lopez MD  You 21 hours ago (1:27 PM)       It should be a chronic med. So she can have 30 days with refills.

## 2020-07-24 RX ORDER — GRANULES FOR ORAL 3 G/1
3 POWDER ORAL
Qty: 3 PACKET | Refills: 0 | Status: SHIPPED | OUTPATIENT
Start: 2020-07-24 | End: 2020-12-09

## 2020-08-06 ENCOUNTER — HOSPITAL ENCOUNTER (OUTPATIENT)
Dept: CT IMAGING | Facility: CLINIC | Age: 45
Discharge: HOME | End: 2020-08-06
Attending: INTERNAL MEDICINE | Admitting: INTERNAL MEDICINE
Payer: COMMERCIAL

## 2020-08-06 DIAGNOSIS — N39.0 RECURRENT UTI: ICD-10-CM

## 2020-08-06 PROCEDURE — 74178 CT ABD&PLV WO CNTR FLWD CNTR: CPT

## 2020-08-06 PROCEDURE — 25000125 ZZHC RX 250: Performed by: INTERNAL MEDICINE

## 2020-08-06 PROCEDURE — 25000128 H RX IP 250 OP 636: Performed by: INTERNAL MEDICINE

## 2020-08-06 RX ORDER — IOPAMIDOL 755 MG/ML
500 INJECTION, SOLUTION INTRAVASCULAR ONCE
Status: COMPLETED | OUTPATIENT
Start: 2020-08-06 | End: 2020-08-06

## 2020-08-06 RX ADMIN — SODIUM CHLORIDE 65 ML: 9 INJECTION, SOLUTION INTRAVENOUS at 13:45

## 2020-08-06 RX ADMIN — IOPAMIDOL 100 ML: 755 INJECTION, SOLUTION INTRAVENOUS at 13:45

## 2020-08-11 ENCOUNTER — VIRTUAL VISIT (OUTPATIENT)
Dept: INFECTIOUS DISEASES | Facility: CLINIC | Age: 45
End: 2020-08-11
Attending: INTERNAL MEDICINE
Payer: COMMERCIAL

## 2020-08-11 DIAGNOSIS — N39.0 RECURRENT UTI: Primary | ICD-10-CM

## 2020-08-11 ASSESSMENT — PAIN SCALES - GENERAL: PAINLEVEL: NO PAIN (0)

## 2020-08-11 NOTE — PROGRESS NOTES
"Noa Mcgill is a 45 year old female who is being evaluated via a billable telephone visit.      The patient has been notified of following:     \"This telephone visit will be conducted via a call between you and your physician/provider. We have found that certain health care needs can be provided without the need for a physical exam.  This service lets us provide the care you need with a short phone conversation.  If a prescription is necessary we can send it directly to your pharmacy.  If lab work is needed we can place an order for that and you can then stop by our lab to have the test done at a later time.    Telephone visits are billed at different rates depending on your insurance coverage. During this emergency period, for some insurers they may be billed the same as an in-person visit.  Please reach out to your insurance provider with any questions.    If during the course of the call the physician/provider feels a telephone visit is not appropriate, you will not be charged for this service.\"    Patient has given verbal consent for Telephone visit?  Yes    What phone number would you like to be contacted at? [phone]    How would you like to obtain your AVS? 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 She has a long history of UTIs and usually they are 4 times a year but has been almost constant over the last few years.     I got her a prior authorization for fosfomycin and ordered methenamine. She also had a cystogram and CT abd/pelvis. Both were unrevealing and did not show anatomic issues. Over the last 3 weeks she has not had a UTI and that is the longest she has gone in months. She thinks it might be the methenamine or the fact that a cystogram stretches her urethra. But regardless she is pleased.     She is tolerating the methenamine without issues. I did a drug interactions scan. I found that " methenamine can lower the blood levels of adderall. She is not currently on adderall as she is not in school but will keep that in mind. No other drug interactions were found with what she was on.    She plans to follow up with Urology and see me as needed.    Plan  -fosfomycin for UTI after getting UA and Culture  -methenamine for prevention  -follow with urology    RTC- as needed      Phone call duration: 9:30 minutes    Bridget Lopez MD

## 2020-08-11 NOTE — LETTER
"8/11/2020       RE: Noa Mcgill  [address] [facility] [address]     Dear Colleague,    Thank you for referring your patient, Noa Mcgill, to the [facility] at [facility]. Please see a copy of my visit note below.    Noa Mcgill is a 45 year old female who is being evaluated via a billable telephone visit.      The patient has been notified of following:     \"This telephone visit will be conducted via a call between you and your physician/provider. We have found that certain health care needs can be provided without the need for a physical exam.  This service lets us provide the care you need with a short phone conversation.  If a prescription is necessary we can send it directly to your pharmacy.  If lab work is needed we can place an order for that and you can then stop by our lab to have the test done at a later time.    Telephone visits are billed at different rates depending on your insurance coverage. During this emergency period, for some insurers they may be billed the same as an in-person visit.  Please reach out to your insurance provider with any questions.    If during the course of the call the physician/provider feels a telephone visit is not appropriate, you will not be charged for this service.\"    Patient has given verbal consent for Telephone visit?  Yes    What phone number would you like to be contacted at? [phone]    How would you like to obtain your AVS? 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 She has a long history of UTIs and usually they are 4 times a year but has been almost constant over the last few years.     I got her a prior authorization for fosfomycin and ordered methenamine. She also had a cystogram and CT abd/pelvis. Both were unrevealing and did not show anatomic issues. Over the last 3 " weeks she has not had a UTI and that is the longest she has gone in months. She thinks it might be the methenamine or the fact that a cystogram stretches her urethra. But regardless she is pleased.     She is tolerating the methenamine without issues. I did a drug interactions scan. I found that methenamine can lower the blood levels of adderall. She is not currently on adderall as she is not in school but will keep that in mind. No other drug interactions were found with what she was on.    She plans to follow up with Urology and see me as needed.    Plan  -fosfomycin for UTI after getting UA and Culture  -methenamine for prevention  -follow with urology    RTC- as needed      Phone call duration: 9:30 minutes    Bridget Lopez MD

## 2020-08-20 ENCOUNTER — TRANSFERRED RECORDS (OUTPATIENT)
Dept: HEALTH INFORMATION MANAGEMENT | Facility: CLINIC | Age: 45
End: 2020-08-20

## 2020-08-20 DIAGNOSIS — G47.00 INSOMNIA, UNSPECIFIED TYPE: ICD-10-CM

## 2020-08-21 RX ORDER — ZOLPIDEM TARTRATE 10 MG/1
TABLET ORAL
Qty: 30 TABLET | Refills: 0 | Status: SHIPPED | OUTPATIENT
Start: 2020-08-21 | End: 2020-11-30

## 2020-09-15 ENCOUNTER — TELEPHONE (OUTPATIENT)
Dept: FAMILY MEDICINE | Facility: CLINIC | Age: 45
End: 2020-09-15

## 2020-09-15 NOTE — TELEPHONE ENCOUNTER
Patient Quality Outreach Summary      Summary:    Patient is due/failing the following:   PHQ-9 Needed    Type of outreach:    Sent Fanb[employer]shart message.    Questions for provider review:    None                                                                                                                    JELENA Tang       Chart routed to Care Team.

## 2020-10-08 ENCOUNTER — OFFICE VISIT (OUTPATIENT)
Dept: SURGERY | Facility: CLINIC | Age: 45
End: 2020-10-08
Payer: COMMERCIAL

## 2020-10-08 VITALS
OXYGEN SATURATION: 99 % | RESPIRATION RATE: 16 BRPM | SYSTOLIC BLOOD PRESSURE: 122 MMHG | WEIGHT: 221 LBS | HEIGHT: 66 IN | HEART RATE: 108 BPM | DIASTOLIC BLOOD PRESSURE: 84 MMHG | BODY MASS INDEX: 35.52 KG/M2

## 2020-10-08 DIAGNOSIS — R10.32 ABDOMINAL PAIN, LEFT LOWER QUADRANT: Primary | ICD-10-CM

## 2020-10-08 PROCEDURE — 99214 OFFICE O/P EST MOD 30 MIN: CPT | Performed by: SURGERY

## 2020-10-08 ASSESSMENT — MIFFLIN-ST. JEOR: SCORE: 1674.2

## 2020-10-08 NOTE — PROGRESS NOTES
Noa is a 45 year old White female who presents for hernia evaluation. The patient has noticed a bulge. Pain has been present.  She has been trying to lose weight and has been doing sit ups and she then notices bulging in the left lower abdomen as well as intermittent pain.  Associated with this  is none.  Pt has had previous ABD surgery including bilateral TRAM flap harvesting followed by development of significant incisional hernias.  Repair was performed in 2018 with Stratus mesh.  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  At that point, she was informed that this mesh would stretch over time.  Patient does report that increased activity/lifting causes pain. Employment does not require lifting.      Pt's chart has been reviewed for FH,  PMH, PSH, allergies, medications and social history.    ROS:  Pulm:  No shortness of breath, dyspnea on exertion, cough, or hemoptysis  CV:  negative  ABD:  See chief complaint  :  Negative  All other systems negative    Physical exam:  Patient able to get up on table without difficulty.  Head eyes, nose and mouth within normal limits.  Skin - no jaundice  Sclera are clear  No supraclavicular or cervical adenopathy noted.  Neck shows no gross mass  Neurologic: alert, speech is clear, moves all extremities with good strength  Psychiatric: Mood and affect are appropriate  Respirations are regular and non labored  Abdomen is abdomen is soft without significant tenderness, masses, organomegaly or guarding  bowel sounds are positive and no caput medusa noted.  Hernia-there is a bulge in the left lower quadrant where unsupported mesh bridges the gap between the lateral rectus in the midline    Imaging  CT Yes, 8/2020-obtained for urinary tract evaluation, shows eventration of her lower abdomen where tissue was harvested for her TRAM flap  reconstructions.  No hernia present but eventration noted.      Assesment: Eventration of the lower abdominal wall secondary to tissue removal and stretching of biologic mesh.  No hernia present    Plan: Discussed observation, external support, possible progression.  Discussed weight loss which she is already trying to pursue.  Recommended that she avoid exercises that increase the pressure in her abdomen.  Will discuss further with plastic surgery to see if they have experience in this type of problem as this all started as a TRAM flap harvest.  While repeat surgery is a possibility with imbrication or partial replacement of her biologic mesh, imbrication of the mesh may simply speed continued stretching and permanent mesh placement would carry some risk of infection.  We could consider decolonization procedure prior to permanent mesh placement and I discussed this with her as well.  Time spent with the patient with greater that 50% of the time in discussion was 30 minutes.    Suraj Bergeron MD  10/8/2020 3:06 PM    Please route or send letter to:  Primary Care Provider (PCP) and Include Progress Note    Level 4    Reviewed with plastic surgery, with her prior history of MRSA and desire to avoid permanent mesh, she had a biologic mesh placed in the past.  This is now stretching.  There is also muscle deficit.  He agreed that weight loss would be the first step followed by considering MRSA decolonization and then repeat repair with prosthetic/permanent mesh.  Left a message on patient's answering machine with this recommendation which was also discussed with her in the office.  Suraj Bergeron MD  10/20/2020 12:25 PM

## 2020-10-08 NOTE — LETTER
2020    RE: Noa Mcgill, : 1975      Noa is a 45 year old White female who presents for hernia evaluation. The patient has noticed a bulge. Pain has been present.  She has been trying to lose weight and has been doing sit ups and she then notices bulging in the left lower abdomen as well as intermittent pain.  Associated with this  is none.  Pt has had previous ABD surgery including bilateral TRAM flap harvesting followed by development of significant incisional hernias.  Repair was performed in 2018 with Stratus mesh.  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  At that point, she was informed that this mesh would stretch over time.  Patient does report that increased activity/lifting causes pain. Employment does not require lifting.      Pt's chart has been reviewed for FH,  PMH, PSH, allergies, medications and social history.     ROS:  Pulm:  No shortness of breath, dyspnea on exertion, cough, or hemoptysis  CV:  negative  ABD:  See chief complaint  :  Negative  All other systems negative     Physical exam:  Patient able to get up on table without difficulty.  Head eyes, nose and mouth within normal limits.  Skin - no jaundice  Sclera are clear  No supraclavicular or cervical adenopathy noted.  Neck shows no gross mass  Neurologic: alert, speech is clear, moves all extremities with good strength  Psychiatric: Mood and affect are appropriate  Respirations are regular and non labored  Abdomen is abdomen is soft without significant tenderness, masses, organomegaly or guarding  bowel sounds are positive and no caput medusa noted.  Hernia-there is a bulge in the left lower quadrant where unsupported mesh bridges the gap between the lateral rectus in the midline     Imaging  CT Yes, 2020-obtained for urinary tract evaluation, shows eventration of her lower  abdomen where tissue was harvested for her TRAM flap reconstructions.  No hernia present but eventration noted.     Assesment: Eventration of the lower abdominal wall secondary to tissue removal and stretching of biologic mesh.  No hernia present     Plan: Discussed observation, external support, possible progression.  Discussed weight loss which she is already trying to pursue.  Recommended that she avoid exercises that increase the pressure in her abdomen.  Will discuss further with plastic surgery to see if they have experience in this type of problem as this all started as a TRAM flap harvest.  While repeat surgery is a possibility with imbrication or partial replacement of her biologic mesh, imbrication of the mesh may simply speed continued stretching and permanent mesh placement would carry some risk of infection.  We could consider decolonization procedure prior to permanent mesh placement and I discussed this with her as well.       Suraj Bergeron MD

## 2020-10-13 ENCOUNTER — CARE COORDINATION (OUTPATIENT)
Dept: CARDIOLOGY | Facility: CLINIC | Age: 45
End: 2020-10-13

## 2020-10-13 DIAGNOSIS — Q21.11 OSTIUM SECUNDUM TYPE ATRIAL SEPTAL DEFECT: Primary | ICD-10-CM

## 2020-10-13 NOTE — PROGRESS NOTES
Called and spoke with pt. Discussed that Dr. Cornejo recommends seeing REGAN Lam for pre-PFO procedure H&P so will cancel visit with Dr. Cornejo on 10/20/20- she verbalized understanding and agrees with this plan.  Also reviewed that next available PFO closure date is 12/17/20; however, Dr. Cornejo is not scheduled to do closures that date. His next closure date is 1/7/20. Pt states she prefers to wait until at least Jan for Dr. Cornejo to do the closure.  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 Discussed with pt that will plan to call her when Dec schedule for REGAN Lam opens to discuss whether or not she would like to proceed with closure in January or wait. She verbalized understanding and agrees with this plan.    SERG Ibarra 11:14 AM 10/13/2020

## 2020-10-29 NOTE — PROGRESS NOTES
Called and spoke with pt. Discussed that REGAN Lam's December schedule is now open and would be able to schedule the pre-PFO H&P is she would like to proceed with PFO closure on 1/7/21 with Dr. Cornejo. Pt states she would like to proceed with PFO closure on 1/7/21 and agrees to pre-PFO H&P visit with REGAN Lam on 12/31/20 at 10:10am. Discussed with pt that will send out pre-PFO information in the mail and will have scheduling call her to set up PFO procedure on 1/7/20.  Discussed that she will have follow up with REGAN Lam about 1 week after the procedure; however that cannot be scheduled yet because REGAN Lam's January schedule is not open yet. She verbalized understanding and agrees with this plan.     PFO case request order entered and message sent to scheduling to call pt to arrange.     SERG Ibarra 9:53 AM 10/29/2020

## 2020-11-12 DIAGNOSIS — Z11.59 ENCOUNTER FOR SCREENING FOR OTHER VIRAL DISEASES: Primary | ICD-10-CM

## 2020-11-22 ENCOUNTER — OFFICE VISIT (OUTPATIENT)
Dept: URGENT CARE | Facility: URGENT CARE | Age: 45
End: 2020-11-22
Payer: COMMERCIAL

## 2020-11-22 VITALS
WEIGHT: 221 LBS | DIASTOLIC BLOOD PRESSURE: 80 MMHG | BODY MASS INDEX: 35.67 KG/M2 | HEART RATE: 91 BPM | SYSTOLIC BLOOD PRESSURE: 134 MMHG | RESPIRATION RATE: 16 BRPM | OXYGEN SATURATION: 99 % | TEMPERATURE: 98.4 F

## 2020-11-22 DIAGNOSIS — N39.0 RECURRENT UTI: Primary | ICD-10-CM

## 2020-11-22 LAB
ALBUMIN UR-MCNC: NEGATIVE MG/DL
APPEARANCE UR: CLEAR
BACTERIA #/AREA URNS HPF: ABNORMAL /HPF
BILIRUB UR QL STRIP: NEGATIVE
COLOR UR AUTO: YELLOW
GLUCOSE UR STRIP-MCNC: NEGATIVE MG/DL
HGB UR QL STRIP: NEGATIVE
KETONES UR STRIP-MCNC: NEGATIVE MG/DL
LEUKOCYTE ESTERASE UR QL STRIP: ABNORMAL
NITRATE UR QL: POSITIVE
NON-SQ EPI CELLS #/AREA URNS LPF: ABNORMAL /LPF
PH UR STRIP: 5.5 PH (ref 5–7)
RBC #/AREA URNS AUTO: ABNORMAL /HPF
SOURCE: ABNORMAL
SP GR UR STRIP: 1.02 (ref 1–1.03)
UROBILINOGEN UR STRIP-ACNC: 0.2 EU/DL (ref 0.2–1)
WBC #/AREA URNS AUTO: ABNORMAL /HPF

## 2020-11-22 PROCEDURE — 81001 URINALYSIS AUTO W/SCOPE: CPT | Performed by: FAMILY MEDICINE

## 2020-11-22 PROCEDURE — 87186 SC STD MICRODIL/AGAR DIL: CPT | Performed by: FAMILY MEDICINE

## 2020-11-22 PROCEDURE — 99213 OFFICE O/P EST LOW 20 MIN: CPT | Performed by: PHYSICIAN ASSISTANT

## 2020-11-22 PROCEDURE — 87086 URINE CULTURE/COLONY COUNT: CPT | Performed by: FAMILY MEDICINE

## 2020-11-22 PROCEDURE — 87088 URINE BACTERIA CULTURE: CPT | Performed by: FAMILY MEDICINE

## 2020-11-22 RX ORDER — GRANULES FOR ORAL 3 G/1
3 POWDER ORAL DAILY
Qty: 3 PACKET | Refills: 0 | Status: SHIPPED | OUTPATIENT
Start: 2020-11-22 | End: 2020-11-25

## 2020-11-22 ASSESSMENT — ENCOUNTER SYMPTOMS
DYSURIA: 1
VOMITING: 0
DIARRHEA: 0
FEVER: 0
SHORTNESS OF BREATH: 0
RHINORRHEA: 0
NAUSEA: 0
COUGH: 0
FREQUENCY: 1
CHILLS: 0
SORE THROAT: 0
HEADACHES: 0

## 2020-11-22 NOTE — PATIENT INSTRUCTIONS
1. Increase fluid intake  2. Complete antibiotic regimen as prescribed. You will be notified if the treatment plan needs to be changed based on the urine culture results.   3. For the discomfort: You may take an over the counter medication called pyridium (AZO) up three times daily for up to 2 days.  4. Follow up if you have a fever of 100.4 F (38 C) or higher, no improvement after three days of treatment, trouble urinating because of pain,new or increased discharge from the vagina, rash or joint pain, Increased back or abdominal pain.

## 2020-11-22 NOTE — PROGRESS NOTES
HPI:  Noa Mcgill is a 45 year old female with history of recurrent UTI who presents for evaluation of dysuria & urinary frequency onset today. No known alleviating or aggravating factors. Symptoms are moderate in severity & constant in duration. Patient reports no blood in urine, vaginal discharge, fever/chills, genital sores, abdominal pain, flank pain, nausea/vomiting, or any other symptoms.    She takes methanamine for UTI prevention. Per urology note in Aug 2020, if she gets a UTI she should be treated with 3 day course of Fosfomycin.    Past Medical History:   Diagnosis Date     Adrenal insufficiency ([place]'s disease) (H)     ACTH stim test failed to increase cortisol     Allergic rhinitis, cause unspecified      Antiplatelet or antithrombotic long-term use      Arthritis      ASD (atrial septal defect) 02/15/2013    ASD dx by echo, pt declined ASD closure     Attention deficit disorder without mention of hyperactivity 7/2/2014     BRCA1 gene mutation positive 1/24/2018    Reported by patient, no report available at this time Records requested from [employer] 1/23/18     Cerebral infarction (H)      Clotting disorder (H) birth    undefined clotting disorder - sees Dr. 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     Past Surgical History:   Procedure Laterality Date     APPENDECTOMY  2006       SECTION  2013    Procedure:  SECTION;  Primary  Section;  Surgeon: Chad Hughes MD;  Location: [facility] L+D     DILATION AND CURETTAGE SUCTION N/A 2014    Procedure: DILATION AND CURETTAGE SUCTION;  Surgeon: Srini Martinez MD;  Location:  OR     DILATION AND CURETTAGE SUCTION N/A 2014    Procedure: DILATION AND CURETTAGE SUCTION;  Surgeon: Connor Kang MD;  Location:  OR     GENITOURINARY SURGERY      bladder sling and complete historectomy     HC DILATION/CURETTAGE DIAG/THER NON OB       HC DILATION/CURETTAGE DIAG/THER NON OB       HERNIORRHAPHY INCISIONAL (LOCATION) N/A 2018    Procedure: Incisional Hernia repair with Biologic mesh;  Surgeon: Suraj Bergeron MD;  Location:  OR     HYSTERECTOMY, [facility]  2016    does not need pap     MASTECTOMY SIMPLE BILATERAL Bilateral 2018    Procedure: MASTECTOMY SIMPLE BILATERAL;  PROPHYLACTIC BILATERAL MASTECTOMY (GLADIS) BILATERAL BREAST RECONSTRUCTION [facility] TISSUE EXPANDERS (WILLOW)   ;  Surgeon: Suraj Bergeron MD;  Location:  OR     ORTHOPEDIC SURGERY      (L) thumb fused     RECONSTRUCT BREAST, INSERT TISSUE EXPANDER BILATERAL, COMBINED Bilateral 2018    expander and tram flap with drains - had weekly surgery to have I & D and removal of infected tissue     SURGICAL HISTORY OF -       left thumb fusion due to arthritis     Social History     Tobacco Use     Smoking status: Never Smoker     Smokeless tobacco: Never Used   Substance Use Topics     Alcohol use: Not Currently     Alcohol/week: 0.0 standard drinks     Comment: 1-3drink per week     Family History   Problem Relation Age of Onset     C.A.D. Maternal Grandmother      Hypertension Maternal Grandmother      Psychotic Disorder Mother         Depression, alcoholism     Diabetes Mother      Hereditary Breast and Ovarian Cancer Syndrome Mother         BRCA1+, no hx of cancer, s/p prophylactic surgery to remove breast tissue,  ovaries, fallopian tubes     Hypertension Mother      Thyroid Disease Mother         thyroid nodules     Lung Cancer Mother      Substance Abuse Father      Hereditary Breast and Ovarian Cancer Syndrome Sister         matrnal half sister, BRCA1+     Hereditary Breast and Ovarian Cancer Syndrome Daughter 23        BRCA1+     Breast Cancer Maternal Aunt 40        lumpectomy, then 2nd primary breast cancer later in 40s, treated with mastectomy     Hereditary Breast and Ovarian Cancer Syndrome Maternal Aunt         BRCA1+     Ovarian Cancer Maternal Aunt 70        surgry     Thyroid Cancer Maternal Aunt         medullary thyroid cancer     Hereditary Breast and Ovarian Cancer Syndrome Maternal Aunt         BRCA1+     Breast Cancer Other 41        maternal great aunt     Ovarian Cancer Other 45         in 40s     Thyroid Cancer Maternal Uncle 66        papillary thyroid cancer     Breast Cancer Cousin 45     Hereditary Breast and Ovarian Cancer Syndrome Cousin         BRCA1+     Breast Cancer Other 43        maternal great aunt     Other Cancer Other         ovarian cancer        Review of Systems   Constitutional: Negative for chills and fever.   HENT: Negative for congestion, ear pain, rhinorrhea and sore throat.    Respiratory: Negative for cough and shortness of breath.    Gastrointestinal: Negative for diarrhea, nausea and vomiting.   Genitourinary: Positive for dysuria and frequency.   Neurological: Negative for headaches.       Vitals:    20 1331   BP: 134/80   Pulse: 91   Resp: 16   Temp: 98.4  F (36.9  C)   TempSrc: Oral   SpO2: 99%   Weight: 100.2 kg (221 lb)       Physical Exam  Vitals signs and nursing note reviewed.   Constitutional:       Appearance: Normal appearance.   Cardiovascular:      Rate and Rhythm: Normal rate and regular rhythm.   Pulmonary:      Effort: Pulmonary effort is normal.      Breath sounds: Normal breath sounds.   Abdominal:      Palpations: Abdomen is soft.      Tenderness:  There is no abdominal tenderness. There is no right CVA tenderness or left CVA tenderness.   Skin:     General: Skin is warm and dry.   Neurological:      Mental Status: She is oriented to person, place, and time.         Labs/Imaging:  Results for orders placed or performed in visit on 11/22/20 (from the past 24 hour(s))   *UA reflex to Microscopic and Culture ([facility] (except [place] and [place])    Specimen: Midstream Urine   Result Value Ref Range    Color Urine Yellow     Appearance Urine Clear     Glucose Urine Negative NEG^Negative mg/dL    Bilirubin Urine Negative NEG^Negative    Ketones Urine Negative NEG^Negative mg/dL    Specific Gravity Urine 1.025 1.003 - 1.035    Blood Urine Negative NEG^Negative    pH Urine 5.5 5.0 - 7.0 pH    Protein Albumin Urine Negative NEG^Negative mg/dL    Urobilinogen Urine 0.2 0.2 - 1.0 EU/dL    Nitrite Urine Positive (A) NEG^Negative    Leukocyte Esterase Urine Small (A) NEG^Negative    Source Midstream Urine    Urine Microscopic   Result Value Ref Range    WBC Urine 5-10 (A) OTO5^0 - 5 /HPF    RBC Urine O - 2 OTO2^O - 2 /HPF    Squamous Epithelial /LPF Urine Few FEW^Few /LPF    Bacteria Urine Many (A) NEG^Negative /HPF         Clinical Decision Making:    Urinalysis consistent with UTI; patient afebrile and no s/sx of pyelonephritis. Will prescribe 3 day course of fosfomycin per urology's recommendations. Culture pending; patient requests results of this be sent to [facility].  See patient instructions below.    At the end of the encounter, I discussed results, diagnosis, medications. Discussed red flags for immediate return to clinic/ER, as well as indications for follow up if no improvement. Patient understood and agreed to plan. Patient was stable for discharge.      ICD-10-CM    1. Recurrent UTI  N39.0 *UA reflex to Microscopic and Culture ([facility] (except [place] and [place])     Urine Culture Aerobic Bacterial     Urine  Microscopic     fosfomycin (MONUROL) 3 g Packet         If not improving or if condition worsens, follow up with your Primary Care Provider    CHETAN Naranjo, DRE  [facility]    Patient Instructions   1. Increase fluid intake  2. Complete antibiotic regimen as prescribed. You will be notified if the treatment plan needs to be changed based on the urine culture results.   3. For the discomfort: You may take an over the counter medication called pyridium (AZO) up three times daily for up to 2 days.  4. Follow up if you have a fever of 100.4 F (38 C) or higher, no improvement after three days of treatment, trouble urinating because of pain,new or increased discharge from the vagina, rash or joint pain, Increased back or abdominal pain.

## 2020-11-23 LAB
BACTERIA SPEC CULT: ABNORMAL
Lab: ABNORMAL
SPECIMEN SOURCE: ABNORMAL

## 2020-11-30 DIAGNOSIS — G47.00 INSOMNIA, UNSPECIFIED TYPE: ICD-10-CM

## 2020-11-30 RX ORDER — ZOLPIDEM TARTRATE 10 MG/1
TABLET ORAL
Qty: 30 TABLET | Refills: 0 | Status: SHIPPED | OUTPATIENT
Start: 2020-11-30 | End: 2020-12-09

## 2020-11-30 NOTE — TELEPHONE ENCOUNTER
Routing refill request to provider for review/approval because:  Drug not on the [facility] refill protocol     Isabel Ashby RN on 11/30/2020 at 9:59 AM

## 2020-11-30 NOTE — TELEPHONE ENCOUNTER
General Call:     Who is calling:  Noa Mcgill    Reason for Call:  Med Refill    What are your questions or concerns:  Pt is losing her insurance today and wants to be able to get a refill of her medication while she still has her Insurance.     Date of last appointment with provider: 03/09/2020    Okay to leave a detailed message:Yes at Cell number on file:    Telephone Information:   Mobile [phone]

## 2020-12-01 ENCOUNTER — DOCUMENTATION ONLY (OUTPATIENT)
Dept: CARDIOLOGY | Facility: CLINIC | Age: 45
End: 2020-12-01

## 2020-12-01 NOTE — PROGRESS NOTES
Can you call her and verify that she has indeed has hysterectomy and when    Then I will address the need for plavix/asa to start pre-procedure  HPI      ROS      Physical Exam

## 2020-12-01 NOTE — PROGRESS NOTES
She is scheduling her pfo closure in January  You mention putting her on plavix well in advance of her procedure     Your note from 18 months ago      A special note:  I would probably start her on the Plavix well in advance of the procedure and we will drop the aspirin down to 81 mg.  The patient will automatically be getting IV heparin during the procedure.  We will not give protamine post procedure which we sometimes do electively when we pull the sheath out    And see below:  She probably has some undiagnosed hypercoagulable state.  She has indeed seen Dr. Sanchez for this and she has had 1 or 2 workups for hypercoagulable state with nothing found.  However, as we have outlined in her history, in 2013, she had a retinal artery occlusion following a pregnancy.  She had 5 pregnancies and 2 miscarriages.  She is not lupus anticoagulant syndrome positive.  There is a question of her right heart being slightly enlarged but again, the left-to-right portion of the shunt through the PFO is trivial.  We were going to do the PFO closure back in 2014 but she canceled that appointment.  After that, it turned out she was BRCA1 gene positive and had a family history of breast cancer.  She had elective bilateral mastectomy in 2018.  Unfortunately, she had significant issues with that.  She underwent a TRAM with flap reconstruction.  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  She also developed a venous thrombosis of the left TRAM flap, treated with tPA.  She was then placed on Lovenox.  She had problems with reconstructive failure of the flap.  She actually required blood transfusion during that procedure.  Eventually late last year, Dr. Bergeron was able to reconstruct her.  Of note, I believe she was indeed on Lovenox and heparin post surgery and also of note, there and also to my team, she was MRSA positive in her nose and I believe they used  vancomycin as a perioperative antibiotic.    QUESTION:  When do I start Plavix and let me know which post op antibiotic your prefer  thanks

## 2020-12-01 NOTE — PROGRESS NOTES
Called pt, no answer, left voicemail requesting return call to verify a part of her medical history that REGAN Lam would like to verify before determining her pre-PFO medications.    SERG Ibarra 2:17 PM 12/1/2020

## 2020-12-01 NOTE — TELEPHONE ENCOUNTER
She was suppose to be closed in 2014 and she cancelled  I saw her a year and a half ago and she was supposed to be closed a couple months after that-2019  Now she is scheduled for 2021??  I dont understand what is going on why these repeated cancellations  I guess since she's only on asa and not other anticoagulants?????---go to asa 81 and plavix 75mg/d for at least one month   Use vanco for antibiotic  Should she really be on nuva ring with her history-ask  Her for her ob gyn to comment on that before we do the procedure

## 2020-12-02 NOTE — PROGRESS NOTES
Pre-Visit Planning     Future Appointments   Date Time Provider Department Center   12/9/2020  1:30 PM Sydnie Daley MD [facility]   12/31/2020 10:10 AM [facility]     Arrival Time for this Appointment:  1:15 PM   Appointment Notes for this encounter:   touch base with medications    Questionnaires Reviewed/Assigned  Additional questionnaires assigned    Last OV with provider    Hospital ER Visits    Specialty Visits    Imaging and Lab Review    Recent Procedures    Health Maintenance Due   Topic Date Due     PREVENTIVE CARE VISIT  1977     INFLUENZA VACCINE (1) 09/01/2020     PHQ-9  09/09/2020     ANNUAL REVIEW OF HM ORDERS  11/30/2020     URINE DRUG SCREEN  12/30/2020     Current Outpatient Medications   Medication     amphetamine-dextroamphetamine (ADDERALL) 10 MG tablet     ascorbic acid 250 MG TABS tablet     aspirin 325 MG EC tablet     busPIRone (BUSPAR) 10 MG tablet     butalbital-acetaminophen-caffeine (ESGIC) -40 MG tablet     CRANBERRY CONCENTRATE PO     diazepam (VALIUM) 5 MG tablet     etonogestrel-ethinyl estradiol (NUVARING) 0.12-0.015 MG/24HR vaginal ring     fosfomycin (MONUROL) 3 g Packet     Ibuprofen (ADVIL PO)     methenamine (HIPREX) 1 g tablet     Omega-3 Fatty Acids (FISH OIL) 1000 MG CPDR     phenazopyridine (PYRIDIUM) 200 MG tablet     rizatriptan (MAXALT) 10 MG tablet     STATIN NOT PRESCRIBED (INTENTIONAL)     VITAMIN D PO     zolpidem (AMBIEN) 10 MG tablet     No current facility-administered medications for this visit.      Patient is active on [employer].    Patient preferred phone number: [phone]  pvp via my chart.  Lydia Rodriguez RN

## 2020-12-07 NOTE — PROGRESS NOTES
Have not received return call from pt after voicemail left on 12/1/20. Called pt, no answer, left voicemail requesting return call to confirm some information with her before REGAN Lam can determine pre-PFO medications.     SERG Ibarra 3:38 PM 12/7/2020

## 2020-12-09 ENCOUNTER — VIRTUAL VISIT (OUTPATIENT)
Dept: FAMILY MEDICINE | Facility: CLINIC | Age: 45
End: 2020-12-09
Payer: COMMERCIAL

## 2020-12-09 DIAGNOSIS — G89.4 CHRONIC PAIN SYNDROME: Primary | ICD-10-CM

## 2020-12-09 DIAGNOSIS — G47.00 INSOMNIA, UNSPECIFIED TYPE: ICD-10-CM

## 2020-12-09 DIAGNOSIS — F98.8 ATTENTION DEFICIT DISORDER (ADD) WITHOUT HYPERACTIVITY: ICD-10-CM

## 2020-12-09 PROCEDURE — 99214 OFFICE O/P EST MOD 30 MIN: CPT | Mod: GT | Performed by: FAMILY MEDICINE

## 2020-12-09 RX ORDER — ZOLPIDEM TARTRATE 10 MG/1
TABLET ORAL
Qty: 30 TABLET | Refills: 5 | Status: SHIPPED | OUTPATIENT
Start: 2020-12-09 | End: 2021-04-21

## 2020-12-09 RX ORDER — DEXTROAMPHETAMINE SACCHARATE, AMPHETAMINE ASPARTATE, DEXTROAMPHETAMINE SULFATE AND AMPHETAMINE SULFATE 2.5; 2.5; 2.5; 2.5 MG/1; MG/1; MG/1; MG/1
TABLET ORAL
Qty: 60 TABLET | Refills: 0 | Status: SHIPPED | OUTPATIENT
Start: 2020-12-09 | End: 2021-06-09

## 2020-12-09 ASSESSMENT — ANXIETY QUESTIONNAIRES
GAD7 TOTAL SCORE: 9
3. WORRYING TOO MUCH ABOUT DIFFERENT THINGS: SEVERAL DAYS
5. BEING SO RESTLESS THAT IT IS HARD TO SIT STILL: NOT AT ALL
IF YOU CHECKED OFF ANY PROBLEMS ON THIS QUESTIONNAIRE, HOW DIFFICULT HAVE THESE PROBLEMS MADE IT FOR YOU TO DO YOUR WORK, TAKE CARE OF THINGS AT HOME, OR GET ALONG WITH OTHER PEOPLE: VERY DIFFICULT
6. BECOMING EASILY ANNOYED OR IRRITABLE: SEVERAL DAYS
7. FEELING AFRAID AS IF SOMETHING AWFUL MIGHT HAPPEN: NEARLY EVERY DAY
1. FEELING NERVOUS, ANXIOUS, OR ON EDGE: SEVERAL DAYS
2. NOT BEING ABLE TO STOP OR CONTROL WORRYING: NOT AT ALL

## 2020-12-09 ASSESSMENT — PATIENT HEALTH QUESTIONNAIRE - PHQ9
5. POOR APPETITE OR OVEREATING: NEARLY EVERY DAY
SUM OF ALL RESPONSES TO PHQ QUESTIONS 1-9: 7

## 2020-12-09 NOTE — PROGRESS NOTES
"Noa Mcgill is a 45 year old female who is being evaluated via a billable video visit.      The patient has been notified of following:     \"This video visit will be conducted via a call between you and your physician/provider. We have found that certain health care needs can be provided without the need for an in-person physical exam.  This service lets us provide the care you need with a video conversation.  If a prescription is necessary we can send it directly to your pharmacy.  If lab work is needed we can place an order for that and you can then stop by our lab to have the test done at a later time.    Video visits are billed at different rates depending on your insurance coverage.  Please reach out to your insurance provider with any questions.    If during the course of the call the physician/provider feels a video visit is not appropriate, you will not be charged for this service.\"    Patient has given verbal consent for Video visit? Yes  How would you like to obtain your AVS? MyChart  If you are dropped from the video visit, the video invite should be resent to: Text to cell phone: [phone]  Will anyone else be joining your video visit? 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      She feels her mood is pretty good.   Her dad is on hospice and she worries about the next phone call.   Her son is doing virtual and is stressed somewhat about it.   They are all hanging in there.     HPI        General Follow Up on Medications. States she is taking medications as prescribed and denies any side effects. Adderall and Ambien F/u    Depression and Anxiety Follow-Up    How are you doing with your depression since your last visit? Stable    How are you doing with your anxiety since your last visit?  Stable    Are you having other symptoms that might be associated with " depression or anxiety? Troubles sleeping-Also does shift work    Have you had a significant life event? Dad is dying     Do you have any concerns with your use of alcohol or other drugs? No    Social History     Tobacco Use     Smoking status: Never Smoker     Smokeless tobacco: Never Used   Substance Use Topics     Alcohol use: Not Currently     Alcohol/week: 0.0 standard drinks     Comment: 1-3drink per week     Drug use: No     PHQ 12/30/2019 12/31/2019 3/9/2020   PHQ-9 Total Score 13 13 14   Q9: Thoughts of better off dead/self-harm past 2 weeks Not at all Not at all Not at all   F/U: Thoughts of suicide or self-harm No - -   F/U: Safety concerns No No -     BASSAM-7 SCORE 12/30/2019 12/31/2019 3/9/2020   Total Score - - -   Total Score - - 11 (moderate anxiety)   Total Score 12 12 11     Last PHQ-9 12/9/2020   1.  Little interest or pleasure in doing things 0   2.  Feeling down, depressed, or hopeless 1   3.  Trouble falling or staying asleep, or sleeping too much 3   4.  Feeling tired or having little energy 2   5.  Poor appetite or overeating 0   6.  Feeling bad about yourself 0   7.  Trouble concentrating 1   8.  Moving slowly or restless 0   Q9: Thoughts of better off dead/self-harm past 2 weeks 0   PHQ-9 Total Score 7   Difficulty at work, home, or with people Somewhat difficult   In the past two weeks have you had thoughts of suicide or self harm? -   Do you have concerns about your personal safety or the safety of others? -     BASSAM-7  12/9/2020   1. Feeling nervous, anxious, or on edge 1   2. Not being able to stop or control worrying 0   3. Worrying too much about different things 1   4. Trouble relaxing 3   5. Being so restless that it is hard to sit still 0   6. Becoming easily annoyed or irritable 1   7. Feeling afraid, as if something awful might happen 3   BASSAM-7 Total Score 9   If you checked any problems, how difficult have they made it for you to do your work, take care of things at home, or get  along with other people? Very difficult       Suicide Assessment Five-step Evaluation and Treatment (SAFE-T)          Past Medical History:   Diagnosis Date     Adrenal insufficiency (Brewton's disease) (H)     ACTH stim test failed to increase cortisol     Allergic rhinitis, cause unspecified      Antiplatelet or antithrombotic long-term use      Arthritis      ASD (atrial septal defect) 02/15/2013    ASD dx by echo, pt declined ASD closure     Attention deficit disorder without mention of hyperactivity 2014     BRCA1 gene mutation positive 2018    Reported by patient, no report available at this time Records requested from [facility]     Cerebral infarction (H)      Clotting disorder (H) birth    undefined clotting disorder - sees Dr. Sanchez Retinal artery occlusion, R int mammary artery occlusion post breastCA surg and recd TPA     Congenital heart disease      Depressive disorder      Endometriosis of uterus      HERPES SIMPLEX NOS 2007    cold sores on remicade     Insomnia, unspecified 2/10/2005     Lung nodule < 6cm on CT 2019    consider repeat in 2020 - low risk but around a lot of second hand smoke growing up     Migraine headaches      Other chronic sinusitis      Postoperative urinary retention     every surgery     Psoriatic arthropathy (H) 2007     Recurrent UTI      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       Past Surgical History:   Procedure Laterality Date     APPENDECTOMY  2006      SECTION  2013    Procedure:  SECTION;  Primary  Section;  Surgeon: Chad Hughes MD;  Location:  L+D     DILATION AND CURETTAGE SUCTION N/A 2014    Procedure: DILATION AND CURETTAGE SUCTION;  Surgeon: Srini Martinez MD;  Location:  OR     DILATION AND CURETTAGE SUCTION N/A 2014    Procedure: DILATION AND CURETTAGE SUCTION;  Surgeon: Connor Kang MD;  Location:  OR      GENITOURINARY SURGERY  2016    bladder sling and complete historectomy     HC DILATION/CURETTAGE DIAG/THER NON OB  1998     HC DILATION/CURETTAGE DIAG/THER NON OB  2006     HERNIORRHAPHY INCISIONAL (LOCATION) N/A 12/7/2018    Procedure: Incisional Hernia repair with Biologic mesh;  Surgeon: Suraj Bergeron MD;  Location: [facility] OR     HYSTERECTOMY, [facility]  04/18/2016    does not need pap     MASTECTOMY SIMPLE BILATERAL Bilateral 5/25/2018    Procedure: MASTECTOMY SIMPLE BILATERAL;  PROPHYLACTIC BILATERAL MASTECTOMY (GLADIS) BILATERAL BREAST RECONSTRUCTION [facility] TISSUE EXPANDERS (WILLOW)   ;  Surgeon: Suraj Bergeron MD;  Location:  OR     ORTHOPEDIC SURGERY      (L) thumb fused     RECONSTRUCT BREAST, INSERT TISSUE EXPANDER BILATERAL, COMBINED Bilateral 5/25/2018    expander and tram flap with drains - had weekly surgery to have I & D and removal of infected tissue     SURGICAL HISTORY OF -   2006    left thumb fusion due to arthritis       MEDICATIONS:  Current Outpatient Medications   Medication     amphetamine-dextroamphetamine (ADDERALL) 10 MG tablet     ascorbic acid 250 MG TABS tablet     aspirin 325 MG EC tablet     busPIRone (BUSPAR) 10 MG tablet     butalbital-acetaminophen-caffeine (ESGIC) -40 MG tablet     CRANBERRY CONCENTRATE PO     diazepam (VALIUM) 5 MG tablet     etonogestrel-ethinyl estradiol (NUVARING) 0.12-0.015 MG/24HR vaginal ring     fosfomycin (MONUROL) 3 g Packet     Ibuprofen (ADVIL PO)     methenamine (HIPREX) 1 g tablet     Omega-3 Fatty Acids (FISH OIL) 1000 MG CPDR     phenazopyridine (PYRIDIUM) 200 MG tablet     rizatriptan (MAXALT) 10 MG tablet     STATIN NOT PRESCRIBED (INTENTIONAL)     VITAMIN D PO     zolpidem (AMBIEN) 10 MG tablet     No current facility-administered medications for this visit.        SOCIAL HISTORY:  Social History     Tobacco Use     Smoking status: Never Smoker     Smokeless tobacco: Never Used   Substance Use Topics     Alcohol use: Not Currently      Alcohol/week: 0.0 standard drinks     Comment: 1-3drink per week       Family History   Problem Relation Age of Onset     C.A.D. Maternal Grandmother      Hypertension Maternal Grandmother      Psychotic Disorder Mother         Depression, alcoholism     Diabetes Mother      Hereditary Breast and Ovarian Cancer Syndrome Mother         BRCA1+, no hx of cancer, s/p prophylactic surgery to remove breast tissue, ovaries, fallopian tubes     Hypertension Mother      Thyroid Disease Mother         thyroid nodules     Lung Cancer Mother      Substance Abuse Father      Hereditary Breast and Ovarian Cancer Syndrome Sister         matrnal half sister, BRCA1+     Hereditary Breast and Ovarian Cancer Syndrome Daughter 23        BRCA1+     Breast Cancer Maternal Aunt 40        lumpectomy, then 2nd primary breast cancer later in 40s, treated with mastectomy     Hereditary Breast and Ovarian Cancer Syndrome Maternal Aunt         BRCA1+     Ovarian Cancer Maternal Aunt 70        surgry     Thyroid Cancer Maternal Aunt         medullary thyroid cancer     Hereditary Breast and Ovarian Cancer Syndrome Maternal Aunt         BRCA1+     Breast Cancer Other 41        maternal great aunt     Ovarian Cancer Other 45         in 40s     Thyroid Cancer Maternal Uncle 66        papillary thyroid cancer     Breast Cancer Cousin 45     Hereditary Breast and Ovarian Cancer Syndrome Cousin         BRCA1+     Breast Cancer Other 43        maternal great aunt     Other Cancer Other         ovarian cancer            Video Start Time: 12:34 PM    }    Review of Systems   Constitutional, HEENT, cardiovascular, pulmonary, gi and gu systems are negative, except as otherwise noted.      Objective           Vitals:  No vitals were obtained today due to virtual visit.    Physical Exam     GENERAL: Healthy, alert and no distress  EYES: Eyes grossly normal to inspection.  No discharge or erythema, or obvious scleral/conjunctival abnormalities.  RESP: No  "audible wheeze, cough, or visible cyanosis.  No visible retractions or increased work of breathing.    SKIN: Visible skin clear. No significant rash, abnormal pigmentation or lesions.  NEURO: Cranial nerves grossly intact.  Mentation and speech appropriate for age.  PSYCH: Mentation appears normal, affect normal/bright, judgement and insight intact, normal speech and appearance well-groomed.      No results found for this or any previous visit (from the past 24 hour(s)).        Assessment & Plan     Chronic pain syndrome  stable  - Drug  Screen Comprehensive, Urine w/o Reported Meds (Pain Care Package); Future    Attention deficit disorder (ADD) without hyperactivity  Uses very little - last refill was last January.   She is starting new job and would like to have a few handy   Refills per epicare    - amphetamine-dextroamphetamine (ADDERALL) 10 MG tablet; 1 tab twice per day    Insomnia, unspecified type  Refills per epicare     - zolpidem (AMBIEN) 10 MG tablet; TAKE ONE TABLET BY MOUTH EVERY NIGHT AS NEEDED FOR SLEEP     BMI:   Estimated body mass index is 35.67 kg/m  as calculated from the following:    Height as of 10/8/20: 1.676 m (5' 6\").    Weight as of 11/22/20: 100.2 kg (221 lb).   Did not discuss         Return in about 6 months (around 6/9/2021) for Physical Exam.    Sydnie Daley MD  [facility] [employer]      Video-Visit Details    Type of service:  Video Visit    Video End Time:1:43 PM    Originating Location (pt. Location): Home    Distant Location (provider location):  [facility]     Platform used for Video Visit: [employer]"

## 2020-12-10 ASSESSMENT — ANXIETY QUESTIONNAIRES: GAD7 TOTAL SCORE: 9

## 2020-12-10 NOTE — PROGRESS NOTES
Have not received return call from pt after voicemails left on 12/1 and 12/7. Called pt, no answer, left voicemail requesting return call to confirm if she is still planning to proceed with PFO closure currently scheduled on 1/7/21 and if so, need to confirm some medical history information with her before Genaro CNP can determine pre-PFO medicaitons.     Requested return call.     SERG Ibarra 3:48 PM 12/10/2020

## 2020-12-10 NOTE — PROGRESS NOTES
So I found a note/pathology report that confirms hysterectomy, tubes removed and ovaries removed so really not sure why on NuvaRing but I did find the hysterectomy information.    So the only outstanding question is IF she is on nuvaring and what for    And he still wants to do plavix longer than the loading dose day before and day of to be sure she is protected longer given her clotting history    I she does not call back we may have an issue  I will try to call her as well    Thanks for the help on this

## 2020-12-11 NOTE — PROGRESS NOTES
Office note from pt's visit with Dr. Sanchez with [facility] on 6/25/18 scanned into Epic. Called and confirmed with [facility] medical records that pt has not been seen there more recently than 6/2018.     Note from pt's OBGYN specialists provider from 7/10/18 scanned into Epic. Note discussed having pt stop hormone replacement when surgery is planned.    Routed to REGAN Kaplan as SIRENA Ibarra RN 2:01 PM 12/11/2020

## 2020-12-11 NOTE — PROGRESS NOTES
Received voicemail from pt who apologized for not returning calls sooner and states she is off work today and requests a return call.     Called and spoke with pt. 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 and her dad has pancreatic cancer and is likely enrolling in hospice soon.   She is concerned about having to take time off of work for the PFO closure and then possibly having it get cancelled and then still being out of work for three weeks. She is also concerned about not being able to help her dad as much as she does currently after the procedure.   Reviwed with pt that the next possible month Dr. Cornejo may be doing PFO closures is April; however, do not know that for sure yet and reviewed that it could be later than April. Pt states she would like to cancel procedure on 1/7/21 and be put on Dr. Cornejo's list for whenever is is scheduled next, April or later.     She also confirms that she does use a NuvaRing in order to help prevent osteoporosis. Hysterectomy was done due to BRACA gene and strong family history of cancer.    Reviewed with pt that she will likely need to start Plavix sooner than the typical one day prior to procedure due to her clotting history. She verbalized understanding. She follows with Dr. Sanchez for hematology at [facility] and confirms that she usually takes Lovenox a week before and after surgeries because of her clotting disorder.     Routed update to REGAN Lam RN 10:38 AM 12/11/2020

## 2020-12-11 NOTE — TELEPHONE ENCOUNTER
Thanks  Can you try to get Dr Sanchez's notes  They may be in here but do not have time to look  Thanks  Let me know about the notes

## 2021-01-14 ENCOUNTER — HEALTH MAINTENANCE LETTER (OUTPATIENT)
Age: 46
End: 2021-01-14

## 2021-03-24 ENCOUNTER — MYC MEDICAL ADVICE (OUTPATIENT)
Dept: CARDIOLOGY | Facility: CLINIC | Age: 46
End: 2021-03-24

## 2021-03-24 DIAGNOSIS — Q21.11 OSTIUM SECUNDUM TYPE ATRIAL SEPTAL DEFECT: Primary | ICD-10-CM

## 2021-03-26 PROBLEM — Q21.11 OSTIUM SECUNDUM TYPE ATRIAL SEPTAL DEFECT: Status: ACTIVE | Noted: 2020-10-29

## 2021-03-26 NOTE — TELEPHONE ENCOUNTER
Called and spoke with pt. She reports her dad is on hospice so things could change depending on when he passes away; however, she would like to schedule her PFO closure with DR. Hernandes for 6/10/21 at this time with pre-precedure visit with REGAN Lam on 6/3/10 at 10:10am and post-procedure visit with REGAN Lam on 6/17/21 at 10:10am.     Discussed with pt that will have scheduling call her to get procedure scheduled and post-procedure limited echo scheduled as well.     Discharge instructions for procedure sent to pt in a [employer] message so she can review restrictions with her manager at work.     SERG Ibarra 3:28 PM 3/26/2021

## 2021-04-20 DIAGNOSIS — G47.00 INSOMNIA, UNSPECIFIED TYPE: ICD-10-CM

## 2021-04-21 RX ORDER — ZOLPIDEM TARTRATE 10 MG/1
TABLET ORAL
Qty: 30 TABLET | Refills: 0 | Status: SHIPPED | OUTPATIENT
Start: 2021-04-21 | End: 2021-06-09

## 2021-05-03 NOTE — RESULT ENCOUNTER NOTE
ADD-ON, patient called in; and needed to schedule a f/u appt; prior to her 06/10/2021 Botox appointment  Schedule 05/03/2021 @ 3:15pm w/Gege Dye  Patient was a Dr Armando Rodrigues patient  Patient was notified of these results.  Covered by current antibiotics  Suraj Bergeron MD  12/14/2018 3:00 PM

## 2021-06-01 ENCOUNTER — CARE COORDINATION (OUTPATIENT)
Dept: CARDIOLOGY | Facility: CLINIC | Age: 46
End: 2021-06-01

## 2021-06-01 DIAGNOSIS — Q21.11 OSTIUM SECUNDUM TYPE ATRIAL SEPTAL DEFECT: Primary | ICD-10-CM

## 2021-06-01 RX ORDER — CLOPIDOGREL BISULFATE 75 MG/1
75 TABLET ORAL DAILY
Qty: 90 TABLET | Refills: 1 | Status: SHIPPED | OUTPATIENT
Start: 2021-06-01 | End: 2021-11-26

## 2021-06-01 NOTE — PROGRESS NOTES
"Per chart review, Dr. Cornejo's note from 5/2/19 states:   \"A special note:  I would probably start her on the Plavix well in advance of the procedure and we will drop the aspirin down to 81 mg.\"    Reviewed with REGAN Lam who is scheduled for pre-PFO closure H&P with pt on 6/3/21 and she recommends:      Called and spoke with pt. Reviewed above information and she agreed to the plan and requested Plavix Rx be sent to [facility] pharmacy in [place] and she will also buy Aspirin 81mg tablets. D    Also reviewed with pt that will sent her a [employer] message about what to expect for upcoming PFO closure and she agrees to check her [employer] to read that information.     SERG Ibarra 1:40 PM 6/1/2021    "

## 2021-06-03 ENCOUNTER — DOCUMENTATION ONLY (OUTPATIENT)
Dept: CARDIOLOGY | Facility: CLINIC | Age: 46
End: 2021-06-03

## 2021-06-03 ENCOUNTER — OFFICE VISIT (OUTPATIENT)
Dept: CARDIOLOGY | Facility: CLINIC | Age: 46
End: 2021-06-03
Attending: INTERNAL MEDICINE
Payer: COMMERCIAL

## 2021-06-03 VITALS
BODY MASS INDEX: 36.32 KG/M2 | OXYGEN SATURATION: 99 % | HEART RATE: 87 BPM | WEIGHT: 225 LBS | DIASTOLIC BLOOD PRESSURE: 81 MMHG | SYSTOLIC BLOOD PRESSURE: 137 MMHG

## 2021-06-03 DIAGNOSIS — Q21.12 PATENT FORAMEN OVALE: Primary | ICD-10-CM

## 2021-06-03 DIAGNOSIS — Z86.718 PERSONAL HISTORY OF VENOUS THROMBOSIS AND EMBOLUS: ICD-10-CM

## 2021-06-03 PROCEDURE — 93000 ELECTROCARDIOGRAM COMPLETE: CPT | Performed by: NURSE PRACTITIONER

## 2021-06-03 PROCEDURE — 99417 PROLNG OP E/M EACH 15 MIN: CPT | Performed by: NURSE PRACTITIONER

## 2021-06-03 PROCEDURE — 99215 OFFICE O/P EST HI 40 MIN: CPT | Performed by: NURSE PRACTITIONER

## 2021-06-03 NOTE — PATIENT INSTRUCTIONS
nuvaring-remove now; I will clarify how long you need this out before we can insert again    Stay on Plavix for six months as take the am of the procedure    Stay on Aspirin 81mg pre procedure and take the am of procedure and we will plan one week of therapy post procedure    We will start Eliquis 5mg twice daily likely the PM the day after the procedure    For one week post closure  Eliquis 5mg twice daily  ASA 81mg daily  Plavix 75mg daily    Then after one week  Eliquis 5mg twice daily  Plavix 75mg daily  This will be for three months    At three months, we will Stop Elquis  And add Aspirin back at 81mg daily with plavix for the remaining three months    Tania [phone]

## 2021-06-03 NOTE — PROGRESS NOTES
Called and spoke with pt. She denies any nickel allergy/sensitivity that she is aware of.     SERG Ibarra 1:18 PM 6/3/2021

## 2021-06-03 NOTE — LETTER
6/3/2021    Sydnie Daley MD  [id number] [facility] [place] [address]    RE: Noa Mcgill       Dear Colleague,    I had the pleasure of seeing Noa Mcgill in the [facility].    History of Present Illness:    Noa Mcgill is a 46 year old female followed here by Dr. Cornejo who presents today for cardiac assessment for upcoming percutaneous PFO closure. Noa  is a very complex female who has seen Dr Cornejo in the past and we have been attempting PFO closure    She has had a retinal artery occlusion following a pregnancy in 2013. She had 5 pregnancies and 2 miscarriages.  She is not lupus anticoagulant syndrome positive.  There is a question of her right heart being slightly enlarged but again, the left-to-right portion of the shunt through the PFO is trivial.  We were going to do the PFO closure back in 2014 but she canceled that appointment.    After that, multiple events have happened.    She was BRCA1 gene positive with a family history of breast cancer.  She had elective bilateral mastectomy in 5-2018 and a TRAM flap reconstruction 6-2018 complicated by thrombosis of the PHANI.  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  She also developed a venous thrombosis of the left TRAM flap, treated with tPA.  She was then placed on Lovenox.Her trans flap failed and she underwent reconstruction of the failed flap and required blood transfusions during that procedure. She was MRSA positive in her nose and they used vancomycin as a perioperative antibiotic.    Hysterectomy was done due to BRACA gene and strong FH of cancer. She uses Nuvaring for prevention of  Osteoporosis/menopausal symptoms. Old notes from OB-GYN from 2018 note that she should stop hormone replacement for one week before surgery and one week after.     Dr. Sanchez's notes from 2018 indicate that she may  benefit from Lovenox pre and post op if her repeat hypercoaguble panel was negative. I believe it was negative but clinical suspicion continues that she may have an undefined hypercoaguable state.    She also has a history of adrenal insufficiency and at one ponit was on hydrocortisone which she is not now.     She subsequently underwent a hernia repair with mess by Dr Leary  and I believe Lovenox was used pre-op, Heparin IV for a short time post op and Lovenox for 5 more days upon discharge.     In addition her father had cancer and she was helping care for him; he passed away a few weeks ago. She has had 9 family members pass away in the past few years.    In 2-2019 she was seen in the ED for loss of consciousness; Her son witnessed this and stated her eyes rolled back and she fell to the ground. He reported to EMS that she may have had some jerking and blueness to her lips but he felt a pulse. EMS noted her to be hypotensive and when the EMS staff tried to sit her up, she had another short-lived syncopal spell. She complained of a migraine upon arrival to ED. Work up including head CT was negative. Apparently she had a reduced oral intake prior to this event. She was discharged to home.    After review of her cardiac images, Dr. Garcia felt that a large-diameter GORE cardioform device will work for her closure. She will automatically get Heparin during the procedure and Dr. Cornejo does NOT plan to use protamine post procedure when the sheath is pulled.    Based on positive nare cultures for MRSA, her pre-procedure antibiotic will be Vancomycin which has been used dony-op in the past. We suggest she remove her Nuvaring one week before and leave this out for the first 12 weeks post closure if possible to limit dony procedural clot risk and risk of thrombus formation on her device until endothelial tissue has time to form    We will NOT plan Lovenox as we use intraprocedure Heparin but we will do triple  therapy with ASA/Plavix/Eliquis for one week post implant, than Plavix/ASA for three months, then convert to standard ASA/Plavix for the next 3 months. Eliquis will likely start the PM after her procedure due to instrumentation.    She denies nickel allergy. She started ASA/Plavix yesterday in preparation for her procedure. She has been feeling well. She struggled with depression due to her own health struggles and her multiple losses.    Exam; no femoral bruits; lungs clear; no significant varicosities.    I have reviewed the Oncology notes and Hematology notes.    She has bladder issues with recurrent UTI's. She follows with Urology and shares that when she has surgery she often cannot void; she may needs straight cathing. The only antibiotic that she is not resistant to for her UTI's is Cefdinir.           Impression/Plan:     1. PFO-to undergo percutaneous closure next week  -we reivewed NPO after midnight  - we will use Vancomycin pre-op due to nares culturing positive for MRSA      -reviewed post procedure restrictions  *10 pound lifting restriction week one  *20-30 pound lifting restriction weeks two and three    *no heavy aerobics for one month  *no driving for minimum of 48 hours  *no contact sports, scuba diving or sean diving for 3 months  *avoid elective dental work for 6 months; if urgent will need SBE prophylaxis for six months  *follow up echo/bubble study and follow up with Cardiologist in 3 months post closure  I will see her back with a limited echo one to two weeks post closure  -she will remove her Nuvaring now pre-op and leave out for 3 months-she inserts and removes this herself.  -continue Plavix 75mg daily and ASA 81mg daily  -post procedure we will add Eliquis 5mg bid and do triple therapy (asa/plavix/eliquis) for one week  -then Plavix/Eliquis for three months  -then ASA/Plavix for three months after that  We reviewed need to be NPO prior to the procedure    I will see her back for follow up  post procedure  Risks of procedure have been reviewed including infection, bleeding and vascular trauma, embolic events, stroke, cardiac trauma/tamponade, reactions to anesthesia.    2. Multiple thrombotic events as outlined  hypercoaguable panel is negative X2 but Hematology is still suspicious based on clinical history that she has an undefined hypercoaguable state    Will receive heparin intra procedure and NO protamine  Blood thinners as noted  Remove nuvaring as noted    3. No evidence of atrial fibrillation by history    3. BRACA gene positive  -s/p bilateral mastectomy and hysterectomy    4. Recurrent UTI  Only responsive to cefdinir    5. MRSA positive in nares  Will use Vancomycin periop.    It has been a pleasure seeing Noa Mcgill in follow up today. I have reviewed this plan with Dr. Cornejo.  70 minutes spent on the date of the encounter doing chart review, review of outside records, patient visit, documentation and discussion with other provider(s)     Jose R Lui, MSN, APRN-BC, CNP  Cardiology    Orders Placed This Encounter   Procedures     EKG 12-lead complete w/read - Clinics (performed today)     No orders of the defined types were placed in this encounter.    There are no discontinued medications.      Encounter Diagnoses   Name Primary?     Patent foramen ovale Yes     Personal history of venous thrombosis and embolus        CURRENT MEDICATIONS:  Current Outpatient Medications   Medication Sig Dispense Refill     ascorbic acid 250 MG TABS tablet Take 250 mg by mouth daily Take by mouth twice a day.       aspirin (ASA) 81 MG EC tablet Take 1 tablet (81 mg) by mouth daily       butalbital-acetaminophen-caffeine (ESGIC) -40 MG tablet Take 1 tablet by mouth every 4 hours as needed 30 tablet 1     clopidogrel (PLAVIX) 75 MG tablet Take 1 tablet (75 mg) by mouth daily 90 tablet 1     CRANBERRY CONCENTRATE PO        etonogestrel-ethinyl estradiol (NUVARING) 0.12-0.015 MG/24HR vaginal  ring Place 1 each vaginally every 28 days       Ibuprofen (ADVIL PO) Take 600 mg by mouth 2 times daily as needed for moderate pain       methenamine (HIPREX) 1 g tablet Take 1 tablet (1 g) by mouth 2 times daily 60 tablet 3     Omega-3 Fatty Acids (FISH OIL) 1000 MG CPDR Take 1,000 mg by mouth daily       VITAMIN D PO Take 2,000 Units by mouth daily.       zolpidem (AMBIEN) 10 MG tablet TAKE ONE TABLET BY MOUTH EVERY NIGHT AS NEEDED FOR SLEEP 30 tablet 0     amphetamine-dextroamphetamine (ADDERALL) 10 MG tablet 1 tab twice per day (Patient not taking: Reported on 6/3/2021) 60 tablet 0     phenazopyridine (PYRIDIUM) 200 MG tablet Take 200 mg by mouth 3 times daily as needed for irritation       rizatriptan (MAXALT) 10 MG tablet TAKE 1 TABLET BY MOUTH ONCE, MAY REPEAT IN 2 HRS. MAX 30MG/24HRS (Patient not taking: Reported on 6/3/2021) 12 tablet 3     STATIN NOT PRESCRIBED (INTENTIONAL) Please choose reason not prescribed, below (Patient not taking: Reported on 6/3/2021)         ALLERGIES     Allergies   Allergen Reactions     Tnf Antagonists Hives     Enbrel Hives     Humira [Humira]      Rash hives     Prednisone      Pt sensitive to prednisone--shakey heart racing - only with large doses     Compazine [Prochlorperazine] Anxiety     Shakey per H&P dated 5/21/2018  Shaky and anxiety     Nitrofurantoin Hives and Rash       PAST MEDICAL HISTORY:  Past Medical History:   Diagnosis Date     Adrenal insufficiency ([place]'s disease) (H)     ACTH stim test failed to increase cortisol     Allergic rhinitis, cause unspecified      Antiplatelet or antithrombotic long-term use      Arthritis      ASD (atrial septal defect) 02/15/2013    ASD dx by echo, pt declined ASD closure     Attention deficit disorder without mention of hyperactivity 7/2/2014     BRCA1 gene mutation positive 1/24/2018    Reported by patient, no report available at this time Records requested from [employer] 1/23/18     Cerebral infarction (H)       Clotting disorder (H) birth    undefined clotting disorder - sees Dr. Sanchez Retinal artery occlusion, R int mammary artery occlusion post breastCA surg and recd TPA     Congenital heart disease      Depressive disorder      Endometriosis of uterus      HERPES SIMPLEX NOS 2007    cold sores on remicade     Insomnia, unspecified 2/10/2005     Lung nodule < 6cm on CT 2019    consider repeat in 2020 - low risk but around a lot of second hand smoke growing up     Migraine headaches      Other chronic sinusitis      Postoperative urinary retention     every surgery     Psoriatic arthropathy (H) 2007     Recurrent UTI      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       PAST SURGICAL HISTORY:  Past Surgical History:   Procedure Laterality Date     APPENDECTOMY  2006      SECTION  2013    Procedure:  SECTION;  Primary  Section;  Surgeon: Chad Hughes MD;  Location:  L+D     DILATION AND CURETTAGE SUCTION N/A 2014    Procedure: DILATION AND CURETTAGE SUCTION;  Surgeon: Srini Martinez MD;  Location:  OR     DILATION AND CURETTAGE SUCTION N/A 2014    Procedure: DILATION AND CURETTAGE SUCTION;  Surgeon: Connor Kang MD;  Location:  OR     GENITOURINARY SURGERY      bladder sling and complete historectomy     HC DILATION/CURETTAGE DIAG/THER NON OB       HC DILATION/CURETTAGE DIAG/THER NON OB       HERNIORRHAPHY INCISIONAL (LOCATION) N/A 2018    Procedure: Incisional Hernia repair with Biologic mesh;  Surgeon: Suraj Bergeron MD;  Location:  OR     HYSTERECTOMY, [facility]  2016    does not need pap     MASTECTOMY SIMPLE BILATERAL Bilateral 2018    Procedure: MASTECTOMY SIMPLE BILATERAL;  PROPHYLACTIC BILATERAL MASTECTOMY (GLADIS) BILATERAL BREAST RECONSTRUCTION [facility] TISSUE EXPANDERS (WILLOW)   ;  Surgeon: Suraj Bergeron MD;  Location:  OR     ORTHOPEDIC SURGERY      (L) thumb  fused     RECONSTRUCT BREAST, INSERT TISSUE EXPANDER BILATERAL, COMBINED Bilateral 2018    expander and tram flap with drains - had weekly surgery to have I & D and removal of infected tissue     SURGICAL HISTORY OF -       left thumb fusion due to arthritis       FAMILY HISTORY:  Family History   Problem Relation Age of Onset     C.A.D. Maternal Grandmother      Hypertension Maternal Grandmother      Psychotic Disorder Mother         Depression, alcoholism     Diabetes Mother      Hereditary Breast and Ovarian Cancer Syndrome Mother         BRCA1+, no hx of cancer, s/p prophylactic surgery to remove breast tissue, ovaries, fallopian tubes     Hypertension Mother      Thyroid Disease Mother         thyroid nodules     Lung Cancer Mother      Substance Abuse Father      Hereditary Breast and Ovarian Cancer Syndrome Sister         matrnal half sister, BRCA1+     Hereditary Breast and Ovarian Cancer Syndrome Daughter 23        BRCA1+     Breast Cancer Maternal Aunt 40        lumpectomy, then 2nd primary breast cancer later in 40s, treated with mastectomy     Hereditary Breast and Ovarian Cancer Syndrome Maternal Aunt         BRCA1+     Ovarian Cancer Maternal Aunt 70        surgry     Thyroid Cancer Maternal Aunt         medullary thyroid cancer     Hereditary Breast and Ovarian Cancer Syndrome Maternal Aunt         BRCA1+     Breast Cancer Other 41        maternal great aunt     Ovarian Cancer Other 45         in 40s     Thyroid Cancer Maternal Uncle 66        papillary thyroid cancer     Breast Cancer Cousin 45     Hereditary Breast and Ovarian Cancer Syndrome Cousin         BRCA1+     Breast Cancer Other 43        maternal great aunt     Other Cancer Other         ovarian cancer       SOCIAL HISTORY:  Social History     Socioeconomic History     Marital status:      Spouse name: Ciro     Number of children: 3     Years of education: 14     Highest education level: None   Occupational History      Occupation: LPN     Employer: [employer]     Employer: [employer]   Social Needs     Financial resource strain: None     Food insecurity     Worry: None     Inability: None     Transportation needs     Medical: None     Non-medical: None   Tobacco Use     Smoking status: Never Smoker     Smokeless tobacco: Never Used   Substance and Sexual Activity     Alcohol use: Not Currently     Alcohol/week: 0.0 standard drinks     Comment: 1-3drink per week     Drug use: No     Sexual activity: Yes     Partners: Male     Birth control/protection: Female Surgical   Lifestyle     Physical activity     Days per week: None     Minutes per session: None     Stress: None   Relationships     Social connections     Talks on phone: None     Gets together: None     Attends Protestant service: None     Active member of club or organization: None     Attends meetings of clubs or organizations: None     Relationship status: None     Intimate partner violence     Fear of current or ex partner: None     Emotionally abused: None     Physically abused: None     Forced sexual activity: None   Other Topics Concern     Parent/sibling w/ CABG, MI or angioplasty before 65F 55M? No   Social History Narrative     None       Review of Systems:  Skin:  Negative for       Eyes:  Negative for glasses;contacts    ENT:  Negative for      Respiratory:  Negative for       Cardiovascular:  Negative palpitations;Positive for    Gastroenterology: Positive for heartburn    Genitourinary:  Positive for   hx chronic UTI's  Musculoskeletal:  Negative      Neurologic:  Positive for migraine headaches    Psychiatric:  Positive for sleep disturbances;anxiety    Heme/Lymph/Imm:  Negative for      Endocrine:  Negative        Physical Exam:  Vitals: /81   Pulse 87   Wt 102.1 kg (225 lb)   LMP 02/08/2015 (Within Months)   SpO2 99%   BMI 36.32 kg/m      Constitutional:  cooperative, alert and oriented, well developed, well nourished, in no  acute distress        Skin:  warm and dry to the touch, no apparent skin lesions or masses noted          Head:  normocephalic, no masses or lesions        Eyes:           Lymph:No Cervical lymphadenopathy present     ENT:  no pallor or cyanosis, dentition good        Neck:  carotid pulses are full and equal bilaterally, JVP normal, no carotid bruit        Respiratory:  normal breath sounds, clear to auscultation, normal A-P diameter, normal symmetry, normal respiratory excursion, no use of accessory muscles    bilat mastectomy and transflap    Cardiac: regular rhythm, normal S1/S2, no S3 or S4, apical impulse not displaced, no murmurs, gallops or rubs             no murmur, normal split s2  pulses full and equal, no bruits auscultated                                   no femoral bruits    GI:  abdomen soft, non-tender, BS normoactive, no mass, no HSM, no bruits obese;surgical scars midline healed incision    Extremities and Muscular Skeletal:  no deformities, clubbing, cyanosis, erythema observed         scar L ankle -injury as child    Neurological:  no gross motor deficits        Psych:  Alert and Oriented x 3      Recent Lab Results:  LIPID RESULTS:  Lab Results   Component Value Date    CHOL 188 02/10/2018    HDL 58 02/10/2018    LDL 84 02/10/2018    TRIG 231 (H) 02/10/2018    CHOLHDLRATIO 2.7 05/08/2014       LIVER ENZYME RESULTS:  Lab Results   Component Value Date    AST 14 03/11/2019    ALT 21 03/11/2019       CBC RESULTS:  Lab Results   Component Value Date    WBC 12.2 (H) 01/02/2020    RBC 5.22 (H) 01/02/2020    HGB 13.7 01/02/2020    HCT 42.9 01/02/2020    MCV 82 01/02/2020    MCH 26.2 (L) 01/02/2020    MCHC 31.9 01/02/2020    RDW 13.4 01/02/2020     01/02/2020       BMP RESULTS:  Lab Results   Component Value Date     01/02/2020    POTASSIUM 4.1 01/02/2020    CHLORIDE 110 (H) 01/02/2020    CO2 20 01/02/2020    ANIONGAP 8 01/02/2020     (H) 01/02/2020    BUN 16 01/02/2020    CR 0.80  01/02/2020    GFRESTIMATED >90 01/02/2020    GFRESTBLACK >90 01/02/2020    KENDRICK 8.7 01/02/2020        A1C RESULTS:  Lab Results   Component Value Date    A1C 5.2 05/08/2014       INR RESULTS:  Lab Results   Component Value Date    INR 0.92 05/21/2013    INR 0.94 04/22/2013         Thank you for allowing me to participate in the care of your patient.      Sincerely,     Pennie Lui, ZULEMA [facility]    cc:   Victoriano Cornejo MD  [id number]  [place] [phone]

## 2021-06-03 NOTE — PROGRESS NOTES
History of Present Illness:    Noa Mcgill is a 46 year old female followed here by Dr. Cornejo who presents today for cardiac assessment for upcoming percutaneous PFO closure. Noa  is a very complex female who has seen Dr Cornejo in the past and we have been attempting PFO closure    She has had a retinal artery occlusion following a pregnancy in 2013. She had 5 pregnancies and 2 miscarriages.  She is not lupus anticoagulant syndrome positive.  There is a question of her right heart being slightly enlarged but again, the left-to-right portion of the shunt through the PFO is trivial.  We were going to do the PFO closure back in 2014 but she canceled that appointment.    After that, multiple events have happened.    She was BRCA1 gene positive with a family history of breast cancer.  She had elective bilateral mastectomy in 5-2018 and a TRAM flap reconstruction 6-2018 complicated by thrombosis of the PHANI.  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  She also developed a venous thrombosis of the left TRAM flap, treated with tPA.  She was then placed on Lovenox.Her trans flap failed and she underwent reconstruction of the failed flap and required blood transfusions during that procedure. She was MRSA positive in her nose and they used vancomycin as a perioperative antibiotic.    Hysterectomy was done due to BRACA gene and strong FH of cancer. She uses Nuvaring for prevention of  Osteoporosis/menopausal symptoms. Old notes from OB-GYN from 2018 note that she should stop hormone replacement for one week before surgery and one week after.     Dr. Sanchez's notes from 2018 indicate that she may benefit from Lovenox pre and post op if her repeat hypercoaguble panel was negative. I believe it was negative but clinical suspicion continues that she may have an undefined hypercoaguable state.    She also has a history of adrenal  insufficiency and at one ponit was on hydrocortisone which she is not now.     She subsequently underwent a hernia repair with mess by Dr Leary  and I believe Lovenox was used pre-op, Heparin IV for a short time post op and Lovenox for 5 more days upon discharge.     In addition her father had cancer and she was helping care for him; he passed away a few weeks ago. She has had 9 family members pass away in the past few years.    In 2-2019 she was seen in the ED for loss of consciousness; Her son witnessed this and stated her eyes rolled back and she fell to the ground. He reported to EMS that she may have had some jerking and blueness to her lips but he felt a pulse. EMS noted her to be hypotensive and when the EMS staff tried to sit her up, she had another short-lived syncopal spell. She complained of a migraine upon arrival to ED. Work up including head CT was negative. Apparently she had a reduced oral intake prior to this event. She was discharged to home.    After review of her cardiac images, Dr. Garcia felt that a large-diameter GORE cardioform device will work for her closure. She will automatically get Heparin during the procedure and Dr. Cornejo does NOT plan to use protamine post procedure when the sheath is pulled.    Based on positive nare cultures for MRSA, her pre-procedure antibiotic will be Vancomycin which has been used dony-op in the past. We suggest she remove her Nuvaring one week before and leave this out for the first 12 weeks post closure if possible to limit dony procedural clot risk and risk of thrombus formation on her device until endothelial tissue has time to form    We will NOT plan Lovenox as we use intraprocedure Heparin but we will do triple therapy with ASA/Plavix/Eliquis for one week post implant, than Plavix/ASA for three months, then convert to standard ASA/Plavix for the next 3 months. Eliquis will likely start the PM after her procedure due to instrumentation.    She  denies nickel allergy. She started ASA/Plavix yesterday in preparation for her procedure. She has been feeling well. She struggled with depression due to her own health struggles and her multiple losses.    Exam; no femoral bruits; lungs clear; no significant varicosities.    I have reviewed the Oncology notes and Hematology notes.    She has bladder issues with recurrent UTI's. She follows with Urology and shares that when she has surgery she often cannot void; she may needs straight cathing. The only antibiotic that she is not resistant to for her UTI's is Cefdinir.           Impression/Plan:     1. PFO-to undergo percutaneous closure next week  -we reivewed NPO after midnight  - we will use Vancomycin pre-op due to nares culturing positive for MRSA      -reviewed post procedure restrictions  *10 pound lifting restriction week one  *20-30 pound lifting restriction weeks two and three    *no heavy aerobics for one month  *no driving for minimum of 48 hours  *no contact sports, scuba diving or sean diving for 3 months  *avoid elective dental work for 6 months; if urgent will need SBE prophylaxis for six months  *follow up echo/bubble study and follow up with Cardiologist in 3 months post closure  I will see her back with a limited echo one to two weeks post closure  -she will remove her Nuvaring now pre-op and leave out for 3 months-she inserts and removes this herself.  -continue Plavix 75mg daily and ASA 81mg daily  -post procedure we will add Eliquis 5mg bid and do triple therapy (asa/plavix/eliquis) for one week  -then Plavix/Eliquis for three months  -then ASA/Plavix for three months after that  We reviewed need to be NPO prior to the procedure    I will see her back for follow up post procedure  Risks of procedure have been reviewed including infection, bleeding and vascular trauma, embolic events, stroke, cardiac trauma/tamponade, reactions to anesthesia.    2. Multiple thrombotic events as  outlined  hypercoaguable panel is negative X2 but Hematology is still suspicious based on clinical history that she has an undefined hypercoaguable state    Will receive heparin intra procedure and NO protamine  Blood thinners as noted  Remove nuvaring as noted    3. No evidence of atrial fibrillation by history    3. BRACA gene positive  -s/p bilateral mastectomy and hysterectomy    4. Recurrent UTI  Only responsive to cefdinir    5. MRSA positive in nares  Will use Vancomycin periop.    It has been a pleasure seeing Noa Mcgill in follow up today. I have reviewed this plan with Dr. Cornejo.  70 minutes spent on the date of the encounter doing chart review, review of outside records, patient visit, documentation and discussion with other provider(s)     Jose R Lui, MSN, APRN-BC, CNP  Cardiology    Orders Placed This Encounter   Procedures     EKG 12-lead complete w/read - Clinics (performed today)     No orders of the defined types were placed in this encounter.    There are no discontinued medications.      Encounter Diagnoses   Name Primary?     Patent foramen ovale Yes     Personal history of venous thrombosis and embolus        CURRENT MEDICATIONS:  Current Outpatient Medications   Medication Sig Dispense Refill     ascorbic acid 250 MG TABS tablet Take 250 mg by mouth daily Take by mouth twice a day.       aspirin (ASA) 81 MG EC tablet Take 1 tablet (81 mg) by mouth daily       butalbital-acetaminophen-caffeine (ESGIC) -40 MG tablet Take 1 tablet by mouth every 4 hours as needed 30 tablet 1     clopidogrel (PLAVIX) 75 MG tablet Take 1 tablet (75 mg) by mouth daily 90 tablet 1     CRANBERRY CONCENTRATE PO        etonogestrel-ethinyl estradiol (NUVARING) 0.12-0.015 MG/24HR vaginal ring Place 1 each vaginally every 28 days       Ibuprofen (ADVIL PO) Take 600 mg by mouth 2 times daily as needed for moderate pain       methenamine (HIPREX) 1 g tablet Take 1 tablet (1 g) by mouth 2 times daily 60  tablet 3     Omega-3 Fatty Acids (FISH OIL) 1000 MG CPDR Take 1,000 mg by mouth daily       VITAMIN D PO Take 2,000 Units by mouth daily.       zolpidem (AMBIEN) 10 MG tablet TAKE ONE TABLET BY MOUTH EVERY NIGHT AS NEEDED FOR SLEEP 30 tablet 0     amphetamine-dextroamphetamine (ADDERALL) 10 MG tablet 1 tab twice per day (Patient not taking: Reported on 6/3/2021) 60 tablet 0     phenazopyridine (PYRIDIUM) 200 MG tablet Take 200 mg by mouth 3 times daily as needed for irritation       rizatriptan (MAXALT) 10 MG tablet TAKE 1 TABLET BY MOUTH ONCE, MAY REPEAT IN 2 HRS. MAX 30MG/24HRS (Patient not taking: Reported on 6/3/2021) 12 tablet 3     STATIN NOT PRESCRIBED (INTENTIONAL) Please choose reason not prescribed, below (Patient not taking: Reported on 6/3/2021)         ALLERGIES     Allergies   Allergen Reactions     Tnf Antagonists Hives     Enbrel Hives     Humira [Humira]      Rash hives     Prednisone      Pt sensitive to prednisone--shakey heart racing - only with large doses     Compazine [Prochlorperazine] Anxiety     Shakey per H&P dated 5/21/2018  Shaky and anxiety     Nitrofurantoin Hives and Rash       PAST MEDICAL HISTORY:  Past Medical History:   Diagnosis Date     Adrenal insufficiency ([place]'s disease) (H)     ACTH stim test failed to increase cortisol     Allergic rhinitis, cause unspecified      Antiplatelet or antithrombotic long-term use      Arthritis      ASD (atrial septal defect) 02/15/2013    ASD dx by echo, pt declined ASD closure     Attention deficit disorder without mention of hyperactivity 7/2/2014     BRCA1 gene mutation positive 1/24/2018    Reported by patient, no report available at this time Records requested from [facility] 1/23/18     Cerebral infarction (H)      Clotting disorder (H) birth    undefined clotting disorder - sees Dr. 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       PAST SURGICAL HISTORY:  Past Surgical History:   Procedure Laterality Date     APPENDECTOMY  2006      SECTION  2013    Procedure:  SECTION;  Primary  Section;  Surgeon: Chad Hughes MD;  Location:  L+D     DILATION AND CURETTAGE SUCTION N/A 2014    Procedure: DILATION AND CURETTAGE SUCTION;  Surgeon: Srini Martinez MD;  Location:  OR     DILATION AND CURETTAGE SUCTION N/A 2014    Procedure: DILATION AND CURETTAGE SUCTION;  Surgeon: Connor Kang MD;  Location:  OR     GENITOURINARY SURGERY      bladder sling and complete historectomy     HC DILATION/CURETTAGE DIAG/THER NON OB       HC DILATION/CURETTAGE DIAG/THER NON OB       HERNIORRHAPHY INCISIONAL (LOCATION) N/A 2018    Procedure: Incisional Hernia repair with Biologic mesh;  Surgeon: Suraj Bergeron MD;  Location:  OR     HYSTERECTOMY, [facility]  2016    does not need pap     MASTECTOMY SIMPLE BILATERAL Bilateral 2018    Procedure: MASTECTOMY SIMPLE BILATERAL;  PROPHYLACTIC BILATERAL MASTECTOMY (GLADIS) BILATERAL BREAST RECONSTRUCTION [facility] TISSUE EXPANDERS (WILLOW)   ;  Surgeon: Suraj Bergeron MD;  Location:  OR     ORTHOPEDIC SURGERY      (L) thumb fused     RECONSTRUCT BREAST, INSERT TISSUE EXPANDER BILATERAL, COMBINED Bilateral 2018    expander and tram flap with drains - had weekly surgery to have I & D and removal of infected tissue     SURGICAL HISTORY  2006    left thumb fusion due to arthritis       FAMILY HISTORY:  Family History   Problem Relation Age of Onset     C.A.D. Maternal Grandmother      Hypertension Maternal Grandmother      Psychotic Disorder Mother         Depression, alcoholism     Diabetes Mother      Hereditary Breast and Ovarian Cancer Syndrome Mother         BRCA1+, no hx of cancer, s/p prophylactic surgery to remove breast tissue, ovaries, fallopian tubes     Hypertension Mother      Thyroid Disease Mother         thyroid nodules     Lung Cancer Mother      Substance Abuse Father      Hereditary Breast and Ovarian Cancer Syndrome Sister         matrnal half sister, BRCA1+     Hereditary Breast and Ovarian Cancer Syndrome Daughter 23        BRCA1+     Breast Cancer Maternal Aunt 40        lumpectomy, then 2nd primary breast cancer later in 40s, treated with mastectomy     Hereditary Breast and Ovarian Cancer Syndrome Maternal Aunt         BRCA1+     Ovarian Cancer Maternal Aunt 70        surgry     Thyroid Cancer Maternal Aunt         medullary thyroid cancer     Hereditary Breast and Ovarian Cancer Syndrome Maternal Aunt         BRCA1+     Breast Cancer Other 41        maternal great aunt     Ovarian Cancer Other 45         in 40s     Thyroid Cancer Maternal Uncle 66        papillary thyroid cancer     Breast Cancer Cousin 45     Hereditary Breast and Ovarian Cancer Syndrome Cousin         BRCA1+     Breast Cancer Other 43        maternal great aunt     Other Cancer Other         ovarian cancer       SOCIAL HISTORY:  Social History     Socioeconomic History     Marital status:      Spouse name: Ciro     Number of children: 3     Years of education: 14     Highest education level: None   Occupational History     Occupation: LPN     Employer: [employer]     Employer: [employer]   Social Needs     Financial resource strain: None     Food insecurity     Worry: None     Inability: None     Transportation needs      Medical: None     Non-medical: None   Tobacco Use     Smoking status: Never Smoker     Smokeless tobacco: Never Used   Substance and Sexual Activity     Alcohol use: Not Currently     Alcohol/week: 0.0 standard drinks     Comment: 1-3drink per week     Drug use: No     Sexual activity: Yes     Partners: Male     Birth control/protection: Female Surgical   Lifestyle     Physical activity     Days per week: None     Minutes per session: None     Stress: None   Relationships     Social connections     Talks on phone: None     Gets together: None     Attends Buddhism service: None     Active member of club or organization: None     Attends meetings of clubs or organizations: None     Relationship status: None     Intimate partner violence     Fear of current or ex partner: None     Emotionally abused: None     Physically abused: None     Forced sexual activity: None   Other Topics Concern     Parent/sibling w/ CABG, MI or angioplasty before 65F 55M? No   Social History Narrative     None       Review of Systems:  Skin:  Negative for       Eyes:  Negative for glasses;contacts    ENT:  Negative for      Respiratory:  Negative for       Cardiovascular:  Negative palpitations;Positive for    Gastroenterology: Positive for heartburn    Genitourinary:  Positive for   hx chronic UTI's  Musculoskeletal:  Negative      Neurologic:  Positive for migraine headaches    Psychiatric:  Positive for sleep disturbances;anxiety    Heme/Lymph/Imm:  Negative for      Endocrine:  Negative        Physical Exam:  Vitals: /81   Pulse 87   Wt 102.1 kg (225 lb)   LMP 02/08/2015 (Within Months)   SpO2 99%   BMI 36.32 kg/m      Constitutional:  cooperative, alert and oriented, well developed, well nourished, in no acute distress        Skin:  warm and dry to the touch, no apparent skin lesions or masses noted          Head:  normocephalic, no masses or lesions        Eyes:           Lymph:No Cervical lymphadenopathy present     ENT:   no pallor or cyanosis, dentition good        Neck:  carotid pulses are full and equal bilaterally, JVP normal, no carotid bruit        Respiratory:  normal breath sounds, clear to auscultation, normal A-P diameter, normal symmetry, normal respiratory excursion, no use of accessory muscles    bilat mastectomy and transflap    Cardiac: regular rhythm, normal S1/S2, no S3 or S4, apical impulse not displaced, no murmurs, gallops or rubs             no murmur, normal split s2  pulses full and equal, no bruits auscultated                                   no femoral bruits    GI:  abdomen soft, non-tender, BS normoactive, no mass, no HSM, no bruits obese;surgical scars midline healed incision    Extremities and Muscular Skeletal:  no deformities, clubbing, cyanosis, erythema observed         scar L ankle -injury as child    Neurological:  no gross motor deficits        Psych:  Alert and Oriented x 3      Recent Lab Results:  LIPID RESULTS:  Lab Results   Component Value Date    CHOL 188 02/10/2018    HDL 58 02/10/2018    LDL 84 02/10/2018    TRIG 231 (H) 02/10/2018    CHOLHDLRATIO 2.7 05/08/2014       LIVER ENZYME RESULTS:  Lab Results   Component Value Date    AST 14 03/11/2019    ALT 21 03/11/2019       CBC RESULTS:  Lab Results   Component Value Date    WBC 12.2 (H) 01/02/2020    RBC 5.22 (H) 01/02/2020    HGB 13.7 01/02/2020    HCT 42.9 01/02/2020    MCV 82 01/02/2020    MCH 26.2 (L) 01/02/2020    MCHC 31.9 01/02/2020    RDW 13.4 01/02/2020     01/02/2020       BMP RESULTS:  Lab Results   Component Value Date     01/02/2020    POTASSIUM 4.1 01/02/2020    CHLORIDE 110 (H) 01/02/2020    CO2 20 01/02/2020    ANIONGAP 8 01/02/2020     (H) 01/02/2020    BUN 16 01/02/2020    CR 0.80 01/02/2020    GFRESTIMATED >90 01/02/2020    GFRESTBLACK >90 01/02/2020    KENDRICK 8.7 01/02/2020        A1C RESULTS:  Lab Results   Component Value Date    A1C 5.2 05/08/2014       INR RESULTS:  Lab Results   Component Value  Date    INR 0.92 05/21/2013    INR 0.94 04/22/2013           CC  Victoriano Cornejo MD  [id number]  [place] [id number]

## 2021-06-08 DIAGNOSIS — Q21.11 OSTIUM SECUNDUM TYPE ATRIAL SEPTAL DEFECT: ICD-10-CM

## 2021-06-08 LAB
SARS-COV-2 RNA RESP QL NAA+PROBE: NORMAL
SPECIMEN SOURCE: NORMAL

## 2021-06-08 PROCEDURE — U0003 INFECTIOUS AGENT DETECTION BY NUCLEIC ACID (DNA OR RNA); SEVERE ACUTE RESPIRATORY SYNDROME CORONAVIRUS 2 (SARS-COV-2) (CORONAVIRUS DISEASE [COVID-19]), AMPLIFIED PROBE TECHNIQUE, MAKING USE OF HIGH THROUGHPUT TECHNOLOGIES AS DESCRIBED BY CMS-2020-01-R: HCPCS | Performed by: INTERNAL MEDICINE

## 2021-06-08 PROCEDURE — U0005 INFEC AGEN DETEC AMPLI PROBE: HCPCS | Performed by: INTERNAL MEDICINE

## 2021-06-09 LAB
LABORATORY COMMENT REPORT: NORMAL
SARS-COV-2 RNA RESP QL NAA+PROBE: NEGATIVE
SPECIMEN SOURCE: NORMAL

## 2021-06-09 RX ORDER — RIZATRIPTAN BENZOATE 10 MG/1
10 TABLET ORAL
COMMUNITY
End: 2023-07-18

## 2021-06-09 RX ORDER — ZOLPIDEM TARTRATE 10 MG/1
10 TABLET ORAL
COMMUNITY
End: 2021-10-20

## 2021-06-09 NOTE — PROGRESS NOTES
PTA medications updated by Medication Scribe prior to surgery via phone call with patient (last doses completed by Nurse)     Medication history sources: Patient, Surescripts and H&P  In the past week, patient estimated taking medication this percent of the time: Greater than 90%  Adherence assessment: N/A Not Observed    Significant changes made to the medication list:  Patient reports no longer taking the following meds (med scribe removed from PTA med list): Adderall, Ibuprofen      Additional medication history information:   None    Medication reconciliation completed by provider prior to medication history? No    Time spent in this activity: 20 minutes    The information provided in this note is only as accurate as the sources available at the time of update(s)      Prior to Admission medications    Medication Sig Last Dose Taking? Auth Provider   ascorbic acid 250 MG TABS tablet Take 250 mg by mouth daily  6/9/2021 at am Yes Reported, Patient   aspirin (ASA) 81 MG EC tablet Take 81 mg by mouth every morning   at AM Yes Pennie Lui APRN CNP   butalbital-acetaminophen-caffeine (ESGIC) -40 MG tablet Take 1 tablet by mouth every 4 hours as needed > 1 WEEK at PRN Yes Sydnie Daley MD   clopidogrel (PLAVIX) 75 MG tablet Take 1 tablet (75 mg) by mouth daily  at AM Yes Pennie Lui APRN CNP   CRANBERRY CONCENTRATE PO Take 1 Dose by mouth daily  6/9/2021 at AM Yes Reported, Patient   etonogestrel-ethinyl estradiol (NUVARING) 0.12-0.015 MG/24HR vaginal ring Place 1 each vaginally every 28 days OUT - temporarily removed for surgery Yes Reported, Patient   methenamine (HIPREX) 1 g tablet Take 1 tablet (1 g) by mouth 2 times daily  Yes Bridget Lopez MD   Omega-3 Fatty Acids (FISH OIL) 1000 MG CPDR Take 1,000 mg by mouth daily 6/9/2021 at am Yes Reported, Patient   phenazopyridine (PYRIDIUM) 200 MG tablet Take 200 mg by mouth 3 times daily as needed for irritation (urinary  infections)  > 1 week at PRN Yes Reported, Patient   rizatriptan (MAXALT) 10 MG tablet Take 10 mg by mouth at onset of headache for migraine > 1 week at prn Yes Reported, Patient   VITAMIN D PO Take 2,000 Units by mouth daily. 6/9/2021 Yes Reported, Patient   zolpidem (AMBIEN) 10 MG tablet Take 10 mg by mouth nightly as needed for sleep  at prn Yes Reported, Patient   STATIN NOT PRESCRIBED (INTENTIONAL) Please choose reason not prescribed, below  Patient not taking: Reported on 6/3/2021   Sydnie Daley MD

## 2021-06-10 ENCOUNTER — HOSPITAL ENCOUNTER (OUTPATIENT)
Dept: CARDIOLOGY | Facility: CLINIC | Age: 46
Discharge: HOME | End: 2021-06-10
Attending: INTERNAL MEDICINE | Admitting: INTERNAL MEDICINE
Payer: COMMERCIAL

## 2021-06-10 ENCOUNTER — CARE COORDINATION (OUTPATIENT)
Dept: CARDIOLOGY | Facility: CLINIC | Age: 46
End: 2021-06-10

## 2021-06-10 ENCOUNTER — HOSPITAL ENCOUNTER (INPATIENT)
Facility: CLINIC | Age: 46
LOS: 1 days | Discharge: HOME | DRG: 272 | End: 2021-06-11
Admitting: INTERNAL MEDICINE
Payer: COMMERCIAL

## 2021-06-10 ENCOUNTER — ANESTHESIA EVENT (OUTPATIENT)
Dept: CARDIOLOGY | Facility: CLINIC | Age: 46
DRG: 272 | End: 2021-06-10
Payer: COMMERCIAL

## 2021-06-10 ENCOUNTER — ANESTHESIA (OUTPATIENT)
Dept: CARDIOLOGY | Facility: CLINIC | Age: 46
DRG: 272 | End: 2021-06-10
Payer: COMMERCIAL

## 2021-06-10 DIAGNOSIS — Q21.11 OSTIUM SECUNDUM TYPE ATRIAL SEPTAL DEFECT: ICD-10-CM

## 2021-06-10 DIAGNOSIS — Q21.12 PFO (PATENT FORAMEN OVALE): Primary | ICD-10-CM

## 2021-06-10 DIAGNOSIS — Q21.10 ASD (ATRIAL SEPTAL DEFECT): ICD-10-CM

## 2021-06-10 LAB
ANION GAP SERPL CALCULATED.3IONS-SCNC: 3 MMOL/L (ref 3–14)
APTT PPP: 25 SEC (ref 22–37)
CHLORIDE SERPL-SCNC: 110 MMOL/L (ref 94–109)
CO2 SERPL-SCNC: 27 MMOL/L (ref 20–32)
CREAT SERPL-MCNC: 0.96 MG/DL (ref 0.52–1.04)
ERYTHROCYTE [DISTWIDTH] IN BLOOD BY AUTOMATED COUNT: 13.3 % (ref 10–15)
GFR SERPL CREATININE-BSD FRML MDRD: 72 ML/MIN/{1.73_M2}
HCT VFR BLD AUTO: 43 % (ref 35–47)
HGB BLD-MCNC: 13.7 G/DL (ref 11.7–15.7)
INR PPP: 0.93 (ref 0.86–1.14)
KCT BLD-ACNC: 227 SEC (ref 75–150)
KCT BLD-ACNC: 267 SEC (ref 75–150)
MCH RBC QN AUTO: 25.9 PG (ref 26.5–33)
MCHC RBC AUTO-ENTMCNC: 31.9 G/DL (ref 31.5–36.5)
MCV RBC AUTO: 81 FL (ref 78–100)
PLATELET # BLD AUTO: 257 10E9/L (ref 150–450)
POTASSIUM SERPL-SCNC: 4.6 MMOL/L (ref 3.4–5.3)
RBC # BLD AUTO: 5.29 10E12/L (ref 3.8–5.2)
SODIUM SERPL-SCNC: 140 MMOL/L (ref 133–144)
WBC # BLD AUTO: 6 10E9/L (ref 4–11)

## 2021-06-10 PROCEDURE — 250N000011 HC RX IP 250 OP 636: Performed by: NURSE PRACTITIONER

## 2021-06-10 PROCEDURE — 250N000011 HC RX IP 250 OP 636: Performed by: INTERNAL MEDICINE

## 2021-06-10 PROCEDURE — 272N000001 HC OR GENERAL SUPPLY STERILE: Performed by: INTERNAL MEDICINE

## 2021-06-10 PROCEDURE — 93005 ELECTROCARDIOGRAM TRACING: CPT

## 2021-06-10 PROCEDURE — 93325 DOPPLER ECHO COLOR FLOW MAPG: CPT

## 2021-06-10 PROCEDURE — 210N000002 HC R&B HEART CARE

## 2021-06-10 PROCEDURE — 250N000013 HC RX MED GY IP 250 OP 250 PS 637: Performed by: STUDENT IN AN ORGANIZED HEALTH CARE EDUCATION/TRAINING PROGRAM

## 2021-06-10 PROCEDURE — 370N000017 HC ANESTHESIA TECHNICAL FEE, PER MIN: Performed by: INTERNAL MEDICINE

## 2021-06-10 PROCEDURE — 93312 ECHO TRANSESOPHAGEAL: CPT | Mod: 26 | Performed by: INTERNAL MEDICINE

## 2021-06-10 PROCEDURE — 258N000003 HC RX IP 258 OP 636: Performed by: NURSE PRACTITIONER

## 2021-06-10 PROCEDURE — C1894 INTRO/SHEATH, NON-LASER: HCPCS | Performed by: INTERNAL MEDICINE

## 2021-06-10 PROCEDURE — 93580 TRANSCATH CLOSURE OF ASD: CPT | Performed by: INTERNAL MEDICINE

## 2021-06-10 PROCEDURE — 250N000009 HC RX 250: Performed by: INTERNAL MEDICINE

## 2021-06-10 PROCEDURE — 250N000009 HC RX 250: Performed by: NURSE ANESTHETIST, CERTIFIED REGISTERED

## 2021-06-10 PROCEDURE — 250N000025 HC SEVOFLURANE, PER MIN: Performed by: INTERNAL MEDICINE

## 2021-06-10 PROCEDURE — C1769 GUIDE WIRE: HCPCS | Performed by: INTERNAL MEDICINE

## 2021-06-10 PROCEDURE — 250N000011 HC RX IP 250 OP 636: Performed by: NURSE ANESTHETIST, CERTIFIED REGISTERED

## 2021-06-10 PROCEDURE — 80051 ELECTROLYTE PANEL: CPT | Performed by: NURSE PRACTITIONER

## 2021-06-10 PROCEDURE — 250N000013 HC RX MED GY IP 250 OP 250 PS 637: Performed by: INTERNAL MEDICINE

## 2021-06-10 PROCEDURE — C1887 CATHETER, GUIDING: HCPCS | Performed by: INTERNAL MEDICINE

## 2021-06-10 PROCEDURE — 85027 COMPLETE CBC AUTOMATED: CPT | Performed by: NURSE PRACTITIONER

## 2021-06-10 PROCEDURE — 85730 THROMBOPLASTIN TIME PARTIAL: CPT | Performed by: NURSE PRACTITIONER

## 2021-06-10 PROCEDURE — 93321 DOPPLER ECHO F-UP/LMTD STD: CPT | Mod: 26 | Performed by: INTERNAL MEDICINE

## 2021-06-10 PROCEDURE — 36415 COLL VENOUS BLD VENIPUNCTURE: CPT | Performed by: NURSE PRACTITIONER

## 2021-06-10 PROCEDURE — 278N000051 HC OR IMPLANT GENERAL: Performed by: INTERNAL MEDICINE

## 2021-06-10 PROCEDURE — C1760 CLOSURE DEV, VASC: HCPCS | Performed by: INTERNAL MEDICINE

## 2021-06-10 PROCEDURE — 93325 DOPPLER ECHO COLOR FLOW MAPG: CPT | Mod: 26 | Performed by: INTERNAL MEDICINE

## 2021-06-10 PROCEDURE — 82565 ASSAY OF CREATININE: CPT | Performed by: NURSE PRACTITIONER

## 2021-06-10 PROCEDURE — 85610 PROTHROMBIN TIME: CPT | Performed by: NURSE PRACTITIONER

## 2021-06-10 PROCEDURE — 85347 COAGULATION TIME ACTIVATED: CPT | Mod: 91

## 2021-06-10 PROCEDURE — 02LR3DT OCCLUSION OF DUCTUS ARTERIOSUS WITH INTRALUMINAL DEVICE, PERCUTANEOUS APPROACH: ICD-10-PCS | Performed by: INTERNAL MEDICINE

## 2021-06-10 DEVICE — OCCLUDER CARDIOFORM SEPTAL 30MM: Type: IMPLANTABLE DEVICE | Status: FUNCTIONAL

## 2021-06-10 RX ORDER — ONDANSETRON 2 MG/ML
INJECTION INTRAMUSCULAR; INTRAVENOUS PRN
Status: DISCONTINUED | OUTPATIENT
Start: 2021-06-10 | End: 2021-06-10

## 2021-06-10 RX ORDER — PROPOFOL 10 MG/ML
INJECTION, EMULSION INTRAVENOUS PRN
Status: DISCONTINUED | OUTPATIENT
Start: 2021-06-10 | End: 2021-06-10

## 2021-06-10 RX ORDER — HEPARIN SODIUM 1000 [USP'U]/ML
INJECTION, SOLUTION INTRAVENOUS; SUBCUTANEOUS
Status: DISCONTINUED | OUTPATIENT
Start: 2021-06-10 | End: 2021-06-10 | Stop reason: HOSPADM

## 2021-06-10 RX ORDER — ONDANSETRON 2 MG/ML
4 INJECTION INTRAMUSCULAR; INTRAVENOUS EVERY 30 MIN PRN
Status: DISCONTINUED | OUTPATIENT
Start: 2021-06-10 | End: 2021-06-10 | Stop reason: HOSPADM

## 2021-06-10 RX ORDER — SODIUM CHLORIDE, SODIUM LACTATE, POTASSIUM CHLORIDE, CALCIUM CHLORIDE 600; 310; 30; 20 MG/100ML; MG/100ML; MG/100ML; MG/100ML
INJECTION, SOLUTION INTRAVENOUS CONTINUOUS
Status: DISCONTINUED | OUTPATIENT
Start: 2021-06-10 | End: 2021-06-10 | Stop reason: HOSPADM

## 2021-06-10 RX ORDER — ASPIRIN 81 MG/1
81 TABLET ORAL DAILY
Status: DISCONTINUED | OUTPATIENT
Start: 2021-06-10 | End: 2021-06-11 | Stop reason: HOSPADM

## 2021-06-10 RX ORDER — ACETAMINOPHEN 325 MG/1
650 TABLET ORAL EVERY 4 HOURS PRN
Status: DISCONTINUED | OUTPATIENT
Start: 2021-06-10 | End: 2021-06-11 | Stop reason: HOSPADM

## 2021-06-10 RX ORDER — BUPIVACAINE HYDROCHLORIDE 2.5 MG/ML
INJECTION, SOLUTION EPIDURAL; INFILTRATION; INTRACAUDAL
Status: DISCONTINUED | OUTPATIENT
Start: 2021-06-10 | End: 2021-06-10 | Stop reason: HOSPADM

## 2021-06-10 RX ORDER — CEPHALEXIN 500 MG/1
500 CAPSULE ORAL 3 TIMES DAILY
Status: DISCONTINUED | OUTPATIENT
Start: 2021-06-10 | End: 2021-06-11

## 2021-06-10 RX ORDER — LIDOCAINE HYDROCHLORIDE 20 MG/ML
INJECTION, SOLUTION INFILTRATION; PERINEURAL PRN
Status: DISCONTINUED | OUTPATIENT
Start: 2021-06-10 | End: 2021-06-10

## 2021-06-10 RX ORDER — ONDANSETRON 4 MG/1
4 TABLET, ORALLY DISINTEGRATING ORAL EVERY 30 MIN PRN
Status: DISCONTINUED | OUTPATIENT
Start: 2021-06-10 | End: 2021-06-10 | Stop reason: HOSPADM

## 2021-06-10 RX ORDER — CLOPIDOGREL BISULFATE 75 MG/1
75 TABLET ORAL DAILY
Status: DISCONTINUED | OUTPATIENT
Start: 2021-06-11 | End: 2021-06-11 | Stop reason: HOSPADM

## 2021-06-10 RX ORDER — SODIUM CHLORIDE 9 MG/ML
INJECTION, SOLUTION INTRAVENOUS CONTINUOUS
Status: DISCONTINUED | OUTPATIENT
Start: 2021-06-10 | End: 2021-06-10 | Stop reason: HOSPADM

## 2021-06-10 RX ORDER — ACETAMINOPHEN 650 MG/1
650 SUPPOSITORY RECTAL EVERY 4 HOURS PRN
Status: DISCONTINUED | OUTPATIENT
Start: 2021-06-10 | End: 2021-06-11 | Stop reason: HOSPADM

## 2021-06-10 RX ORDER — LIDOCAINE HYDROCHLORIDE 20 MG/ML
5 SOLUTION OROPHARYNGEAL
Status: DISCONTINUED | OUTPATIENT
Start: 2021-06-10 | End: 2021-06-11 | Stop reason: HOSPADM

## 2021-06-10 RX ORDER — CLOPIDOGREL BISULFATE 75 MG/1
75 TABLET ORAL ONCE
Status: DISCONTINUED | OUTPATIENT
Start: 2021-06-10 | End: 2021-06-10 | Stop reason: HOSPADM

## 2021-06-10 RX ORDER — PROPOFOL 10 MG/ML
INJECTION, EMULSION INTRAVENOUS CONTINUOUS PRN
Status: DISCONTINUED | OUTPATIENT
Start: 2021-06-10 | End: 2021-06-10

## 2021-06-10 RX ORDER — ASPIRIN 81 MG/1
81 TABLET, CHEWABLE ORAL ONCE
Status: DISCONTINUED | OUTPATIENT
Start: 2021-06-10 | End: 2021-06-10 | Stop reason: HOSPADM

## 2021-06-10 RX ORDER — FENTANYL CITRATE 50 UG/ML
INJECTION, SOLUTION INTRAMUSCULAR; INTRAVENOUS PRN
Status: DISCONTINUED | OUTPATIENT
Start: 2021-06-10 | End: 2021-06-10

## 2021-06-10 RX ORDER — HYDROMORPHONE HYDROCHLORIDE 1 MG/ML
.3-.5 INJECTION, SOLUTION INTRAMUSCULAR; INTRAVENOUS; SUBCUTANEOUS EVERY 5 MIN PRN
Status: DISCONTINUED | OUTPATIENT
Start: 2021-06-10 | End: 2021-06-10 | Stop reason: HOSPADM

## 2021-06-10 RX ORDER — LIDOCAINE 40 MG/G
CREAM TOPICAL
Status: DISCONTINUED | OUTPATIENT
Start: 2021-06-10 | End: 2021-06-10 | Stop reason: HOSPADM

## 2021-06-10 RX ORDER — DEXAMETHASONE SODIUM PHOSPHATE 4 MG/ML
INJECTION, SOLUTION INTRA-ARTICULAR; INTRALESIONAL; INTRAMUSCULAR; INTRAVENOUS; SOFT TISSUE PRN
Status: DISCONTINUED | OUTPATIENT
Start: 2021-06-10 | End: 2021-06-10

## 2021-06-10 RX ORDER — FENTANYL CITRATE 50 UG/ML
25-50 INJECTION, SOLUTION INTRAMUSCULAR; INTRAVENOUS
Status: DISCONTINUED | OUTPATIENT
Start: 2021-06-10 | End: 2021-06-10 | Stop reason: HOSPADM

## 2021-06-10 RX ADMIN — CEPHALEXIN 500 MG: 500 CAPSULE ORAL at 18:39

## 2021-06-10 RX ADMIN — ROCURONIUM BROMIDE 50 MG: 10 INJECTION INTRAVENOUS at 08:41

## 2021-06-10 RX ADMIN — VANCOMYCIN HYDROCHLORIDE 1500 MG: 5 INJECTION, POWDER, LYOPHILIZED, FOR SOLUTION INTRAVENOUS at 08:50

## 2021-06-10 RX ADMIN — PROPOFOL 200 MG: 10 INJECTION, EMULSION INTRAVENOUS at 08:41

## 2021-06-10 RX ADMIN — ROCURONIUM BROMIDE 10 MG: 10 INJECTION INTRAVENOUS at 09:15

## 2021-06-10 RX ADMIN — ONDANSETRON 4 MG: 2 INJECTION INTRAMUSCULAR; INTRAVENOUS at 09:30

## 2021-06-10 RX ADMIN — ONDANSETRON 4 MG: 2 INJECTION INTRAMUSCULAR; INTRAVENOUS at 09:08

## 2021-06-10 RX ADMIN — SODIUM CHLORIDE: 9 INJECTION, SOLUTION INTRAVENOUS at 07:50

## 2021-06-10 RX ADMIN — CEPHALEXIN 500 MG: 500 CAPSULE ORAL at 13:15

## 2021-06-10 RX ADMIN — SUGAMMADEX 400 MG: 100 INJECTION, SOLUTION INTRAVENOUS at 09:29

## 2021-06-10 RX ADMIN — ACETAMINOPHEN 650 MG: 325 TABLET, FILM COATED ORAL at 21:10

## 2021-06-10 RX ADMIN — FENTANYL CITRATE 50 MCG: 50 INJECTION, SOLUTION INTRAMUSCULAR; INTRAVENOUS at 08:50

## 2021-06-10 RX ADMIN — DEXAMETHASONE SODIUM PHOSPHATE 4 MG: 4 INJECTION, SOLUTION INTRA-ARTICULAR; INTRALESIONAL; INTRAMUSCULAR; INTRAVENOUS; SOFT TISSUE at 09:08

## 2021-06-10 RX ADMIN — MIDAZOLAM 2 MG: 1 INJECTION INTRAMUSCULAR; INTRAVENOUS at 08:35

## 2021-06-10 RX ADMIN — CEPHALEXIN 500 MG: 500 CAPSULE ORAL at 21:10

## 2021-06-10 RX ADMIN — PROPOFOL 200 MCG/KG/MIN: 10 INJECTION, EMULSION INTRAVENOUS at 08:45

## 2021-06-10 RX ADMIN — LIDOCAINE HYDROCHLORIDE 100 MG: 20 INJECTION, SOLUTION INFILTRATION; PERINEURAL at 08:41

## 2021-06-10 RX ADMIN — FENTANYL CITRATE 50 MCG: 50 INJECTION, SOLUTION INTRAMUSCULAR; INTRAVENOUS at 08:41

## 2021-06-10 ASSESSMENT — ENCOUNTER SYMPTOMS: DYSRHYTHMIAS: 0

## 2021-06-10 ASSESSMENT — MIFFLIN-ST. JEOR: SCORE: 1689.16

## 2021-06-10 NOTE — ANESTHESIA CARE TRANSFER NOTE
Patient: Noa [facility]    Procedure(s):  Patent Foramen Ovale Closure    Diagnosis: remote stroke  Diagnosis Additional Information: No value filed.    Anesthesia Type:   General     Note:    Oropharynx: spontaneously breathing  Level of Consciousness: awake  Oxygen Supplementation: face mask  Level of Supplemental Oxygen (L/min / FiO2): 6  Independent Airway: airway patency satisfactory and stable  Dentition: dentition unchanged  Vital Signs Stable: post-procedure vital signs reviewed and stable  Report to RN Given: handoff report given  Patient transferred to: PACU  Comments: Neuromuscular blockade reversed after TOF 4/4, spontaneous respirations, oropharynx suctioned with soft flexible catheter, airway patent after extubation.  Oxygen via facemask at 10 liters per minute to PACU. Oxygen tubing connected to wall O2 in PACU, SpO2, NiBP, and EKG monitors and alarms on and functioning, Zoey Hugger warmer connected to patient gown, report on patient's clinical status given to PACU RN, RN questions answered.     Handoff Report: Identifed the Patient, Identified the Reponsible Provider, Reviewed the pertinent medical history, Discussed the surgical course, Reviewed Intra-OP anesthesia mangement and issues during anesthesia, Set expectations for post-procedure period and Allowed opportunity for questions and acknowledgement of understanding      Vitals: (Last set prior to Anesthesia Care Transfer)  CRNA VITALS  6/10/2021 0923 - 6/10/2021 0953      6/10/2021             Resp Rate (set):  10        Electronically Signed By: ZULEMA Inman CRNA  Zahraa 10, 2021  9:53 AM

## 2021-06-10 NOTE — OR NURSING
"Oxygen sats good - pt feels \"can't breathe\" - anxious - MDA is here and feels due to anxiety Will continue to monitor closely and offer reassurance   "

## 2021-06-10 NOTE — ANESTHESIA POSTPROCEDURE EVALUATION
Patient: Noa [facility]    Procedure(s):  Patent Foramen Ovale Closure    Diagnosis:remote stroke  Diagnosis Additional Information: No value filed.    Anesthesia Type:  General    Note:  Disposition: Outpatient   Postop Pain Control: Uneventful            Sign Out: Well controlled pain   PONV:    Neuro/Psych: Uneventful            Sign Out: Acceptable/Baseline neuro status   Airway/Respiratory: Uneventful            Sign Out: Acceptable/Baseline resp. status   CV/Hemodynamics: Uneventful            Sign Out: Acceptable CV status   Other NRE: NONE   DID A NON-ROUTINE EVENT OCCUR? No    Event details/Postop Comments:  Some chest heaviness post op. Cardiology evaluated and was unconcerned. Feels more like a resistance to deep breathing.            Last vitals:  Vitals:    06/10/21 1100 06/10/21 1110 06/10/21 1115   BP: 128/75 134/79 134/79   Pulse: 80 81 77   Resp: 17 14 13   Temp:      SpO2: 98% 100% 98%       Last vitals prior to Anesthesia Care Transfer:  CRNA VITALS  6/10/2021 0923 - 6/10/2021 1023      6/10/2021             NIBP:  122/74    Pulse:  85    NIBP Mean:  84          Electronically Signed By: Jonny Aceves MD  Zahraa 10, 2021  11:25 AM   Tremfya Counseling: I discussed with the patient the risks of guselkumab including but not limited to immunosuppression, serious infections, worsening of inflammatory bowel disease and drug reactions.  The patient understands that monitoring is required including a PPD at baseline and must alert us or the primary physician if symptoms of infection or other concerning signs are noted.

## 2021-06-10 NOTE — CONSULTS
Discharge Pharmacy Test Claim    Ran test script for eliquis through pt's [employer] commer[employer] insurance. Pt has an unmet deductible with $3446.08 remaining. Initial copay of eliquis will be $499.76. Pt is eligible for eliquis copay card that will reduce the copay to $10/mo. Pt must call [phone] to activate. [facility] pharmacy has a one-time use 30-day free trial voucher available too.     Eliquis copay card    Bridget Rhodes  [facility] Liaison  Ph: [phone] Pager: [phone]

## 2021-06-10 NOTE — PROGRESS NOTES
Received call from pt's hospital RNPraveen, who requested clarification on pt's post procedure antibiotic order. Praveen was advised by REGAN Lam to contact Dr. Cornejo directly to clarify orders and Praveen agrees with this plan.    SERG Ibarra 12:47 PM 6/10/2021

## 2021-06-10 NOTE — PRE-PROCEDURE
GENERAL PRE-PROCEDURE:   Procedure:  Pfo cl    Risks and benefits: Risks, benefits and alternatives were discussed    Consent given by:  Patient  Patient states understanding of procedure being performed: Yes    Patient's understanding of procedure matches consent: Yes    Procedure consent matches procedure scheduled: Yes    Expected level of sedation:  Deep  Appropriately NPO:  Yes  ASA Class:  Class 2- mild systemic disease, no acute problems, no functional limitations  Lungs:  Lungs clear with good breath sounds bilaterally  Heart:  Normal heart sounds and rate  History & Physical reviewed:  History and physical reviewed and no updates needed  Statement of review:  I have reviewed the lab findings, diagnostic data, medications, and the plan for sedation  reviewed saqib results  Discussed previously and again risk/benefit  Anticoagulation protocol plan reviewed with patient

## 2021-06-10 NOTE — ANESTHESIA PREPROCEDURE EVALUATION
Anesthesia Pre-Procedure Evaluation    Patient: Noa Mcgill   MRN: [id number] : 1975        Preoperative Diagnosis: remote stroke   Procedure : Procedure(s):  Patent Foramen Ovale Closure     Past Medical History:   Diagnosis Date     Adrenal insufficiency ([place]'s disease) (H)     ACTH stim test failed to increase cortisol     Allergic rhinitis, cause unspecified      Antiplatelet or antithrombotic long-term use      Arthritis      ASD (atrial septal defect) 02/15/2013    ASD dx by echo, pt declined ASD closure     Attention deficit disorder without mention of hyperactivity 2014     BRCA1 gene mutation positive 2018    Reported by patient, no report available at this time Records requested from [employer] [facility]     Cerebral infarction (H)      Clotting disorder (H) birth    undefined clotting disorder - sees Dr. Sanchez Retinal artery occlusion, R int mammary artery occlusion post breastCA surg and recd TPA     Congenital heart disease      Depressive disorder      Endometriosis of uterus      HERPES SIMPLEX NOS 2007    cold sores on remicade     Insomnia, unspecified 2/10/2005     Lung nodule < 6cm on CT 2019    consider repeat in 2020 - low risk but around a lot of second hand smoke growing up     Migraine headaches      Other chronic sinusitis      Postoperative urinary retention     every surgery     Psoriatic arthropathy (H) 2007     Recurrent UTI      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      Past Surgical History:   Procedure Laterality Date     APPENDECTOMY  2006      SECTION  2013    Procedure:  SECTION;  Primary  Section;  Surgeon: Chad Hughes MD;  Location: [facility] L+D     DILATION AND CURETTAGE SUCTION N/A 2014    Procedure: DILATION AND CURETTAGE SUCTION;  Surgeon: Srini Martinez MD;  Location:  OR     DILATION AND CURETTAGE SUCTION N/A 2014     Procedure: DILATION AND CURETTAGE SUCTION;  Surgeon: Connor Kang MD;  Location: [facility] OR     GENITOURINARY SURGERY  2016    bladder sling and complete historectomy     HC DILATION/CURETTAGE DIAG/THER NON OB  1998     HC DILATION/CURETTAGE DIAG/THER NON OB  2006     HERNIORRHAPHY INCISIONAL (LOCATION) N/A 12/7/2018    Procedure: Incisional Hernia repair with Biologic mesh;  Surgeon: Suraj Bergeron MD;  Location: [facility] OR     HYSTERECTOMY, [facility]  04/18/2016    does not need pap     MASTECTOMY SIMPLE BILATERAL Bilateral 5/25/2018    Procedure: MASTECTOMY SIMPLE BILATERAL;  PROPHYLACTIC BILATERAL MASTECTOMY (GLADIS) BILATERAL BREAST RECONSTRUCTION WT TISSUE EXPANDERS (WILLOW)   ;  Surgeon: Suraj Bergeron MD;  Location:  OR     ORTHOPEDIC SURGERY      (L) thumb fused     RECONSTRUCT BREAST, INSERT TISSUE EXPANDER BILATERAL, COMBINED Bilateral 5/25/2018    expander and tram flap with drains - had weekly surgery to have I & D and removal of infected tissue     SURGICAL HISTORY OF -   2006    left thumb fusion due to arthritis      Allergies   Allergen Reactions     Tnf Antagonists Hives     Enbrel Hives     Humira [Humira]      Rash hives     Prednisone      Pt sensitive to prednisone--shakey heart racing - only with large doses     Compazine [Prochlorperazine] Anxiety     Shakey per H&P dated 5/21/2018  Shaky and anxiety     Nitrofurantoin Hives and Rash      Social History     Tobacco Use     Smoking status: Never Smoker     Smokeless tobacco: Never Used   Substance Use Topics     Alcohol use: Not Currently     Alcohol/week: 0.0 standard drinks     Comment: 1-3drink per week      Wt Readings from Last 1 Encounters:   06/03/21 102.1 kg (225 lb)        Anesthesia Evaluation            ROS/MED HX  ENT/Pulmonary:       Neurologic:     (+) migraines, CVA, date: retinal artery occlusion, without deficits,     Cardiovascular:     (+) -----Taking blood thinners congenital heart disease OS PFO. Previous cardiac testing   Echo:  Date: 2018 Results:  A patent foramen ovale is present with a small left to right atrial shunt.  Images of the shunt were obtained. Thee patient suddenly awoke and pulled the  probe. The study was abbreviated.  Stress Test: Date: Results:    ECG Reviewed: Date: Results:    Cath: Date: Results:   (-) CAD and arrhythmias   METS/Exercise Tolerance: >4 METS    Hematologic:       Musculoskeletal:       GI/Hepatic:    (-) GERD   Renal/Genitourinary:       Endo: Comment: Add[facility]    (+) Obesity,  (-) Type I DM, Type II DM and thyroid disease   Psychiatric/Substance Use:     (+) psychiatric history anxiety and depression (ADHD)     Infectious Disease:       Malignancy: Comment: BRCA (+)      Other:               OUTSIDE LABS:  CBC:   Lab Results   Component Value Date    WBC 12.2 (H) 01/02/2020    WBC 7.2 07/13/2019    HGB 13.7 01/02/2020    HGB 13.3 07/13/2019    HCT 42.9 01/02/2020    HCT 42.1 07/13/2019     01/02/2020     07/13/2019     BMP:   Lab Results   Component Value Date     01/02/2020     07/13/2019    POTASSIUM 4.1 01/02/2020    POTASSIUM 4.3 07/13/2019    CHLORIDE 110 (H) 01/02/2020    CHLORIDE 111 (H) 07/13/2019    CO2 20 01/02/2020    CO2 24 07/13/2019    BUN 16 01/02/2020    BUN 10 07/13/2019    CR 0.80 01/02/2020    CR 0.78 07/13/2019     (H) 01/02/2020    GLC 91 07/13/2019     COAGS:   Lab Results   Component Value Date    PTT 27 05/21/2013    INR 0.92 05/21/2013    FIBR 410 04/01/2013     POC:   Lab Results   Component Value Date    HCG Negative 05/30/2019     HEPATIC:   Lab Results   Component Value Date    ALBUMIN 3.9 03/11/2019    PROTTOTAL 7.1 03/11/2019    ALT 21 03/11/2019    AST 14 03/11/2019    ALKPHOS 59 03/11/2019    BILITOTAL 0.2 03/11/2019     OTHER:   Lab Results   Component Value Date    A1C 5.2 05/08/2014    KENDRICK 8.7 01/02/2020    MAG 1.9 06/07/2013    LIPASE 117 06/11/2014    TSH 2.63 03/11/2019    T4 1.04 10/21/2013    CRP 2.6 05/23/2006    SED 7 08/12/2009        Anesthesia Plan    ASA Status:  2      Anesthesia Type: General.     - Airway: ETT   Induction: Propofol.   Maintenance: Balanced.        Consents    Anesthesia Plan(s) and associated risks, benefits, and realistic alternatives discussed. Questions answered and patient/representative(s) expressed understanding.     - Discussed with:  Patient      - Extended Intubation/Ventilatory Support Discussed: No.      - Patient is DNR/DNI Status: No    Use of blood products discussed: No .     Postoperative Care    Pain management: Multi-modal analgesia.   PONV prophylaxis: Ondansetron (or other 5HT-3), Dexamethasone or Solumedrol     Comments:    Patient is counseled on the anesthesia plan and relevant anesthesia procedures (vascular lines/blocks/KAYLIE/airway devices) including all risks and benefits. All patient questions were answered.        H&P reviewed: Unable to attach H&P to encounter due to EHR limitations. H&P Update: appropriate H&P reviewed, patient examined. No interval changes since H&P (within 30 days).         Jonny Aceves MD

## 2021-06-10 NOTE — ANESTHESIA PROCEDURE NOTES
Airway       Patient location during procedure: OR ([facility] - Operating Room or Procedural Area)  Staff -        Anesthesiologist:  Jonny Aceves MD       CRNA: Asia Guzman APRN CRNA       Other Anesthesia Staff: Estevan Monzon       Performed By: SRNA  Consent for Airway        Urgency: elective  Indications and Patient Condition       Indications for airway management: dony-procedural       Induction type:intravenous       Mask difficulty assessment: 1 - vent by mask    Final Airway Details       Final airway type: endotracheal airway    Endotracheal Airway Details        Cuffed: yes       Successful intubation technique: video laryngoscopy       VL Blade Size: Louie 3       Grade View of Cords: 1       Adjucts: stylet       Position: Right       Measured from: lips       Secured at (cm): 21       Bite block used: None    Post intubation assessment        Number of attempts at approach: 1       Number of other approaches attempted: 0       Secured with: pink tape       Ease of procedure: easy       Dentition: Intact and Unchanged    Medication(s) Administered   Medication Administration Time: 6/10/2021 8:45 AM

## 2021-06-10 NOTE — OR NURSING
Still a bit anxious and says hard to breathe at times - dozes off and breathing regularly  and sats good - MDA okay with patient to go to floor

## 2021-06-11 ENCOUNTER — APPOINTMENT (OUTPATIENT)
Dept: CARDIOLOGY | Facility: CLINIC | Age: 46
DRG: 272 | End: 2021-06-11
Attending: STUDENT IN AN ORGANIZED HEALTH CARE EDUCATION/TRAINING PROGRAM
Payer: COMMERCIAL

## 2021-06-11 VITALS
RESPIRATION RATE: 16 BRPM | OXYGEN SATURATION: 99 % | BODY MASS INDEX: 35.9 KG/M2 | HEIGHT: 66 IN | SYSTOLIC BLOOD PRESSURE: 113 MMHG | HEART RATE: 93 BPM | TEMPERATURE: 98.6 F | DIASTOLIC BLOOD PRESSURE: 69 MMHG | WEIGHT: 223.4 LBS

## 2021-06-11 PROCEDURE — 93325 DOPPLER ECHO COLOR FLOW MAPG: CPT | Mod: 26 | Performed by: INTERNAL MEDICINE

## 2021-06-11 PROCEDURE — 93308 TTE F-UP OR LMTD: CPT | Mod: 26 | Performed by: INTERNAL MEDICINE

## 2021-06-11 PROCEDURE — 93325 DOPPLER ECHO COLOR FLOW MAPG: CPT

## 2021-06-11 PROCEDURE — 250N000013 HC RX MED GY IP 250 OP 250 PS 637: Performed by: STUDENT IN AN ORGANIZED HEALTH CARE EDUCATION/TRAINING PROGRAM

## 2021-06-11 PROCEDURE — 250N000013 HC RX MED GY IP 250 OP 250 PS 637: Performed by: NURSE PRACTITIONER

## 2021-06-11 PROCEDURE — 93321 DOPPLER ECHO F-UP/LMTD STD: CPT | Mod: 26 | Performed by: INTERNAL MEDICINE

## 2021-06-11 RX ORDER — ASPIRIN 81 MG/1
81 TABLET ORAL EVERY MORNING
Status: DISCONTINUED | OUTPATIENT
Start: 2021-06-11 | End: 2021-06-11

## 2021-06-11 RX ORDER — CHOLECALCIFEROL (VITAMIN D3) 50 MCG
50 TABLET ORAL DAILY
Status: DISCONTINUED | OUTPATIENT
Start: 2021-06-11 | End: 2021-06-11 | Stop reason: HOSPADM

## 2021-06-11 RX ORDER — NALOXONE HYDROCHLORIDE 0.4 MG/ML
0.4 INJECTION, SOLUTION INTRAMUSCULAR; INTRAVENOUS; SUBCUTANEOUS
Status: DISCONTINUED | OUTPATIENT
Start: 2021-06-11 | End: 2021-06-11 | Stop reason: HOSPADM

## 2021-06-11 RX ORDER — CEFDINIR 300 MG/1
300 CAPSULE ORAL EVERY 12 HOURS SCHEDULED
Status: DISCONTINUED | OUTPATIENT
Start: 2021-06-11 | End: 2021-06-11 | Stop reason: HOSPADM

## 2021-06-11 RX ORDER — PHENAZOPYRIDINE HYDROCHLORIDE 200 MG/1
200 TABLET, FILM COATED ORAL 3 TIMES DAILY PRN
Status: DISCONTINUED | OUTPATIENT
Start: 2021-06-11 | End: 2021-06-11 | Stop reason: HOSPADM

## 2021-06-11 RX ORDER — MULTIVIT WITH MINERALS/LUTEIN
250 TABLET ORAL DAILY
Status: DISCONTINUED | OUTPATIENT
Start: 2021-06-11 | End: 2021-06-11 | Stop reason: CLARIF

## 2021-06-11 RX ORDER — ZOLPIDEM TARTRATE 5 MG/1
10 TABLET ORAL
Status: DISCONTINUED | OUTPATIENT
Start: 2021-06-11 | End: 2021-06-11 | Stop reason: HOSPADM

## 2021-06-11 RX ORDER — NALOXONE HYDROCHLORIDE 0.4 MG/ML
0.2 INJECTION, SOLUTION INTRAMUSCULAR; INTRAVENOUS; SUBCUTANEOUS
Status: DISCONTINUED | OUTPATIENT
Start: 2021-06-11 | End: 2021-06-11 | Stop reason: HOSPADM

## 2021-06-11 RX ORDER — CEFDINIR 300 MG/1
300 CAPSULE ORAL EVERY 12 HOURS
Qty: 21 CAPSULE | Refills: 1 | Status: SHIPPED | OUTPATIENT
Start: 2021-06-11 | End: 2021-06-17

## 2021-06-11 RX ORDER — BUTALBITAL, ACETAMINOPHEN AND CAFFEINE 50; 325; 40 MG/1; MG/1; MG/1
1 TABLET ORAL EVERY 4 HOURS PRN
Status: DISCONTINUED | OUTPATIENT
Start: 2021-06-11 | End: 2021-06-11 | Stop reason: HOSPADM

## 2021-06-11 RX ORDER — RIZATRIPTAN BENZOATE 10 MG/1
10 TABLET ORAL
Status: DISCONTINUED | OUTPATIENT
Start: 2021-06-11 | End: 2021-06-11 | Stop reason: HOSPADM

## 2021-06-11 RX ADMIN — CEFDINIR 300 MG: 300 CAPSULE ORAL at 09:36

## 2021-06-11 RX ADMIN — APIXABAN 5 MG: 5 TABLET, FILM COATED ORAL at 08:26

## 2021-06-11 RX ADMIN — ASPIRIN 81 MG: 81 TABLET, COATED ORAL at 08:26

## 2021-06-11 RX ADMIN — CLOPIDOGREL BISULFATE 75 MG: 75 TABLET ORAL at 08:26

## 2021-06-11 RX ADMIN — Medication 50 MCG: at 09:36

## 2021-06-11 ASSESSMENT — MIFFLIN-ST. JEOR: SCORE: 1680.09

## 2021-06-11 NOTE — PLAN OF CARE
VSS. Tele shows sr. Pt denies any CP or SOB. Discharge instructions, medication indications/side effects, and follow up instructions discussed w/ pt. Handouts given. All questions answered. All pt belongings are accounted for and sent w/ pt. Pt left floor via ambulation and was driven home by .

## 2021-06-11 NOTE — DISCHARGE SUMMARY
[facility]    Discharge Summary/Cardiology Progress Note    Date of Service: 06/11/2021     Assessment & Plan   Noa Mcgill is a 46 year old female with past medical history of PFO, renal artery occlusion post pregnancy, BRCA1 gene positive with family history of breast cancer s/p bilateral mastectomy and transflap reconstruction complicated by intraoperative thrombosis of the right internal mammary artery requiring intrathoracic thrombectomy, venous thrombosis of the left TRAM flap, treated with tPA, trans flap failed and she underwent reconstruction of the failed flap and required blood transfusions during that procedure, MRSA positive in her nose, hysterectomy with complication of ventral hernia with surgical repair     This patient was admitted on 6/10/2021 for percutaneous closure of PFO     1. PFO  S/p successful closure using ICE  #30mm Calpine Cardioform Occluder Device  perclose in place  -Plavix 75mg daily and ASA 81mg daily  -add Eliquis 5mg bid started this am  -triple therapy (asa/plavix/eliquis) for one week  -then Plavix/Eliquis for three months  -then ASA/Plavix for three months after that          -reviewed post procedure restrictions  *10 pound lifting restriction week one  *20-30 pound lifting restriction week two and three    *no heavy aerobics for one month  *no driving for minimum of 48 hours  *no contact sports, scuba diving or sean diving for 3 months  *avoid elective dental work for 6 months; if urgent will need SBE prophylaxis for six months  *follow up echo/bubble study and follow up with Cardiologist in 3 months post closure  I will see her back with a limited echo one to two weeks post closure    HOME after up walking an no groin ooze on Eliquis and echo read    2. S/p hysterectomy  Remain off of nuvaring for 1-3 months post closure to limit clotting risk  Will decide as OP      3. 2. Multiple thrombotic events by history as outlined  hypercoaguable panel  is negative X2 but Hematology is still suspicious based on clinical history that she has an undefined hypercoaguable state    3. Frequent UTI's  By history  Only med that clears her is Cefdinir  Has drake and has voided  Will change Keflex to Cefidnir 300mg bid for 7 days             Jose R Lui, MSN, APRN, CNP  [facility]      Interval History     Review of Systems:  The Review of Systems is negative other than noted in the HPI    Physical Exam   Temp: 98.6  F (37  C) Temp src: Oral BP: 107/66 Pulse: 84   Resp: 16 SpO2: 95 % O2 Device: None (Room air) Oxygen Delivery: 2 LPM  Vitals:    06/10/21 0725 06/11/21 0613   Weight: 102.2 kg (225 lb 6.4 oz) 101.3 kg (223 lb 6.4 oz)     Vital Signs with Ranges  Temp:  [98.2  F (36.8  C)-98.6  F (37  C)] 98.6  F (37  C)  Pulse:  [70-90] 84  Resp:  [9-21] 16  BP: (104-134)/(56-98) 107/66  SpO2:  [95 %-100 %] 95 %  I/O last 3 completed shifts:  In: 1220 [P.O.:720; I.V.:500]  Out: 2800 [Urine:2800]    Constitutional     alert and oriented, in no acute distress.     Skin     warm and dry to touch    ENT     no pallor or cyanosis    Neck    Supple, JVP normal, no carotid bruit    Chest     no tenderness to palpation     Lungs  clear to auscultation     Cardiac  regular rhythm, S1 normal, S2 normal, No S3 or S4, no murmurs, no rubs    Abdomen     abdomen soft, bowel sounds normoactive, no hepatosplenomegaly    Extremities and Back     no clubbing, cyanosis. No edema observed.  Right groin very clean without hum or bruit      Neurological     no gross motor deficits noted, affect appropriate, oriented to time, person and place.        Medications       apixaban ANTICOAGULANT  5 mg Oral BID     aspirin  81 mg Oral Daily     cephALEXin  500 mg Oral TID     clopidogrel  75 mg Oral Daily          Data:     ROUTINE IP LABS (Last four results)  BMP  Recent Labs   Lab 06/10/21  0748      POTASSIUM 4.6   CHLORIDE 110*   CO2 27   CR 0.96     CHOLESTEROL/HEPATICNo lab  results found in last 7 days.  CBC  Recent Labs   Lab 06/10/21  0748   WBC 6.0   RBC 5.29*   HGB 13.7   HCT 43.0   MCV 81   MCH 25.9*   MCHC 31.9   RDW 13.3        TROP: No lab results found in last 7 days.   BNP:  No results for input(s): NTBNPI in the last 168 hours.  INR  Recent Labs   Lab 06/10/21  0748   INR 0.93     TSH   Date Value Ref Range Status   2019 2.63 0.40 - 4.00 mU/L Final       EKG results:  Reviewed if available     Imaging:  Recent Results (from the past 24 hour(s))   Cardiac Catheterization    Narrative    Successful deployment of a 30mm Crowley Cardioform Septal Occluder device.  No complications    KAYLIE Only- Transesophageal Echocardiogram    Narrative    [id number]  [id number]  [id number]^LISSETTE^SELVIN^KAILYN     [facility]  Echocardiography Laboratory  [address] [place] [address]     Name: REZA VINCENT  MRN: [id number]  : 1975  Study Date: 06/10/2021 08:47 AM  Age: 46 yrs  Gender: Female  Patient Location: [facility]  Reason For Study: Ostium secundum type atrial septal defect  Ordering Physician: SELVIN MCLAUGHLIN  Referring Physician: SELVIN MCLAUGHLIN  Performed By: Catalina Tyler     BSA: 2.1 m2  Height: 66 in  Weight: 225 lb  HR: 84  ______________________________________________________________________________  Procedure  Complete KAYLIE Adult. Echo Guided PFO Closure Adult. 3D image acquisition,  reconstruction, and real-time interpretation was performed.  ______________________________________________________________________________  Interpretation Summary     PROCEDURE:  Intra-procedural KAYLIE for transcatheter patent foramen ovale closure using the  30-mm Crowley Cardioform Septal Occluder device.  Trans-femoral delivery and trans-septal puncture.     BASELINE SURVEY:  1. Large patent foramen ovale with suitable anatomy and dimensions for  closure.  2. No intracardiac thrombus.  3. Normal left ventricular systolic function. LVEF 60 - 65%.  4. No  significant valve disease.     PROCEDURAL MONITORIN. Trans-septal puncture, guiding catheter placement and device delivery  performed under KAYLIE guidance.  2. Stable device position with no flow around its edges.     POST-PROCEDURAL SURVEY:  1. Ventricular function is unchanged.  2. No pericardial effusion.     ______________________________________________________________________________  KAYLIE  Probe insertion by anesthesia staff. There were no complications associated  with this procedure. The transesophageal probe was passed without difficulty.     Left Ventricle  The left ventricle is normal in size. Left ventricular systolic function is  normal. The visual ejection fraction is estimated at 60-65%. No regional wall  motion abnormalities noted. There is no thrombus seen in the left ventricle.     Right Ventricle  The right ventricle is normal size. The right ventricular systolic function is  normal. There is no mass or thrombus in the right ventricle.     Atria  No left atrial mass or thrombus visualized. The left atrium is moderately  dilated. No evidence of right atrial mass/thrombus. Right atrial size is  normal. Intact atrial septum. A contrast injection (Bubble Study) was  performed that was negative for flow across the interatrial septum. There is  no color Doppler evidence of an atrial shunt. The left atrial appendage was  well visualized and free of thrombus.     Mitral Valve  The mitral valve leaflets appear normal. There is no evidence of stenosis,  fluttering, or prolapse. There is trace mitral regurgitation. There is no  mitral valve stenosis.     Tricuspid Valve  Normal tricuspid valve. There is trace tricuspid regurgitation.     Aortic Valve  The aortic valve is trileaflet. No aortic regurgitation is present. No aortic  stenosis is present.     Pulmonic Valve  Normal pulmonic valve. There is trace pulmonic valvular regurgitation.     Vessels  The aortic root is normal size. Normal size ascending  aorta. Normal pulmonary  venous drainage.     Pericardial/Pleural  There is no pericardial effusion.     Rhythm  Sinus rhythm was noted.  ______________________________________________________________________________  Report approved by: Dr Ana Lima 06/10/2021 05:14 PM     ______________________________________________________________________________        Telemetry:

## 2021-06-11 NOTE — PLAN OF CARE
A&OX4, RA, complained of headache relieved with tylenol. Dotson with good output overnight, removed at 0630. VSS, Tele- SR. On contact isolation for MRSA.

## 2021-06-11 NOTE — PROVIDER NOTIFICATION
MD Notification    Notified Person: MD    Notified Person Name:Dr. Gonzalez    Notification Date/Time:06/10/21 2004    Notification Interaction:text/page    Purpose of Notification:Pt requesting Tylenol for headache. Pt will be discharging tomorrow. Thanks    Orders Received:    Comments:

## 2021-06-11 NOTE — PLAN OF CARE
Pt arrived on unit around  noon today post PFO closure.  VS stable, right groin with only slight ooze on gauze pad and no change through shift. Neuro's intact.  Per pt/spouse request, and MD approval, drake catheter was placed to avoid serial straight cath's. Pt has complicated hx for voiding post procedures.  Pt up independently, no pain or SOB reported

## 2021-06-11 NOTE — DISCHARGE INSTRUCTIONS
Femoral    Care of Groin Puncture Site:      For the first 24 hrs - check the puncture site every 1-2 hours while awake.    For 2 days, when you cough, sneeze, laugh or move your bowels, hold your hand over the puncture site and press firmly.    Remove the bandaid after 24 hours. If there is minor oozing, apply another bandaid and remove it after 12 hours.    It is normal to have a small bruise or pea size lump at the site.    You may shower tomorrow. Do NOT take a bath, or use a hot tub or pool for at least 3 days. Do NOT scrub the site. Do not use lotion or powder near the puncture site.    Activity:            For 2 days:    No stooping or squatting    Do NOT do any heavy activity such as exercise, lifting, or straining.     No housework, yard work or any activity that make you sweat    Do NOT lift more than 10 pounds    Bleeding:      If you start bleeding from the site in your groin, lie down flat and press firmly on/above the site for 10 minutes.     Once bleeding stops, lay flat for 2 hours.     Call [facility] as soon as you can.       Call 911 right away if you have heavy bleeding or bleeding that does not stop.      Medicines:      If you are taking an antiplatelet medication such as Plavix, Brilinta or Effient, do not stop taking it until you talk to your cardiologist.      If you are on Metformin (Glucophage), do not restart it until you have blood tests (within 2 to 3 days after discharge).  After you have your blood drawn, you may restart the Metformin.     Take your medications, including blood thinners, unless your provider tells you not to.      If you take Coumadin (Warfarin), have your INR checked by your provider in  3-5 days. Call your clinic to schedule this.    If you have stopped any medicines, check with your provider about when to restart them.    Follow Up Appointments:      Follow up with [facility] Nurse Practitioner at [facility] of patient preference in 7-10 days.    Call the  clinic if:      You have increased pain or a large or growing hard lump around the site.    The site is red, swollen, hot or tender.    Blood or fluid is draining from the site.    You have chills or a fever greater than 101 F (38 C).    Your leg feels numb, cool or changes color.    You have hives, a rash or unusual itching.    New pain in the back or belly that you cannot control with Tylenol.    Any questions or concerns.          [facility]:    [phone] UMP (7 days a week)

## 2021-06-14 ENCOUNTER — HOSPITAL ENCOUNTER (EMERGENCY)
Facility: CLINIC | Age: 46
Discharge: HOME | End: 2021-06-14
Attending: EMERGENCY MEDICINE | Admitting: EMERGENCY MEDICINE
Payer: COMMERCIAL

## 2021-06-14 ENCOUNTER — DOCUMENTATION ONLY (OUTPATIENT)
Dept: CARDIOLOGY | Facility: CLINIC | Age: 46
End: 2021-06-14

## 2021-06-14 VITALS
RESPIRATION RATE: 18 BRPM | SYSTOLIC BLOOD PRESSURE: 128 MMHG | OXYGEN SATURATION: 98 % | HEART RATE: 121 BPM | DIASTOLIC BLOOD PRESSURE: 85 MMHG | HEIGHT: 66 IN | BODY MASS INDEX: 36.06 KG/M2 | TEMPERATURE: 98.8 F

## 2021-06-14 DIAGNOSIS — N39.0 URINARY TRACT INFECTION WITHOUT HEMATURIA, SITE UNSPECIFIED: ICD-10-CM

## 2021-06-14 LAB
ALBUMIN UR-MCNC: NEGATIVE MG/DL
ANION GAP SERPL CALCULATED.3IONS-SCNC: 5 MMOL/L (ref 3–14)
APPEARANCE UR: CLEAR
BACTERIA #/AREA URNS HPF: ABNORMAL /HPF
BASOPHILS # BLD AUTO: 0.1 10E9/L (ref 0–0.2)
BASOPHILS NFR BLD AUTO: 0.7 %
BILIRUB UR QL STRIP: NEGATIVE
BUN SERPL-MCNC: 13 MG/DL (ref 7–30)
CALCIUM SERPL-MCNC: 9.2 MG/DL (ref 8.5–10.1)
CHLORIDE SERPL-SCNC: 108 MMOL/L (ref 94–109)
CO2 SERPL-SCNC: 24 MMOL/L (ref 20–32)
COLOR UR AUTO: ABNORMAL
CREAT SERPL-MCNC: 0.8 MG/DL (ref 0.52–1.04)
DIFFERENTIAL METHOD BLD: ABNORMAL
EOSINOPHIL # BLD AUTO: 0.1 10E9/L (ref 0–0.7)
EOSINOPHIL NFR BLD AUTO: 1 %
ERYTHROCYTE [DISTWIDTH] IN BLOOD BY AUTOMATED COUNT: 13.2 % (ref 10–15)
GFR SERPL CREATININE-BSD FRML MDRD: 90 ML/MIN/{1.73_M2}
GLUCOSE SERPL-MCNC: 93 MG/DL (ref 70–99)
GLUCOSE UR STRIP-MCNC: NEGATIVE MG/DL
HCG UR QL: NEGATIVE
HCT VFR BLD AUTO: 44.8 % (ref 35–47)
HGB BLD-MCNC: 14.2 G/DL (ref 11.7–15.7)
HGB UR QL STRIP: NEGATIVE
IMM GRANULOCYTES # BLD: 0.1 10E9/L (ref 0–0.4)
IMM GRANULOCYTES NFR BLD: 0.8 %
INTERPRETATION ECG - MUSE: NORMAL
INTERPRETATION ECG - MUSE: NORMAL
KETONES UR STRIP-MCNC: NEGATIVE MG/DL
LACTATE BLD-SCNC: 1.1 MMOL/L (ref 0.7–2)
LEUKOCYTE ESTERASE UR QL STRIP: ABNORMAL
LYMPHOCYTES # BLD AUTO: 2 10E9/L (ref 0.8–5.3)
LYMPHOCYTES NFR BLD AUTO: 22.1 %
MCH RBC QN AUTO: 26.4 PG (ref 26.5–33)
MCHC RBC AUTO-ENTMCNC: 31.7 G/DL (ref 31.5–36.5)
MCV RBC AUTO: 83 FL (ref 78–100)
MONOCYTES # BLD AUTO: 0.5 10E9/L (ref 0–1.3)
MONOCYTES NFR BLD AUTO: 5.9 %
NEUTROPHILS # BLD AUTO: 6.2 10E9/L (ref 1.6–8.3)
NEUTROPHILS NFR BLD AUTO: 69.5 %
NITRATE UR QL: POSITIVE
NRBC # BLD AUTO: 0 10*3/UL
NRBC BLD AUTO-RTO: 0 /100
PH UR STRIP: 5.5 PH (ref 5–7)
PLATELET # BLD AUTO: 220 10E9/L (ref 150–450)
POTASSIUM SERPL-SCNC: 4.4 MMOL/L (ref 3.4–5.3)
RBC # BLD AUTO: 5.38 10E12/L (ref 3.8–5.2)
RBC #/AREA URNS AUTO: 1 /HPF (ref 0–2)
SODIUM SERPL-SCNC: 137 MMOL/L (ref 133–144)
SOURCE: ABNORMAL
SP GR UR STRIP: 1.01 (ref 1–1.03)
SQUAMOUS #/AREA URNS AUTO: 2 /HPF (ref 0–1)
UROBILINOGEN UR STRIP-MCNC: NORMAL MG/DL (ref 0–2)
WBC # BLD AUTO: 8.9 10E9/L (ref 4–11)
WBC #/AREA URNS AUTO: 36 /HPF (ref 0–5)

## 2021-06-14 PROCEDURE — 81001 URINALYSIS AUTO W/SCOPE: CPT | Performed by: EMERGENCY MEDICINE

## 2021-06-14 PROCEDURE — 250N000013 HC RX MED GY IP 250 OP 250 PS 637: Performed by: EMERGENCY MEDICINE

## 2021-06-14 PROCEDURE — 93005 ELECTROCARDIOGRAM TRACING: CPT

## 2021-06-14 PROCEDURE — 96365 THER/PROPH/DIAG IV INF INIT: CPT

## 2021-06-14 PROCEDURE — 87088 URINE BACTERIA CULTURE: CPT | Performed by: EMERGENCY MEDICINE

## 2021-06-14 PROCEDURE — 81025 URINE PREGNANCY TEST: CPT | Performed by: EMERGENCY MEDICINE

## 2021-06-14 PROCEDURE — 87186 SC STD MICRODIL/AGAR DIL: CPT | Performed by: EMERGENCY MEDICINE

## 2021-06-14 PROCEDURE — 87086 URINE CULTURE/COLONY COUNT: CPT | Performed by: EMERGENCY MEDICINE

## 2021-06-14 PROCEDURE — 83605 ASSAY OF LACTIC ACID: CPT | Performed by: EMERGENCY MEDICINE

## 2021-06-14 PROCEDURE — 250N000011 HC RX IP 250 OP 636: Performed by: EMERGENCY MEDICINE

## 2021-06-14 PROCEDURE — 87040 BLOOD CULTURE FOR BACTERIA: CPT | Performed by: EMERGENCY MEDICINE

## 2021-06-14 PROCEDURE — 80048 BASIC METABOLIC PNL TOTAL CA: CPT | Performed by: EMERGENCY MEDICINE

## 2021-06-14 PROCEDURE — 36415 COLL VENOUS BLD VENIPUNCTURE: CPT | Performed by: EMERGENCY MEDICINE

## 2021-06-14 PROCEDURE — 85025 COMPLETE CBC W/AUTO DIFF WBC: CPT | Performed by: EMERGENCY MEDICINE

## 2021-06-14 PROCEDURE — 99284 EMERGENCY DEPT VISIT MOD MDM: CPT | Mod: 25

## 2021-06-14 RX ORDER — CEFOXITIN 2 G/1
2 INJECTION, POWDER, FOR SOLUTION INTRAVENOUS EVERY 6 HOURS
Status: DISCONTINUED | OUTPATIENT
Start: 2021-06-14 | End: 2021-06-14 | Stop reason: HOSPADM

## 2021-06-14 RX ORDER — GRANULES FOR ORAL 3 G/1
3 POWDER ORAL ONCE
Status: COMPLETED | OUTPATIENT
Start: 2021-06-14 | End: 2021-06-14

## 2021-06-14 RX ORDER — CEFUROXIME AXETIL 500 MG/1
500 TABLET ORAL 2 TIMES DAILY
Qty: 14 TABLET | Refills: 0 | Status: SHIPPED | OUTPATIENT
Start: 2021-06-14 | End: 2021-06-21

## 2021-06-14 RX ADMIN — CEFOXITIN SODIUM 2 G: 2 POWDER, FOR SOLUTION INTRAVENOUS at 12:15

## 2021-06-14 RX ADMIN — GRANULES FOR ORAL SOLUTION 3 G: 3 POWDER ORAL at 11:14

## 2021-06-14 ASSESSMENT — ENCOUNTER SYMPTOMS
ABDOMINAL PAIN: 0
DYSURIA: 1
NAUSEA: 1
FEVER: 0
FATIGUE: 1
BACK PAIN: 0
SHORTNESS OF BREATH: 0

## 2021-06-14 NOTE — ED TRIAGE NOTES
ABCs intact. Pt had a PFO closure on Friday 6/10. Pt was started on abx for UTI d/t needing to have multiple caths. Pt c/o ongoing urinary symptoms. C/o palpitations and sob.

## 2021-06-14 NOTE — DISCHARGE INSTRUCTIONS
Please return to the ED as needed for new or worsening symptoms such as fever greater 100.4 F, vomiting and unable to keep anything down, persistent symptoms are 2 days of antibiotics, any other concerning symptoms.      Discharge Instructions  Urinary Tract Infection  You or your child have been diagnosed with a urinary tract infection, or UTI. The urinary tract includes the kidneys (which make urine/pee), ureters (the tubes that carry urine/pee from the kidneys to the bladder), the bladder (which stores urine/pee), and urethra (the tube that carries urine/pee out of the bladder). Urinary tract infections occur when bacteria travel up the urethra into the bladder (bladder infection) and, in some cases, from there into the kidneys (kidney infection).  Generally, every Emergency Department visit should have a follow-up clinic visit with either a primary or a specialty clinic/provider. Please follow-up as instructed by your emergency provider today.  Return to the Emergency Department if:  You or your child have severe back pain.  You or your child are vomiting (throwing up) so that you cannot take your medicine.  You or your child have a new fever (had not previously had a fever) over 101 F.  You or your child have confusion or are very weak, or feel very ill.  Your child seems much more ill, will not wake up, will not respond right, or is crying for a long time and will not calm down.  You or your child are showing signs of dehydration. These signs may include decreased urination (pee), dry mouth/gums/tongue, or decreased activity.    Follow-up with your provider:   Children under 24 months need to be seen by their regular provider within one week after a diagnosis of a UTI. It may be necessary to do some more tests to look at the child s kidney or bladder.  You should begin to feel better within 24 - 48 hours of starting your antibiotic; follow-up with your regular clinic/doctor/provider if this is not the  case.    Treatment:   You will be treated with an antibiotic to kill the bacteria. We have to make an educated guess, based on what we know about common bacteria and antibiotics, as to which antibiotic will work for your infection. We will be correct most times but there will be some cases where the antibiotic chosen is not correct (see urine cultures below).  Take a pain medication such as acetaminophen (Tylenol ) or ibuprofen (Advil , Motrin , Nuprin ).  Phenazopyridine (Pyridium , Uristat ) is a prescription medication that numbs the bladder to reduce the burning pain of some UTIs.  The same medication is available in a non-prescription version (Azo-Standard , Urodol ). This medication will change the color of the urine and tears (usually blue or orange). If you wear contacts, do not wear them while taking this medication as they may be stained by the medication.    Urine Cultures:  If indicated, a urine culture may have been performed today. This test generally takes 24-48 hours to complete so the results are not known at this time. The results can confirm that an infection is present but also determine which antibiotic is effective for the specific bacteria that is causing the infection. If your urine culture shows that the antibiotic you were given today will not work to treat your infection, we will attempt to contact you to make arrangements to change the antibiotic. If the culture confirms that the antibiotic is effective for your infection, you will not be contacted. We often recommend follow-up with your regular physician/provider on the culture results regardless of this process.    Antibiotic Warning:   If you have been placed on antibiotics - watch for signs of allergic reaction.  These include rash, lip swelling, difficulty breathing, wheezing, and dizziness.  If you develop any of these symptoms, stop the antibiotic immediately and go to an emergency room or urgent care for evaluation.    Probiotics:  "If you have been given an antibiotic, you may want to also take a probiotic pill or eat yogurt with live cultures. Probiotics have \"good bacteria\" to help your intestines stay healthy. Studies have shown that probiotics help prevent diarrhea and other intestine problems (including C. diff infection) when you take antibiotics. You can buy these without a prescription in the pharmacy section of the store.   If you were given a prescription for medicine here today, be sure to read all of the information (including the package insert) that comes with your prescription.  This will include important information about the medicine, its side effects, and any warnings that you need to know about.  The pharmacist who fills the prescription can provide more information and answer questions you may have about the medicine.  If you have questions or concerns that the pharmacist cannot address, please call or return to the Emergency Department.   Remember that you can always come back to the Emergency Department if you are not able to see your regular provider in the amount of time listed above, if you get any new symptoms, or if there is anything that worries you.    "

## 2021-06-14 NOTE — ED PROVIDER NOTES
History   Chief Complaint:  Post-op Problem     HPI   Noa Mcgill is a 46 year old female with history of cerebral infarction, CHF, venous thrombosis and embolus who presents with a post-op problem. Four days ago, the patient had a PFO closure and needed to have a catheter placed multiple times. She developed a bladder infection, with dysuria and nausea, and was placed on Cefdinir. She started taking this antibiotic two days ago without any relief of her symptoms. Secondary to this, she called her urologist who advised her to present to the ED. She notes she is feeling fatigued but denies any shortness of breath, chest pain, leg swelling, abdominal pain, back pain or fevers.    Review of Systems   Constitutional: Positive for fatigue. Negative for fever.   Respiratory: Negative for shortness of breath.    Cardiovascular: Negative for chest pain and leg swelling.   Gastrointestinal: Positive for nausea. Negative for abdominal pain.   Genitourinary: Positive for dysuria.   Musculoskeletal: Negative for back pain.   All other systems reviewed and are negative.    Allergies:  Tnf Antagonists  Enbrel  Humira  Prednisone  Compazine  Nitrofurantoin    Medications:  Eliquis  Ascorbic acid  Omnicef  Plavix  Hiprex  Pyridium  Maxalt  Vitamin D  Ambien    Past Medical History:    [place]'s disease  Allergic rhinitis  Antiplatelet or antithrombotic long-term use  Arthritis  ASD  Attention deficit disorder without mention of hyperactivity  BRCA1 gene mutation positive  Cerebral infarction  Clotting disorder  Congenital heart disease  Depressive disorder  Endometriosis of uterus  Lung nodule  Migraine headaches  Chronic sinusitis  Psoriatic arthropathy  Recurrent UTI  Retinal artery occlusion  Ostium secundum type atrial septal defect  Incisional hernia  Psoriatic arthritis  Venous thrombosis and embolus  Endometriosis  Central retinal artery occlusion of right eye  Anemia due to blood loss, acute  Dehiscence of closure of  "muscle or muscle flap  Psoriasis  Psoriatic arthropathy  Herpes simplex virus  Obesity  Insomnia   Non allopathic lesion of sacral region  Lumbago  Vaginal bleeding in pregnancy  ASD  Retinal artery branch occlusion of right eye  Vision loss of right eye  ADD  Anxiety  Mild major depression    Past Surgical History:    Left thumb fusion due to arthritis   Reconstruct breast, insert tissue expander bilateral, combined  Mastectomy simple, bilateral  Hysterectomy  Herniorrhaphy incisional  Dilation/Curettage x4  Bladder sling  PFO closure  Appendectomy   section  Tonsillectomy    Family History:    Mother: Depression, Alcoholism, Diabetes, Hereditary breast and Ovarian cancer syndrome, Hypertension, Thyroid disease, Lung cancer  Father: Substance abuse  Sister: Hereditary breast and Ovarian cancer syndrome, BRCA1+  Daughter: Hereditary breast and Ovarian cancer syndrome    Social History:  The patient was unaccompanied to the ED.    Physical Exam     Patient Vitals for the past 24 hrs:   BP Temp Temp src Pulse Resp SpO2 Height   21 1200 128/85 -- -- -- -- 98 % --   21 0940 125/86 98.8  F (37.1  C) Oral 121 18 98 % 1.676 m (5' 6\")       Physical Exam  Constitutional: Well developed, nontox appearance  Head: Atraumatic.   Mouth/Throat: Oropharynx is clear and moist.   Neck:  no stridor  Eyes: no scleral icterus  Cardiovascular: RRR, 2+ bilat radial pulses  Pulmonary/Chest: nml resp effort, Clear BS bilat  Abdominal: ND, soft, NT, no rebound or guarding   : no CVA tenderness bilat  Ext: Warm, well perfused, no edema  Neurological: A&O, symmetric facies, moves ext x4  Skin: Skin is warm and dry.   Psychiatric: Behavior is normal. Thought content normal.   Nursing note and vitals reviewed.    Emergency Department Course   ECG:  ECG taken at 1004, ECG read at 1013  Sinus tachycardia  Cannot rule out Anterior infarct, age undetermined  Abnormal ECG  Rate 104 bpm. CT interval 132 ms. QRS duration 80 ms. " QT/QTc 348/457 ms. P-R-T axes 36 35 24.    Laboratory:  UA with Microscopic: Nitrite Positive (A), Leukocyte Esterase Moderate (A), WBC 36 (H), Bacteria Many (A), Squamous Epithelial 2 (H) o/w Negative    CBC: WBC 8.9, HGB 14.2,   BMP: WNL (Creatinine 0.80)    HCG Qualitative Urine: Negative    Urine Culture Aerobic Bacterial: Pending    Emergency Department Course:  Reviewed:  I reviewed nursing notes, vitals, past medical history and care everywhere    Assessments:  1042 I obtained history and examined the patient as noted above.     1153 I rechecked and updated the patient regarding the laboratory results and the plan for care.    1300 I rechecked and updated the patient.    Interventions:  1114 Monurol 3g PO  1215 Mefoxin 2g IV    Disposition:  The patient was discharged to home.     Impression & Plan   Medical Decision Makin year old female presenting w/ dysuria     Patient's presentation consistent with UTI.  Doubt bacteremia or sepsis.  Mild tachycardia on exam but was noted to improve over the course of ED stay and upon final recheck he was noted to be in the 90s.  Labs reassuring but UA demonstrates persistent infection.  Upon review of previous culture results, cefdinir is likely an appropriate choice given resistance to ceftriaxone.  Patient was given cefoxitin in the emergency department which previous culture from May 2021 demonstrated E. coli susceptibility to.  Prescription written as noted below.  At this time I feel the pt is safe for discharge.  Recommendations given regarding follow up with PCP and return to the emergency department as needed for new or worsening symptoms.    Patient counseled on all results, disposition and diagnosis.  They are understanding and agreeable to plan. Patient discharged in stable condition.      Diagnosis:    ICD-10-CM    1. Urinary tract infection without hematuria, site unspecified  N39.0 Lactic acid whole blood     Blood culture     Blood culture        Discharge Medications:  New Prescriptions    CEFUROXIME (CEFTIN) 500 MG TABLET    Take 1 tablet (500 mg) by mouth 2 times daily for 7 days       Scribe Disclosure:  I, Bello Greenwood, am serving as a scribe at 10:30 AM on 6/14/2021 to document services personally performed by Daron Medina MD based on my observations and the provider's statements to me.      Daron Medina MD  06/14/21 [id number]

## 2021-06-14 NOTE — RESULT ENCOUNTER NOTE
[facility] Emergency Dept discharge antibiotic (if prescribed): Cefuroxime (Ceftin) 500 mg PO tablet, 1 tablet (500 mg) by mouth 2 times daily for 7 days  Date of Rx (if applicable):  6/14/21  No changes in treatment per [facility] ED Lab Result Urine culture protocol.

## 2021-06-14 NOTE — PROGRESS NOTES
Post-PFO dental letter mailed to pt's dentist; [facility].     SERG Ibarra 11:52 AM 6/14/2021

## 2021-06-16 ENCOUNTER — TELEPHONE (OUTPATIENT)
Dept: FAMILY MEDICINE | Facility: CLINIC | Age: 46
End: 2021-06-16

## 2021-06-16 ENCOUNTER — HOSPITAL ENCOUNTER (OUTPATIENT)
Dept: CARDIOLOGY | Facility: CLINIC | Age: 46
Discharge: HOME | End: 2021-06-16
Attending: INTERNAL MEDICINE | Admitting: INTERNAL MEDICINE
Payer: COMMERCIAL

## 2021-06-16 DIAGNOSIS — Q21.11 OSTIUM SECUNDUM TYPE ATRIAL SEPTAL DEFECT: ICD-10-CM

## 2021-06-16 DIAGNOSIS — N39.0 RECURRENT UTI: Primary | ICD-10-CM

## 2021-06-16 LAB
BACTERIA SPEC CULT: ABNORMAL
Lab: ABNORMAL
SPECIMEN SOURCE: ABNORMAL

## 2021-06-16 PROCEDURE — 93321 DOPPLER ECHO F-UP/LMTD STD: CPT | Mod: 26 | Performed by: INTERNAL MEDICINE

## 2021-06-16 PROCEDURE — 93325 DOPPLER ECHO COLOR FLOW MAPG: CPT

## 2021-06-16 PROCEDURE — 93308 TTE F-UP OR LMTD: CPT | Mod: 26 | Performed by: INTERNAL MEDICINE

## 2021-06-16 PROCEDURE — 93325 DOPPLER ECHO COLOR FLOW MAPG: CPT | Mod: 26 | Performed by: INTERNAL MEDICINE

## 2021-06-16 RX ORDER — CIPROFLOXACIN 500 MG/1
500 TABLET, FILM COATED ORAL 2 TIMES DAILY
Qty: 20 TABLET | Refills: 0 | Status: SHIPPED | OUTPATIENT
Start: 2021-06-16 | End: 2021-06-26

## 2021-06-16 NOTE — TELEPHONE ENCOUNTER
Pt is still complaining about s/s of UTI,  Pt is currently on Cefuroxime.  Huddled with provider   Pt is allergic to Macrobid.  Please advise another abx.    Lydia VALDES RN, BSN, PAL (Patient Advocate Liaison)  [facility]   [phone]

## 2021-06-16 NOTE — RESULT ENCOUNTER NOTE
Patient contacted PCP clinic regarding urine culture result and PCP prescribed Ciprofloxacin 500 mg PO BID for 10 days.  See telephone encounter from Lydia Rodriguez RN

## 2021-06-16 NOTE — TELEPHONE ENCOUNTER
Huddled with provider new order of Cipro 500mg po BID for 10 days  Called to pt and let her know.    Lydia VALDES RN, BSN, PAL (Patient Advocate Liaison)  [facility]   [phone]

## 2021-06-16 NOTE — TELEPHONE ENCOUNTER
"Pt was in [facility] from 6/10-6/11 for PFO closure.    START taking:  apixaban ANTICOAGULANT (ELIQUIS)  cefdinir (OMNICEF)  CHANGE how you take:  aspirin (ASA)  STOP taking:  NuvaRing 0.12-0.015 MG/24HR vaginal ring    -reviewed post procedure restrictions  *10 pound lifting restriction week one  *20-30 pound lifting restriction week two and three  *no heavy aerobics for one month  *no driving for minimum of 48 hours  *no contact sports, scuba diving or sean diving for 3 months  *avoid elective dental work for 6 months; if urgent will need SBE  prophylaxis for six months  *follow up echo/bubble study and follow up with Cardiologist  in 3 months post closure  I will see her back with a limited echo one to two weeks post Closure  Reg diet  Appts made with cardiology.    ED/Discharge Protocol    \"Hi, my name is Lydia Rodriguez RN, a registered nurse, and I am calling on behalf of Dr. Daley's office at [facility].  I am calling to follow up and see how things are going for you after your recent visit.\"  How are you doing now that you are home?\"     Good ECHO today.  Site is healed already.   UTI follow-up pt is still having symptoms.    Is patient experiencing symptoms that may require a hospital visit?  NO    Discharge Instructions    \"Let's review your discharge instructions.  What is/are the follow-up recommendations?  Pt. Response: pt understands follow-up instructions    \"Were you instructed to make a follow-up appointment?\"  Pt. Response: Yes.  Has appointment been made?   Yes      \"When you see the provider, I would recommend that you bring your discharge instructions with you.    Medications    \"How many new medications are you on since your hospitalization/ED visit?\"    2 or more - Epic MTM referral needed  \"How many of your current medicines changed (dose, timing, name, etc.) while you were in the hospital/ED visit?\"   0-1  \"Do you have questions about your medications?\"   No  \"Were you newly diagnosed with " "heart failure, COPD, diabetes or did you have a heart attack?\"   No  For patients on insulin: \"Did you start on insulin in the hospital or did you have your insulin dose changed?\"   No  Post Discharge Medication Reconciliation Status: discharge medications reconciled, continue medications without change.    Was MTM referral placed (*Make sure to put transitions as reason for referral)?   No    Call Summary    \"Do you have any questions or concerns about your condition or care plan at the moment?\"    Yes Is she on the right abx for her UTI  Triage nurse advice given: will huddle with provider.    Patient was in ER 0 in the past year (assess appropriateness of ER visits.)      Lydia VALDES RN, BSN, PAL (Patient Advocate Liaison)  [facility]   [phone]               "

## 2021-06-16 NOTE — RESULT ENCOUNTER NOTE
Final Urine Culture Report on 6/16/21  [facility] Emergency Dept discharge antibiotic prescribed: Cefuroxime (Ceftin) 500 mg PO tablet, 1 tablet (500 mg) by mouth 2 times daily for 7 days  #1. Bacteria, 50,000 to 100,000 colonies/ml Escherichia coli ESBL,  is [RESISTANT] to antibiotic.   Recommendations in treatment per [facility] ED lab result Urine Culture protocol.

## 2021-06-16 NOTE — PROGRESS NOTES
History of Present Illness:     Noa Mcgill is a 46 year old female followed here by Dr. Cornejo who presents today to follow up on a percutaneous PFO closure using a #30mm [place] Cardioform device.    Noa has a very complex history:     She has had a retinal artery occlusion following a pregnancy in 2013. She had 5 pregnancies and 2 miscarriages.  She is not lupus anticoagulant syndrome positive.  There is a question of her right heart being slightly enlarged but again, the left-to-right portion of the shunt through the PFO was trivial.  We were going to do the PFO closure back in 2014 but she canceled that appointment.     After that, multiple events have happened.     She was BRCA1 gene positive with a family history of breast cancer.  She had elective bilateral mastectomy in 5-2018 and a TRAM flap reconstruction 6-2018 complicated by thrombosis of the PHANI.  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  She also developed a venous thrombosis of the left TRAM flap, treated with tPA.  She was then placed on Lovenox.Her trans flap failed and she underwent reconstruction of the failed flap and required blood transfusions during that procedure. She was MRSA positive in her nose and they used vancomycin as a perioperative antibiotic.     Hysterectomy was done due to BRACA gene and strong FH of cancer. She uses Nuvaring for prevention of  Osteoporosis/menopausal symptoms. Old notes from OB-GYN from 2018 note that she should stop hormone replacement for one week before surgery and one week after.      Dr. Sanchez's notes from 2018 indicate that she may benefit from Lovenox pre and post op if her repeat hypercoaguble panel was negative. I believe it was negative but clinical suspicion continues that she may have an undefined hypercoaguable state.     She also has a history of adrenal insufficiency and at one ponit was on hydrocortisone  which she is not now.      She subsequently underwent a hernia repair with mess by Dr Leary  and I believe Lovenox was used pre-op, Heparin IV for a short time post op and Lovenox for 5 more days upon discharge.      In addition her father had cancer and she was helping care for him; he passed away a few weeks ago. She has had 9 family members pass away in the past few years.     In 2-2019 she was seen in the ED for loss of consciousness; Her son witnessed this and stated her eyes rolled back and she fell to the ground. He reported to EMS that she may have had some jerking and blueness to her lips but he felt a pulse. EMS noted her to be hypotensive and when the EMS staff tried to sit her up, she had another short-lived syncopal spell. She complained of a migraine upon arrival to ED. Work up including head CT was negative. Apparently she had a reduced oral intake prior to this event. She was discharged to home.     Based on positive nare cultures for MRSA, her pre-procedure antibiotic was Vancomycin which has been used dony-op in the past. We suggested she remove her Nuvaring one week before and leave this out for the first 12 weeks post closure if possible to limit dony procedural clot risk and risk of thrombus formation on her device until endothelial tissue has time to form     We are doing triple therapy with ASA/Plavix/Eliquis for one week post implant which is complete now, she chen start Eliquis/plavix for three months, than Plavix/ASA for the remaining three months after that.     She denies nickel allergy. She struggled with depression due to her own health struggles and her multiple losses.     Exam; no femoral bruits; lungs clear; no significant varicosities.     I have reviewed the Oncology notes and Hematology notes.     She has bladder issues with recurrent UTI's. She follows with Urology and shares that when she has surgery she often cannot void; she may needs straight cathing. The only  antibiotic that she is not resistant to for her UTI's is Cefdinir. She was straight cathed 4 times post closure and had UC done showing E Coli extended beta lactamase. It appears the only drugs this is sensitive to may be IV therapy; I have pulled up her UC and sensitivities, reviewed this with her and given her a copy.      She is feeling nauseated, has a dry cough, no fever and generally does not feel well; she has dysuria and low pelvic discomfort with the UTI.    I have discuss the UTI and her request to restart her Nuvaring directly with Dr. Cornejo today.    Right groin looks very clean; no ecchymosis, hum or bruit.   Her limited echo yesterday shows the device is well seated with no effusion; these results have been reviewed with her today.      Impression/Plan:      1. PFO-to undergo percutaneous closure next week  S/p successful closure with #30mm [place] Cardioform device   -reviewed post procedure restrictions  *10 pound lifting restriction week one completed  *20-30 pound lifting restriction weeks two and three     *no heavy aerobics for one month  *no driving for minimum of 48 hours  *no contact sports, scuba diving or sean diving for 3 months  *avoid elective dental work for 6 months; if urgent will need SBE prophylaxis for six months  *follow up echo/bubble study and follow up with Cardiologist in 3 months post closure  --then Plavix/Eliquis for three months  -then ASA/Plavix for three months after that  -I have reviewed her request to re-insert her Nuvaring with Dr. Cornejo and he prefers off therapy for 4 weeks which would be three more weeks from now based on her multiple previous thrombotic events     2. Multiple thrombotic events as outlined  hypercoaguable panel is negative X2 but Hematology is still suspicious based on clinical history that she has an undefined hypercoaguable state       3. No evidence of atrial fibrillation by history     4. BRACA gene positive  -s/p bilateral mastectomy and  hysterectomy     5 Recurrent UTI-currently has ongoing UTI with E Coli extended beta lactamase  May need IV therapy as resistant to most oral antibiotics  I have talked this over with Dr. Cornejo who would prefer peripheral IV to PICC line to avoid any clot on the catheter and lowest infection risk which is likely peripheral IV  She will discuss with Urology when she hears back     5. MRSA positive in nares     6. Cough post intubation  If this does not improve, she may need to see ENT    I have offered a return visit back in a few weeks, but she will call me if she feels a need for that and preferred not to schedule at this time.    I have contacted her by phone after my consult with  regarding PICC and Nuvarinig  45 minutes spent on the date of the encounter doing chart review, review of test results, patient visit, documentation and discussion with other provider(s)     Jose R Lui, MSN, APRN-BC, CNP  Cardiology    No orders of the defined types were placed in this encounter.    No orders of the defined types were placed in this encounter.    Medications Discontinued During This Encounter   Medication Reason     cefdinir (OMNICEF) 300 MG capsule      aspirin (ASA) 81 MG EC tablet      aspirin (ASA) 81 MG EC tablet          Encounter Diagnosis   Name Primary?     Ostium secundum type atrial septal defect        CURRENT MEDICATIONS:  Current Outpatient Medications   Medication Sig Dispense Refill     apixaban ANTICOAGULANT (ELIQUIS) 5 MG tablet Take 1 tablet (5 mg) by mouth 2 times daily 60 tablet 11     ascorbic acid 250 MG TABS tablet Take 250 mg by mouth daily        butalbital-acetaminophen-caffeine (ESGIC) -40 MG tablet Take 1 tablet by mouth every 4 hours as needed 30 tablet 1     ciprofloxacin (CIPRO) 500 MG tablet Take 1 tablet (500 mg) by mouth 2 times daily for 10 days 20 tablet 0     clopidogrel (PLAVIX) 75 MG tablet Take 1 tablet (75 mg) by mouth daily 90 tablet 1     CRANBERRY  CONCENTRATE PO Take 1 Dose by mouth daily        rizatriptan (MAXALT) 10 MG tablet Take 10 mg by mouth at onset of headache for migraine       VITAMIN D PO Take 2,000 Units by mouth daily.       zolpidem (AMBIEN) 10 MG tablet Take 10 mg by mouth nightly as needed for sleep       cefuroxime (CEFTIN) 500 MG tablet Take 1 tablet (500 mg) by mouth 2 times daily for 7 days (Patient not taking: Reported on 6/17/2021) 14 tablet 0     methenamine (HIPREX) 1 g tablet Take 1 tablet (1 g) by mouth 2 times daily (Patient not taking: Reported on 6/17/2021) 60 tablet 3     Omega-3 Fatty Acids (FISH OIL) 1000 MG CPDR Take 1,000 mg by mouth daily       phenazopyridine (PYRIDIUM) 200 MG tablet Take 200 mg by mouth 3 times daily as needed for irritation (urinary infections)        STATIN NOT PRESCRIBED (INTENTIONAL) Please choose reason not prescribed, below (Patient not taking: Reported on 6/3/2021)         ALLERGIES     Allergies   Allergen Reactions     Tnf Antagonists Hives     Enbrel Hives     Humira [Humira]      Rash hives     Prednisone      Pt sensitive to prednisone--shakey heart racing - only with large doses     Compazine [Prochlorperazine] Anxiety     Shakey per H&P dated 5/21/2018  Shaky and anxiety     Nitrofurantoin Hives and Rash       PAST MEDICAL HISTORY:  Past Medical History:   Diagnosis Date     Adrenal insufficiency (Hugh's disease) (H)     ACTH stim test failed to increase cortisol     Allergic rhinitis, cause unspecified      Antiplatelet or antithrombotic long-term use      Arthritis      ASD (atrial septal defect) 02/15/2013    ASD dx by echo, pt declined ASD closure     Attention deficit disorder without mention of hyperactivity 7/2/2014     BRCA1 gene mutation positive 1/24/2018    Reported by patient, no report available at this time Records requested from [facility] 1/23/18     Cerebral infarction (H)      Clotting disorder (H) birth    undefined clotting disorder - sees Dr. Sanchez Retinal artery  occlusion, R int mammary artery occlusion post breastCA surg and recd TPA     Congenital heart disease      Depressive disorder      Endometriosis of uterus      HERPES SIMPLEX NOS 2007    cold sores on remicade     Insomnia, unspecified 2/10/2005     Lung nodule < 6cm on CT 2019    consider repeat in 2020 - low risk but around a lot of second hand smoke growing up     Migraine headaches      Other chronic sinusitis      Postoperative urinary retention     every surgery     Psoriatic arthropathy (H) 2007     Recurrent UTI      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       PAST SURGICAL HISTORY:  Past Surgical History:   Procedure Laterality Date     APPENDECTOMY  2006      SECTION  2013    Procedure:  SECTION;  Primary  Section;  Surgeon: Chad Hughes MD;  Location:  L[facility]D     CV PFO CLOSURE N/A 6/10/2021    Procedure: Patent Foramen Ovale Closure;  Surgeon: Victoriano Cornejo MD;  Location:  [facility]     DILATION AND CURETTAGE SUCTION N/A 2014    Procedure: DILATION AND CURETTAGE SUCTION;  Surgeon: Srini Martinez MD;  Location:  OR     DILATION AND CURETTAGE SUCTION N/A 2014    Procedure: DILATION AND CURETTAGE SUCTION;  Surgeon: Connor Kang MD;  Location:  OR     GENITOURINARY SURGERY      bladder sling and complete historectomy     HC DILATION/CURETTAGE DIAG/THER NON OB       HC DILATION/CURETTAGE DIAG/THER NON OB       HERNIORRHAPHY INCISIONAL (LOCATION) N/A 2018    Procedure: Incisional Hernia repair with Biologic mesh;  Surgeon: Suraj Bergeron MD;  Location: [facility] OR     HYSTERECTOMY, [facility]  2016    does not need pap     MASTECTOMY SIMPLE BILATERAL Bilateral 2018    Procedure: MASTECTOMY SIMPLE BILATERAL;  PROPHYLACTIC BILATERAL MASTECTOMY (GLADIS) BILATERAL BREAST RECONSTRUCTION [facility] TISSUE EXPANDERS (WILLOW)   ;  Surgeon: Suraj Bergeron  MD;  Location: [facility] OR     ORTHOPEDIC SURGERY      (L) thumb fused     RECONSTRUCT BREAST, INSERT TISSUE EXPANDER BILATERAL, COMBINED Bilateral 2018    expander and tram flap with drains - had weekly surgery to have I & D and removal of infected tissue     SURGICAL HISTORY OF -       left thumb fusion due to arthritis       FAMILY HISTORY:  Family History   Problem Relation Age of Onset     C.A.D. Maternal Grandmother      Hypertension Maternal Grandmother      Psychotic Disorder Mother         Depression, alcoholism     Diabetes Mother      Hereditary Breast and Ovarian Cancer Syndrome Mother         BRCA1+, no hx of cancer, s/p prophylactic surgery to remove breast tissue, ovaries, fallopian tubes     Hypertension Mother      Thyroid Disease Mother         thyroid nodules     Lung Cancer Mother      Substance Abuse Father      Hereditary Breast and Ovarian Cancer Syndrome Sister         matrnal half sister, BRCA1+     Hereditary Breast and Ovarian Cancer Syndrome Daughter 23        BRCA1+     Breast Cancer Maternal Aunt 40        lumpectomy, then 2nd primary breast cancer later in 40s, treated with mastectomy     Hereditary Breast and Ovarian Cancer Syndrome Maternal Aunt         BRCA1+     Ovarian Cancer Maternal Aunt 70        surgry     Thyroid Cancer Maternal Aunt         medullary thyroid cancer     Hereditary Breast and Ovarian Cancer Syndrome Maternal Aunt         BRCA1+     Breast Cancer Other 41        maternal great aunt     Ovarian Cancer Other 45         in 40s     Thyroid Cancer Maternal Uncle 66        papillary thyroid cancer     Breast Cancer Cousin 45     Hereditary Breast and Ovarian Cancer Syndrome Cousin         BRCA1+     Breast Cancer Other 43        maternal great aunt     Other Cancer Other         ovarian cancer       SOCIAL HISTORY:  Social History     Socioeconomic History     Marital status:      Spouse name: Ciro     Number of children: 3     Years of education: 14      Highest education level: None   Occupational History     Occupation: LPN     Employer: [employer]     Employer: [employer]   Social Needs     Financial resource strain: None     Food insecurity     Worry: None     Inability: None     Transportation needs     Medical: None     Non-medical: None   Tobacco Use     Smoking status: Never Smoker     Smokeless tobacco: Never Used   Substance and Sexual Activity     Alcohol use: Not Currently     Alcohol/week: 0.0 standard drinks     Comment: 1-3drink per week     Drug use: No     Sexual activity: Yes     Partners: Male     Birth control/protection: Female Surgical   Lifestyle     Physical activity     Days per week: None     Minutes per session: None     Stress: None   Relationships     Social connections     Talks on phone: None     Gets together: None     Attends Protestant service: None     Active member of club or organization: None     Attends meetings of clubs or organizations: None     Relationship status: None     Intimate partner violence     Fear of current or ex partner: None     Emotionally abused: None     Physically abused: None     Forced sexual activity: None   Other Topics Concern     Parent/sibling w/ CABG, MI or angioplasty before 65F 55M? No   Social History Narrative     None       Review of Systems:  Skin:  Negative for       Eyes:  Negative for glasses;contacts    ENT:  Negative for      Respiratory:  Positive for cough developed cough since surgery. dry non productive   Cardiovascular:  Negative palpitations;Positive for occas  Gastroenterology: Positive for heartburn    Genitourinary:  Positive for   currently has UTI  Musculoskeletal:  Negative      Neurologic:  Positive for migraine headaches    Psychiatric:  Positive for sleep disturbances;anxiety    Heme/Lymph/Imm:  Negative for      Endocrine:  Negative        Physical Exam:  Vitals: /83   Pulse 93   Wt 103 kg (227 lb)   LMP 02/08/2015 (Within Months)   SpO2 98%    BMI 36.64 kg/m      Constitutional:  cooperative, alert and oriented, well developed, well nourished, in no acute distress        Skin:  warm and dry to the touch, no apparent skin lesions or masses noted          Head:  normocephalic, no masses or lesions        Eyes:           Lymph:No Cervical lymphadenopathy present     ENT:  no pallor or cyanosis, dentition good        Neck:  carotid pulses are full and equal bilaterally, JVP normal, no carotid bruit        Respiratory:  normal breath sounds, clear to auscultation, normal A-P diameter, normal symmetry, normal respiratory excursion, no use of accessory muscles    bilat mastectomy and transflap    Cardiac: regular rhythm, normal S1/S2, no S3 or S4, apical impulse not displaced, no murmurs, gallops or rubs             no murmur, normal split s2  pulses full and equal, no bruits auscultated                                   no femoral bruits or hum; access site well healed    GI:  abdomen soft, non-tender, BS normoactive, no mass, no HSM, no bruits obese;surgical scars midline healed incision    Extremities and Muscular Skeletal:  no deformities, clubbing, cyanosis, erythema observed         scar L ankle -injury as child    Neurological:  no gross motor deficits        Psych:  Alert and Oriented x 3      Recent Lab Results:  LIPID RESULTS:  Lab Results   Component Value Date    CHOL 188 02/10/2018    HDL 58 02/10/2018    LDL 84 02/10/2018    TRIG 231 (H) 02/10/2018    CHOLHDLRATIO 2.7 05/08/2014       LIVER ENZYME RESULTS:  Lab Results   Component Value Date    AST 14 03/11/2019    ALT 21 03/11/2019       CBC RESULTS:  Lab Results   Component Value Date    WBC 8.9 06/14/2021    RBC 5.38 (H) 06/14/2021    HGB 14.2 06/14/2021    HCT 44.8 06/14/2021    MCV 83 06/14/2021    MCH 26.4 (L) 06/14/2021    MCHC 31.7 06/14/2021    RDW 13.2 06/14/2021     06/14/2021       BMP RESULTS:  Lab Results   Component Value Date     06/14/2021    POTASSIUM 4.4 06/14/2021     CHLORIDE 108 06/14/2021    CO2 24 06/14/2021    ANIONGAP 5 06/14/2021    GLC 93 06/14/2021    BUN 13 06/14/2021    CR 0.80 06/14/2021    GFRESTIMATED 90 06/14/2021    GFRESTBLACK >90 06/14/2021    KENDRICK 9.2 06/14/2021        A1C RESULTS:  Lab Results   Component Value Date    A1C 5.2 05/08/2014       INR RESULTS:  Lab Results   Component Value Date    INR 0.93 06/10/2021    INR 0.92 05/21/2013           CC  Victoriano Cornejo MD  [id number]  [place] [id number]

## 2021-06-17 ENCOUNTER — OFFICE VISIT (OUTPATIENT)
Dept: CARDIOLOGY | Facility: CLINIC | Age: 46
End: 2021-06-17
Attending: INTERNAL MEDICINE
Payer: COMMERCIAL

## 2021-06-17 VITALS
OXYGEN SATURATION: 98 % | SYSTOLIC BLOOD PRESSURE: 114 MMHG | DIASTOLIC BLOOD PRESSURE: 83 MMHG | BODY MASS INDEX: 36.64 KG/M2 | HEART RATE: 93 BPM | WEIGHT: 227 LBS

## 2021-06-17 DIAGNOSIS — Q21.12 PATENT FORAMEN OVALE: Primary | ICD-10-CM

## 2021-06-17 PROCEDURE — 99215 OFFICE O/P EST HI 40 MIN: CPT | Performed by: NURSE PRACTITIONER

## 2021-06-17 NOTE — LETTER
6/17/2021    Sydnie Daley MD  [id number] [facility] [address]    RE: Noa Mcgill       Dear Colleague,    I had the pleasure of seeing Noa Mcgill in the [facility].    History of Present Illness:     Noa Mcgill is a 46 year old female followed here by Dr. Cornejo who presents today to follow up on a percutaneous PFO closure using a #30mm [place] Cardioform device.    Noa has a very complex history:     She has had a retinal artery occlusion following a pregnancy in 2013. She had 5 pregnancies and 2 miscarriages.  She is not lupus anticoagulant syndrome positive.  There is a question of her right heart being slightly enlarged but again, the left-to-right portion of the shunt through the PFO was trivial.  We were going to do the PFO closure back in 2014 but she canceled that appointment.     After that, multiple events have happened.     She was BRCA1 gene positive with a family history of breast cancer.  She had elective bilateral mastectomy in 5-2018 and a TRAM flap reconstruction 6-2018 complicated by thrombosis of the PHANI.  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  She also developed a venous thrombosis of the left TRAM flap, treated with tPA.  She was then placed on Lovenox.Her trans flap failed and she underwent reconstruction of the failed flap and required blood transfusions during that procedure. She was MRSA positive in her nose and they used vancomycin as a perioperative antibiotic.     Hysterectomy was done due to BRACA gene and strong FH of cancer. She uses Nuvaring for prevention of  Osteoporosis/menopausal symptoms. Old notes from OB-GYN from 2018 note that she should stop hormone replacement for one week before surgery and one week after.      Dr. Sanchez's notes from 2018 indicate that she may benefit from Lovenox pre and post op if her  repeat hypercoaguble panel was negative. I believe it was negative but clinical suspicion continues that she may have an undefined hypercoaguable state.     She also has a history of adrenal insufficiency and at one ponit was on hydrocortisone which she is not now.      She subsequently underwent a hernia repair with mess by Dr Leary  and I believe Lovenox was used pre-op, Heparin IV for a short time post op and Lovenox for 5 more days upon discharge.      In addition her father had cancer and she was helping care for him; he passed away a few weeks ago. She has had 9 family members pass away in the past few years.     In 2-2019 she was seen in the ED for loss of consciousness; Her son witnessed this and stated her eyes rolled back and she fell to the ground. He reported to EMS that she may have had some jerking and blueness to her lips but he felt a pulse. EMS noted her to be hypotensive and when the EMS staff tried to sit her up, she had another short-lived syncopal spell. She complained of a migraine upon arrival to ED. Work up including head CT was negative. Apparently she had a reduced oral intake prior to this event. She was discharged to home.     Based on positive nare cultures for MRSA, her pre-procedure antibiotic was Vancomycin which has been used dony-op in the past. We suggested she remove her Nuvaring one week before and leave this out for the first 12 weeks post closure if possible to limit dony procedural clot risk and risk of thrombus formation on her device until endothelial tissue has time to form     We are doing triple therapy with ASA/Plavix/Eliquis for one week post implant which is complete now, she chen start Eliquis/plavix for three months, than Plavix/ASA for the remaining three months after that.     She denies nickel allergy. She struggled with depression due to her own health struggles and her multiple losses.     Exam; no femoral bruits; lungs clear; no significant  varicosities.     I have reviewed the Oncology notes and Hematology notes.     She has bladder issues with recurrent UTI's. She follows with Urology and shares that when she has surgery she often cannot void; she may needs straight cathing. The only antibiotic that she is not resistant to for her UTI's is Cefdinir. She was straight cathed 4 times post closure and had UC done showing E Coli extended beta lactamase. It appears the only drugs this is sensitive to may be IV therapy; I have pulled up her UC and sensitivities, reviewed this with her and given her a copy.      She is feeling nauseated, has a dry cough, no fever and generally does not feel well; she has dysuria and low pelvic discomfort with the UTI.    I have discuss the UTI and her request to restart her Nuvaring directly with Dr. Cornejo today.    Right groin looks very clean; no ecchymosis, hum or bruit.   Her limited echo yesterday shows the device is well seated with no effusion; these results have been reviewed with her today.      Impression/Plan:      1. PFO-to undergo percutaneous closure next week  S/p successful closure with #30mm [place] Cardioform device   -reviewed post procedure restrictions  *10 pound lifting restriction week one completed  *20-30 pound lifting restriction weeks two and three     *no heavy aerobics for one month  *no driving for minimum of 48 hours  *no contact sports, scuba diving or sean diving for 3 months  *avoid elective dental work for 6 months; if urgent will need SBE prophylaxis for six months  *follow up echo/bubble study and follow up with Cardiologist in 3 months post closure  --then Plavix/Eliquis for three months  -then ASA/Plavix for three months after that  -I have reviewed her request to re-insert her Nuvaring with Dr. Cornejo and he prefers off therapy for 4 weeks which would be three more weeks from now based on her multiple previous thrombotic events     2. Multiple thrombotic events as  outlined  hypercoaguable panel is negative X2 but Hematology is still suspicious based on clinical history that she has an undefined hypercoaguable state       3. No evidence of atrial fibrillation by history     4. BRACA gene positive  -s/p bilateral mastectomy and hysterectomy     5 Recurrent UTI-currently has ongoing UTI with E Coli extended beta lactamase  May need IV therapy as resistant to most oral antibiotics  I have talked this over with Dr. Cornejo who would prefer peripheral IV to PICC line to avoid any clot on the catheter and lowest infection risk which is likely peripheral IV  She will discuss with Urology when she hears back     5. MRSA positive in nares     6. Cough post intubation  If this does not improve, she may need to see ENT    I have offered a return visit back in a few weeks, but she will call me if she feels a need for that and preferred not to schedule at this time.    I have contacted her by phone after my consult with  regarding PICC and Nuvarinig  45 minutes spent on the date of the encounter doing chart review, review of test results, patient visit, documentation and discussion with other provider(s)     Jose R Lui, MSN, APRN-BC, CNP  Cardiology    No orders of the defined types were placed in this encounter.    No orders of the defined types were placed in this encounter.    Medications Discontinued During This Encounter   Medication Reason     cefdinir (OMNICEF) 300 MG capsule      aspirin (ASA) 81 MG EC tablet      aspirin (ASA) 81 MG EC tablet          Encounter Diagnosis   Name Primary?     Ostium secundum type atrial septal defect        CURRENT MEDICATIONS:  Current Outpatient Medications   Medication Sig Dispense Refill     apixaban ANTICOAGULANT (ELIQUIS) 5 MG tablet Take 1 tablet (5 mg) by mouth 2 times daily 60 tablet 11     ascorbic acid 250 MG TABS tablet Take 250 mg by mouth daily        butalbital-acetaminophen-caffeine (ESGIC) -40 MG tablet  Take 1 tablet by mouth every 4 hours as needed 30 tablet 1     ciprofloxacin (CIPRO) 500 MG tablet Take 1 tablet (500 mg) by mouth 2 times daily for 10 days 20 tablet 0     clopidogrel (PLAVIX) 75 MG tablet Take 1 tablet (75 mg) by mouth daily 90 tablet 1     CRANBERRY CONCENTRATE PO Take 1 Dose by mouth daily        rizatriptan (MAXALT) 10 MG tablet Take 10 mg by mouth at onset of headache for migraine       VITAMIN D PO Take 2,000 Units by mouth daily.       zolpidem (AMBIEN) 10 MG tablet Take 10 mg by mouth nightly as needed for sleep       cefuroxime (CEFTIN) 500 MG tablet Take 1 tablet (500 mg) by mouth 2 times daily for 7 days (Patient not taking: Reported on 6/17/2021) 14 tablet 0     methenamine (HIPREX) 1 g tablet Take 1 tablet (1 g) by mouth 2 times daily (Patient not taking: Reported on 6/17/2021) 60 tablet 3     Omega-3 Fatty Acids (FISH OIL) 1000 MG CPDR Take 1,000 mg by mouth daily       phenazopyridine (PYRIDIUM) 200 MG tablet Take 200 mg by mouth 3 times daily as needed for irritation (urinary infections)        STATIN NOT PRESCRIBED (INTENTIONAL) Please choose reason not prescribed, below (Patient not taking: Reported on 6/3/2021)         ALLERGIES     Allergies   Allergen Reactions     Tnf Antagonists Hives     Enbrel Hives     Humira [Humira]      Rash hives     Prednisone      Pt sensitive to prednisone--shakey heart racing - only with large doses     Compazine [Prochlorperazine] Anxiety     Shakey per H&P dated 5/21/2018  Shaky and anxiety     Nitrofurantoin Hives and Rash       PAST MEDICAL HISTORY:  Past Medical History:   Diagnosis Date     Adrenal insufficiency (Cascade's disease) (H)     ACTH stim test failed to increase cortisol     Allergic rhinitis, cause unspecified      Antiplatelet or antithrombotic long-term use      Arthritis      ASD (atrial septal defect) 02/15/2013    ASD dx by echo, pt declined ASD closure     Attention deficit disorder without mention of hyperactivity 7/2/2014      BRCA1 gene mutation positive 2018    Reported by patient, no report available at this time Records requested from [employer] 18     Cerebral infarction (H)      Clotting disorder (H) birth    undefined clotting disorder - sees Dr. Sanchez Retinal artery occlusion, R int mammary artery occlusion post breastCA surg and recd TPA     Congenital heart disease      Depressive disorder      Endometriosis of uterus      HERPES SIMPLEX NOS 2007    cold sores on remicade     Insomnia, unspecified 2/10/2005     Lung nodule < 6cm on CT 2019    consider repeat in 2020 - low risk but around a lot of second hand smoke growing up     Migraine headaches      Other chronic sinusitis      Postoperative urinary retention     every surgery     Psoriatic arthropathy (H) 2007     Recurrent UTI      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       PAST SURGICAL HISTORY:  Past Surgical History:   Procedure Laterality Date     APPENDECTOMY  2006      SECTION  2013    Procedure:  SECTION;  Primary  Section;  Surgeon: Chad Hughes MD;  Location:  L+D     CV PFO CLOSURE N/A 6/10/2021    Procedure: Patent Foramen [place]vale Closure;  Surgeon: Victoriano Cornejo MD;  Location:  HEART CARDI[facility] CATH LAB     DILATION AND CURETTAGE SUCTION N/A 2014    Procedure: DILATION AND CURETTAGE SUCTION;  Surgeon: Srini Martinez MD;  Location:  OR     DILATION AND CURETTAGE SUCTION N/A 2014    Procedure: DILATION AND CURETTAGE SUCTION;  Surgeon: Connor Kang MD;  Location:  OR     GENITOURINARY SURGERY      bladder sling and complete historectomy     HC DILATION/CURETTAGE DIAG/THER NON OB       HC DILATION/CURETTAGE DIAG/THER NON OB       HERNIORRHAPHY INCISIONAL (LOCATION) N/A 2018    Procedure: Incisional Hernia repair with Biologic mesh;  Surgeon: Suraj Bergeron MD;  Location: [facility] OR     HYSTERECTOMY,  [facility]  2016    does not need pap     MASTECTOMY SIMPLE BILATERAL Bilateral 2018    Procedure: MASTECTOMY SIMPLE BILATERAL;  PROPHYLACTIC BILATERAL MASTECTOMY (GLADIS) BILATERAL BREAST RECONSTRUCTION [facility] TISSUE EXPANDERS (WILLOW)   ;  Surgeon: Suraj Bergeron MD;  Location:  OR     ORTHOPEDIC SURGERY      (L) thumb fused     RECONSTRUCT BREAST, INSERT TISSUE EXPANDER BILATERAL, COMBINED Bilateral 2018    expander and tram flap with drains - had weekly surgery to have I & D and removal of infected tissue     SURGICAL HISTORY OF -       left thumb fusion due to arthritis       FAMILY HISTORY:  Family History   Problem Relation Age of Onset     C.A.D. Maternal Grandmother      Hypertension Maternal Grandmother      Psychotic Disorder Mother         Depression, alcoholism     Diabetes Mother      Hereditary Breast and Ovarian Cancer Syndrome Mother         BRCA1+, no hx of cancer, s/p prophylactic surgery to remove breast tissue, ovaries, fallopian tubes     Hypertension Mother      Thyroid Disease Mother         thyroid nodules     Lung Cancer Mother      Substance Abuse Father      Hereditary Breast and Ovarian Cancer Syndrome Sister         matrnal half sister, BRCA1+     Hereditary Breast and Ovarian Cancer Syndrome Daughter 23        BRCA1+     Breast Cancer Maternal Aunt 40        lumpectomy, then 2nd primary breast cancer later in 40s, treated with mastectomy     Hereditary Breast and Ovarian Cancer Syndrome Maternal Aunt         BRCA1+     Ovarian Cancer Maternal Aunt 70        surgry     Thyroid Cancer Maternal Aunt         medullary thyroid cancer     Hereditary Breast and Ovarian Cancer Syndrome Maternal Aunt         BRCA1+     Breast Cancer Other 41        maternal great aunt     Ovarian Cancer Other 45         in 40s     Thyroid Cancer Maternal Uncle 66        papillary thyroid cancer     Breast Cancer Cousin 45     Hereditary Breast and Ovarian Cancer Syndrome Cousin         BRCA1+      Breast Cancer Other 43        maternal great aunt     Other Cancer Other         ovarian cancer       SOCIAL HISTORY:  Social History     Socioeconomic History     Marital status:      Spouse name: Ciro     Number of children: 3     Years of education: 14     Highest education level: None   Occupational History     Occupation: LPN     Employer: [employer]     Employer: [employer]   Social Needs     Financial resource strain: None     Food insecurity     Worry: None     Inability: None     Transportation needs     Medical: None     Non-medical: None   Tobacco Use     Smoking status: Never Smoker     Smokeless tobacco: Never Used   Substance and Sexual Activity     Alcohol use: Not Currently     Alcohol/week: 0.0 standard drinks     Comment: 1-3drink per week     Drug use: No     Sexual activity: Yes     Partners: Male     Birth control/protection: Female Surgical   Lifestyle     Physical activity     Days per week: None     Minutes per session: None     Stress: None   Relationships     Social connections     Talks on phone: None     Gets together: None     Attends Nondenominational service: None     Active member of club or organization: None     Attends meetings of clubs or organizations: None     Relationship status: None     Intimate partner violence     Fear of current or ex partner: None     Emotionally abused: None     Physically abused: None     Forced sexual activity: None   Other Topics Concern     Parent/sibling w/ CABG, MI or angioplasty before 65F 55M? No   Social History Narrative     None       Review of Systems:  Skin:  Negative for       Eyes:  Negative for glasses;contacts    ENT:  Negative for      Respiratory:  Positive for cough developed cough since surgery. dry non productive   Cardiovascular:  Negative palpitations;Positive for occas  Gastroenterology: Positive for heartburn    Genitourinary:  Positive for   currently has UTI  Musculoskeletal:  Negative      Neurologic:   Positive for migraine headaches    Psychiatric:  Positive for sleep disturbances;anxiety    Heme/Lymph/Imm:  Negative for      Endocrine:  Negative        Physical Exam:  Vitals: /83   Pulse 93   Wt 103 kg (227 lb)   LMP 02/08/2015 (Within Months)   SpO2 98%   BMI 36.64 kg/m      Constitutional:  cooperative, alert and oriented, well developed, well nourished, in no acute distress        Skin:  warm and dry to the touch, no apparent skin lesions or masses noted          Head:  normocephalic, no masses or lesions        Eyes:           Lymph:No Cervical lymphadenopathy present     ENT:  no pallor or cyanosis, dentition good        Neck:  carotid pulses are full and equal bilaterally, JVP normal, no carotid bruit        Respiratory:  normal breath sounds, clear to auscultation, normal A-P diameter, normal symmetry, normal respiratory excursion, no use of accessory muscles    bilat mastectomy and transflap    Cardiac: regular rhythm, normal S1/S2, no S3 or S4, apical impulse not displaced, no murmurs, gallops or rubs             no murmur, normal split s2  pulses full and equal, no bruits auscultated                                   no femoral bruits or hum; access site well healed    GI:  abdomen soft, non-tender, BS normoactive, no mass, no HSM, no bruits obese;surgical scars midline healed incision    Extremities and Muscular Skeletal:  no deformities, clubbing, cyanosis, erythema observed         scar L ankle -injury as child    Neurological:  no gross motor deficits        Psych:  Alert and Oriented x 3      Recent Lab Results:  LIPID RESULTS:  Lab Results   Component Value Date    CHOL 188 02/10/2018    HDL 58 02/10/2018    LDL 84 02/10/2018    TRIG 231 (H) 02/10/2018    CHOLHDLRATIO 2.7 05/08/2014       LIVER ENZYME RESULTS:  Lab Results   Component Value Date    AST 14 03/11/2019    ALT 21 03/11/2019       CBC RESULTS:  Lab Results   Component Value Date    WBC 8.9 06/14/2021    RBC 5.38 (H)  06/14/2021    HGB 14.2 06/14/2021    HCT 44.8 06/14/2021    MCV 83 06/14/2021    MCH 26.4 (L) 06/14/2021    MCHC 31.7 06/14/2021    RDW 13.2 06/14/2021     06/14/2021       BMP RESULTS:  Lab Results   Component Value Date     06/14/2021    POTASSIUM 4.4 06/14/2021    CHLORIDE 108 06/14/2021    CO2 24 06/14/2021    ANIONGAP 5 06/14/2021    GLC 93 06/14/2021    BUN 13 06/14/2021    CR 0.80 06/14/2021    GFRESTIMATED 90 06/14/2021    GFRESTBLACK >90 06/14/2021    KENDRICK 9.2 06/14/2021        A1C RESULTS:  Lab Results   Component Value Date    A1C 5.2 05/08/2014       INR RESULTS:  Lab Results   Component Value Date    INR 0.93 06/10/2021    INR 0.92 05/21/2013           CC  Victoriano Cornejo MD  [address]  [place] [id number]            Thank you for allowing me to participate in the care of your patient.      Sincerely,     ZULEMA Clement [facility]  cc:   Victoriano Cornejo MD  [address]  [place] [phone]

## 2021-06-17 NOTE — PATIENT INSTRUCTIONS
I will talk to Dr. Cornejo about the best access for IV antibiotics if you need that    I will ask him about the Nuvaring    If your cough does not start to get better, you may need to see an ENT

## 2021-06-20 LAB
BACTERIA SPEC CULT: NO GROWTH
BACTERIA SPEC CULT: NO GROWTH
SPECIMEN SOURCE: NORMAL
SPECIMEN SOURCE: NORMAL

## 2021-07-28 NOTE — PROGRESS NOTES
Pre-Visit Planning   Next 5 appointments (look out 90 days)    Sep 14, 2021  8:30 AM  Return Visit with Victoriano Cornejo MD  [facility] ([facility] ) [address] [address] [address] [phone]        Appointment Notes for this encounter:   medication and health follow up    Questionnaires Reviewed/Assigned  Additional questionnaires assigned [id number] [id number]  Health Maintenance Due   Topic Date Due     PREVENTIVE CARE VISIT  Never done     ADVANCE CARE PLANNING  Never done     URINE DRUG SCREEN  12/30/2020     PHQ-9  06/09/2021     Current Outpatient Medications   Medication     apixaban ANTICOAGULANT (ELIQUIS) 5 MG tablet     ascorbic acid 250 MG TABS tablet     butalbital-acetaminophen-caffeine (ESGIC) -40 MG tablet     clopidogrel (PLAVIX) 75 MG tablet     CRANBERRY CONCENTRATE PO     methenamine (HIPREX) 1 g tablet     Omega-3 Fatty Acids (FISH OIL) 1000 MG CPDR     phenazopyridine (PYRIDIUM) 200 MG tablet     rizatriptan (MAXALT) 10 MG tablet     STATIN NOT PRESCRIBED (INTENTIONAL)     VITAMIN D PO     zolpidem (AMBIEN) 10 MG tablet     No current facility-administered medications for this visit.         Patient preferred phone number: [phone]    Patient contact not needed.PVP via manuel VALDES RN, BSN, PAL (Patient Advocate Liaison)  [facility]   [phone]

## 2021-08-05 ENCOUNTER — VIRTUAL VISIT (OUTPATIENT)
Dept: FAMILY MEDICINE | Facility: CLINIC | Age: 46
End: 2021-08-05
Payer: COMMERCIAL

## 2021-08-05 DIAGNOSIS — M54.2 NECK PAIN: Primary | ICD-10-CM

## 2021-08-05 DIAGNOSIS — G89.4 CHRONIC PAIN SYNDROME: ICD-10-CM

## 2021-08-05 DIAGNOSIS — N39.0 RECURRENT UTI: ICD-10-CM

## 2021-08-05 DIAGNOSIS — F41.9 ANXIETY: ICD-10-CM

## 2021-08-05 PROBLEM — Q21.11 OSTIUM SECUNDUM TYPE ATRIAL SEPTAL DEFECT: Status: RESOLVED | Noted: 2020-10-29 | Resolved: 2021-08-05

## 2021-08-05 PROCEDURE — 99214 OFFICE O/P EST MOD 30 MIN: CPT | Mod: 95 | Performed by: FAMILY MEDICINE

## 2021-08-05 RX ORDER — CEFDINIR 300 MG/1
300 CAPSULE ORAL
COMMUNITY
Start: 2021-08-02 | End: 2021-08-12

## 2021-08-05 RX ORDER — ETONOGESTREL/ETHINYL ESTRADIOL .12-.015MG
RING, VAGINAL VAGINAL
COMMUNITY
Start: 2021-07-21 | End: 2023-11-07

## 2021-08-05 RX ORDER — FLUOXETINE 10 MG/1
10 CAPSULE ORAL DAILY
Qty: 30 CAPSULE | Refills: 2 | Status: SHIPPED | OUTPATIENT
Start: 2021-08-05 | End: 2021-10-20

## 2021-08-05 ASSESSMENT — ANXIETY QUESTIONNAIRES
2. NOT BEING ABLE TO STOP OR CONTROL WORRYING: NOT AT ALL
IF YOU CHECKED OFF ANY PROBLEMS ON THIS QUESTIONNAIRE, HOW DIFFICULT HAVE THESE PROBLEMS MADE IT FOR YOU TO DO YOUR WORK, TAKE CARE OF THINGS AT HOME, OR GET ALONG WITH OTHER PEOPLE: SOMEWHAT DIFFICULT
5. BEING SO RESTLESS THAT IT IS HARD TO SIT STILL: NOT AT ALL
6. BECOMING EASILY ANNOYED OR IRRITABLE: NEARLY EVERY DAY
GAD7 TOTAL SCORE: 9
1. FEELING NERVOUS, ANXIOUS, OR ON EDGE: NEARLY EVERY DAY
7. FEELING AFRAID AS IF SOMETHING AWFUL MIGHT HAPPEN: SEVERAL DAYS
3. WORRYING TOO MUCH ABOUT DIFFERENT THINGS: MORE THAN HALF THE DAYS

## 2021-08-05 ASSESSMENT — PATIENT HEALTH QUESTIONNAIRE - PHQ9
SUM OF ALL RESPONSES TO PHQ QUESTIONS 1-9: 5
5. POOR APPETITE OR OVEREATING: NOT AT ALL

## 2021-08-05 NOTE — PROGRESS NOTES
Noa is a 46 year old who is being evaluated via a billable video visit.      How would you like to obtain your AVS? MyChart  If the video visit is dropped, the invitation should be resent by: Text to cell phone: [phone]  Will anyone else be joining your video visit? No    Video Start Time: 10:48 AM        Subjective   Noa is a 46 year old who presents for the following health issues - she is here due to concerns about stress level.  She also get recurrent UTI.   She sees urology and is on prevention med.   She is on antibiotic right now.    It is a hassle to always try and get in for labs at the urology clinic.   She is currently not on anything for mood.   She does NOT feel depressed but there is stress.   She also has gained weight over the last couple years with all her surgeries and covid.      [facility]     ED/UC Followup:    Facility:  [facility]   Date of visit: 08/02/2021  Reason for visit: UTI  Current Status: Patient states that she has another uti       Depression and Anxiety Follow-Up    How are you doing with your depression since your last visit? No change    How are you doing with your anxiety since your last visit?  Worsened     Are you having other symptoms that might be associated with depression or anxiety? No    Have you had a significant life event? Grief or Loss     Do you have any concerns with your use of alcohol or other drugs? No    Social History     Tobacco Use     Smoking status: Never Smoker     Smokeless tobacco: Never Used   Substance Use Topics     Alcohol use: Not Currently     Alcohol/week: 0.0 standard drinks     Comment: 1-3drink per week     Drug use: No     PHQ 3/9/2020 12/9/2020 8/5/2021   PHQ-9 Total Score 14 7 5   Q9: Thoughts of better off dead/self-harm past 2 weeks Not at all Not at all Not at all   F/U: Thoughts of suicide or self-harm - - -   F/U: Safety concerns - - -     BASSAM-7 SCORE 3/9/2020 12/9/2020 8/5/2021   Total Score - - -   Total Score 11  (moderate anxiety) - -   Total Score 11 9 9     Last PHQ-9 8/5/2021   1.  Little interest or pleasure in doing things 0   2.  Feeling down, depressed, or hopeless 0   3.  Trouble falling or staying asleep, or sleeping too much 3   4.  Feeling tired or having little energy 1   5.  Poor appetite or overeating 1   6.  Feeling bad about yourself 0   7.  Trouble concentrating 0   8.  Moving slowly or restless 0   Q9: Thoughts of better off dead/self-harm past 2 weeks 0   PHQ-9 Total Score 5   Difficulty at work, home, or with people Somewhat difficult   In the past two weeks have you had thoughts of suicide or self harm? -   Do you have concerns about your personal safety or the safety of others? -     BASSAM-7  8/5/2021   1. Feeling nervous, anxious, or on edge 3   2. Not being able to stop or control worrying 0   3. Worrying too much about different things 2   4. Trouble relaxing 0   5. Being so restless that it is hard to sit still 0   6. Becoming easily annoyed or irritable 3   7. Feeling afraid, as if something awful might happen 1   BASSAM-7 Total Score 9   If you checked any problems, how difficult have they made it for you to do your work, take care of things at home, or get along with other people? Somewhat difficult       Suicide Assessment Five-step Evaluation and Treatment (SAFE-T)      How many servings of fruits and vegetables do you eat daily?  2-3    On average, how many sweetened beverages do you drink each day (Examples: soda, juice, sweet tea, etc.  Do NOT count diet or artificially sweetened beverages)?   0    How many days per week do you exercise enough to make your heart beat faster? 3 or less    How many minutes a day do you exercise enough to make your heart beat faster? 9 or less    How many days per week do you miss taking your medication? 0      Review of Systems   Constitutional, HEENT, cardiovascular, pulmonary, gi and gu systems are negative, except as otherwise noted.      Objective            Vitals:  No vitals were obtained today due to virtual visit.    Physical Exam   GENERAL: Healthy, alert and no distress  EYES: Eyes grossly normal to inspection.  No discharge or erythema, or obvious scleral/conjunctival abnormalities.  RESP: No audible wheeze, cough, or visible cyanosis.  No visible retractions or increased work of breathing.    SKIN: Visible skin clear. No significant rash, abnormal pigmentation or lesions.  NEURO: Cranial nerves grossly intact.  Mentation and speech appropriate for age.  PSYCH: Mentation appears normal, affect normal/bright, judgement and insight intact, normal speech and appearance well-groomed.    Admission on 06/14/2021, Discharged on 06/14/2021   Component Date Value Ref Range Status     Interpretation ECG 06/14/2021 Click View Image link to view waveform and result   Final     Color Urine 06/14/2021 Light Yellow   Final     Appearance Urine 06/14/2021 Clear   Final     Glucose Urine 06/14/2021 Negative  NEG^Negative mg/dL Final     Bilirubin Urine 06/14/2021 Negative  NEG^Negative Final     Ketones Urine 06/14/2021 Negative  NEG^Negative mg/dL Final     Specific Gravity Urine 06/14/2021 1.011  1.003 - 1.035 Final     Blood Urine 06/14/2021 Negative  NEG^Negative Final     pH Urine 06/14/2021 5.5  5.0 - 7.0 pH Final     Protein Albumin Urine 06/14/2021 Negative  NEG^Negative mg/dL Final     Urobilinogen mg/dL 06/14/2021 Normal  0.0 - 2.0 mg/dL Final     Nitrite Urine 06/14/2021 Positive* NEG^Negative Final     Leukocyte Esterase Urine 06/14/2021 Moderate* NEG^Negative Final     Source 06/14/2021 Midstream Urine   Final     WBC Urine 06/14/2021 36* 0 - 5 /HPF Final     RBC Urine 06/14/2021 1  0 - 2 /HPF Final     Bacteria Urine 06/14/2021 Many* NEG^Negative /HPF Final     Squamous Epithelial /HPF Urine 06/14/2021 2* 0 - 1 /HPF Final     HCG Qual Urine 06/14/2021 Negative  NEG^Negative Final    Comment: This test is for screening purposes.  Results should be interpreted  along with   the clinical picture.  Confirmation testing is available if warranted by   ordering [id number], HCG Quantitative Pregnancy.       Specimen Description 06/14/2021 Midstream Urine   Final     Special Requests 06/14/2021 Specimen received in preservative   Final     Culture Micro 06/14/2021 *  Final                    Value:50,000 to 100,000 colonies/mL  Escherichia coli ESBL  ESBL (extended beta lactamase) producing organisms require contact precautions.       Sodium 06/14/2021 137  133 - 144 mmol/L Final     Potassium 06/14/2021 4.4  3.4 - 5.3 mmol/L Final     Chloride 06/14/2021 108  94 - 109 mmol/L Final     Carbon Dioxide 06/14/2021 24  20 - 32 mmol/L Final     Anion Gap 06/14/2021 5  3 - 14 mmol/L Final     Glucose 06/14/2021 93  70 - 99 mg/dL Final     Urea Nitrogen 06/14/2021 13  7 - 30 mg/dL Final     Creatinine 06/14/2021 0.80  0.52 - 1.04 mg/dL Final     GFR Estimate 06/14/2021 90  >60 mL/min/[1.73_m2] Final    Comment: Non  GFR Calc  Starting 12/18/2018, serum creatinine based estimated GFR (eGFR) will be   calculated using the Chronic Kidney Disease Epidemiology Collaboration   (CKD-EPI) equation.       GFR Estimate If Black 06/14/2021 >90  >60 mL/min/[1.73_m2] Final    Comment:  GFR Calc  Starting 12/18/2018, serum creatinine based estimated GFR (eGFR) will be   calculated using the Chronic Kidney Disease Epidemiology Collaboration   (CKD-EPI) equation.       Calcium 06/14/2021 9.2  8.5 - 10.1 mg/dL Final     WBC 06/14/2021 8.9  4.0 - 11.0 10e9/L Final     RBC Count 06/14/2021 5.38* 3.8 - 5.2 10e12/L Final     Hemoglobin 06/14/2021 14.2  11.7 - 15.7 g/dL Final     Hematocrit 06/14/2021 44.8  35.0 - 47.0 % Final     MCV 06/14/2021 83  78 - 100 fl Final     MCH 06/14/2021 26.4* 26.5 - 33.0 pg Final     MCHC 06/14/2021 31.7  31.5 - 36.5 g/dL Final     RDW 06/14/2021 13.2  10.0 - 15.0 % Final     Platelet Count 06/14/2021 220  150 - 450 10e9/L Final     Diff Method  06/14/2021 Automated Method   Final     % Neutrophils 06/14/2021 69.5  % Final     % Lymphocytes 06/14/2021 22.1  % Final     % Monocytes 06/14/2021 5.9  % Final     % Eosinophils 06/14/2021 1.0  % Final     % Basophils 06/14/2021 0.7  % Final     % Immature Granulocytes 06/14/2021 0.8  % Final     Nucleated RBCs 06/14/2021 0  0 /100 Final     Absolute Neutrophil 06/14/2021 6.2  1.6 - 8.3 10e9/L Final     Absolute Lymphocytes 06/14/2021 2.0  0.8 - 5.3 10e9/L Final     Absolute Monocytes 06/14/2021 0.5  0.0 - 1.3 10e9/L Final     Absolute Eosinophils 06/14/2021 0.1  0.0 - 0.7 10e9/L Final     Absolute Basophils 06/14/2021 0.1  0.0 - 0.2 10e9/L Final     Abs Immature Granulocytes 06/14/2021 0.1  0 - 0.4 10e9/L Final     Absolute Nucleated RBC 06/14/2021 0.0   Final     Lactic Acid 06/14/2021 1.1  0.7 - 2.0 mmol/L Final     Specimen Description 06/14/2021 Blood Right Arm   Final     Culture Micro 06/14/2021 No growth   Final     Specimen Description 06/14/2021 Blood Left Arm   Final     Culture Micro 06/14/2021 No growth   Final     Assessment:  1. Recurrent UTI  2. Stress/anxiety  3. Weight gain  4. Chronic pain - stable    Plan:  1. Standing orders placed for UA and UC  2. Will start low dose fluoxetine  3. The potential side effects of the prescription medication were discussed with the patient.  4. This may help with stress and weight and  Chronic pain  5. Recheck in 2 months for physical            Video-Visit Details    Type of service:  Video Visit    Video End Time:11:09 AM    Originating Location (pt. Location): Home    Distant Location (provider location):  [facility] [place]     Platform used for Video Visit: [employer]

## 2021-08-06 ASSESSMENT — ANXIETY QUESTIONNAIRES: GAD7 TOTAL SCORE: 9

## 2021-09-02 ENCOUNTER — TRANSFERRED RECORDS (OUTPATIENT)
Dept: HEALTH INFORMATION MANAGEMENT | Facility: CLINIC | Age: 46
End: 2021-09-02

## 2021-09-07 ENCOUNTER — HOSPITAL ENCOUNTER (OUTPATIENT)
Dept: CARDIOLOGY | Facility: CLINIC | Age: 46
Discharge: HOME | End: 2021-09-07
Attending: INTERNAL MEDICINE | Admitting: INTERNAL MEDICINE
Payer: COMMERCIAL

## 2021-09-07 DIAGNOSIS — Q21.11 OSTIUM SECUNDUM TYPE ATRIAL SEPTAL DEFECT: ICD-10-CM

## 2021-09-07 LAB — LVEF ECHO: NORMAL

## 2021-09-07 PROCEDURE — 93321 DOPPLER ECHO F-UP/LMTD STD: CPT | Mod: 26 | Performed by: INTERNAL MEDICINE

## 2021-09-07 PROCEDURE — 93325 DOPPLER ECHO COLOR FLOW MAPG: CPT | Mod: 26 | Performed by: INTERNAL MEDICINE

## 2021-09-07 PROCEDURE — 258N000001 HC RX 258: Performed by: INTERNAL MEDICINE

## 2021-09-07 PROCEDURE — 93321 DOPPLER ECHO F-UP/LMTD STD: CPT

## 2021-09-07 PROCEDURE — 93308 TTE F-UP OR LMTD: CPT | Mod: 26 | Performed by: INTERNAL MEDICINE

## 2021-09-07 RX ORDER — ACYCLOVIR 200 MG/1
30 CAPSULE ORAL ONCE
Status: COMPLETED | OUTPATIENT
Start: 2021-09-07 | End: 2021-09-07

## 2021-09-07 RX ADMIN — SODIUM CHLORIDE 30 ML: 9 INJECTION, SOLUTION INTRAMUSCULAR; INTRAVENOUS; SUBCUTANEOUS at 14:00

## 2021-09-14 ENCOUNTER — OFFICE VISIT (OUTPATIENT)
Dept: CARDIOLOGY | Facility: CLINIC | Age: 46
End: 2021-09-14
Payer: COMMERCIAL

## 2021-09-14 VITALS
SYSTOLIC BLOOD PRESSURE: 114 MMHG | BODY MASS INDEX: 37.11 KG/M2 | DIASTOLIC BLOOD PRESSURE: 78 MMHG | HEART RATE: 78 BPM | HEIGHT: 66 IN | WEIGHT: 230.9 LBS | OXYGEN SATURATION: 97 %

## 2021-09-14 DIAGNOSIS — Q21.11 OSTIUM SECUNDUM TYPE ATRIAL SEPTAL DEFECT: ICD-10-CM

## 2021-09-14 PROCEDURE — 99213 OFFICE O/P EST LOW 20 MIN: CPT | Performed by: INTERNAL MEDICINE

## 2021-09-14 ASSESSMENT — MIFFLIN-ST. JEOR: SCORE: 1714.11

## 2021-09-14 NOTE — LETTER
9/14/2021    Sydnie Daley MD  [address]  [facility] [address]    RE: Noa Mcgill       Dear Colleague,    I had the pleasure of seeing Noa Mcgill in the [facility].    HPI and Plan:   See dictation    Orders Placed This Encounter   Procedures     Follow-Up with Cardiologist     Echocardiogram Limited     Orders Placed This Encounter   Medications     aspirin (ASA) 81 MG EC tablet     Sig: Take 1 tablet (81 mg) by mouth daily     Dispense:  1 tablet     Refill:  1     Medications Discontinued During This Encounter   Medication Reason     apixaban ANTICOAGULANT (ELIQUIS) 5 MG tablet      Omega-3 Fatty Acids (FISH OIL) 1000 MG CPDR          Encounter Diagnosis   Name Primary?     Ostium secundum type atrial septal defect        CURRENT MEDICATIONS:  Current Outpatient Medications   Medication Sig Dispense Refill     ascorbic acid 250 MG TABS tablet Take 250 mg by mouth daily        aspirin (ASA) 81 MG EC tablet Take 1 tablet (81 mg) by mouth daily 1 tablet 1     butalbital-acetaminophen-caffeine (ESGIC) -40 MG tablet Take 1 tablet by mouth every 4 hours as needed 30 tablet 1     clopidogrel (PLAVIX) 75 MG tablet Take 1 tablet (75 mg) by mouth daily 90 tablet 1     CRANBERRY CONCENTRATE PO Take 1 Dose by mouth daily        FLUoxetine (PROZAC) 10 MG capsule Take 1 capsule (10 mg) by mouth daily 30 capsule 2     methenamine (HIPREX) 1 g tablet Take 1 tablet (1 g) by mouth 2 times daily 60 tablet 3     NUVARING 0.12-0.015 MG/24HR vaginal ring INSERT 1 RING VAGINALLY AND KEEP IN PLACE FOR 3 WEEKS. REMOVE FOR 1 WEEK. REPEAT WITH NEW RING. USE AS DIRECTED       phenazopyridine (PYRIDIUM) 200 MG tablet Take 200 mg by mouth 3 times daily as needed for irritation (urinary infections)        rizatriptan (MAXALT) 10 MG tablet Take 10 mg by mouth at onset of headache for migraine       STATIN NOT PRESCRIBED (INTENTIONAL) Please choose reason not  prescribed, below       VITAMIN D PO Take 2,000 Units by mouth daily.       zolpidem (AMBIEN) 10 MG tablet Take 10 mg by mouth nightly as needed for sleep         ALLERGIES     Allergies   Allergen Reactions     Tnf Antagonists Hives     Enbrel Hives     Humira [Humira]      Rash hives     Prednisone      Pt sensitive to prednisone--shakey heart racing - only with large doses     Compazine [Prochlorperazine] Anxiety     Shakey per H&P dated 5/21/2018  Shaky and anxiety     Nitrofurantoin Hives and Rash       PAST MEDICAL HISTORY:  Past Medical History:   Diagnosis Date     Adrenal insufficiency (Hugh's disease) (H)     ACTH stim test failed to increase cortisol     Allergic rhinitis, cause unspecified      Antiplatelet or antithrombotic long-term use      Arthritis      ASD (atrial septal defect) 02/15/2013    repaired 6/10/21     Attention deficit disorder without mention of hyperactivity 07/02/2014     BRCA1 gene mutation positive 01/24/2018    Reported by patient, no report available at this time Records requested from [employer] 1/23/18     Cerebral infarction (H)      Clotting disorder (H) birth    undefined clotting disorder - sees Dr. Sanchez Retinal artery occlusion, R int mammary artery occlusion post breastCA surg and recd TPA     Congenital heart disease      Depressive disorder      Endometriosis of uterus      HERPES SIMPLEX NOS 07/13/2007    cold sores on remicade     Insomnia, unspecified 02/10/2005     Lung nodule < 6cm on CT 11/2019    consider repeat in 11/2020 - low risk but around a lot of second hand smoke growing up     Migraine headaches      Other chronic sinusitis      Postoperative urinary retention     every surgery     Psoriatic arthropathy (H) 01/26/2007     Recurrent UTI 2008     Retinal artery occlusion 02/15/2013    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 in 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 pregnant       PAST SURGICAL HISTORY:  Past Surgical History:   Procedure Laterality Date     APPENDECTOMY  2006       SECTION  2013    Procedure:  SECTION;  Primary  Section;  Surgeon: Chad Hughes MD;  Location: [facility] L+D     CV PFO CLOSURE N/A 06/10/2021    Procedure:30mm [place] CARDIOFORM- Patent Foramen [place] Closure;  Surgeon: Victoriano Cornejo MD;  Location:  HEART CARDIAC CATH LAB     DILATION AND CURETTAGE SUCTION N/A 2014    Procedure: DILATION AND CURETTAGE SUCTION;  Surgeon: Srini Martinez MD;  Location:  OR     DILATION AND CURETTAGE SUCTION N/A 2014    Procedure: DILATION AND CURETTAGE SUCTION;  Surgeon: Connor Kang MD;  Location:  OR     GENITOURINARY SURGERY      bladder sling and complete historectomy     HC DILATION/CURETTAGE DIAG/THER NON OB       HC DILATION/CURETTAGE DIAG/THER NON OB       HERNIORRHAPHY INCISIONAL (LOCATION) N/A 2018    Procedure: Incisional Hernia repair with Biologic mesh;  Surgeon: Suraj Bergeron MD;  Location:  OR     HYSTERECTOMY, CARLOS  2016    does not need pap     MASTECTOMY SIMPLE BILATERAL Bilateral 2018    Procedure: MASTECTOMY SIMPLE BILATERAL;  PROPHYLACTIC BILATERAL MASTECTOMY (GLADIS) BILATERAL BREAST RECONSTRUCTION [facility] TISSUE EXPANDERS (WILLWO)   ;  Surgeon: Suraj Bergeron MD;  Location:  OR     ORTHOPEDIC SURGERY      (L) thumb fused     RECONSTRUCT BREAST, INSERT TISSUE EXPANDER BILATERAL, COMBINED Bilateral 2018    expander and tram flap with drains - had weekly surgery to have I & D and removal of infected tissue     SURGICAL HISTORY OF -       left thumb fusion due to arthritis       FAMILY HISTORY:  Family History   Problem Relation Age of Onset     C.A.D. Maternal Grandmother      Hypertension Maternal Grandmother      Psychotic Disorder Mother         Depression, alcoholism     Diabetes Mother      Hereditary Breast and Ovarian Cancer Syndrome Mother         BRCA1+, no hx of cancer, s/p prophylactic surgery to remove breast tissue, ovaries, fallopian tubes      Hypertension Mother      Thyroid Disease Mother         thyroid nodules     Lung Cancer Mother      Substance Abuse Father      Hereditary Breast and Ovarian Cancer Syndrome Sister         matrnal half sister, BRCA1+     Hereditary Breast and Ovarian Cancer Syndrome Daughter 23        BRCA1+     Breast Cancer Maternal Aunt 40        lumpectomy, then 2nd primary breast cancer later in 40s, treated with mastectomy     Hereditary Breast and Ovarian Cancer Syndrome Maternal Aunt         BRCA1+     Ovarian Cancer Maternal Aunt 70        surgry     Thyroid Cancer Maternal Aunt         medullary thyroid cancer     Hereditary Breast and Ovarian Cancer Syndrome Maternal Aunt         BRCA1+     Breast Cancer Other 41        maternal great aunt     Ovarian Cancer Other 45         in 40s     Thyroid Cancer Maternal Uncle 66        papillary thyroid cancer     Breast Cancer Cousin 45     Hereditary Breast and Ovarian Cancer Syndrome Cousin         BRCA1+     Breast Cancer Other 43        maternal great aunt     Other Cancer Other         ovarian cancer       SOCIAL HISTORY:  Social History     Socioeconomic History     Marital status:      Spouse name: Ciro     Number of children: 3     Years of education: 14     Highest education level: None   Occupational History     Occupation: LPN     Employer: [employer]     Employer: [employer]   Tobacco Use     Smoking status: Never Smoker     Smokeless tobacco: Never Used   Substance and Sexual Activity     Alcohol use: Not Currently     Alcohol/week: 0.0 standard drinks     Comment: 1-3drink per week     Drug use: No     Sexual activity: Yes     Partners: Male     Birth control/protection: Female Surgical   Other Topics Concern     Parent/sibling w/ CABG, MI or angioplasty before 65F 55M? No   Social History Narrative     None     Social Determinants of Health     Financial Resource Strain:      Difficulty of Paying Living Expenses:    Food Insecurity:       "Worried About Running Out of Food in the Last Year:      Ran Out of Food in the Last Year:    Transportation Needs:      Lack of Transportation (Medical):      Lack of Transportation (Non-Medical):    Physical Activity:      Days of Exercise per Week:      Minutes of Exercise per Session:    Stress:      Feeling of Stress :    Social Connections:      Frequency of Communication with Friends and Family:      Frequency of Social Gatherings with Friends and Family:      Attends [employer] Services:      Active Member of Clubs or Organizations:      Attends Club or Organization Meetings:      Marital Status:    Intimate Partner Violence:      Fear of Current or Ex-Partner:      Emotionally Abused:      Physically Abused:      Sexually Abused:        Review of Systems:  Skin:  Negative     Eyes:  Positive for glasses;contacts  ENT:  Negative    Respiratory:  Negative    Cardiovascular:  Negative    Gastroenterology: Negative    Genitourinary:  Negative    Musculoskeletal:  Negative    Neurologic:  Negative    Psychiatric:  Negative    Heme/Lymph/Imm:  Negative    Endocrine:  Negative      Physical Exam:  Vitals: /78 (BP Location: Right arm, Patient Position: Sitting, Cuff Size: Adult Large)   Pulse 78   Ht 1.676 m (5' 6\")   Wt 104.7 kg (230 lb 14.4 oz)   LMP 02/08/2015 (Within Months)   SpO2 97%   Breastfeeding No   BMI 37.27 kg/m      Constitutional:           Skin:             Head:           Eyes:           Lymph:      ENT:           Neck:           Respiratory:            Cardiac:                                                           GI:           Extremities and Muscular Skeletal:                 Neurological:           Psych:         Recent Lab Results:  LIPID RESULTS:  Lab Results   Component Value Date    CHOL 188 02/10/2018    HDL 58 02/10/2018    LDL 84 02/10/2018    TRIG 231 (H) 02/10/2018    CHOLHDLRATIO 2.7 05/08/2014       LIVER ENZYME RESULTS:  Lab Results   Component Value Date    AST 14 " 03/11/2019    ALT 21 03/11/2019       CBC RESULTS:  Lab Results   Component Value Date    WBC 8.9 06/14/2021    RBC 5.38 (H) 06/14/2021    HGB 14.2 06/14/2021    HCT 44.8 06/14/2021    MCV 83 06/14/2021    MCH 26.4 (L) 06/14/2021    MCHC 31.7 06/14/2021    RDW 13.2 06/14/2021     06/14/2021       BMP RESULTS:  Lab Results   Component Value Date     06/14/2021    POTASSIUM 4.4 06/14/2021    CHLORIDE 108 06/14/2021    CO2 24 06/14/2021    ANIONGAP 5 06/14/2021    GLC 93 06/14/2021    BUN 13 06/14/2021    CR 0.80 06/14/2021    GFRESTIMATED 90 06/14/2021    GFRESTBLACK >90 06/14/2021    KENDRICK 9.2 06/14/2021        A1C RESULTS:  Lab Results   Component Value Date    A1C 5.2 05/08/2014       INR RESULTS:  Lab Results   Component Value Date    INR 0.93 06/10/2021    INR 0.92 05/21/2013           CC  No referring provider defined for this encounter.      Thank you for allowing me to participate in the care of your patient.      Sincerely,     Victoriano Cornejo MD     [facility]  cc:   No referring provider defined for this encounter.

## 2021-09-14 NOTE — PROGRESS NOTES
HPI and Plan:   See dictation    Orders Placed This Encounter   Procedures     Follow-Up with Cardiologist     Echocardiogram Limited     Orders Placed This Encounter   Medications     aspirin (ASA) 81 MG EC tablet     Sig: Take 1 tablet (81 mg) by mouth daily     Dispense:  1 tablet     Refill:  1     Medications Discontinued During This Encounter   Medication Reason     apixaban ANTICOAGULANT (ELIQUIS) 5 MG tablet      Omega-3 Fatty Acids (FISH OIL) 1000 MG CPDR          Encounter Diagnosis   Name Primary?     Ostium secundum type atrial septal defect        CURRENT MEDICATIONS:  Current Outpatient Medications   Medication Sig Dispense Refill     ascorbic acid 250 MG TABS tablet Take 250 mg by mouth daily        aspirin (ASA) 81 MG EC tablet Take 1 tablet (81 mg) by mouth daily 1 tablet 1     butalbital-acetaminophen-caffeine (ESGIC) -40 MG tablet Take 1 tablet by mouth every 4 hours as needed 30 tablet 1     clopidogrel (PLAVIX) 75 MG tablet Take 1 tablet (75 mg) by mouth daily 90 tablet 1     CRANBERRY CONCENTRATE PO Take 1 Dose by mouth daily        FLUoxetine (PROZAC) 10 MG capsule Take 1 capsule (10 mg) by mouth daily 30 capsule 2     methenamine (HIPREX) 1 g tablet Take 1 tablet (1 g) by mouth 2 times daily 60 tablet 3     NUVARING 0.12-0.015 MG/24HR vaginal ring INSERT 1 RING VAGINALLY AND KEEP IN PLACE FOR 3 WEEKS. REMOVE FOR 1 WEEK. REPEAT WITH NEW RING. USE AS DIRECTED       phenazopyridine (PYRIDIUM) 200 MG tablet Take 200 mg by mouth 3 times daily as needed for irritation (urinary infections)        rizatriptan (MAXALT) 10 MG tablet Take 10 mg by mouth at onset of headache for migraine       STATIN NOT PRESCRIBED (INTENTIONAL) Please choose reason not prescribed, below       VITAMIN D PO Take 2,000 Units by mouth daily.       zolpidem (AMBIEN) 10 MG tablet Take 10 mg by mouth nightly as needed for sleep         ALLERGIES     Allergies   Allergen Reactions     Tnf Antagonists Hives     Enbrel  Hives     Humira [Humira]      Rash hives     Prednisone      Pt sensitive to prednisone--shakey heart racing - only with large doses     Compazine [Prochlorperazine] Anxiety     Shakey per H&P dated 2018  Shaky and anxiety     Nitrofurantoin Hives and Rash       PAST MEDICAL HISTORY:  Past Medical History:   Diagnosis Date     Adrenal insufficiency (Hugh's disease) (H)     ACTH stim test failed to increase cortisol     Allergic rhinitis, cause unspecified      Antiplatelet or antithrombotic long-term use      Arthritis      ASD (atrial septal defect) 02/15/2013    repaired 6/10/21     Attention deficit disorder without mention of hyperactivity 2014     BRCA1 gene mutation positive 2018    Reported by patient, no report available at this time Records requested from [email or web address] 18     Cerebral infarction (H)      Clotting disorder (H) birth    undefined clotting disorder - sees Dr. 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 in 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 pregnant       PAST SURGICAL HISTORY:  Past Surgical History:   Procedure Laterality Date     APPENDECTOMY  2006      SECTION  2013    Procedure:  SECTION;  Primary  Section;  Surgeon: Chad Hughes MD;  Location: [facility] L+D     CV PFO CLOSURE N/A 06/10/2021    Procedure:30mm [place] CARDIOFORM- Patent Foramen [place] Closure;  Surgeon:  Victoriano Cornejo MD;  Location:  [facility]     DILATION AND CURETTAGE SUCTION N/A 12/12/2014    Procedure: DILATION AND CURETTAGE SUCTION;  Surgeon: Srini Martinez MD;  Location:  OR     DILATION AND CURETTAGE SUCTION N/A 12/20/2014    Procedure: DILATION AND CURETTAGE SUCTION;  Surgeon: Connor Kang MD;  Location:  OR     GENITOURINARY SURGERY  2016    bladder sling and complete historectomy     HC DILATION/CURETTAGE DIAG/THER NON OB  1998     HC DILATION/CURETTAGE DIAG/THER NON OB  2006     HERNIORRHAPHY INCISIONAL (LOCATION) N/A 12/07/2018    Procedure: Incisional Hernia repair with Biologic mesh;  Surgeon: Suraj Bergeron MD;  Location:  OR     HYSTERECTOMY, CARLOS  04/18/2016    does not need pap     MASTECTOMY SIMPLE BILATERAL Bilateral 05/25/2018    Procedure: MASTECTOMY SIMPLE BILATERAL;  PROPHYLACTIC BILATERAL MASTECTOMY (GLADIS) BILATERAL BREAST RECONSTRUCTION [facility] TISSUE EXPANDERS (WILLOW)   ;  Surgeon: Suraj Bergeron MD;  Location:  OR     ORTHOPEDIC SURGERY      (L) thumb fused     RECONSTRUCT BREAST, INSERT TISSUE EXPANDER BILATERAL, COMBINED Bilateral 05/25/2018    expander and tram flap with drains - had weekly surgery to have I & D and removal of infected tissue     SURGICAL HISTORY OF -   2006    left thumb fusion due to arthritis       FAMILY HISTORY:  Family History   Problem Relation Age of Onset     C.A.D. Maternal Grandmother      Hypertension Maternal Grandmother      Psychotic Disorder Mother         Depression, alcoholism     Diabetes Mother      Hereditary Breast and Ovarian Cancer Syndrome Mother         BRCA1+, no hx of cancer, s/p prophylactic surgery to remove breast tissue, ovaries, fallopian tubes     Hypertension Mother      Thyroid Disease Mother         thyroid nodules     Lung Cancer Mother      Substance Abuse Father      Hereditary Breast and Ovarian Cancer Syndrome Sister         matrnal half sister, BRCA1+     Hereditary Breast and Ovarian Cancer  Syndrome Daughter 23        BRCA1+     Breast Cancer Maternal Aunt 40        lumpectomy, then 2nd primary breast cancer later in 40s, treated with mastectomy     Hereditary Breast and Ovarian Cancer Syndrome Maternal Aunt         BRCA1+     Ovarian Cancer Maternal Aunt 70        surgry     Thyroid Cancer Maternal Aunt         medullary thyroid cancer     Hereditary Breast and Ovarian Cancer Syndrome Maternal Aunt         BRCA1+     Breast Cancer Other 41        maternal great aunt     Ovarian Cancer Other 45         in 40s     Thyroid Cancer Maternal Uncle 66        papillary thyroid cancer     Breast Cancer Cousin 45     Hereditary Breast and Ovarian Cancer Syndrome Cousin         BRCA1+     Breast Cancer Other 43        maternal great aunt     Other Cancer Other         ovarian cancer       SOCIAL HISTORY:  Social History     Socioeconomic History     Marital status:      Spouse name: Ciro     Number of children: 3     Years of education: 14     Highest education level: None   Occupational History     Occupation: LPN     Employer: [employer]     Employer: [employer]   Tobacco Use     Smoking status: Never Smoker     Smokeless tobacco: Never Used   Substance and Sexual Activity     Alcohol use: Not Currently     Alcohol/week: 0.0 standard drinks     Comment: 1-3drink per week     Drug use: No     Sexual activity: Yes     Partners: Male     Birth control/protection: Female Surgical   Other Topics Concern     Parent/sibling w/ CABG, MI or angioplasty before 65F 55M? No   Social History Narrative     None     Social Determinants of Health     Financial Resource Strain:      Difficulty of Paying Living Expenses:    Food Insecurity:      Worried About Running Out of Food in the Last Year:      Ran Out of Food in the Last Year:    Transportation Needs:      Lack of Transportation (Medical):      Lack of Transportation (Non-Medical):    Physical Activity:      Days of Exercise per Week:       "Minutes of Exercise per Session:    Stress:      Feeling of Stress :    Social Connections:      Frequency of Communication with Friends and Family:      Frequency of Social Gatherings with Friends and Family:      Attends L[employer] Services:      Active Member of Clubs or Organizations:      Attends Club or Organization Meetings:      Marital Status:    Intimate Partner Violence:      Fear of Current or Ex-Partner:      Emotionally Abused:      Physically Abused:      Sexually Abused:        Review of Systems:  Skin:  Negative     Eyes:  Positive for glasses;contacts  ENT:  Negative    Respiratory:  Negative    Cardiovascular:  Negative    Gastroenterology: Negative    Genitourinary:  Negative    Musculoskeletal:  Negative    Neurologic:  Negative    Psychiatric:  Negative    Heme/Lymph/Imm:  Negative    Endocrine:  Negative      Physical Exam:  Vitals: /78 (BP Location: Right arm, Patient Position: Sitting, Cuff Size: Adult Large)   Pulse 78   Ht 1.676 m (5' 6\")   Wt 104.7 kg (230 lb 14.4 oz)   LMP 02/08/2015 (Within Months)   SpO2 97%   Breastfeeding No   BMI 37.27 kg/m      Constitutional:           Skin:             Head:           Eyes:           Lymph:      ENT:           Neck:           Respiratory:            Cardiac:                                                           GI:           Extremities and Muscular Skeletal:                 Neurological:           Psych:         Recent Lab Results:  LIPID RESULTS:  Lab Results   Component Value Date    CHOL 188 02/10/2018    HDL 58 02/10/2018    LDL 84 02/10/2018    TRIG 231 (H) 02/10/2018    CHOLHDLRATIO 2.7 05/08/2014       LIVER ENZYME RESULTS:  Lab Results   Component Value Date    AST 14 03/11/2019    ALT 21 03/11/2019       CBC RESULTS:  Lab Results   Component Value Date    WBC 8.9 06/14/2021    RBC 5.38 (H) 06/14/2021    HGB 14.2 06/14/2021    HCT 44.8 06/14/2021    MCV 83 06/14/2021    MCH 26.4 (L) 06/14/2021    MCHC 31.7 06/14/2021    " RDW 13.2 06/14/2021     06/14/2021       BMP RESULTS:  Lab Results   Component Value Date     06/14/2021    POTASSIUM 4.4 06/14/2021    CHLORIDE 108 06/14/2021    CO2 24 06/14/2021    ANIONGAP 5 06/14/2021    GLC 93 06/14/2021    BUN 13 06/14/2021    CR 0.80 06/14/2021    GFRESTIMATED 90 06/14/2021    GFRESTBLACK >90 06/14/2021    KENDRICK 9.2 06/14/2021        A1C RESULTS:  Lab Results   Component Value Date    A1C 5.2 05/08/2014       INR RESULTS:  Lab Results   Component Value Date    INR 0.93 06/10/2021    INR 0.92 05/21/2013           CC  No referring provider defined for this encounter.

## 2021-09-14 NOTE — PROGRESS NOTES
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  She is a most pleasant woman.  She has presumptively a hypercoagulable state, although the standard hypercoagulable tests are negative.  We have outlined her history of many times before.  The reader is referred to my nurse practitioner's note dated 06/17/2021, but briefly, she had a remote history of retinal artery occlusion following a pregnancy back in 2013.  She has had some miscarriages.  Her lupus anticoagulant tests have been negative.  She has been seen by  xxxxxxxxxxxxxxxxx from Hematology/Oncology and again no specific diagnostic test that showed a hypercoagulable state, but her clinical history is consistent with that.  She was identified with a PFO back in 2014.  We were going to close at that point, but many things happened in between.  She was BRCA1 gene positive with a family history of breast cancer.  She had elective bilateral mastectomy in 2018 with her TRAM flap reconstruction.  She had thrombosis of the right internal mammary artery.  She had thrombosis requiring thrombectomy and then she developed a venous thrombosis of her left TRAM treated with tPA.  She has been placed on Lovenox when her flap failed and she underwent reconstructive surgery and required blood transfusions.  She has a history of MRSA in her nose also.  She also underwent a hysterectomy because of the BRCA gene and family history.  She is on NuvaRing now.  She had a previous history of adrenal insufficiency.  No longer needed to take hydrocortisone.  Three months ago we brought her to the Cath Lab and placed a [place] [employer] Cardioform 30 mm device and with good results.  There was an uncomplicated procedure.  She returns for followup.  She is feeling well.  We reviewed her actual echocardiogram pictures today.  Her left and right heart are completely normal and her atrial septal defect is closed.  Bubble study was negative.  Per working with Dr. Daley  and us, our plan now is since we are now 3 months out we are going to stop the Eliquis, we are going to add aspirin 81 mg a day and she will continue Plavix 75 mg a day.  Three months from now, which will be 6 months post-procedure, she will stop the Plavix and switch to aspirin at 325 mg per day.  We are going to have her come back in about 6 months from now when she is off the Plavix and repeat a limited echo just to make sure clot does not form on the device when she is off Plavix.  That will have been 9 months post-procedure and it will be well endothelialized at that point.  This was all reviewed with her today.    Today's visit was 26 minutes.    cc:   Sydnie Lawson MD   [address], [address]    Kevan xxxxxxxxxxxxxxMD Victoriano MD        D: 2021   T: 2021   MT: sarah    Name:     REZA VINCENT  MRN:      [id number]        Account:      [id number]   :      1975           Service Date: 2021       Document: [id number]

## 2021-10-19 PROBLEM — F32.9 MAJOR DEPRESSION: Status: ACTIVE | Noted: 2018-05-21

## 2021-10-19 NOTE — PROGRESS NOTES
Pre-Visit Planning   Next 5 appointments (look out 90 days)    Oct 20, 2021  7:15 AM  (Arrive by 6:50 AM)  PHYSICAL with Sydnie Daley MD  [facility] ([facility] ) [address] [place] [id number] [phone]        Appointment Notes for this encounter:   Physical  Followed by Cardiology 9/14/21    Follow-up UC with Sydnie Daley MD 08/05/2021  Assessment:  1. Recurrent UTI  2. Stress/anxiety  3. Weight gain  4. Chronic pain - stable     Plan:  1. Standing orders placed for UA and UC  2. Will start low dose fluoxetine  3. The potential side effects of the prescription medication were discussed with the patient.  4. This may help with stress and weight and  Chronic pain  5. Recheck in 2 months for physical    Questionnaires Reviewed/Assigned  Additional questionnaires assigned: [id number]   .hq  Health Maintenance Due   Topic Date Due     PREVENTIVE CARE VISIT  Never done     ADVANCE CARE PLANNING  Never done     URINE DRUG SCREEN  12/30/2020     INFLUENZA VACCINE (1) 09/01/2021     Current Outpatient Medications   Medication     ascorbic acid 250 MG TABS tablet     aspirin (ASA) 81 MG EC tablet     butalbital-acetaminophen-caffeine (ESGIC) -40 MG tablet     clopidogrel (PLAVIX) 75 MG tablet     CRANBERRY CONCENTRATE PO     FLUoxetine (PROZAC) 10 MG capsule     methenamine (HIPREX) 1 g tablet     NUVARING 0.12-0.015 MG/24HR vaginal ring     phenazopyridine (PYRIDIUM) 200 MG tablet     rizatriptan (MAXALT) 10 MG tablet     STATIN NOT PRESCRIBED (INTENTIONAL)     VITAMIN D PO     zolpidem (AMBIEN) 10 MG tablet     No current facility-administered medications for this visit.   956}  Patient preferred phone number: [phone]    Unable to reach. Left voicemail. Advised patient to call clinic back at [phone].  Lydia Rodriguez. RN, BSN, PAL (Patient Advocate Liaison)  [place] [facility]   [phone]

## 2021-10-20 ENCOUNTER — OFFICE VISIT (OUTPATIENT)
Dept: FAMILY MEDICINE | Facility: CLINIC | Age: 46
End: 2021-10-20
Payer: COMMERCIAL

## 2021-10-20 VITALS
BODY MASS INDEX: 36.64 KG/M2 | DIASTOLIC BLOOD PRESSURE: 78 MMHG | HEART RATE: 85 BPM | SYSTOLIC BLOOD PRESSURE: 119 MMHG | WEIGHT: 228 LBS | HEIGHT: 66 IN

## 2021-10-20 DIAGNOSIS — F41.9 ANXIETY: Primary | ICD-10-CM

## 2021-10-20 DIAGNOSIS — Z15.09 BRCA GENE MUTATION POSITIVE: ICD-10-CM

## 2021-10-20 DIAGNOSIS — G47.00 INSOMNIA, UNSPECIFIED TYPE: ICD-10-CM

## 2021-10-20 DIAGNOSIS — Z15.01 BRCA GENE MUTATION POSITIVE: ICD-10-CM

## 2021-10-20 DIAGNOSIS — Z13.6 CARDIOVASCULAR SCREENING; LDL GOAL LESS THAN 160: ICD-10-CM

## 2021-10-20 PROBLEM — G89.4 CHRONIC PAIN SYNDROME: Status: RESOLVED | Noted: 2017-03-08 | Resolved: 2021-10-20

## 2021-10-20 PROBLEM — N39.0 URINARY TRACT INFECTION, SITE NOT SPECIFIED: Status: RESOLVED | Noted: 2020-05-01 | Resolved: 2021-10-20

## 2021-10-20 PROCEDURE — 80061 LIPID PANEL: CPT | Performed by: FAMILY MEDICINE

## 2021-10-20 PROCEDURE — 84443 ASSAY THYROID STIM HORMONE: CPT | Performed by: FAMILY MEDICINE

## 2021-10-20 PROCEDURE — 99214 OFFICE O/P EST MOD 30 MIN: CPT | Performed by: FAMILY MEDICINE

## 2021-10-20 PROCEDURE — 36415 COLL VENOUS BLD VENIPUNCTURE: CPT | Performed by: FAMILY MEDICINE

## 2021-10-20 RX ORDER — ZOLPIDEM TARTRATE 10 MG/1
10 TABLET ORAL
Qty: 30 TABLET | Refills: 3 | Status: SHIPPED | OUTPATIENT
Start: 2021-10-20 | End: 2022-03-15

## 2021-10-20 SDOH — HEALTH STABILITY: PHYSICAL HEALTH: ON AVERAGE, HOW MANY MINUTES DO YOU ENGAGE IN EXERCISE AT THIS LEVEL?: 40 MIN

## 2021-10-20 SDOH — ECONOMIC STABILITY: INCOME INSECURITY: HOW HARD IS IT FOR YOU TO PAY FOR THE VERY BASICS LIKE FOOD, HOUSING, MEDICAL CARE, AND HEATING?: NOT HARD AT ALL

## 2021-10-20 SDOH — ECONOMIC STABILITY: INCOME INSECURITY: IN THE LAST 12 MONTHS, WAS THERE A TIME WHEN YOU WERE NOT ABLE TO PAY THE MORTGAGE OR RENT ON TIME?: NO

## 2021-10-20 SDOH — ECONOMIC STABILITY: TRANSPORTATION INSECURITY
IN THE PAST 12 MONTHS, HAS LACK OF TRANSPORTATION KEPT YOU FROM MEETINGS, WORK, OR FROM GETTING THINGS NEEDED FOR DAILY LIVING?: NO

## 2021-10-20 SDOH — ECONOMIC STABILITY: TRANSPORTATION INSECURITY
IN THE PAST 12 MONTHS, HAS THE LACK OF TRANSPORTATION KEPT YOU FROM MEDICAL APPOINTMENTS OR FROM GETTING MEDICATIONS?: NO

## 2021-10-20 SDOH — HEALTH STABILITY: PHYSICAL HEALTH: ON AVERAGE, HOW MANY DAYS PER WEEK DO YOU ENGAGE IN MODERATE TO STRENUOUS EXERCISE (LIKE A BRISK WALK)?: 5 DAYS

## 2021-10-20 SDOH — ECONOMIC STABILITY: FOOD INSECURITY: WITHIN THE PAST 12 MONTHS, THE FOOD YOU BOUGHT JUST DIDN'T LAST AND YOU DIDN'T HAVE MONEY TO GET MORE.: NEVER TRUE

## 2021-10-20 SDOH — ECONOMIC STABILITY: FOOD INSECURITY: WITHIN THE PAST 12 MONTHS, YOU WORRIED THAT YOUR FOOD WOULD RUN OUT BEFORE YOU GOT MONEY TO BUY MORE.: NEVER TRUE

## 2021-10-20 ASSESSMENT — ENCOUNTER SYMPTOMS
FREQUENCY: 0
DIARRHEA: 0
HEMATOCHEZIA: 0
ABDOMINAL PAIN: 0
EYE PAIN: 0
NERVOUS/ANXIOUS: 1
ARTHRALGIAS: 0
SORE THROAT: 0
FEVER: 0
CHILLS: 0
HEMATURIA: 0
WEAKNESS: 0
CONSTIPATION: 0
PALPITATIONS: 0
NAUSEA: 0
DIZZINESS: 0
MYALGIAS: 0
DYSURIA: 1
HEARTBURN: 1
HEADACHES: 1
SHORTNESS OF BREATH: 0
JOINT SWELLING: 0
PARESTHESIAS: 0
COUGH: 0

## 2021-10-20 ASSESSMENT — ANXIETY QUESTIONNAIRES
8. IF YOU CHECKED OFF ANY PROBLEMS, HOW DIFFICULT HAVE THESE MADE IT FOR YOU TO DO YOUR WORK, TAKE CARE OF THINGS AT HOME, OR GET ALONG WITH OTHER PEOPLE?: SOMEWHAT DIFFICULT
7. FEELING AFRAID AS IF SOMETHING AWFUL MIGHT HAPPEN: NOT AT ALL
GAD7 TOTAL SCORE: 4
3. WORRYING TOO MUCH ABOUT DIFFERENT THINGS: SEVERAL DAYS
7. FEELING AFRAID AS IF SOMETHING AWFUL MIGHT HAPPEN: NOT AT ALL
2. NOT BEING ABLE TO STOP OR CONTROL WORRYING: SEVERAL DAYS
4. TROUBLE RELAXING: SEVERAL DAYS
6. BECOMING EASILY ANNOYED OR IRRITABLE: NOT AT ALL
1. FEELING NERVOUS, ANXIOUS, OR ON EDGE: SEVERAL DAYS
GAD7 TOTAL SCORE: 4
5. BEING SO RESTLESS THAT IT IS HARD TO SIT STILL: NOT AT ALL
GAD7 TOTAL SCORE: 4

## 2021-10-20 ASSESSMENT — LIFESTYLE VARIABLES
HOW OFTEN DO YOU HAVE A DRINK CONTAINING ALCOHOL: MONTHLY OR LESS
HOW OFTEN DO YOU HAVE SIX OR MORE DRINKS ON ONE OCCASION: NEVER
HOW MANY STANDARD DRINKS CONTAINING ALCOHOL DO YOU HAVE ON A TYPICAL DAY: 1 OR 2

## 2021-10-20 ASSESSMENT — SOCIAL DETERMINANTS OF HEALTH (SDOH)
HOW OFTEN DO YOU GET TOGETHER WITH FRIENDS OR RELATIVES?: MORE THAN THREE TIMES A WEEK
HOW OFTEN DO YOU ATTEND CHURCH OR RELIGIOUS SERVICES?: 1 TO 4 TIMES PER YEAR
DO YOU BELONG TO ANY CLUBS OR ORGANIZATIONS SUCH AS CHURCH GROUPS UNIONS, FRATERNAL OR ATHLETIC GROUPS, OR SCHOOL GROUPS?: NO
IN A TYPICAL WEEK, HOW MANY TIMES DO YOU TALK ON THE PHONE WITH FAMILY, FRIENDS, OR NEIGHBORS?: MORE THAN THREE TIMES A WEEK

## 2021-10-20 ASSESSMENT — MIFFLIN-ST. JEOR: SCORE: 1693.01

## 2021-10-20 ASSESSMENT — PATIENT HEALTH QUESTIONNAIRE - PHQ9
SUM OF ALL RESPONSES TO PHQ QUESTIONS 1-9: 10
SUM OF ALL RESPONSES TO PHQ QUESTIONS 1-9: 10
10. IF YOU CHECKED OFF ANY PROBLEMS, HOW DIFFICULT HAVE THESE PROBLEMS MADE IT FOR YOU TO DO YOUR WORK, TAKE CARE OF THINGS AT HOME, OR GET ALONG WITH OTHER PEOPLE: SOMEWHAT DIFFICULT

## 2021-10-20 NOTE — PROGRESS NOTES
"  Assessment & Plan     Anxiety  Doing fairly well but not as well as she would like  - FLUoxetine (PROZAC) 20 MG capsule; Take 1 capsule (20 mg) by mouth daily    Insomnia, unspecified type  The addition of fluoxetine last summer may have helped some, would like to increase dose  Refills per [facility]    - zolpidem (AMBIEN) 10 MG tablet; Take 1 tablet (10 mg) by mouth nightly as needed for sleep    CARDIOVASCULAR SCREENING; LDL GOAL LESS THAN 160  Last checked in 2018 and was okay  - Lipid panel reflex to direct LDL Fasting; Future    BRCA gene mutation positive  Breasts and cervix gone,  Risk for thyroid cancer remains - no lumps noted today   - TSH with free T4 reflex; Future      24 minutes spent on the date of the encounter doing chart review, review of test results, interpretation of tests, patient visit and documentation        BMI:   Estimated body mass index is 37.36 kg/m  as calculated from the following:    Height as of this encounter: 1.664 m (5' 5.5\").    Weight as of this encounter: 103.4 kg (228 lb).   Weight management plan: Discussed healthy diet and exercise guidelines        Return in about 6 weeks (around 12/1/2021) for video visit, telephone visit.    Sydnie Daley MD  [facility] STEPHANIE Latham is a 46 year old who presents for the following health issues - she is doing well but has had some issues with sleep for which she uses Ambien.   She would like to try and go up on her prozac.     Healthy Habits:     Getting at least 3 servings of Calcium per day:  Yes    Bi-annual eye exam:  Yes    Dental care twice a year:  Yes    Sleep apnea or symptoms of sleep apnea:  None    Diet:  Other    Frequency of exercise:  2-3 days/week    Duration of exercise:  30-45 minutes    Taking medications regularly:  Yes    Medication side effects:  None    PHQ-2 Total Score: 2    Additional concerns today:  No       Medication Followup of Prozac    Taking Medication as prescribed: " yes    Side Effects:  None    Medication Helping Symptoms:  yes     Past Medical History:   Diagnosis Date     Adrenal insufficiency (Hugh's disease) (H)     ACTH stim test failed to increase cortisol     Allergic rhinitis, cause unspecified      Antiplatelet or antithrombotic long-term use      Arthritis      ASD (atrial septal defect) 02/15/2013    repaired 6/10/21     Attention deficit disorder without mention of hyperactivity 2014     BRCA1 gene mutation positive 2018    Reported by patient, no report available at this time Records requested from [facility] 18     Cerebral infarction (H)      Clotting disorder (H) birth    undefined clotting disorder - sees Dr. 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 in 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 pregnant       Past Surgical History:   Procedure Laterality Date     APPENDECTOMY  2006      SECTION  2013    Procedure:  SECTION;  Primary  Section;  Surgeon: Chad Hughes MD;  Location:  L+D     CV PFO CLOSURE N/A 06/10/2021    Procedure:30mm [place] CARDIOFORM- Patent Foramen [place] Closure;  Surgeon: Victoriano Cornejo MD;  Location:  HEART CARDIAC CATH LAB     DILATION AND CURETTAGE SUCTION N/A 2014    Procedure: DILATION AND CURETTAGE SUCTION;  Surgeon: Srini Martinez MD;  Location:  OR     DILATION AND CURETTAGE SUCTION  N/A 12/20/2014    Procedure: DILATION AND CURETTAGE SUCTION;  Surgeon: Connor Kang MD;  Location: [facility] OR     GENITOURINARY SURGERY  2016    bladder sling and complete historectomy     HC DILATION/CURETTAGE DIAG/THER NON OB  1998     HC DILATION/CURETTAGE DIAG/THER NON OB  2006     HERNIORRHAPHY INCISIONAL (LOCATION) N/A 12/07/2018    Procedure: Incisional Hernia repair with Biologic mesh;  Surgeon: Suraj Bergeron MD;  Location: [facility] OR     HYSTERECTOMY, [facility]  04/18/2016    does not need pap     MASTECTOMY SIMPLE BILATERAL Bilateral 05/25/2018    Procedure: MASTECTOMY SIMPLE BILATERAL;  PROPHYLACTIC BILATERAL MASTECTOMY (GLADIS) BILATERAL BREAST RECONSTRUCTION [facility] TISSUE EXPANDERS (WILLOW)   ;  Surgeon: Suraj Bergeron MD;  Location:  OR     ORTHOPEDIC SURGERY      (L) thumb fused     RECONSTRUCT BREAST, INSERT TISSUE EXPANDER BILATERAL, COMBINED Bilateral 05/25/2018    expander and tram flap with drains - had weekly surgery to have I & D and removal of infected tissue     SURGICAL HISTORY OF -   2006    left thumb fusion due to arthritis       MEDICATIONS:  Current Outpatient Medications   Medication     FLUoxetine (PROZAC) 20 MG capsule     zolpidem (AMBIEN) 10 MG tablet     ascorbic acid 250 MG TABS tablet     aspirin (ASA) 81 MG EC tablet     butalbital-acetaminophen-caffeine (ESGIC) -40 MG tablet     clopidogrel (PLAVIX) 75 MG tablet     CRANBERRY CONCENTRATE PO     methenamine (HIPREX) 1 g tablet     NUVARING 0.12-0.015 MG/24HR vaginal ring     phenazopyridine (PYRIDIUM) 200 MG tablet     rizatriptan (MAXALT) 10 MG tablet     STATIN NOT PRESCRIBED (INTENTIONAL)     VITAMIN D PO     No current facility-administered medications for this visit.       SOCIAL HISTORY:  Social History     Tobacco Use     Smoking status: Never Smoker     Smokeless tobacco: Never Used   Substance Use Topics     Alcohol use: Not Currently     Alcohol/week: 0.0 standard drinks     Comment: occassional       Family History   Problem  Relation Age of Onset     C.A.D. Maternal Grandmother      Hypertension Maternal Grandmother      Psychotic Disorder Mother         Depression, alcoholism     Diabetes Mother      Hereditary Breast and Ovarian Cancer Syndrome Mother         BRCA1+, no hx of cancer, s/p prophylactic surgery to remove breast tissue, ovaries, fallopian tubes     Hypertension Mother      Thyroid Disease Mother         thyroid nodules     Lung Cancer Mother      Substance Abuse Father      Hereditary Breast and Ovarian Cancer Syndrome Sister         matrnal half sister, BRCA1+     Hereditary Breast and Ovarian Cancer Syndrome Daughter 23        BRCA1+     Breast Cancer Maternal Aunt 40        lumpectomy, then 2nd primary breast cancer later in 40s, treated with mastectomy     Hereditary Breast and Ovarian Cancer Syndrome Maternal Aunt         BRCA1+     Ovarian Cancer Maternal Aunt 70        surgry     Thyroid Cancer Maternal Aunt         medullary thyroid cancer     Hereditary Breast and Ovarian Cancer Syndrome Maternal Aunt         BRCA1+     Breast Cancer Other 41        maternal great aunt     Ovarian Cancer Other 45         in 40s     Thyroid Cancer Maternal Uncle 66        papillary thyroid cancer     Breast Cancer Cousin 45     Hereditary Breast and Ovarian Cancer Syndrome Cousin         BRCA1+     Breast Cancer Other 43        maternal great aunt     Other Cancer Other         ovarian cancer           Review of Systems   Constitutional: Negative for chills and fever.   HENT: Negative for congestion, ear pain, hearing loss and sore throat.    Eyes: Negative for pain and visual disturbance.   Respiratory: Negative for cough and shortness of breath.    Cardiovascular: Negative for chest pain, palpitations and peripheral edema.   Gastrointestinal: Positive for heartburn. Negative for abdominal pain, constipation, diarrhea, hematochezia and nausea.   Genitourinary: Positive for dysuria. Negative for frequency, genital sores,  "hematuria and urgency.   Musculoskeletal: Negative for arthralgias, joint swelling and myalgias.   Skin: Negative for rash.   Neurological: Positive for headaches. Negative for dizziness, weakness and paresthesias.   Psychiatric/Behavioral: Negative for mood changes. The patient is nervous/anxious.       Constitutional, HEENT, cardiovascular, pulmonary, gi and gu systems are negative, except as otherwise noted.      Objective    /78 (BP Location: Right arm, Patient Position: Sitting, Cuff Size: Adult Regular)   Pulse 85   Ht 1.664 m (5' 5.5\")   Wt 103.4 kg (228 lb)   LMP 02/08/2015 (Within Months)   BMI 37.36 kg/m    Body mass index is 37.36 kg/m .  Physical Exam   GENERAL: healthy, alert and no distress  EYES: Eyes grossly normal to inspection, PERRL and conjunctivae and sclerae normal  HENT: ear canals and TM's normal, nose and mouth without ulcers or lesions  NECK: no adenopathy, no asymmetry, masses, or scars and thyroid normal to palpation  RESP: lungs clear to auscultation - no rales, rhonchi or wheezes  CV: regular rate and rhythm, normal S1 S2, no S3 or S4, no murmur, click or rub, no peripheral edema and peripheral pulses strong  ABDOMEN: soft, nontender, no hepatosplenomegaly, no masses and bowel sounds normal  MS: no gross musculoskeletal defects noted, no edema  SKIN: no suspicious lesions or rashes  NEURO: Normal strength and tone, mentation intact and speech normal  PSYCH: mentation appears normal, affect normal/bright  LYMPH: no cervical, supraclavicular, axillary, or inguinal adenopathy    Hospital Outpatient Visit on 09/07/2021   Component Date Value Ref Range Status     LVEF  09/07/2021 60-65%   Final               Answers for HPI/ROS submitted by the patient on 10/20/2021  If you checked off any problems, how difficult have these problems made it for you to do your work, take care of things at home, or get along with other people?: Somewhat difficult  PHQ9 TOTAL SCORE: 10  BASSAM 7 TOTAL " SCORE: 4

## 2021-10-20 NOTE — PROGRESS NOTES
SUBJECTIVE:   CC: Noa Mcgill is an 46 year old woman who presents for preventive health visit.     Patient has been advised of split billing requirements and indicates understanding: Yes  Healthy Habits:     Getting at least 3 servings of Calcium per day:  Yes    Bi-annual eye exam:  Yes    Dental care twice a year:  Yes    Sleep apnea or symptoms of sleep apnea:  None    Diet:  Other    Frequency of exercise:  2-3 days/week    Duration of exercise:  30-45 minutes    Taking medications regularly:  Yes    Medication side effects:  None    PHQ-2 Total Score: 2    Additional concerns today:  No    {Add if <65 person on Medicare  - Required Questions (Optional):481925}  {Outside tests to abstract? :180527}    {additional problems to add (Optional):984736}    Today's PHQ-2 Score:   PHQ-2 ( 1999 [employer]) 10/20/2021   Q1: Little interest or pleasure in doing things 1   Q2: Feeling down, depressed or hopeless 1   PHQ-2 Score 2   Q1: Little interest or pleasure in doing things Several days   Q2: Feeling down, depressed or hopeless Several days   PHQ-2 Score 2       Abuse: Current or Past (Physical, Sexual or Emotional) - No  Do you feel safe in your environment? Yes    Have you ever done Advance Care Planning? (For example, a Health Directive, POLST, or a discussion with a medical provider or your loved ones about your wishes): No, advance care planning information given to patient to review.  Patient declined advance care planning discussion at this time.    Social History     Tobacco Use     Smoking status: Never Smoker     Smokeless tobacco: Never Used   Substance Use Topics     Alcohol use: Not Currently     Alcohol/week: 0.0 standard drinks     Comment: occassional     If you drink alcohol do you typically have >3 drinks per day or >7 drinks per week? No    Alcohol Use 10/20/2021   Prescreen: >3 drinks/day or >7 drinks/week? No   Prescreen: >3 drinks/day or >7 drinks/week? -       Reviewed orders with patient.   "Reviewed health maintenance and updated orders accordingly - { :508382::\"Yes\"}  {Chronicprobdata (optional):844837}    Breast Cancer Screening:  Any new diagnosis of family breast, ovarian, or bowel cancer? {Yes_Link to Screening / No:913728}    FHS-7: No flowsheet data found.  {If any of the questions to the BCRA (FHS-7) are answered yes, consider ordering referral for genetic counseling (Optional) :490566::\"click delete button to remove this line now\"}  {AMB Mammogram Decision Support (Optional) :022827}  Pertinent mammograms are reviewed under the imaging tab.    History of abnormal Pap smear: { :388511}     Reviewed and updated as needed this visit by clinical staff  Tobacco  Allergies  Meds   Med Hx  Surg Hx  Fam Hx  Soc Hx        Reviewed and updated as needed this visit by Provider                {HISTORY OPTIONS (Optional):134306}    Review of Systems   Constitutional: Negative for chills and fever.   HENT: Negative for congestion, ear pain, hearing loss and sore throat.    Eyes: Negative for pain and visual disturbance.   Respiratory: Negative for cough and shortness of breath.    Cardiovascular: Negative for chest pain, palpitations and peripheral edema.   Gastrointestinal: Positive for heartburn. Negative for abdominal pain, constipation, diarrhea, hematochezia and nausea.   Genitourinary: Positive for dysuria. Negative for frequency, genital sores, hematuria and urgency.   Musculoskeletal: Negative for arthralgias, joint swelling and myalgias.   Skin: Negative for rash.   Neurological: Positive for headaches. Negative for dizziness, weakness and paresthesias.   Psychiatric/Behavioral: Negative for mood changes. The patient is nervous/anxious.      {FEMALE ROS (Optional):302802}     OBJECTIVE:   /78 (BP Location: Right arm, Patient Position: Sitting, Cuff Size: Adult Regular)   Pulse 85   Ht 1.664 m (5' 5.5\")   Wt 103.4 kg (228 lb)   LMP 02/08/2015 (Within Months)   BMI 37.36 kg/m  " "  Physical Exam  {Exam Choices (Optional):456210}    {Diagnostic Test Results (Optional):667469::\"Diagnostic Test Results:\",\"Labs reviewed in Epic\"}    ASSESSMENT/PLAN:   {Diag Picklist:731353}    Patient has been advised of split billing requirements and indicates understanding: {YES / NO:210093::\"Yes\"}  COUNSELING:  {FEMALE COUNSELING MESSAGES:631584::\"Reviewed preventive health counseling, as reflected in patient instructions\"}    Estimated body mass index is 37.36 kg/m  as calculated from the following:    Height as of this encounter: 1.664 m (5' 5.5\").    Weight as of this encounter: 103.4 kg (228 lb).    {Weight Management Plan (ACO) Complete if BMI is abnormal-  Ages 18-64  BMI >24.9.  Age 65+ with BMI <23 or >30 (Optional):859324}    She reports that she has never smoked. She has never used smokeless tobacco.      Counseling Resources:  ATP IV Guidelines  Pooled Cohorts Equation Calculator  Breast Cancer Risk Calculator  BRCA-Related Cancer Risk Assessment: FHS-7 Tool  FRAX Risk Assessment  ICSI Preventive Guidelines  Dietary Guidelines for Americans, 2010  USDA's MyPlate  ASA Prophylaxis  Lung CA Screening    Sydnie Daley MD  [facility]  Answers for HPI/ROS submitted by the patient on 10/20/2021  If you checked off any problems, how difficult have these problems made it for you to do your work, take care of things at home, or get along with other people?: Somewhat difficult  PHQ9 TOTAL SCORE: 10  BASSAM 7 TOTAL SCORE: 4      " Partial Purse String (Simple) Text: Given the location of the defect and the characteristics of the surrounding skin a simple purse string closure was deemed most appropriate.  Undermining was performed circumfirentially around the surgical defect.  A purse string suture was then placed and tightened. Wound tension only allowed a partial closure of the circular defect.

## 2021-10-21 LAB
CHOLEST SERPL-MCNC: 212 MG/DL
FASTING STATUS PATIENT QL REPORTED: ABNORMAL
HDLC SERPL-MCNC: 56 MG/DL
LDLC SERPL CALC-MCNC: 124 MG/DL
NONHDLC SERPL-MCNC: 156 MG/DL
TRIGL SERPL-MCNC: 160 MG/DL
TSH SERPL DL<=0.005 MIU/L-ACNC: 2.68 MU/L (ref 0.4–4)

## 2021-10-21 ASSESSMENT — PATIENT HEALTH QUESTIONNAIRE - PHQ9: SUM OF ALL RESPONSES TO PHQ QUESTIONS 1-9: 10

## 2021-10-21 ASSESSMENT — ANXIETY QUESTIONNAIRES: GAD7 TOTAL SCORE: 4

## 2021-10-23 ENCOUNTER — HEALTH MAINTENANCE LETTER (OUTPATIENT)
Age: 46
End: 2021-10-23

## 2021-10-26 ENCOUNTER — TRANSFERRED RECORDS (OUTPATIENT)
Dept: HEALTH INFORMATION MANAGEMENT | Facility: CLINIC | Age: 46
End: 2021-10-26
Payer: COMMERCIAL

## 2021-11-02 ENCOUNTER — TRANSFERRED RECORDS (OUTPATIENT)
Dept: HEALTH INFORMATION MANAGEMENT | Facility: CLINIC | Age: 46
End: 2021-11-02
Payer: COMMERCIAL

## 2021-11-24 ENCOUNTER — TELEPHONE (OUTPATIENT)
Dept: CARDIOLOGY | Facility: CLINIC | Age: 46
End: 2021-11-24
Payer: COMMERCIAL

## 2021-11-24 NOTE — TELEPHONE ENCOUNTER
Call out to Pt to discuss how much plavix she needs to get to 12/10/21 her 6 months post PFO closure. DARYA Mandujano RN

## 2021-11-26 DIAGNOSIS — Q21.11 OSTIUM SECUNDUM TYPE ATRIAL SEPTAL DEFECT: ICD-10-CM

## 2021-11-26 RX ORDER — CLOPIDOGREL BISULFATE 75 MG/1
75 TABLET ORAL DAILY
Qty: 12 TABLET | Refills: 0 | Status: SHIPPED | OUTPATIENT
Start: 2021-11-26 | End: 2021-12-22

## 2021-11-26 NOTE — PROGRESS NOTES
Pt needs 12 more Plavix to make to 6 month teresita post PFO. RX sent to pharmacy. DARYA Mandujano RN

## 2021-11-30 ENCOUNTER — TRANSFERRED RECORDS (OUTPATIENT)
Dept: HEALTH INFORMATION MANAGEMENT | Facility: CLINIC | Age: 46
End: 2021-11-30
Payer: COMMERCIAL

## 2021-12-16 ENCOUNTER — TRANSFERRED RECORDS (OUTPATIENT)
Dept: HEALTH INFORMATION MANAGEMENT | Facility: CLINIC | Age: 46
End: 2021-12-16
Payer: COMMERCIAL

## 2021-12-20 DIAGNOSIS — G43.909 MIGRAINE WITHOUT STATUS MIGRAINOSUS, NOT INTRACTABLE, UNSPECIFIED MIGRAINE TYPE: ICD-10-CM

## 2021-12-21 NOTE — TELEPHONE ENCOUNTER
Routing refill request to provider for review/approval because:  Drug not on the [facility] refill protocol     Kavitha Fraser RN   [facility]  -- Triage Nurse

## 2021-12-22 RX ORDER — BUTALBITAL, ACETAMINOPHEN AND CAFFEINE 50; 325; 40 MG/1; MG/1; MG/1
TABLET ORAL
Qty: 30 TABLET | Refills: 1 | Status: SHIPPED | OUTPATIENT
Start: 2021-12-22 | End: 2022-03-15

## 2022-02-12 ENCOUNTER — HEALTH MAINTENANCE LETTER (OUTPATIENT)
Age: 47
End: 2022-02-12

## 2022-03-15 ENCOUNTER — VIRTUAL VISIT (OUTPATIENT)
Dept: FAMILY MEDICINE | Facility: CLINIC | Age: 47
End: 2022-03-15
Payer: COMMERCIAL

## 2022-03-15 DIAGNOSIS — G43.909 MIGRAINE WITHOUT STATUS MIGRAINOSUS, NOT INTRACTABLE, UNSPECIFIED MIGRAINE TYPE: ICD-10-CM

## 2022-03-15 DIAGNOSIS — F32.0 MILD MAJOR DEPRESSION (H): ICD-10-CM

## 2022-03-15 DIAGNOSIS — Z15.01 BRCA GENE MUTATION POSITIVE: ICD-10-CM

## 2022-03-15 DIAGNOSIS — L40.50 PSORIATIC ARTHROPATHY (H): ICD-10-CM

## 2022-03-15 DIAGNOSIS — Z12.11 SPECIAL SCREENING FOR MALIGNANT NEOPLASMS, COLON: ICD-10-CM

## 2022-03-15 DIAGNOSIS — Z15.09 BRCA GENE MUTATION POSITIVE: ICD-10-CM

## 2022-03-15 DIAGNOSIS — F41.9 ANXIETY: ICD-10-CM

## 2022-03-15 DIAGNOSIS — G47.00 INSOMNIA, UNSPECIFIED TYPE: ICD-10-CM

## 2022-03-15 DIAGNOSIS — F98.8 ATTENTION DEFICIT DISORDER (ADD) WITHOUT HYPERACTIVITY: Primary | ICD-10-CM

## 2022-03-15 PROCEDURE — 99214 OFFICE O/P EST MOD 30 MIN: CPT | Mod: 95 | Performed by: FAMILY MEDICINE

## 2022-03-15 RX ORDER — ZOLPIDEM TARTRATE 10 MG/1
10 TABLET ORAL
Qty: 30 TABLET | Refills: 5 | Status: SHIPPED | OUTPATIENT
Start: 2022-03-15 | End: 2022-09-19

## 2022-03-15 RX ORDER — DEXTROAMPHETAMINE SACCHARATE, AMPHETAMINE ASPARTATE, DEXTROAMPHETAMINE SULFATE AND AMPHETAMINE SULFATE 2.5; 2.5; 2.5; 2.5 MG/1; MG/1; MG/1; MG/1
10 TABLET ORAL 2 TIMES DAILY PRN
Qty: 60 TABLET | Refills: 0 | Status: SHIPPED | OUTPATIENT
Start: 2022-03-15 | End: 2023-07-18

## 2022-03-15 RX ORDER — METHOCARBAMOL 500 MG/1
500 TABLET, FILM COATED ORAL
COMMUNITY
Start: 2022-02-17 | End: 2023-07-18

## 2022-03-15 RX ORDER — BUTALBITAL, ACETAMINOPHEN AND CAFFEINE 50; 325; 40 MG/1; MG/1; MG/1
TABLET ORAL
Qty: 30 TABLET | Refills: 2 | Status: SHIPPED | OUTPATIENT
Start: 2022-03-15 | End: 2022-09-19

## 2022-03-15 ASSESSMENT — ANXIETY QUESTIONNAIRES
GAD7 TOTAL SCORE: 4
2. NOT BEING ABLE TO STOP OR CONTROL WORRYING: NOT AT ALL
6. BECOMING EASILY ANNOYED OR IRRITABLE: MORE THAN HALF THE DAYS
3. WORRYING TOO MUCH ABOUT DIFFERENT THINGS: NOT AT ALL
1. FEELING NERVOUS, ANXIOUS, OR ON EDGE: SEVERAL DAYS
IF YOU CHECKED OFF ANY PROBLEMS ON THIS QUESTIONNAIRE, HOW DIFFICULT HAVE THESE PROBLEMS MADE IT FOR YOU TO DO YOUR WORK, TAKE CARE OF THINGS AT HOME, OR GET ALONG WITH OTHER PEOPLE: SOMEWHAT DIFFICULT
7. FEELING AFRAID AS IF SOMETHING AWFUL MIGHT HAPPEN: NOT AT ALL
5. BEING SO RESTLESS THAT IT IS HARD TO SIT STILL: NOT AT ALL

## 2022-03-15 ASSESSMENT — PATIENT HEALTH QUESTIONNAIRE - PHQ9: 5. POOR APPETITE OR OVEREATING: SEVERAL DAYS

## 2022-03-15 NOTE — PATIENT INSTRUCTIONS
Patient Education     Adderall Oral Tablet 10 mg  Uses  This medicine is used for the following purposes:    attention deficit hyperactivity disorder    narcolepsy  Instructions  This medicine may be taken with or without food.  Do not take your last dose of the day within 4-6 hours of bedtime.  Keep the medicine at room temperature. Avoid heat and direct light.  Avoid drinks with caffeine while on this medicine.  Do not take this medicine with antacids.  Please tell your doctor and pharmacist about all the medicines you take. Include both prescription and over-the-counter medicines. Also tell them about any vitamins, herbal medicines, or anything else you take for your health.  Cautions  This medicine can be habit-forming. If you use this medicine regularly for a long time, it can lead to withdrawal symptoms when you stop. Please use this medicine only as directed.  Tell your doctor and pharmacist if you ever had an allergic reaction to a medicine. Symptoms of an allergic reaction can include trouble breathing, skin rash, itching, swelling, or severe dizziness.  This medicine is associated with a rare but very serious medical condition. Please speak with your doctor about symptoms you should look out for while on this medicine. Notify your doctor immediately if you develop those symptoms.  Do not use the medication any more than instructed.  Your ability to stay alert or to react quickly may be impaired by this medicine. Do not drive or operate machinery until you know how this medicine will affect you.  Tell the doctor or pharmacist if you are pregnant, planning to be pregnant, or breastfeeding.  Do not take Myrna's wort while on this medicine.  Ask your pharmacist if this medicine can interact with any of your other medicines. Be sure to tell them about all the medicines you take.  Please tell all your doctors and dentists that you are on this medicine before they provide care.  Do not start or stop any  other medicines without first speaking to your doctor or pharmacist.  If you have painful erection or an erection for more than 4 hours, seek medical care right away.  Do not share this medicine with anyone who has not been prescribed this medicine.  This medicine can cause serious side effects in some patients. Important information from the U.[place]. Food and Drug Administration (FDA) is available from your pharmacist. Please review it carefully with your pharmacist to understand the risks associated with this medicine.  Side Effects  The following is a list of some common side effects from this medicine. Please speak with your doctor about what you should do if you experience these or other side effects.    agitated feeling or trouble sleeping    decreased appetite    dizziness    dry mouth    high blood pressure    stomach upset or abdominal pain    weight loss  Call your doctor or get medical help right away if you notice any of these more serious side effects:    change in behavior    chest pain    swelling of the legs, feet, and hands    fainting    high fever    hallucinations (unusual thoughts, seeing or hearing things that are not real)    rapid heartbeat    mood changes    muscle aches, spasms or abnormal movements    feeling of numbness or tingling in your hands and feet    pale or blue skin, lips or fingernails    seizures    problems with sexual functions or desire    shakiness    shortness of breath    symptoms of stroke (such as one-sided weakness, slurred speech, confusion)    suicidal thoughts    unusual or unexplained tiredness or weakness    blurring or changes of vision  A few people may have an allergic reactions to this medicine. Symptoms can include difficulty breathing, skin rash, itching, swelling, or severe dizziness. If you notice any of these symptoms, seek medical help quickly.  Extra  Please speak with your doctor, nurse, or pharmacist if you have any questions about this  medicine.  [email or web address]  IMPORTANT NOTE: This document tells you briefly how to take your medicine, but it does not tell you all there is to know about it.Your doctor or pharmacist may give you other documents about your medicine. Please talk to them if you have any questions.Always follow their advice. There is a more complete description of this medicine available in English.Scan this code on your smartphone or tablet or use the web address below. You can also ask your pharmacist for a printout. If you have any questions, please ask your pharmacist.     2021 [employer].

## 2022-03-15 NOTE — PROGRESS NOTES
Noa is a 46 year old who is being evaluated via a billable video visit.      How would you like to obtain your AVS? MyChart  If the video visit is dropped, the invitation should be resent by: Text to cell phone: [phone]   Will anyone else be joining your video visit? No    Video Start Time: 8:58 AM    Assessment & Plan     Psoriatic arthropathy (H)  stable    Mild major depression (H)  Doing really well  Continue prozac at current dose    Anxiety  Stable  Refills per epicare   - FLUoxetine (PROZAC) 20 MG capsule  Dispense: 90 capsule; Refill: 2    Insomnia, unspecified type  Doing well  Add refills  - zolpidem (AMBIEN) 10 MG tablet  Dispense: 30 tablet; Refill: 5    Migraine without status migrainosus, not intractable, unspecified migraine type  Stable  Add refills  This is working well  - butalbital-acetaminophen-caffeine (ESGIC) -40 MG tablet  Dispense: 30 tablet; Refill: 2    Attention deficit disorder (ADD) without hyperactivity  resume  - amphetamine-dextroamphetamine (ADDERALL) 10 MG tablet  Dispense: 60 tablet; Refill: 0    Special screening for malignant neoplasms, colon    - Adult Gastro Ref - Procedure Only    BRCA gene mutation positive    - Adult Gastro Ref - Procedure Only        22 minutes spent on the date of the encounter doing chart review, review of test results, patient visit and documentation        MEDICATIONS:        - Resume adderall but only bid prn for work       - Continue other medications without change    Return in about 6 months (around 9/15/2022) for Physical Exam.    Sydnie Daley MD  [facility]   Noa is a 46 year old who presents for the following health issues - she is here to recheck on her meds.   She is doing well with the increase in her prozac.   The Ambien continues to work for sleep but she only takes it intermittently.   She wonders about going back on adderall.  She was on it while in school.    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   She would not need it as much as while in school     She recently had 3 herniated disc.   She is seeing ortho and is trying to get back injections covered.   She is leaving in a few weeks for a 1 week trip to [place].       HPI     Anxiety Follow-Up    How are you doing with your anxiety since your last visit? Improved     Are you having other symptoms that might be associated with anxiety? Yes:  trouble sleeping    Have you had a significant life event? No     Are you feeling depressed? No    Do you have any concerns with your use of alcohol or other drugs? No    Social History     Tobacco Use     Smoking status: Never Smoker     Smokeless tobacco: Never Used   Vaping Use     Vaping Use: Never used   Substance Use Topics     Alcohol use: Not Currently     Alcohol/week: 0.0 standard drinks     Comment: occassional     Drug use: No     BASSAM-7 SCORE 8/5/2021 10/20/2021 3/15/2022   Total Score - - -   Total Score - 4 (minimal anxiety) -   Total Score 9 4 4     PHQ 12/9/2020 8/5/2021 10/20/2021   PHQ-9 Total Score 7 5 10   Q9: Thoughts of better off dead/self-harm past 2 weeks Not at all Not at all Not at all   F/U: Thoughts of suicide or self-harm - - -   F/U: Safety concerns - - -     BASSAM-7  3/15/2022   1. Feeling nervous, anxious, or on edge 1   2. Not being able to stop or control worrying 0   3. Worrying too much about different things 0   4. Trouble relaxing 1   5. Being so restless that it is hard to sit still 0   6. Becoming easily annoyed or irritable 2   7. Feeling afraid, as if something awful might happen 0   BASSAM-7 Total Score 4   If you checked any problems, how difficult have they made it for you to do your work, take care of things at home, or get along with other people? Somewhat difficult       ++++++++++++++++++++++++++++++++++++++++++++++++          Medication Followup of Ambien and prozac    Taking Medication as prescribed: yes    Side Effects:   None    Medication Helping Symptoms:  yes     Past Medical History:   Diagnosis Date     Adrenal insufficiency (Ashaway's disease) (H)     ACTH stim test failed to increase cortisol     Allergic rhinitis, cause unspecified      Antiplatelet or antithrombotic long-term use      Arthritis      ASD (atrial septal defect) 02/15/2013    repaired 6/10/21     Attention deficit disorder without mention of hyperactivity 2014     BRCA1 gene mutation positive 2018    Reported by patient, no report available at this time Records requested from [facility]     Cerebral infarction (H)      Clotting disorder (H) birth    undefined clotting disorder - sees Dr. 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 in 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 pregnant       Past Surgical History:   Procedure Laterality Date     APPENDECTOMY  2006      SECTION  2013    Procedure:  SECTION;  Primary  Section;  Surgeon: Chad Hughes MD;  Location:  L+D     CV PFO CLOSURE N/A 06/10/2021    Procedure:30mm [place] CARDIOFORM- Patent Foramen Ovale Closure;  Surgeon: Victoriano Cornejo MD;  Location:  HEART CARDIAC CATH LAB     DILATION AND CURETTAGE SUCTION N/A 2014    Procedure: DILATION AND CURETTAGE SUCTION;  Surgeon: Srini Martinez MD;  Location:  OR     DILATION AND CURETTAGE SUCTION N/A 2014     Procedure: DILATION AND CURETTAGE SUCTION;  Surgeon: Connor Kang MD;  Location: [facility] OR     GENITOURINARY SURGERY  2016    bladder sling and complete historectomy     HC DILATION/CURETTAGE DIAG/THER NON OB  1998     HC DILATION/CURETTAGE DIAG/THER NON OB  2006     HERNIORRHAPHY INCISIONAL (LOCATION) N/A 12/07/2018    Procedure: Incisional Hernia repair with Biologic mesh;  Surgeon: Suraj Bergeron MD;  Location: [facility] OR     HYSTERECTOMY, [facility]  04/18/2016    does not need pap     MASTECTOMY SIMPLE BILATERAL Bilateral 05/25/2018    Procedure: MASTECTOMY SIMPLE BILATERAL;  PROPHYLACTIC BILATERAL MASTECTOMY (GLADIS) BILATERAL BREAST RECONSTRUCTION [facility] TISSUE EXPANDERS (WILLOW)   ;  Surgeon: Suraj Bergeron MD;  Location:  OR     ORTHOPEDIC SURGERY  2007    (L) thumb fused     RECONSTRUCT BREAST, INSERT TISSUE EXPANDER BILATERAL, COMBINED Bilateral 05/25/2018    expander and tram flap with drains - had weekly surgery to have I & D and removal of infected tissue     SURGICAL HISTORY OF -   2006    left thumb fusion due to arthritis       MEDICATIONS:  Current Outpatient Medications   Medication     ascorbic acid 250 MG TABS tablet     aspirin (ASA) 81 MG EC tablet     butalbital-acetaminophen-caffeine (ESGIC) -40 MG tablet     CRANBERRY CONCENTRATE PO     FLUoxetine (PROZAC) 20 MG capsule     methenamine (HIPREX) 1 g tablet     NUVARING 0.12-0.015 MG/24HR vaginal ring     phenazopyridine (PYRIDIUM) 200 MG tablet     rizatriptan (MAXALT) 10 MG tablet     STATIN NOT PRESCRIBED (INTENTIONAL)     VITAMIN D PO     zolpidem (AMBIEN) 10 MG tablet     No current facility-administered medications for this visit.       SOCIAL HISTORY:  Social History     Tobacco Use     Smoking status: Never Smoker     Smokeless tobacco: Never Used   Substance Use Topics     Alcohol use: Not Currently     Alcohol/week: 0.0 standard drinks     Comment: occassional       Family History   Problem Relation Age of Onset     C.A.D. Maternal  "Grandmother      Hypertension Maternal Grandmother      Psychotic Disorder Mother         Depression, alcoholism     Diabetes Mother      Hereditary Breast and Ovarian Cancer Syndrome Mother         BRCA1+, no hx of cancer, s/p prophylactic surgery to remove breast tissue, ovaries, fallopian tubes     Hypertension Mother      Thyroid Disease Mother         thyroid nodules     Lung Cancer Mother      Substance Abuse Father      Hereditary Breast and Ovarian Cancer Syndrome Sister         matrnal half sister, BRCA1+     Hereditary Breast and Ovarian Cancer Syndrome Daughter 23        BRCA1+     Breast Cancer Maternal Aunt 40        lumpectomy, then 2nd primary breast cancer later in 40s, treated with mastectomy     Hereditary Breast and Ovarian Cancer Syndrome Maternal Aunt         BRCA1+     Ovarian Cancer Maternal Aunt 70        surgry     Thyroid Cancer Maternal Aunt         medullary thyroid cancer     Hereditary Breast and Ovarian Cancer Syndrome Maternal Aunt         BRCA1+     Breast Cancer Other 41        maternal great aunt     Ovarian Cancer Other 45         in 40s     Thyroid Cancer Maternal Uncle 66        papillary thyroid cancer     Breast Cancer Cousin 45     Hereditary Breast and Ovarian Cancer Syndrome Cousin         BRCA1+     Breast Cancer Other 43        maternal great aunt     Other Cancer Other         ovarian cancer         Review of Systems   Constitutional, HEENT, cardiovascular, pulmonary, gi and gu systems are negative, except as otherwise noted.      Objective    Vitals - Patient Reported  Systolic (Patient Reported): 128  Diastolic (Patient Reported): 74  Weight (Patient Reported): 100.7 kg (222 lb)  Height (Patient Reported): 166.4 cm (5' 5.5\")  BMI (Based on Pt Reported Ht/Wt): 36.38      Vitals:  No vitals were obtained today due to virtual visit.    Physical Exam   GENERAL: Healthy, alert and no distress  EYES: Eyes grossly normal to inspection.  No discharge or erythema, or " obvious scleral/conjunctival abnormalities.  RESP: No audible wheeze, cough, or visible cyanosis.  No visible retractions or increased work of breathing.    SKIN: Visible skin clear. No significant rash, abnormal pigmentation or lesions.  NEURO: Cranial nerves grossly intact.  Mentation and speech appropriate for age.  PSYCH: Mentation appears normal, affect normal/bright, judgement and insight intact, normal speech and appearance well-groomed.    Office Visit on 10/20/2021   Component Date Value Ref Range Status     Cholesterol 10/20/2021 212 (A) <200 mg/dL Final     Triglycerides 10/20/2021 160 (A) <150 mg/dL Final     Direct Measure HDL 10/20/2021 56  >=50 mg/dL Final     LDL Cholesterol Calculated 10/20/2021 124 (A) <=100 mg/dL Final     Non HDL Cholesterol 10/20/2021 156 (A) <130 mg/dL Final     Patient Fasting > 8hrs? 10/20/2021 Unknown   Final     TSH 10/20/2021 2.68  0.40 - 4.00 mU/L Final               Video-Visit Details    Type of service:  Video Visit    Video End Time:9:16 AM    Originating Location (pt. Location): Other outside work in [place] in her car    Distant Location (provider location):  [facility]!     Platform used for Video Visit: [place]

## 2022-03-16 ENCOUNTER — HOSPITAL ENCOUNTER (OUTPATIENT)
Facility: CLINIC | Age: 47
End: 2022-03-16
Attending: INTERNAL MEDICINE | Admitting: INTERNAL MEDICINE
Payer: COMMERCIAL

## 2022-03-16 ASSESSMENT — ANXIETY QUESTIONNAIRES: GAD7 TOTAL SCORE: 4

## 2022-04-10 DIAGNOSIS — Z11.59 ENCOUNTER FOR SCREENING FOR OTHER VIRAL DISEASES: Primary | ICD-10-CM

## 2022-04-10 DIAGNOSIS — Z20.5 CONTACT WITH AND (SUSPECTED) EXPOSURE TO VIRAL HEPATITIS: ICD-10-CM

## 2022-06-07 ENCOUNTER — HOSPITAL ENCOUNTER (OUTPATIENT)
Dept: CARDIOLOGY | Facility: CLINIC | Age: 47
Discharge: HOME | End: 2022-06-07
Attending: INTERNAL MEDICINE | Admitting: INTERNAL MEDICINE
Payer: COMMERCIAL

## 2022-06-07 DIAGNOSIS — Q21.11 OSTIUM SECUNDUM TYPE ATRIAL SEPTAL DEFECT: ICD-10-CM

## 2022-06-07 LAB — LVEF ECHO: NORMAL

## 2022-06-07 PROCEDURE — 93308 TTE F-UP OR LMTD: CPT | Mod: 26 | Performed by: INTERNAL MEDICINE

## 2022-06-07 PROCEDURE — 93308 TTE F-UP OR LMTD: CPT

## 2022-06-07 PROCEDURE — 93325 DOPPLER ECHO COLOR FLOW MAPG: CPT | Mod: 26 | Performed by: INTERNAL MEDICINE

## 2022-06-07 PROCEDURE — 93325 DOPPLER ECHO COLOR FLOW MAPG: CPT

## 2022-06-07 PROCEDURE — 93321 DOPPLER ECHO F-UP/LMTD STD: CPT | Mod: 26 | Performed by: INTERNAL MEDICINE

## 2022-06-13 ENCOUNTER — OFFICE VISIT (OUTPATIENT)
Dept: CARDIOLOGY | Facility: CLINIC | Age: 47
End: 2022-06-13
Attending: INTERNAL MEDICINE
Payer: COMMERCIAL

## 2022-06-13 VITALS
SYSTOLIC BLOOD PRESSURE: 110 MMHG | HEART RATE: 88 BPM | HEIGHT: 66 IN | BODY MASS INDEX: 39.53 KG/M2 | DIASTOLIC BLOOD PRESSURE: 74 MMHG | WEIGHT: 246 LBS

## 2022-06-13 DIAGNOSIS — Q21.11 OSTIUM SECUNDUM TYPE ATRIAL SEPTAL DEFECT: ICD-10-CM

## 2022-06-13 PROCEDURE — 99212 OFFICE O/P EST SF 10 MIN: CPT | Performed by: INTERNAL MEDICINE

## 2022-06-13 NOTE — PROGRESS NOTES
Service Date: 2022    Noa Mcgill returns to clinic.  She is a most delightful 47-year-old woman.  She is a nurse.  She has had some unnamed hypercoagulable state and she has been seen by Hem-Onc for that.  She had a stroke and a PFO Cardioform 30 mm device that was placed on 06/10/2021.  She has now completed her aspirin 81 mg and Plavix 75 mg daily and switched to a full aspirin at 325 mg daily.  She reports no problems.  Today we reviewed her echocardiogram and there is no clot on the device.  No shunt.  Left and right ventricles are normal in size and function.  Her blood pressure numbers are stable.  At this time, I think she can be discharged from the clinic.  I told her we sometimes get periodic echoes in 2-3 years and she is welcome to give us a call to schedule that, but since it is so far out, I am not going to put it in the computer. At this time then, we have successful closure.  The device is working well.    Today's visit was all just to review the echo results.    Victoriano Cornejo MD    cc:  [place] [address]      Victoriano Cornejo MD        D: 2022   T: 2022   MT: amy    Name:     NOA MCGILL  MRN:      [id number]        Account:      [id number]   :      1975           Service Date: 2022       Document: [id number]

## 2022-06-13 NOTE — LETTER
6/13/2022    Sydnie Daley MD  [address]  [facility] [address]    RE: Noa Mcgill       Dear Colleague,     I had the pleasure of seeing Noa Mcgill in the [facility].  HPI and Plan:   See dictation    No orders of the defined types were placed in this encounter.    No orders of the defined types were placed in this encounter.    There are no discontinued medications.      Encounter Diagnosis   Name Primary?     Ostium secundum type atrial septal defect        CURRENT MEDICATIONS:  Current Outpatient Medications   Medication Sig Dispense Refill     amphetamine-dextroamphetamine (ADDERALL) 10 MG tablet Take 1 tablet (10 mg) by mouth 2 times daily as needed (inattention) 60 tablet 0     ascorbic acid 250 MG TABS tablet Take 250 mg by mouth daily        aspirin (ASA) 81 MG EC tablet Take 81 mg by mouth daily 325 mg daily 1 tablet 1     butalbital-acetaminophen-caffeine (ESGIC) -40 MG tablet TAKE ONE TABLET BY MOUTH EVERY 4 HOURS AS NEEDED 30 tablet 2     CRANBERRY CONCENTRATE PO Take 1 Dose by mouth daily        FLUoxetine (PROZAC) 20 MG capsule Take 1 capsule (20 mg) by mouth daily 90 capsule 2     methenamine (HIPREX) 1 g tablet Take 1 tablet (1 g) by mouth 2 times daily 60 tablet 3     NUVARING 0.12-0.015 MG/24HR vaginal ring INSERT 1 RING VAGINALLY AND KEEP IN PLACE FOR 3 WEEKS. REMOVE FOR 1 WEEK. REPEAT WITH NEW RING. USE AS DIRECTED       phenazopyridine (PYRIDIUM) 200 MG tablet Take 200 mg by mouth 3 times daily as needed for irritation (urinary infections)        rizatriptan (MAXALT) 10 MG tablet Take 10 mg by mouth at onset of headache for migraine       VITAMIN D PO Take 2,000 Units by mouth daily.       zolpidem (AMBIEN) 10 MG tablet Take 1 tablet (10 mg) by mouth nightly as needed for sleep 30 tablet 5     methocarbamol (ROBAXIN) 500 MG tablet Take 500 mg by mouth (Patient not taking: Reported on 6/13/2022)       STATIN NOT PRESCRIBED (INTENTIONAL) Please choose  reason not prescribed, below (Patient not taking: Reported on 2022)         ALLERGIES     Allergies   Allergen Reactions     Tnf Antagonists Hives     Enbrel Hives     Humira [Humira]      Rash hives     Prednisone      Pt sensitive to prednisone--shakey heart racing - only with large doses     Compazine [Prochlorperazine] Anxiety     Shakey per H&P dated 2018  Shaky and anxiety     Nitrofurantoin Hives and Rash       PAST MEDICAL HISTORY:  Past Medical History:   Diagnosis Date     Adrenal insufficiency (Gallia's disease) (H)     ACTH stim test failed to increase cortisol     Allergic rhinitis, cause unspecified      Antiplatelet or antithrombotic long-term use      Arthritis      ASD (atrial septal defect) 02/15/2013    repaired 6/10/21     Attention deficit disorder without mention of hyperactivity 2014     BRCA1 gene mutation positive 2018    Reported by patient, no report available at this time Records requested from [facility]     Cerebral infarction (H)      Clotting disorder (H) birth    undefined clotting disorder - sees Dr. Sanchez Retinal artery occlusion, R int mammary artery occlusion post breastCA surg and recd TPA     Congenital heart disease      Depressive disorder      Endometriosis of uterus      HERPES SIMPLEX NOS 2007    cold sores on remicade     Insomnia, unspecified 02/10/2005     Lung nodule < 6cm on CT 2019    consider repeat in 2020 - low risk but around a lot of second hand smoke growing up     Migraine headaches      Other chronic sinusitis      Postoperative urinary retention     every surgery     Psoriatic arthropathy (H) 2007     Recurrent UTI 2008     Retinal artery occlusion 02/15/2013    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 in 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 pregnant       PAST SURGICAL HISTORY:  Past Surgical History:   Procedure Laterality Date     APPENDECTOMY  2006      SECTION  2013    Procedure:  SECTION;  Primary   Section;  Surgeon: Chad Hughes MD;  Location: [facility] L+D     CV PFO CLOSURE N/A 06/10/2021    Procedure:30mm [place] CARDIOFORM- Patent Foramen Ovale Closure;  Surgeon: Victoriano Cornejo MD;  Location:  HEART CARDIAC CATH LAB     DILATION AND CURETTAGE SUCTION N/A 12/12/2014    Procedure: DILATION AND CURETTAGE SUCTION;  Surgeon: Srini Martinez MD;  Location:  OR     DILATION AND CURETTAGE SUCTION N/A 12/20/2014    Procedure: DILATION AND CURETTAGE SUCTION;  Surgeon: Connor Kang MD;  Location:  OR     GENITOURINARY SURGERY  2016    bladder sling and complete historectomy     HC DILATION/CURETTAGE DIAG/THER NON OB  1998     HC DILATION/CURETTAGE DIAG/THER NON OB  2006     HERNIORRHAPHY INCISIONAL (LOCATION) N/A 12/07/2018    Procedure: Incisional Hernia repair with Biologic mesh;  Surgeon: Suraj Bergeron MD;  Location:  OR     HYSTERECTOMY, CARLOS  04/18/2016    does not need pap     MASTECTOMY SIMPLE BILATERAL Bilateral 05/25/2018    Procedure: MASTECTOMY SIMPLE BILATERAL;  PROPHYLACTIC BILATERAL MASTECTOMY (GLADIS) BILATERAL BREAST RECONSTRUCTION WT TISSUE EXPANDERS (WILLOW)   ;  Surgeon: Suraj Bergeron MD;  Location:  OR     ORTHOPEDIC SURGERY  2007    (L) thumb fused     RECONSTRUCT BREAST, INSERT TISSUE EXPANDER BILATERAL, COMBINED Bilateral 05/25/2018    expander and tram flap with drains - had weekly surgery to have I & D and removal of infected tissue     SURGICAL HISTORY OF -   2006    left thumb fusion due to arthritis       FAMILY HISTORY:  Family History   Problem Relation Age of Onset     C.A.D. Maternal Grandmother      Hypertension Maternal Grandmother      Psychotic Disorder Mother         Depression, alcoholism     Diabetes Mother      Hereditary Breast and Ovarian Cancer Syndrome Mother         BRCA1+, no hx of cancer, s/p prophylactic surgery to remove breast tissue, ovaries, fallopian tubes     Hypertension Mother      Thyroid Disease Mother         thyroid nodules     Lung  Cancer Mother      Substance Abuse Father      Hereditary Breast and Ovarian Cancer Syndrome Sister         matrnal half sister, BRCA1+     Hereditary Breast and Ovarian Cancer Syndrome Daughter 23        BRCA1+     Breast Cancer Maternal Aunt 40        lumpectomy, then 2nd primary breast cancer later in 40s, treated with mastectomy     Hereditary Breast and Ovarian Cancer Syndrome Maternal Aunt         BRCA1+     Ovarian Cancer Maternal Aunt 70        surgry     Thyroid Cancer Maternal Aunt         medullary thyroid cancer     Hereditary Breast and Ovarian Cancer Syndrome Maternal Aunt         BRCA1+     Breast Cancer Other 41        maternal great aunt     Ovarian Cancer Other 45         in 40s     Thyroid Cancer Maternal Uncle 66        papillary thyroid cancer     Breast Cancer Cousin 45     Hereditary Breast and Ovarian Cancer Syndrome Cousin         BRCA1+     Breast Cancer Other 43        maternal great aunt     Other Cancer Other         ovarian cancer       SOCIAL HISTORY:  Social History     Socioeconomic History     Marital status:      Spouse name: Ciro     Number of children: 3     Years of education: 14     Highest education level: None   Occupational History     Occupation: LPN     Employer: [employer]     Employer: [employer]   Tobacco Use     Smoking status: Never Smoker     Smokeless tobacco: Never Used   Vaping Use     Vaping Use: Never used   Substance and Sexual Activity     Alcohol use: Not Currently     Alcohol/week: 0.0 standard drinks     Comment: occassional     Drug use: No     Sexual activity: Yes     Partners: Male     Birth control/protection: Female Surgical   Other Topics Concern     Parent/sibling w/ CABG, MI or angioplasty before 65F 55M? No     Social Determinants of Health     Financial Resource Strain: Low Risk      Difficulty of Paying Living Expenses: Not hard at all   Food Insecurity: No Food Insecurity     Worried About Running Out of Food in the  "Last Year: Never true     Ran Out of Food in the Last Year: Never true   Transportation Needs: No Transportation Needs     Lack of Transportation (Medical): No     Lack of Transportation (Non-Medical): No   Physical Activity: Sufficiently Active     Days of Exercise per Week: 5 days     Minutes of Exercise per Session: 40 min   Stress: Stress Concern Present     Feeling of Stress : Very much   Social Connections: Moderately Integrated     Frequency of Communication with Friends and Family: More than three times a week     Frequency of Social Gatherings with Friends and Family: More than three times a week     Attends Protestant Services: 1 to 4 times per year     Active Member of Clubs or Organizations: No     Marital Status:    Housing Stability: Low Risk      Unable to Pay for Housing in the Last Year: No     Number of Places Lived in the Last Year: 1     Unstable Housing in the Last Year: No       Review of Systems:  Skin:  Negative     Eyes:  Negative    ENT:  Negative    Respiratory:  Negative    Cardiovascular:  Negative    Gastroenterology: Positive for heartburn  Genitourinary:  not assessed    Musculoskeletal:  Negative    Neurologic:  Positive for migraine headaches  Psychiatric:  Negative    Heme/Lymph/Imm:  Negative    Endocrine:         Physical Exam:  Vitals: /74   Pulse 88   Ht 1.676 m (5' 6\")   Wt 111.6 kg (246 lb)   LMP 02/08/2015 (Within Months)   BMI 39.71 kg/m      Constitutional:           Skin:             Head:           Eyes:           Lymph:      ENT:           Neck:           Respiratory:            Cardiac:                                                           GI:           Extremities and Muscular Skeletal:                 Neurological:           Psych:         Recent Lab Results:  LIPID RESULTS:  Lab Results   Component Value Date    CHOL 212 (H) 10/20/2021    CHOL 188 02/10/2018    HDL 56 10/20/2021    HDL 58 02/10/2018     (H) 10/20/2021    LDL 84 02/10/2018 "    TRIG 160 (H) 10/20/2021    TRIG 231 (H) 02/10/2018    CHOLHDLRATIO 2.7 05/08/2014       LIVER ENZYME RESULTS:  Lab Results   Component Value Date    AST 14 03/11/2019    ALT 21 03/11/2019       CBC RESULTS:  Lab Results   Component Value Date    WBC 8.9 06/14/2021    RBC 5.38 (H) 06/14/2021    HGB 14.2 06/14/2021    HCT 44.8 06/14/2021    MCV 83 06/14/2021    MCH 26.4 (L) 06/14/2021    MCHC 31.7 06/14/2021    RDW 13.2 06/14/2021     06/14/2021       BMP RESULTS:  Lab Results   Component Value Date     06/14/2021    POTASSIUM 4.4 06/14/2021    CHLORIDE 108 06/14/2021    CO2 24 06/14/2021    ANIONGAP 5 06/14/2021    GLC 93 06/14/2021    BUN 13 06/14/2021    CR 0.80 06/14/2021    GFRESTIMATED 90 06/14/2021    GFRESTBLACK >90 06/14/2021    KENDRICK 9.2 06/14/2021        A1C RESULTS:  Lab Results   Component Value Date    A1C 5.2 05/08/2014       INR RESULTS:  Lab Results   Component Value Date    INR 0.93 06/10/2021    INR 0.92 05/21/2013           CC  Victoriano Cornejo MD  [address]  [place] [address]      Thank you for allowing me to participate in the care of your patient.      Sincerely,     Victoriano Cornejo MD     [facility]

## 2022-06-13 NOTE — PROGRESS NOTES
HPI and Plan:   See dictation    No orders of the defined types were placed in this encounter.    No orders of the defined types were placed in this encounter.    There are no discontinued medications.      Encounter Diagnosis   Name Primary?     Ostium secundum type atrial septal defect        CURRENT MEDICATIONS:  Current Outpatient Medications   Medication Sig Dispense Refill     amphetamine-dextroamphetamine (ADDERALL) 10 MG tablet Take 1 tablet (10 mg) by mouth 2 times daily as needed (inattention) 60 tablet 0     ascorbic acid 250 MG TABS tablet Take 250 mg by mouth daily        aspirin (ASA) 81 MG EC tablet Take 81 mg by mouth daily 325 mg daily 1 tablet 1     butalbital-acetaminophen-caffeine (ESGIC) -40 MG tablet TAKE ONE TABLET BY MOUTH EVERY 4 HOURS AS NEEDED 30 tablet 2     CRANBERRY CONCENTRATE PO Take 1 Dose by mouth daily        FLUoxetine (PROZAC) 20 MG capsule Take 1 capsule (20 mg) by mouth daily 90 capsule 2     methenamine (HIPREX) 1 g tablet Take 1 tablet (1 g) by mouth 2 times daily 60 tablet 3     NUVARING 0.12-0.015 MG/24HR vaginal ring INSERT 1 RING VAGINALLY AND KEEP IN PLACE FOR 3 WEEKS. REMOVE FOR 1 WEEK. REPEAT WITH NEW RING. USE AS DIRECTED       phenazopyridine (PYRIDIUM) 200 MG tablet Take 200 mg by mouth 3 times daily as needed for irritation (urinary infections)        rizatriptan (MAXALT) 10 MG tablet Take 10 mg by mouth at onset of headache for migraine       VITAMIN D PO Take 2,000 Units by mouth daily.       zolpidem (AMBIEN) 10 MG tablet Take 1 tablet (10 mg) by mouth nightly as needed for sleep 30 tablet 5     methocarbamol (ROBAXIN) 500 MG tablet Take 500 mg by mouth (Patient not taking: Reported on 6/13/2022)       STATIN NOT PRESCRIBED (INTENTIONAL) Please choose reason not prescribed, below (Patient not taking: Reported on 6/13/2022)         ALLERGIES     Allergies   Allergen Reactions     Tnf Antagonists Hives     Enbrel Hives     Humira [Humira]      Rash hives      Prednisone      Pt sensitive to prednisone--shakey heart racing - only with large doses     Compazine [Prochlorperazine] Anxiety     Shakey per H&P dated 2018  Shaky and anxiety     Nitrofurantoin Hives and Rash       PAST MEDICAL HISTORY:  Past Medical History:   Diagnosis Date     Adrenal insufficiency (Wright's disease) (H)     ACTH stim test failed to increase cortisol     Allergic rhinitis, cause unspecified      Antiplatelet or antithrombotic long-term use      Arthritis      ASD (atrial septal defect) 02/15/2013    repaired 6/10/21     Attention deficit disorder without mention of hyperactivity 2014     BRCA1 gene mutation positive 2018    Reported by patient, no report available at this time Records requested from [employer] 18     Cerebral infarction (H)      Clotting disorder (H) birth    undefined clotting disorder - sees Dr. 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 in 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 pregnant       PAST SURGICAL HISTORY:  Past Surgical History:   Procedure Laterality Date     APPENDECTOMY        SECTION  2013    Procedure:  SECTION;  Primary  Section;  Surgeon: Chad Hughes MD;  Location:  L+D     CV PFO CLOSURE N/A 06/10/2021    Procedure:30mm [place] CARDIOFORM- Patent Foramen Ovale Closure;  Surgeon: Victoriano Cornejo MD;  Location: [facility]  CARDIAC CATH LAB     DILATION AND CURETTAGE SUCTION N/A 12/12/2014    Procedure: DILATION AND CURETTAGE SUCTION;  Surgeon: Srini Martinez MD;  Location:  OR     DILATION AND CURETTAGE SUCTION N/A 12/20/2014    Procedure: DILATION AND CURETTAGE SUCTION;  Surgeon: Connor Kang MD;  Location:  OR     GENITOURINARY SURGERY  2016    bladder sling and complete historectomy     HC DILATION/CURETTAGE DIAG/THER NON OB  1998     HC DILATION/CURETTAGE DIAG/THER NON OB  2006     HERNIORRHAPHY INCISIONAL (LOCATION) N/A 12/07/2018    Procedure: Incisional Hernia repair with Biologic mesh;  Surgeon: Suraj Bergeron MD;  Location: [facility] OR     HYSTERECTOMY, CARLOS  04/18/2016    does not need pap     MASTECTOMY SIMPLE BILATERAL Bilateral 05/25/2018    Procedure: MASTECTOMY SIMPLE BILATERAL;  PROPHYLACTIC BILATERAL MASTECTOMY (GLADIS) BILATERAL BREAST RECONSTRUCTION WT TISSUE EXPANDERS (WILOLW)   ;  Surgeon: Suraj Bergeron MD;  Location:  OR     ORTHOPEDIC SURGERY  2007    (L) thumb fused     RECONSTRUCT BREAST, INSERT TISSUE EXPANDER BILATERAL, COMBINED Bilateral 05/25/2018    expander and tram flap with drains - had weekly surgery to have I & D and removal of infected tissue     SURGICAL HISTORY OF -   2006    left thumb fusion due to arthritis       FAMILY HISTORY:  Family History   Problem Relation Age of Onset     C.A.D. Maternal Grandmother      Hypertension Maternal Grandmother      Psychotic Disorder Mother         Depression, alcoholism     Diabetes Mother      Hereditary Breast and Ovarian Cancer Syndrome Mother         BRCA1+, no hx of cancer, s/p prophylactic surgery to remove breast tissue, ovaries, fallopian tubes     Hypertension Mother      Thyroid Disease Mother         thyroid nodules     Lung Cancer Mother      Substance Abuse Father      Hereditary Breast and Ovarian Cancer Syndrome Sister         matrnal half sister, BRCA1+     Hereditary Breast and Ovarian Cancer Syndrome Daughter 23        BRCA1+      Breast Cancer Maternal Aunt 40        lumpectomy, then 2nd primary breast cancer later in 40s, treated with mastectomy     Hereditary Breast and Ovarian Cancer Syndrome Maternal Aunt         BRCA1+     Ovarian Cancer Maternal Aunt 70        surgry     Thyroid Cancer Maternal Aunt         medullary thyroid cancer     Hereditary Breast and Ovarian Cancer Syndrome Maternal Aunt         BRCA1+     Breast Cancer Other 41        maternal great aunt     Ovarian Cancer Other 45         in 40s     Thyroid Cancer Maternal Uncle 66        papillary thyroid cancer     Breast Cancer Cousin 45     Hereditary Breast and Ovarian Cancer Syndrome Cousin         BRCA1+     Breast Cancer Other 43        maternal great aunt     Other Cancer Other         ovarian cancer       SOCIAL HISTORY:  Social History     Socioeconomic History     Marital status:      Spouse name: Ciro     Number of children: 3     Years of education: 14     Highest education level: None   Occupational History     Occupation: LPN     Employer: [employer]     Employer: [employer]   Tobacco Use     Smoking status: Never Smoker     Smokeless tobacco: Never Used   Vaping Use     Vaping Use: Never used   Substance and Sexual Activity     Alcohol use: Not Currently     Alcohol/week: 0.0 standard drinks     Comment: occassional     Drug use: No     Sexual activity: Yes     Partners: Male     Birth control/protection: Female Surgical   Other Topics Concern     Parent/sibling w/ CABG, MI or angioplasty before 65F 55M? No     Social Determinants of Health     Financial Resource Strain: Low Risk      Difficulty of Paying Living Expenses: Not hard at all   Food Insecurity: No Food Insecurity     Worried About Running Out of Food in the Last Year: Never true     Ran Out of Food in the Last Year: Never true   Transportation Needs: No Transportation Needs     Lack of Transportation (Medical): No     Lack of Transportation (Non-Medical): No   Physical  "Activity: Sufficiently Active     Days of Exercise per Week: 5 days     Minutes of Exercise per Session: 40 min   Stress: Stress Concern Present     Feeling of Stress : Very much   Social Connections: Moderately Integrated     Frequency of Communication with Friends and Family: More than three times a week     Frequency of Social Gatherings with Friends and Family: More than three times a week     Attends Yazidism Services: 1 to 4 times per year     Active Member of Clubs or Organizations: No     Marital Status:    Housing Stability: Low Risk      Unable to Pay for Housing in the Last Year: No     Number of Places Lived in the Last Year: 1     Unstable Housing in the Last Year: No       Review of Systems:  Skin:  Negative     Eyes:  Negative    ENT:  Negative    Respiratory:  Negative    Cardiovascular:  Negative    Gastroenterology: Positive for heartburn  Genitourinary:  not assessed    Musculoskeletal:  Negative    Neurologic:  Positive for migraine headaches  Psychiatric:  Negative    Heme/Lymph/Imm:  Negative    Endocrine:         Physical Exam:  Vitals: /74   Pulse 88   Ht 1.676 m (5' 6\")   Wt 111.6 kg (246 lb)   LMP 02/08/2015 (Within Months)   BMI 39.71 kg/m      Constitutional:           Skin:             Head:           Eyes:           Lymph:      ENT:           Neck:           Respiratory:            Cardiac:                                                           GI:           Extremities and Muscular Skeletal:                 Neurological:           Psych:         Recent Lab Results:  LIPID RESULTS:  Lab Results   Component Value Date    CHOL 212 (H) 10/20/2021    CHOL 188 02/10/2018    HDL 56 10/20/2021    HDL 58 02/10/2018     (H) 10/20/2021    LDL 84 02/10/2018    TRIG 160 (H) 10/20/2021    TRIG 231 (H) 02/10/2018    CHOLHDLRATIO 2.7 05/08/2014       LIVER ENZYME RESULTS:  Lab Results   Component Value Date    AST 14 03/11/2019    ALT 21 03/11/2019       CBC RESULTS:  Lab " Results   Component Value Date    WBC 8.9 06/14/2021    RBC 5.38 (H) 06/14/2021    HGB 14.2 06/14/2021    HCT 44.8 06/14/2021    MCV 83 06/14/2021    MCH 26.4 (L) 06/14/2021    MCHC 31.7 06/14/2021    RDW 13.2 06/14/2021     06/14/2021       BMP RESULTS:  Lab Results   Component Value Date     06/14/2021    POTASSIUM 4.4 06/14/2021    CHLORIDE 108 06/14/2021    CO2 24 06/14/2021    ANIONGAP 5 06/14/2021    GLC 93 06/14/2021    BUN 13 06/14/2021    CR 0.80 06/14/2021    GFRESTIMATED 90 06/14/2021    GFRESTBLACK >90 06/14/2021    KENDRICK 9.2 06/14/2021        A1C RESULTS:  Lab Results   Component Value Date    A1C 5.2 05/08/2014       INR RESULTS:  Lab Results   Component Value Date    INR 0.93 06/10/2021    INR 0.92 05/21/2013           CC  Victoriano Cornejo MD  [id number]  [place] [id number]

## 2022-09-15 ENCOUNTER — MYC MEDICAL ADVICE (OUTPATIENT)
Dept: FAMILY MEDICINE | Facility: CLINIC | Age: 47
End: 2022-09-15

## 2022-09-15 DIAGNOSIS — G47.00 INSOMNIA, UNSPECIFIED TYPE: ICD-10-CM

## 2022-09-15 DIAGNOSIS — G43.909 MIGRAINE WITHOUT STATUS MIGRAINOSUS, NOT INTRACTABLE, UNSPECIFIED MIGRAINE TYPE: ICD-10-CM

## 2022-09-16 NOTE — TELEPHONE ENCOUNTER
Dr[facility] Daley-     [facility]  -Patient requesting rx refill for ambien and butalbital-acetaminophen-caffeine  -Responded to inform patient that Dr. Daley is out of clinic until Monday.    RN pended medications, please review prior to sending.     Amber LUONG RN, BSN, PHN  [facility]

## 2022-09-19 RX ORDER — ZOLPIDEM TARTRATE 10 MG/1
10 TABLET ORAL
Qty: 30 TABLET | Refills: 5 | Status: SHIPPED | OUTPATIENT
Start: 2022-09-19 | End: 2023-07-18

## 2022-09-19 RX ORDER — BUTALBITAL, ACETAMINOPHEN AND CAFFEINE 50; 325; 40 MG/1; MG/1; MG/1
TABLET ORAL
Qty: 30 TABLET | Refills: 2 | Status: SHIPPED | OUTPATIENT
Start: 2022-09-19 | End: 2023-11-07

## 2022-10-03 ENCOUNTER — TELEPHONE (OUTPATIENT)
Dept: FAMILY MEDICINE | Facility: CLINIC | Age: 47
End: 2022-10-03

## 2022-10-03 DIAGNOSIS — T75.3XXA MOTION SICKNESS, INITIAL ENCOUNTER: Primary | ICD-10-CM

## 2022-10-03 RX ORDER — SCOLOPAMINE TRANSDERMAL SYSTEM 1 MG/1
1 PATCH, EXTENDED RELEASE TRANSDERMAL
Qty: 5 PATCH | Refills: 0 | Status: SHIPPED | OUTPATIENT
Start: 2022-10-03 | End: 2023-07-18

## 2022-10-03 NOTE — TELEPHONE ENCOUNTER
Pt called clinic stating that she is going to be going out of town on 10/08/2022 and is requesting at least five Scopolamine patches.

## 2022-10-03 NOTE — TELEPHONE ENCOUNTER
Patient requesting Scopolamine patches for cruise coming up in the next 5 days. Patient has never taking this before, but is wondering if PCP would be able to prescribe 5 patches.    Routed to PCP for review.    Chalo Iniguez RN on 10/3/2022 at 11:09 AM

## 2022-10-04 NOTE — TELEPHONE ENCOUNTER
Spoke to pt and she states she has picked up the patches from her pharmacy.  Stacie Hutchinson, CMA

## 2022-10-10 ENCOUNTER — HEALTH MAINTENANCE LETTER (OUTPATIENT)
Age: 47
End: 2022-10-10

## 2022-11-03 ENCOUNTER — TELEPHONE (OUTPATIENT)
Dept: GASTROENTEROLOGY | Facility: CLINIC | Age: 47
End: 2022-11-03

## 2022-12-13 ENCOUNTER — PATIENT OUTREACH (OUTPATIENT)
Dept: FAMILY MEDICINE | Facility: CLINIC | Age: 47
End: 2022-12-13

## 2022-12-13 RX ORDER — BISACODYL 5 MG
TABLET, DELAYED RELEASE (ENTERIC COATED) ORAL
Qty: 4 TABLET | Refills: 0 | Status: SHIPPED | OUTPATIENT
Start: 2022-12-13 | End: 2023-07-18

## 2022-12-13 NOTE — TELEPHONE ENCOUNTER
No future appointments.  Appointment Notes for this encounter:   Data Unavailable    Health Maintenance Due   Topic Date Due     ADVANCE CARE PLANNING  Never done     HEPATITIS B IMMUNIZATION (1 of 3 - 3-dose series) Never done     Pneumococcal Vaccine: Pediatrics (0 to 5 Years) and At-Risk Patients (6 to 64 Years) (1 - PCV) Never done     COLORECTAL CANCER SCREENING  Never done     YEARLY PREVENTIVE VISIT  05/07/2020     COVID-19 Vaccine (4 - Booster for Pfizer series) 11/24/2021     PHQ-9  04/20/2022     INFLUENZA VACCINE (1) 09/01/2022     Health Maintenance addressed:  PHQ9 and GAD7    PHQ9 / BASSMA / ACT Questionaire sent to patient via [employer] Status:  Active and Using

## 2022-12-27 ENCOUNTER — TRANSFERRED RECORDS (OUTPATIENT)
Dept: HEALTH INFORMATION MANAGEMENT | Facility: CLINIC | Age: 47
End: 2022-12-27

## 2023-01-10 ENCOUNTER — HOSPITAL ENCOUNTER (OUTPATIENT)
Facility: CLINIC | Age: 48
Discharge: HOME | End: 2023-01-10
Attending: INTERNAL MEDICINE | Admitting: INTERNAL MEDICINE
Payer: COMMERCIAL

## 2023-01-10 VITALS
HEIGHT: 66 IN | WEIGHT: 242 LBS | SYSTOLIC BLOOD PRESSURE: 125 MMHG | DIASTOLIC BLOOD PRESSURE: 78 MMHG | RESPIRATION RATE: 20 BRPM | HEART RATE: 72 BPM | BODY MASS INDEX: 38.89 KG/M2 | TEMPERATURE: 96.9 F | OXYGEN SATURATION: 98 %

## 2023-01-10 DIAGNOSIS — Z12.11 SPECIAL SCREENING FOR MALIGNANT NEOPLASMS, COLON: Primary | ICD-10-CM

## 2023-01-10 LAB — COLONOSCOPY: NORMAL

## 2023-01-10 PROCEDURE — 99153 MOD SED SAME PHYS/QHP EA: CPT | Performed by: INTERNAL MEDICINE

## 2023-01-10 PROCEDURE — 250N000011 HC RX IP 250 OP 636: Performed by: INTERNAL MEDICINE

## 2023-01-10 PROCEDURE — G0500 MOD SEDAT ENDO SERVICE >5YRS: HCPCS | Performed by: INTERNAL MEDICINE

## 2023-01-10 PROCEDURE — G0121 COLON CA SCRN NOT HI RSK IND: HCPCS | Performed by: INTERNAL MEDICINE

## 2023-01-10 PROCEDURE — 45378 DIAGNOSTIC COLONOSCOPY: CPT | Performed by: INTERNAL MEDICINE

## 2023-01-10 RX ORDER — FENTANYL CITRATE 50 UG/ML
50-100 INJECTION, SOLUTION INTRAMUSCULAR; INTRAVENOUS EVERY 5 MIN PRN
Status: DISCONTINUED | OUTPATIENT
Start: 2023-01-10 | End: 2023-01-10 | Stop reason: HOSPADM

## 2023-01-10 RX ORDER — NALOXONE HYDROCHLORIDE 0.4 MG/ML
0.2 INJECTION, SOLUTION INTRAMUSCULAR; INTRAVENOUS; SUBCUTANEOUS
Status: DISCONTINUED | OUTPATIENT
Start: 2023-01-10 | End: 2023-01-10 | Stop reason: HOSPADM

## 2023-01-10 RX ORDER — FLUMAZENIL 0.1 MG/ML
0.2 INJECTION, SOLUTION INTRAVENOUS
Status: DISCONTINUED | OUTPATIENT
Start: 2023-01-10 | End: 2023-01-10 | Stop reason: HOSPADM

## 2023-01-10 RX ORDER — DIPHENHYDRAMINE HYDROCHLORIDE 50 MG/ML
25-50 INJECTION INTRAMUSCULAR; INTRAVENOUS
Status: DISCONTINUED | OUTPATIENT
Start: 2023-01-10 | End: 2023-01-10 | Stop reason: HOSPADM

## 2023-01-10 RX ORDER — SIMETHICONE 40MG/0.6ML
133 SUSPENSION, DROPS(FINAL DOSAGE FORM)(ML) ORAL
Status: DISCONTINUED | OUTPATIENT
Start: 2023-01-10 | End: 2023-01-10 | Stop reason: HOSPADM

## 2023-01-10 RX ORDER — LIDOCAINE 40 MG/G
CREAM TOPICAL
Status: DISCONTINUED | OUTPATIENT
Start: 2023-01-10 | End: 2023-01-10 | Stop reason: HOSPADM

## 2023-01-10 RX ORDER — ATROPINE SULFATE 0.1 MG/ML
1 INJECTION INTRAVENOUS
Status: DISCONTINUED | OUTPATIENT
Start: 2023-01-10 | End: 2023-01-10 | Stop reason: HOSPADM

## 2023-01-10 RX ORDER — PROCHLORPERAZINE MALEATE 10 MG
10 TABLET ORAL EVERY 6 HOURS PRN
Status: DISCONTINUED | OUTPATIENT
Start: 2023-01-10 | End: 2023-01-10 | Stop reason: HOSPADM

## 2023-01-10 RX ORDER — ONDANSETRON 2 MG/ML
4 INJECTION INTRAMUSCULAR; INTRAVENOUS
Status: DISCONTINUED | OUTPATIENT
Start: 2023-01-10 | End: 2023-01-10 | Stop reason: HOSPADM

## 2023-01-10 RX ORDER — NALOXONE HYDROCHLORIDE 0.4 MG/ML
0.4 INJECTION, SOLUTION INTRAMUSCULAR; INTRAVENOUS; SUBCUTANEOUS
Status: DISCONTINUED | OUTPATIENT
Start: 2023-01-10 | End: 2023-01-10 | Stop reason: HOSPADM

## 2023-01-10 RX ORDER — ONDANSETRON 2 MG/ML
4 INJECTION INTRAMUSCULAR; INTRAVENOUS EVERY 6 HOURS PRN
Status: DISCONTINUED | OUTPATIENT
Start: 2023-01-10 | End: 2023-01-10 | Stop reason: HOSPADM

## 2023-01-10 RX ORDER — ONDANSETRON 4 MG/1
4 TABLET, ORALLY DISINTEGRATING ORAL EVERY 6 HOURS PRN
Status: DISCONTINUED | OUTPATIENT
Start: 2023-01-10 | End: 2023-01-10 | Stop reason: HOSPADM

## 2023-01-10 RX ORDER — EPINEPHRINE 1 MG/ML
0.1 INJECTION, SOLUTION INTRAMUSCULAR; SUBCUTANEOUS
Status: DISCONTINUED | OUTPATIENT
Start: 2023-01-10 | End: 2023-01-10 | Stop reason: HOSPADM

## 2023-01-10 RX ADMIN — MIDAZOLAM HYDROCHLORIDE 1 MG: 1 INJECTION, SOLUTION INTRAMUSCULAR; INTRAVENOUS at 13:12

## 2023-01-10 RX ADMIN — FENTANYL CITRATE 50 MCG: 50 INJECTION, SOLUTION INTRAMUSCULAR; INTRAVENOUS at 13:16

## 2023-01-10 RX ADMIN — MIDAZOLAM HYDROCHLORIDE 1 MG: 1 INJECTION, SOLUTION INTRAMUSCULAR; INTRAVENOUS at 13:16

## 2023-01-10 RX ADMIN — FENTANYL CITRATE 50 MCG: 50 INJECTION, SOLUTION INTRAMUSCULAR; INTRAVENOUS at 13:12

## 2023-01-10 RX ADMIN — MIDAZOLAM HYDROCHLORIDE 1 MG: 1 INJECTION, SOLUTION INTRAMUSCULAR; INTRAVENOUS at 13:09

## 2023-01-10 RX ADMIN — FENTANYL CITRATE 100 MCG: 50 INJECTION, SOLUTION INTRAMUSCULAR; INTRAVENOUS at 13:01

## 2023-01-10 RX ADMIN — FENTANYL CITRATE 50 MCG: 50 INJECTION, SOLUTION INTRAMUSCULAR; INTRAVENOUS at 13:09

## 2023-01-10 RX ADMIN — MIDAZOLAM HYDROCHLORIDE 2 MG: 1 INJECTION, SOLUTION INTRAMUSCULAR; INTRAVENOUS at 13:01

## 2023-01-10 ASSESSMENT — ACTIVITIES OF DAILY LIVING (ADL): ADLS_ACUITY_SCORE: 37

## 2023-01-10 NOTE — H&P
Pre-Endoscopy History and Physical     Noa Mcgill MRN# [id number]   YOB: 1975 Age: 47 year old     Date of Procedure: 1/10/2023  Primary care provider: Sydnie Daley  Type of Endoscopy: Colonoscopy with possible biopsy, possible polypectomy  Reason for Procedure: screen  Type of Anesthesia Anticipated: Conscious Sedation    HPI:    Noa is a 47 year old female who will be undergoing the above procedure.      A history and physical has been performed. The patient's medications and allergies have been reviewed. The risks and benefits of the procedure and the sedation options and risks were discussed with the patient.  All questions were answered and informed consent was obtained.      She denies a personal or family history of anesthesia complications or bleeding disorders.     Patient Active Problem List   Diagnosis     Acute reaction to stress     Psoriatic arthropathy (H)     Other psoriasis     Herpes simplex virus (HSV) infection     Obesity     Recurrent UTI     Insomnia     CARDIOVASCULAR SCREENING; LDL GOAL LESS THAN 160     Migraine headache     Lumbago     Nonallopathic lesion of sacral region     Phobia, flying     Retinal artery branch occlusion of right eye     Vision loss of right eye     Bleeding     Attention deficit disorder     Anxiety     Mild major depression (H)     S/P mastectomy, bilateral     Anemia due to blood loss, acute     Postoperative urinary retention     Incisional hernia     Central retinal artery occlusion of right eye     History of endometriosis     Personal history of venous thrombosis and embolus     S/P complete hysterectomy     BRCA gene mutation positive     Psoriatic arthritis, destructive type (H)     Acquired absence of breast and absent nipple, bilateral     Dehiscence of closure of muscle or muscle flap, initial encounter        Past Medical History:   Diagnosis Date     Adrenal insufficiency (Whittier's disease) (H)     ACTH stim test failed to  increase cortisol     Allergic rhinitis, cause unspecified      Antiplatelet or antithrombotic long-term use      Arthritis      ASD (atrial septal defect) 02/15/2013    repaired 6/10/21     Attention deficit disorder without mention of hyperactivity 2014     BRCA1 gene mutation positive 2018    Reported by patient, no report available at this time Records requested from [employer] 18     Cerebral infarction (H)      Clotting disorder (H) birth    undefined clotting disorder - sees Dr. 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 in 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 pregnant        Past Surgical History:   Procedure Laterality Date     APPENDECTOMY  2006      SECTION  2013    Procedure:  SECTION;  Primary  Section;  Surgeon: Chad Hughes MD;  Location:  L+D     CV PFO CLOSURE N/A 06/10/2021    Procedure:30mm [place] CARDIOFORM- Patent Foramen Ovale Closure;  Surgeon: Victoriano Cornejo MD;  Location:  [facility] CARDIAC CATH LAB     DILATION AND CURETTAGE SUCTION N/A 2014    Procedure: DILATION AND CURETTAGE SUCTION;  Surgeon: Srini Martinez MD;  Location:  OR     DILATION AND CURETTAGE SUCTION N/A 2014    Procedure: DILATION AND CURETTAGE SUCTION;  Surgeon: Connor Kang MD;  Location:  OR     GENITOURINARY SURGERY  2016    bladder sling and complete historectomy     HC  DILATION/CURETTAGE DIAG/THER NON OB  1998     HC DILATION/CURETTAGE DIAG/THER NON OB  2006     HERNIORRHAPHY INCISIONAL (LOCATION) N/A 12/07/2018    Procedure: Incisional Hernia repair with Biologic mesh;  Surgeon: Suraj Bergeron MD;  Location: [facility] OR     HYSTERECTOMY, [facility]  04/18/2016    does not need pap     MASTECTOMY SIMPLE BILATERAL Bilateral 05/25/2018    Procedure: MASTECTOMY SIMPLE BILATERAL;  PROPHYLACTIC BILATERAL MASTECTOMY (GLADIS) BILATERAL BREAST RECONSTRUCTION [facility] TISSUE EXPANDERS (WILLOW)   ;  Surgeon: Suraj Bergeron MD;  Location:  OR     ORTHOPEDIC SURGERY  2007    (L) thumb fused     RECONSTRUCT BREAST, INSERT TISSUE EXPANDER BILATERAL, COMBINED Bilateral 05/25/2018    expander and tram flap with drains - had weekly surgery to have I & D and removal of infected tissue     SURGICAL HISTORY OF -   2006    left thumb fusion due to arthritis       Social History     Tobacco Use     Smoking status: Never     Smokeless tobacco: Never   Substance Use Topics     Alcohol use: Not Currently     Alcohol/week: 0.0 standard drinks     Comment: occassional       Family History   Problem Relation Age of Onset     Psychotic Disorder Mother         Depression, alcoholism     Diabetes Mother      Hereditary Breast and Ovarian Cancer Syndrome Mother         BRCA1+, no hx of cancer, s/p prophylactic surgery to remove breast tissue, ovaries, fallopian tubes     Hypertension Mother      Thyroid Disease Mother         thyroid nodules     Lung Cancer Mother      Substance Abuse Father      C.A.D. Maternal Grandmother      Hypertension Maternal Grandmother      Hereditary Breast and Ovarian Cancer Syndrome Sister         matrnal half sister, BRCA1+     Hereditary Breast and Ovarian Cancer Syndrome Daughter 23        BRCA1+     Breast Cancer Cousin 45     Hereditary Breast and Ovarian Cancer Syndrome Cousin         BRCA1+     Breast Cancer Maternal Aunt 40        lumpectomy, then 2nd primary breast cancer later in 40s,  treated with mastectomy     Hereditary Breast and Ovarian Cancer Syndrome Maternal Aunt         BRCA1+     Ovarian Cancer Maternal Aunt 70        surgry     Thyroid Cancer Maternal Aunt         medullary thyroid cancer     Hereditary Breast and Ovarian Cancer Syndrome Maternal Aunt         BRCA1+     Thyroid Cancer Maternal Uncle 66        papillary thyroid cancer     Breast Cancer Other 41        maternal great aunt     Ovarian Cancer Other 45         in 40s     Breast Cancer Other 43        maternal great aunt     Other Cancer Other         ovarian cancer     Colon Cancer No family hx of        Prior to Admission medications    Medication Sig Start Date End Date Taking? Authorizing Provider   bisacodyl (DULCOLAX) 5 MG EC tablet 2 days prior to procedure, take 2 tablets at 4 pm. 1 day prior to procedure, take 2 tablets at 4 pm. For additional instructions refer to your colonoscopy prep instructions. 22  Yes Adama White MD   polyethylene glycol (GOLYTELY) 236 g suspension 2 days prior at 5pm, mix and drink half of a jug of Golytely. Drink an 8 oz. glass of Golytely every 15 minutes until half of the jug is gone. Place remainder of Golytely in the refrigerator. 1 day prior at 5 pm, drink the 2nd half of a jug of Golytely bowel prep. 6 hours before your check-in time, drink an 8 oz. glass of Golytely every 15 minutes until half of the 2nd jug of Golytely is gone. Discard remainder of second jug. 22  Yes Adama White MD   amphetamine-dextroamphetamine (ADDERALL) 10 MG tablet Take 1 tablet (10 mg) by mouth 2 times daily as needed (inattention) 3/15/22   Sydnie Daley MD   ascorbic acid 250 MG TABS tablet Take 250 mg by mouth daily     Reported, Patient   aspirin (ASA) 81 MG EC tablet Take 81 mg by mouth daily 325 mg daily 21   Victoriano Cornejo MD   butalbital-acetaminophen-caffeine (ESGIC) -40 MG tablet TAKE ONE TABLET BY MOUTH EVERY 4 HOURS AS NEEDED 22   Sydnie Daley  "MD   CRANBERRY CONCENTRATE PO Take 1 Dose by mouth daily     Reported, Patient   FLUoxetine (PROZAC) 20 MG capsule Take 1 capsule (20 mg) by mouth daily 3/15/22   Sydnie Daley MD   methenamine (HIPREX) 1 g tablet Take 1 tablet (1 g) by mouth 2 times daily 7/20/20   Bridget Lopez MD   methocarbamol (ROBAXIN) 500 MG tablet Take 500 mg by mouth  Patient not taking: Reported on 6/13/2022 2/17/22   Reported, Patient   NUVARING 0.12-0.015 MG/24HR vaginal ring INSERT 1 RING VAGINALLY AND KEEP IN PLACE FOR 3 WEEKS. REMOVE FOR 1 WEEK. REPEAT WITH NEW RING. USE AS DIRECTED 7/21/21   Reported, Patient   phenazopyridine (PYRIDIUM) 200 MG tablet Take 200 mg by mouth 3 times daily as needed for irritation (urinary infections)     Reported, Patient   rizatriptan (MAXALT) 10 MG tablet Take 10 mg by mouth at onset of headache for migraine    Reported, Patient   scopolamine (TRANSDERM) 1 MG/3DAYS 72 hr patch Place 1 patch onto the skin every 72 hours 10/3/22   Sydnie Daley MD   STATIN NOT PRESCRIBED (INTENTIONAL) Please choose reason not prescribed, below  Patient not taking: Reported on 6/13/2022 11/30/19   Sydnie Daley MD   VITAMIN D PO Take 2,000 Units by mouth daily.    Reported, Patient   zolpidem (AMBIEN) 10 MG tablet Take 1 tablet (10 mg) by mouth nightly as needed for sleep 9/19/22   Sydnie Daley MD       Allergies   Allergen Reactions     Tnf Antagonists Hives     Enbrel Hives     Humira [Humira]      Rash hives     Prednisone      Pt sensitive to prednisone--shakey heart racing - only with large doses     Compazine [Prochlorperazine] Anxiety     Shakey per H&P dated 5/21/2018  Shaky and anxiety     Nitrofurantoin Hives and Rash        REVIEW OF SYSTEMS:   5 point ROS negative except as noted above in HPI, including Gen., Resp., CV, GI &  system review.    PHYSICAL EXAM:   /85   Pulse 101   Temp 96.9  F (36.1  C) (Temporal)   Resp 17   Ht 1.676 m (5' 6\")   Wt 109.8 kg (242 lb)   LMP " "02/08/2015 (Within Months)   SpO2 100%   BMI 39.06 kg/m   Estimated body mass index is 39.06 kg/m  as calculated from the following:    Height as of this encounter: 1.676 m (5' 6\").    Weight as of this encounter: 109.8 kg (242 lb).   GENERAL APPEARANCE: alert, and oriented  MENTAL STATUS: alert  AIRWAY EXAM: Mallampatti Class I (visualization of the soft palate, fauces, uvula, anterior and posterior pillars)  RESP: lungs clear to auscultation - no rales, rhonchi or wheezes  CV: regular rates and rhythm  DIAGNOSTICS:    Not indicated    IMPRESSION   ASA Class 2 - Mild systemic disease    PLAN:   Plan for Colonoscopy with possible biopsy, possible polypectomy. We discussed the risks, benefits and alternatives and the patient wished to proceed.    The above has been forwarded to the consulting provider.      Signed Electronically by: Adama White MD  January 10, 2023          "

## 2023-01-13 NOTE — PROGRESS NOTES
"[facility]   General Surgery Progress Note         Assessment and Plan:   Assessment:   POD#4 s/p Procedure(s):  Incisional Hernia repair with Biologic mesh  Post-operative ileus as expected - resolving  UTI, cultures pending  Afebrile      Plan:   OK to DC today on Septra DS, will change antibiotics PRN pending culture results  DC Rx: lovenox x 5 days, oxycodone, Septra DS x 1 week, pericolace.    OTC bowel program prn.    Call and schedule appointment for drain removal on Friday 12/14/2018 and again in 2-3 weeks for postop appt.    Discharge instructions were reviewed with the patient in detail.  All her questions/concerns were addressed.  She is aware that a printed copy of instructions will be given to her upon discharge.         Interval History:   Comfortable in bed. Reports abdominal pain is well controlled with PO medication. Tolerating diet in small amounts. Minimal nausea with ambulating, denies vomiting, resolves with rest. Wearing abdominal binder. Voiding independently, complains of UTI symptoms. +flatus, -BM.       Physical Exam:   Blood pressure 109/59, pulse 72, temperature 97.5  F (36.4  C), temperature source Oral, resp. rate 14, height 1.689 m (5' 6.5\"), weight 92.5 kg (204 lb), last menstrual period 02/08/2015, SpO2 96 %, unknown if currently breastfeeding.    I/O last 3 completed shifts:  In: 360 [P.O.:360]  Out: 3192.5 [Urine:3150; Drains:42.5]    Abdomen:   soft, non-distended, tenderness noted at incision and her right abdomen    Inc(s) - clean, dry, intact with steristrips, no erythema       Diego - serosanguinous, minimal output from left sided drain, 20 from right sided.          Data:     Recent Labs   Lab 12/10/18  0711 12/08/18  0705 12/07/18  1656   WBC 5.6 9.3  --    HGB 10.5* 10.6*  --    HCT 32.8* 33.6*  --    MCV 84 85  --     228 252       Taylor Aiken PA-C             " No

## 2023-03-25 ENCOUNTER — HEALTH MAINTENANCE LETTER (OUTPATIENT)
Age: 48
End: 2023-03-25

## 2023-07-18 ENCOUNTER — MYC MEDICAL ADVICE (OUTPATIENT)
Dept: FAMILY MEDICINE | Facility: CLINIC | Age: 48
End: 2023-07-18

## 2023-07-18 ENCOUNTER — OFFICE VISIT (OUTPATIENT)
Dept: FAMILY MEDICINE | Facility: CLINIC | Age: 48
End: 2023-07-18
Payer: COMMERCIAL

## 2023-07-18 VITALS
OXYGEN SATURATION: 99 % | TEMPERATURE: 98.3 F | DIASTOLIC BLOOD PRESSURE: 82 MMHG | WEIGHT: 253.8 LBS | SYSTOLIC BLOOD PRESSURE: 116 MMHG | RESPIRATION RATE: 16 BRPM | HEART RATE: 90 BPM | BODY MASS INDEX: 42.28 KG/M2 | HEIGHT: 65 IN

## 2023-07-18 DIAGNOSIS — Z13.6 CARDIOVASCULAR SCREENING; LDL GOAL LESS THAN 160: ICD-10-CM

## 2023-07-18 DIAGNOSIS — E66.813 CLASS 3 SEVERE OBESITY DUE TO EXCESS CALORIES WITHOUT SERIOUS COMORBIDITY WITH BODY MASS INDEX (BMI) OF 40.0 TO 44.9 IN ADULT (H): Primary | ICD-10-CM

## 2023-07-18 DIAGNOSIS — Z23 NEED FOR IMMUNIZATION USING DIPHTHERIA-TETANUS-PERTUSSIS WITH TYPHOID-PARATYPHOID (DTP + TAB) VACCINE: ICD-10-CM

## 2023-07-18 DIAGNOSIS — Z15.01 BRCA GENE MUTATION POSITIVE: ICD-10-CM

## 2023-07-18 DIAGNOSIS — E66.01 CLASS 3 SEVERE OBESITY DUE TO EXCESS CALORIES WITHOUT SERIOUS COMORBIDITY WITH BODY MASS INDEX (BMI) OF 40.0 TO 44.9 IN ADULT (H): Primary | ICD-10-CM

## 2023-07-18 DIAGNOSIS — G43.909 MIGRAINE WITHOUT STATUS MIGRAINOSUS, NOT INTRACTABLE, UNSPECIFIED MIGRAINE TYPE: ICD-10-CM

## 2023-07-18 DIAGNOSIS — Z15.09 BRCA GENE MUTATION POSITIVE: ICD-10-CM

## 2023-07-18 DIAGNOSIS — F98.8 ATTENTION DEFICIT DISORDER (ADD) WITHOUT HYPERACTIVITY: ICD-10-CM

## 2023-07-18 DIAGNOSIS — G47.00 INSOMNIA, UNSPECIFIED TYPE: ICD-10-CM

## 2023-07-18 LAB
ALBUMIN SERPL BCG-MCNC: 4.1 G/DL (ref 3.5–5.2)
ALP SERPL-CCNC: 64 U/L (ref 35–104)
ALT SERPL W P-5'-P-CCNC: 15 U/L (ref 0–50)
ANION GAP SERPL CALCULATED.3IONS-SCNC: 13 MMOL/L (ref 7–15)
AST SERPL W P-5'-P-CCNC: 24 U/L (ref 0–45)
BILIRUB SERPL-MCNC: 0.3 MG/DL
BUN SERPL-MCNC: 10.7 MG/DL (ref 6–20)
CALCIUM SERPL-MCNC: 9.3 MG/DL (ref 8.6–10)
CHLORIDE SERPL-SCNC: 105 MMOL/L (ref 98–107)
CHOLEST SERPL-MCNC: 208 MG/DL
CREAT SERPL-MCNC: 0.79 MG/DL (ref 0.51–0.95)
DEPRECATED HCO3 PLAS-SCNC: 20 MMOL/L (ref 22–29)
GFR SERPL CREATININE-BSD FRML MDRD: >90 ML/MIN/1.73M2
GLUCOSE SERPL-MCNC: 95 MG/DL (ref 70–99)
HBA1C MFR BLD: 5.6 % (ref 0–5.6)
HDLC SERPL-MCNC: 56 MG/DL
LDLC SERPL CALC-MCNC: 120 MG/DL
NONHDLC SERPL-MCNC: 152 MG/DL
POTASSIUM SERPL-SCNC: 4.4 MMOL/L (ref 3.4–5.3)
PROT SERPL-MCNC: 6.7 G/DL (ref 6.4–8.3)
SODIUM SERPL-SCNC: 138 MMOL/L (ref 136–145)
TRIGL SERPL-MCNC: 162 MG/DL
TSH SERPL DL<=0.005 MIU/L-ACNC: 3.25 UIU/ML (ref 0.3–4.2)

## 2023-07-18 PROCEDURE — 90471 IMMUNIZATION ADMIN: CPT | Performed by: FAMILY MEDICINE

## 2023-07-18 PROCEDURE — 80053 COMPREHEN METABOLIC PANEL: CPT | Performed by: FAMILY MEDICINE

## 2023-07-18 PROCEDURE — 80061 LIPID PANEL: CPT | Performed by: FAMILY MEDICINE

## 2023-07-18 PROCEDURE — 90715 TDAP VACCINE 7 YRS/> IM: CPT | Performed by: FAMILY MEDICINE

## 2023-07-18 PROCEDURE — 84443 ASSAY THYROID STIM HORMONE: CPT | Performed by: FAMILY MEDICINE

## 2023-07-18 PROCEDURE — 36415 COLL VENOUS BLD VENIPUNCTURE: CPT | Performed by: FAMILY MEDICINE

## 2023-07-18 PROCEDURE — 96127 BRIEF EMOTIONAL/BEHAV ASSMT: CPT | Performed by: FAMILY MEDICINE

## 2023-07-18 PROCEDURE — 83036 HEMOGLOBIN GLYCOSYLATED A1C: CPT | Performed by: FAMILY MEDICINE

## 2023-07-18 PROCEDURE — 99214 OFFICE O/P EST MOD 30 MIN: CPT | Mod: 25 | Performed by: FAMILY MEDICINE

## 2023-07-18 RX ORDER — DEXTROAMPHETAMINE SACCHARATE, AMPHETAMINE ASPARTATE, DEXTROAMPHETAMINE SULFATE AND AMPHETAMINE SULFATE 2.5; 2.5; 2.5; 2.5 MG/1; MG/1; MG/1; MG/1
10 TABLET ORAL 2 TIMES DAILY PRN
Qty: 60 TABLET | Refills: 0 | Status: SHIPPED | OUTPATIENT
Start: 2023-07-18 | End: 2023-11-07

## 2023-07-18 RX ORDER — ESTRADIOL 1 MG/1
1 TABLET ORAL
COMMUNITY
Start: 2023-06-02 | End: 2023-11-07

## 2023-07-18 RX ORDER — RIZATRIPTAN BENZOATE 10 MG/1
10 TABLET ORAL
Qty: 12 TABLET | Refills: 4 | Status: SHIPPED | OUTPATIENT
Start: 2023-07-18 | End: 2024-04-23

## 2023-07-18 RX ORDER — ZOLPIDEM TARTRATE 10 MG/1
10 TABLET ORAL
Qty: 30 TABLET | Refills: 5 | Status: SHIPPED | OUTPATIENT
Start: 2023-07-18 | End: 2023-11-07

## 2023-07-18 ASSESSMENT — ANXIETY QUESTIONNAIRES
2. NOT BEING ABLE TO STOP OR CONTROL WORRYING: SEVERAL DAYS
6. BECOMING EASILY ANNOYED OR IRRITABLE: SEVERAL DAYS
GAD7 TOTAL SCORE: 5
7. FEELING AFRAID AS IF SOMETHING AWFUL MIGHT HAPPEN: SEVERAL DAYS
4. TROUBLE RELAXING: NOT AT ALL
5. BEING SO RESTLESS THAT IT IS HARD TO SIT STILL: NOT AT ALL
IF YOU CHECKED OFF ANY PROBLEMS ON THIS QUESTIONNAIRE, HOW DIFFICULT HAVE THESE PROBLEMS MADE IT FOR YOU TO DO YOUR WORK, TAKE CARE OF THINGS AT HOME, OR GET ALONG WITH OTHER PEOPLE: SOMEWHAT DIFFICULT
GAD7 TOTAL SCORE: 5
1. FEELING NERVOUS, ANXIOUS, OR ON EDGE: SEVERAL DAYS
3. WORRYING TOO MUCH ABOUT DIFFERENT THINGS: SEVERAL DAYS

## 2023-07-18 ASSESSMENT — PATIENT HEALTH QUESTIONNAIRE - PHQ9
SUM OF ALL RESPONSES TO PHQ QUESTIONS 1-9: 6
SUM OF ALL RESPONSES TO PHQ QUESTIONS 1-9: 6
10. IF YOU CHECKED OFF ANY PROBLEMS, HOW DIFFICULT HAVE THESE PROBLEMS MADE IT FOR YOU TO DO YOUR WORK, TAKE CARE OF THINGS AT HOME, OR GET ALONG WITH OTHER PEOPLE: SOMEWHAT DIFFICULT

## 2023-07-18 NOTE — PROGRESS NOTES
Assessment & Plan     Insomnia, unspecified type  Refills per [facility]  Uses only sporadically   - zolpidem (AMBIEN) 10 MG tablet  Dispense: 30 tablet; Refill: 5    Attention deficit disorder (ADD) without hyperactivity  Doing well overall  Updated refills  - amphetamine-dextroamphetamine (ADDERALL) 10 MG tablet  Dispense: 60 tablet; Refill: 0  - Comprehensive metabolic panel (BMP + Alb, Alk Phos, ALT, AST, Total. Bili, TP)  - Comprehensive metabolic panel (BMP + Alb, Alk Phos, ALT, AST, Total. Bili, TP)    Class 3 severe obesity due to excess calories without serious comorbidity with body mass index (BMI) of 40.0 to 44.9 in adult (H)  Gaining more and more  Discussed weight loss clinic and GLP-1   Will get labs  Consider wegovy - other options MTM    - Hemoglobin A1c  - Comprehensive metabolic panel (BMP + Alb, Alk Phos, ALT, AST, Total. Bili, TP)  - Lipid panel reflex to direct LDL Fasting  - TSH with free T4 reflex  - Hemoglobin A1c  - Comprehensive metabolic panel (BMP + Alb, Alk Phos, ALT, AST, Total. Bili, TP)  - Lipid panel reflex to direct LDL Fasting  - TSH with free T4 reflex    CARDIOVASCULAR SCREENING; LDL GOAL LESS THAN 160    - Lipid panel reflex to direct LDL Fasting  - Lipid panel reflex to direct LDL Fasting    BRCA gene mutation positive  Has had hysterectomy and mastectomy     Migraine without status migrainosus, not intractable, unspecified migraine type  Stable  Refills per [facility]   - REVIEW OF HEALTH MAINTENANCE PROTOCOL ORDERS  - rizatriptan (MAXALT) 10 MG tablet  Dispense: 12 tablet; Refill: 4    Need for immunization using diphtheria-tetanus-pertussis with typhoid-paratyphoid (DTP + TAB) vaccine    - TDAP VACCINE (Adacel, Boostrix)        25 minutes spent by me on the date of the encounter doing chart review, history and exam, documentation and further activities per the note       BMI:   Estimated body mass index is 42.23 kg/m  as calculated from the following:    Height as of this  "encounter: 1.651 m (5' 5\").    Weight as of this encounter: 115.1 kg (253 lb 12.8 oz).   Weight management plan: Discussed healthy diet and exercise guidelines    See Patient Instructions    Sydnie Daley MD  [facility]    Cricket Latham is a 48 year old, presenting for the following health issues:  Refill Request ([place] touch [place] today )  she does need refills of her adderall for ADHD.   She rarely uses this.   She also needs her ambien and also uses this rarely.   She has struggled with weight gain over the last few years.   She exercises 5 days per week and knows this is trouble with snacking and portion control.   She is wondering about the new medications out there for this.      Wt Readings from Last 4 Encounters:   07/18/23 115.1 kg (253 lb 12.8 oz)   01/10/23 109.8 kg (242 lb)   06/13/22 111.6 kg (246 lb)   10/20/21 103.4 kg (228 lb)         7/18/2023     8:06 AM   Additional Questions   Roomed by cornelius yun   Accompanied by self     History of Present Illness       Reason for visit:  MEDICATION REFILLSVeronica consumes 0 sweetened beverage(s) daily.She exercises with enough effort to increase her heart rate 30 to 60 minutes per day.  She exercises with enough effort to increase her heart rate 5 days per week. She is missing 1 dose(s) of medications per week.  She is not taking prescribed medications regularly due to remembering to take.    Today's PHQ-9         PHQ-9 Total Score: 6    PHQ-9 Q9 Thoughts of better off dead/self-harm past 2 weeks :   Not at all    How difficult have these problems made it for you to do your work, take care of things at home, or get along with other people: Somewhat difficult  Today's BASSAM-7 Score: 5           How many servings of fruits and vegetables do you eat daily?  2-3    On average, how many sweetened beverages do you drink each day (Examples: soda, juice, sweet tea, etc.  Do NOT count diet or artificially sweetened beverages)?   0    How " many days per week do you exercise enough to make your heart beat faster? 5    How many minutes a day do you exercise enough to make your heart beat faster? 30 - 60  How many days per week do you miss taking your medication? 1    What makes it hard for you to take your medications?  remembering to take    Medication Followup of adderall     Taking Medication as prescribed: yes    Side Effects:  None    Medication Helping Symptoms:  NO      Past Medical History:   Diagnosis Date     Adrenal insufficiency (Elsberry's disease) (H)     ACTH stim test failed to increase cortisol     Allergic rhinitis, cause unspecified      Antiplatelet or antithrombotic long-term use      Arthritis      ASD (atrial septal defect) 02/15/2013    repaired 6/10/21     Attention deficit disorder without mention of hyperactivity 2014     BRCA1 gene mutation positive 2018    Reported by patient, no report available at this time Records requested from [facility] 18     Cerebral infarction (H)      Clotting disorder (H) birth    undefined clotting disorder - sees Dr. Sanchez Retinal artery occlusion, R int mammary artery occlusion post breastCA surg and recd TPA     Congenital heart disease      Depressive disorder      Endometriosis of uterus      HERPES SIMPLEX NOS 2007    cold sores on remicade     Insomnia, unspecified 02/10/2005     Lung nodule < 6cm on CT 2019    consider repeat in 2020 - low risk but around a lot of second hand smoke growing up     Migraine headaches      Other chronic sinusitis      Postoperative urinary retention     every surgery     Psoriatic arthropathy (H) 2007     Recurrent UTI 2008     Retinal artery occlusion 02/15/2013    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 in 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 pregnant       Past Surgical History:   Procedure Laterality Date     APPENDECTOMY  2006      SECTION  2013    Procedure:  SECTION;  Primary  Section;  Surgeon: Chad Hughes  MD;  Location: [facility] L+D     COLONOSCOPY N/A 01/10/2023    rpt 5 years;  Surgeon: Adama White MD;  Location:  GI     CV PFO CLOSURE N/A 06/10/2021    Procedure:30mm [place] CARDIOFORM- Patent Foramen [place]vale Closure;  Surgeon: Victoriano Cornejo MD;  Location:  HEART CARDIAC CATH LAB     DILATION AND CURETTAGE SUCTION N/A 12/12/2014    Procedure: DILATION AND CURETTAGE SUCTION;  Surgeon: Srini Martinez MD;  Location:  OR     DILATION AND CURETTAGE SUCTION N/A 12/20/2014    Procedure: DILATION AND CURETTAGE SUCTION;  Surgeon: Connor Kang MD;  Location:  OR     GENITOURINARY SURGERY  2016    bladder sling and complete historectomy     HC DILATION/CURETTAGE DIAG/THER NON OB  1998     HC DILATION/CURETTAGE DIAG/THER NON OB  2006     HERNIORRHAPHY INCISIONAL (LOCATION) N/A 12/07/2018    Procedure: Incisional Hernia repair with Biologic mesh;  Surgeon: Suraj Bergeron MD;  Location:  OR     HYSTERECTOMY, CARLOS  04/18/2016    does not need pap     MASTECTOMY SIMPLE BILATERAL Bilateral 05/25/2018    Procedure: MASTECTOMY SIMPLE BILATERAL;  PROPHYLACTIC BILATERAL MASTECTOMY (GLADIS) BILATERAL BREAST RECONSTRUCTION [facility] TISSUE EXPANDERS (WILLOW)   ;  Surgeon: Suraj Bergeron MD;  Location:  OR     ORTHOPEDIC SURGERY  2007    (L) thumb fused     RECONSTRUCT BREAST, INSERT TISSUE EXPANDER BILATERAL, COMBINED Bilateral 05/25/2018    expander and tram flap with drains - had weekly surgery to have I & D and removal of infected tissue     SURGICAL HISTORY OF -   2006    left thumb fusion due to arthritis       MEDICATIONS:  Current Outpatient Medications   Medication     amphetamine-dextroamphetamine (ADDERALL) 10 MG tablet     ascorbic acid 250 MG TABS tablet     aspirin (ASA) 81 MG EC tablet     butalbital-acetaminophen-caffeine (ESGIC) -40 MG tablet     CRANBERRY CONCENTRATE PO     methenamine (HIPREX) 1 g tablet     NUVARING 0.12-0.015 MG/24HR vaginal ring     phenazopyridine (PYRIDIUM) 200 MG tablet      rizatriptan (MAXALT) 10 MG tablet     VITAMIN D PO     zolpidem (AMBIEN) 10 MG tablet     estradiol (ESTRACE) 1 MG tablet     STATIN NOT PRESCRIBED (INTENTIONAL)     No current facility-administered medications for this visit.       SOCIAL HISTORY:  Social History     Tobacco Use     Smoking status: Never     Smokeless tobacco: Never   Substance Use Topics     Alcohol use: Not Currently     Alcohol/week: 0.0 standard drinks of alcohol     Comment: occassional       Family History   Problem Relation Age of Onset     Psychotic Disorder Mother         Depression, alcoholism     Diabetes Mother      Hereditary Breast and Ovarian Cancer Syndrome Mother         BRCA1+, no hx of cancer, s/p prophylactic surgery to remove breast tissue, ovaries, fallopian tubes     Hypertension Mother      Thyroid Disease Mother         thyroid nodules     Lung Cancer Mother      Substance Abuse Father      C.A.D. Maternal Grandmother      Hypertension Maternal Grandmother      Hereditary Breast and Ovarian Cancer Syndrome Sister         matrnal half sister, BRCA1+     Hereditary Breast and Ovarian Cancer Syndrome Daughter 23        BRCA1+     Breast Cancer Cousin 45     Hereditary Breast and Ovarian Cancer Syndrome Cousin         BRCA1+     Breast Cancer Maternal Aunt 40        lumpectomy, then 2nd primary breast cancer later in 40s, treated with mastectomy     Hereditary Breast and Ovarian Cancer Syndrome Maternal Aunt         BRCA1+     Ovarian Cancer Maternal Aunt 70        surgry     Thyroid Cancer Maternal Aunt         medullary thyroid cancer     Hereditary Breast and Ovarian Cancer Syndrome Maternal Aunt         BRCA1+     Thyroid Cancer Maternal Uncle 66        papillary thyroid cancer     Breast Cancer Other 41        maternal great aunt     Ovarian Cancer Other 45         in 40s     Breast Cancer Other 43        maternal great aunt     Other Cancer Other         ovarian cancer     Colon Cancer No family hx of   "          Review of Systems   Constitutional, HEENT, cardiovascular, pulmonary, gi and gu systems are negative, except as otherwise noted.      Objective    /82 (BP Location: Right arm, Patient Position: Sitting, Cuff Size: Adult Large)   Pulse 90   Temp 98.3  F (36.8  C) (Oral)   Resp 16   Ht 1.651 m (5' 5\")   Wt 115.1 kg (253 lb 12.8 oz)   LMP 02/08/2015 (Within Months)   SpO2 99%   BMI 42.23 kg/m    Body mass index is 42.23 kg/m .  Physical Exam   GENERAL: healthy, alert, no distress and obese  EYES: Eyes grossly normal to inspection, PERRL and conjunctivae and sclerae normal  NECK: no adenopathy, no asymmetry, masses, or scars and thyroid normal to palpation  RESP: lungs clear to auscultation - no rales, rhonchi or wheezes  CV: regular rate and rhythm, normal S1 S2, no S3 or S4, no murmur, click or rub, no peripheral edema and peripheral pulses strong  MS: no gross musculoskeletal defects noted, no edema  SKIN: no suspicious lesions or rashes  NEURO: Normal strength and tone, mentation intact and speech normal  PSYCH: mentation appears normal, affect normal/bright  LYMPH: no cervical, supraclavicular, axillary, or inguinal adenopathy    Admission on 01/10/2023, Discharged on 01/10/2023   Component Date Value Ref Range Status     COLONOSCOPY 01/10/2023    Final                    Value:[facility]  _______________________________________________________________________________  Patient Name: Noa Mcgill         Procedure Date: 1/10/2023 12:45 PM  MRN: [id number]                       Account Number: [id number]  YOB: 1975              Admit Type: Outpatient  Age: 47                               Gender: Female  Attending MD: ROB PEDERSON MD   Total Sedation Time: 26_minutes continuous   bedside 1:1  Instrument Name: 225 - Adult Colonoscope   _______________________________________________________________________________     Procedure:                " Colonoscopy  Indications:              Screening for colorectal malignant neoplasm  Providers:                ORB PEDERSON MD (Doctor)  Referring MD:             CHIQUIS HORN MD (Referring MD)  Medicines:                Midazolam 5 mg IV, Fentanyl 250 micrograms IV  Complications:            No immediate complications.  _________________________________________________                          ______________________________  Procedure:                Pre-Anesthesia Assessment:                            - Prior to the procedure, a History and Physical                             was performed, and patient medications and                             allergies were reviewed. The patient is competent.                             The risks and benefits of the procedure and the                             sedation options and risks were discussed with the                             patient. All questions were answered and informed                             consent was obtained. Patient identification and                             proposed procedure were verified by the physician                             in the procedure room. Mental Status Examination:                             alert and oriented. Airway Examination: normal                             oropharyngeal airway and neck mobility. Respiratory                             Examination: clear to auscultation. CV E                          xamination:                             normal. Prophylactic Antibiotics: The patient does                             not require prophylactic antibiotics. Prior                             Anticoagulants: The patient has taken no                             anticoagulant or antiplatelet agents. ASA Grade                             Assessment: II - A patient with mild systemic                             disease. After reviewing the risks and benefits,                             the patient was deemed in  satisfactory condition to                             undergo the procedure. The anesthesia plan was to                             use moderate sedation / analgesia (conscious                             sedation). Immediately prior to administration of                             medications, the patient was re-assessed for                             adequacy to receive sedatives. The heart rate,                             respiratory rate, oxygen saturations, blood                                                       pressure, adequacy of pulmonary ventilation, and                             response to care were monitored throughout the                             procedure. The physical status of the patient was                             re-assessed after the procedure.                            After obtaining informed consent, the colonoscope                             was passed under direct vision. Throughout the                             procedure, the patient's blood pressure, pulse, and                             oxygen saturations were monitored continuously. The                             Olympus Adult Colonoscope, Model # CF-YT831S,                             [place] # 225, SN # [id number] was introduced through                             the anus and advanced to the cecum, identified by                             appendiceal orifice and ileocecal valve. The                             colonoscopy was performed with moderate difficulty                                                       due to significant looping. Successful completion                             of the procedure was aided by increasing the dose                             of sedation medication. The patient tolerated the                             procedure. The quality of the bowel preparation was                             good. The ileocecal valve, appendiceal orifice, and                             rectum were  photographed.                                                                                   Findings:       The perianal and digital rectal examinations were normal.       The entire examined colon appeared normal on direct and retroflexion        views.                                                                                   Impression:               - The entire examined colon is normal on direct and                             retroflexion views.                            - No specimens collected.  Recommendation:           - Repeat colonoscopy in 5 years for surveillan                          ce.                             Use propofol.                                                                                   Procedure Code(s):       --- Professional ---       , Colorectal cancer screening; colonoscopy on individual not        meeting criteria for high risk  Diagnosis Code(s):       --- Professional ---       Z12.11, Encounter for screening for malignant neoplasm of colon    CPT copyright 2020 [employer]. All rights reserved.    The codes documented in this report are preliminary and upon  review may   be revised to meet current compliance requirements.    Electronically signed by Rob White MD  ____________________  ROB WHITE MD  1/10/2023 1:43:46 PM  I was physically present for the entire viewing portion of the exam.  __________________________ROB WHITE MD  Number of Addenda: 0    Note Initiated On: 1/10/2023 12:45 PM  MRN:                      [id number]  Procedure Date:           1/10/2023 12:45:56 PM  Scope Withd                          frankie Time: 0 hours 7 minutes 10 seconds   Total Procedure Duration: 0 hours 23 minutes 43 seconds   Estimated Blood Loss:       Scope In: 1:03:55 PM  Scope Out: 1:27:38 PM

## 2023-08-14 ENCOUNTER — OFFICE VISIT (OUTPATIENT)
Dept: URGENT CARE | Facility: URGENT CARE | Age: 48
End: 2023-08-14
Payer: COMMERCIAL

## 2023-08-14 VITALS
HEART RATE: 85 BPM | RESPIRATION RATE: 24 BRPM | DIASTOLIC BLOOD PRESSURE: 74 MMHG | WEIGHT: 258.8 LBS | TEMPERATURE: 97.7 F | BODY MASS INDEX: 43.07 KG/M2 | SYSTOLIC BLOOD PRESSURE: 111 MMHG | OXYGEN SATURATION: 100 %

## 2023-08-14 DIAGNOSIS — R30.0 DYSURIA: Primary | ICD-10-CM

## 2023-08-14 LAB
ALBUMIN UR-MCNC: NEGATIVE MG/DL
APPEARANCE UR: CLEAR
BILIRUB UR QL STRIP: NEGATIVE
CLUE CELLS: ABNORMAL
COLOR UR AUTO: YELLOW
GLUCOSE UR STRIP-MCNC: NEGATIVE MG/DL
HGB UR QL STRIP: NEGATIVE
KETONES UR STRIP-MCNC: NEGATIVE MG/DL
LEUKOCYTE ESTERASE UR QL STRIP: NEGATIVE
NITRATE UR QL: NEGATIVE
PH UR STRIP: 5.5 [PH] (ref 5–7)
SP GR UR STRIP: 1.02 (ref 1–1.03)
TRICHOMONAS, WET PREP: ABNORMAL
UROBILINOGEN UR STRIP-ACNC: 1 E.U./DL
WBC'S/HIGH POWER FIELD, WET PREP: ABNORMAL
YEAST, WET PREP: ABNORMAL

## 2023-08-14 PROCEDURE — 87086 URINE CULTURE/COLONY COUNT: CPT | Performed by: FAMILY MEDICINE

## 2023-08-14 PROCEDURE — 87210 SMEAR WET MOUNT SALINE/INK: CPT | Performed by: FAMILY MEDICINE

## 2023-08-14 PROCEDURE — 81003 URINALYSIS AUTO W/O SCOPE: CPT

## 2023-08-14 PROCEDURE — 99213 OFFICE O/P EST LOW 20 MIN: CPT | Performed by: FAMILY MEDICINE

## 2023-08-14 ASSESSMENT — PAIN SCALES - GENERAL: PAINLEVEL: NO PAIN (0)

## 2023-08-14 NOTE — PROGRESS NOTES
Chief Complaint   Patient presents with    Urinary Problem     Pain and frequency, urgency     Noa was seen today for urinary problem.    Diagnoses and all orders for this visit:    Dysuria  -     UA Macroscopic with reflex to Microscopic and Culture - Clinic Collect  -     Wet prep - Clinic Collect  -     Urine Culture Aerobic Bacterial - lab collect; Future      Results for orders placed or performed in visit on 08/14/23   UA Macroscopic with reflex to Microscopic and Culture - Clinic Collect     Status: Normal    Specimen: Urine, Clean Catch   Result Value Ref Range    Color Urine Yellow Colorless, Straw, Light Yellow, Yellow    Appearance Urine Clear Clear    Glucose Urine Negative Negative mg/dL    Bilirubin Urine Negative Negative    Ketones Urine Negative Negative mg/dL    Specific Gravity Urine 1.025 1.003 - 1.035    Blood Urine Negative Negative    pH Urine 5.5 5.0 - 7.0    Protein Albumin Urine Negative Negative mg/dL    Urobilinogen Urine 1.0 0.2, 1.0 E.U./dL    Nitrite Urine Negative Negative    Leukocyte Esterase Urine Negative Negative    Narrative    Microscopic not indicated   Wet prep - Clinic Collect     Status: Abnormal    Specimen: Vagina; Swab   Result Value Ref Range    Trichomonas Absent Absent    Yeast Absent Absent    Clue Cells Absent Absent    WBCs/high power field 1+ (A) None       PLAN:  As per ordered above  Drink plenty of fluids.  Prevention and treatment of UTI's discussed.  Uc pending   Reviewed result with patient         SUBJECTIVE:   Noa Mcgill is a 48 year old female who  presents today for a possible UTI. Symptoms of dysuria and frequency have been going on for 2day(s).  Hematuria no.  sudden onsetand moderate.  There is no history of fever, chills, nausea or vomiting.  No history of vaginal This patient does not  have a history of urinary tract infections. Patient denies rigors, flank pain, temperature > 101 degrees F., and Vomiting, significant nausea or diarrhea or  vaginal discharge     Past Medical History:   Diagnosis Date    Adrenal insufficiency (Hugh's disease) (H)     ACTH stim test failed to increase cortisol    Allergic rhinitis, cause unspecified     Antiplatelet or antithrombotic long-term use     Arthritis     ASD (atrial septal defect) 02/15/2013    repaired 6/10/21    Attention deficit disorder without mention of hyperactivity 07/02/2014    BRCA1 gene mutation positive 01/24/2018    Reported by patient, no report available at this time Records requested from [facility] 1/23/18    Cerebral infarction (H)     Clotting disorder (H) birth    undefined clotting disorder - sees Dr. Sanchez Retinal artery occlusion, R int mammary artery occlusion post breastCA surg and recd TPA    Congenital heart disease     Depressive disorder     Endometriosis of uterus     HERPES SIMPLEX NOS 07/13/2007    cold sores on remicade    Insomnia, unspecified 02/10/2005    Lung nodule < 6cm on CT 11/2019    consider repeat in 11/2020 - low risk but around a lot of second hand smoke growing up    Migraine headaches     Other chronic sinusitis     Postoperative urinary retention     every surgery    Psoriatic arthropathy (H) 01/26/2007    Recurrent UTI 2008    Retinal artery occlusion 02/15/2013    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 in 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 pregnant     Current Outpatient Medications   Medication Sig Dispense Refill    amphetamine-dextroamphetamine (ADDERALL) 10 MG tablet Take 1 tablet (10 mg) by mouth 2 times daily as needed (inattention) 60 tablet 0    ascorbic acid 250 MG TABS tablet Take 250 mg by mouth daily       aspirin (ASA) 81 MG EC tablet Take 81 mg by mouth daily 325 mg daily 1 tablet 1    butalbital-acetaminophen-caffeine (ESGIC) -40 MG tablet TAKE ONE TABLET BY MOUTH EVERY 4 HOURS AS NEEDED 30 tablet 2    CRANBERRY CONCENTRATE PO Take 1 Dose by mouth daily       estradiol (ESTRACE) 1 MG tablet Take 1 tablet by mouth daily at 2 pm      methenamine (HIPREX) 1 g  tablet Take 1 tablet (1 g) by mouth 2 times daily 60 tablet 3    NUVARING 0.12-0.015 MG/24HR vaginal ring INSERT 1 RING VAGINALLY AND KEEP IN PLACE FOR 3 WEEKS. REMOVE FOR 1 WEEK. REPEAT WITH NEW RING. USE AS DIRECTED      rizatriptan (MAXALT) 10 MG tablet Take 1 tablet (10 mg) by mouth at onset of headache for migraine Take 10 mg by mouth at onset of headache for migraine 12 tablet 4    VITAMIN D PO Take 2,000 Units by mouth daily.      zolpidem (AMBIEN) 10 MG tablet Take 1 tablet (10 mg) by mouth nightly as needed for sleep 30 tablet 5    phenazopyridine (PYRIDIUM) 200 MG tablet Take 200 mg by mouth 3 times daily as needed for irritation (urinary infections)  (Patient not taking: Reported on 8/14/2023)      Semaglutide-Weight Management (WEGOVY) 0.25 MG/0.5ML pen Inject 0.25 mg Subcutaneous once a week for 8 doses (Patient not taking: Reported on 8/14/2023) 2 mL 0    STATIN NOT PRESCRIBED (INTENTIONAL) Please choose reason not prescribed, below (Patient not taking: Reported on 6/13/2022)       Social History     Tobacco Use    Smoking status: Never    Smokeless tobacco: Never   Substance Use Topics    Alcohol use: Not Currently     Alcohol/week: 0.0 standard drinks of alcohol     Comment: occassional       ROS:   Review of systems negative except as stated above.    OBJECTIVE:  /74 (BP Location: Right arm, Patient Position: Sitting, Cuff Size: Adult Large)   Pulse 85   Temp 97.7  F (36.5  C) (Oral)   Resp 24   Wt 117.4 kg (258 lb 12.8 oz)   LMP 02/08/2015 (Within Months)   SpO2 100%   BMI 43.07 kg/m    GENERAL APPEARANCE: healthy, alert and no distress  CV: regular rates and rhythm, normal S1 S2, no murmur noted  ABDOMEN:  soft, nontender, no HSM or masses and bowel sounds normal  BACK: No CVA tenderness  SKIN: no suspicious lesions or rashes  PSYCH: mentation appears normal    Melanie Stokes MD

## 2023-08-15 NOTE — PATIENT INSTRUCTIONS
Drink plenty of fluids.Follow up with primary care physician if not improving, uc result pending       Melanie Stokes MD

## 2023-08-17 LAB — BACTERIA UR CULT: NORMAL

## 2023-08-31 ENCOUNTER — MYC MEDICAL ADVICE (OUTPATIENT)
Dept: FAMILY MEDICINE | Facility: CLINIC | Age: 48
End: 2023-08-31
Payer: COMMERCIAL

## 2023-08-31 DIAGNOSIS — E66.811 CLASS 1 OBESITY DUE TO EXCESS CALORIES WITHOUT SERIOUS COMORBIDITY WITH BODY MASS INDEX (BMI) OF 31.0 TO 31.9 IN ADULT: Primary | ICD-10-CM

## 2023-08-31 DIAGNOSIS — E66.09 CLASS 1 OBESITY DUE TO EXCESS CALORIES WITHOUT SERIOUS COMORBIDITY WITH BODY MASS INDEX (BMI) OF 31.0 TO 31.9 IN ADULT: Primary | ICD-10-CM

## 2023-08-31 NOTE — TELEPHONE ENCOUNTER
See my chart     Asia Castellano, Registered Nurse, PAL (Patient Advocate Liaison)   [facility]   [phone]

## 2023-09-01 NOTE — TELEPHONE ENCOUNTER
Dr. Daley-    See [facility]:  Patient has been unable to get wegovy as pharmacies have not had the medication in stock. Patient requests new prescription for Ozempic sent to pharmacy. RN pended.     Amber LUONG RN, BSN, PHN - PAL (patient advocate liaison)  [facility]  [phone]

## 2023-10-22 ENCOUNTER — MYC REFILL (OUTPATIENT)
Dept: FAMILY MEDICINE | Facility: CLINIC | Age: 48
End: 2023-10-22
Payer: COMMERCIAL

## 2023-10-22 DIAGNOSIS — E66.811 CLASS 1 OBESITY DUE TO EXCESS CALORIES WITHOUT SERIOUS COMORBIDITY WITH BODY MASS INDEX (BMI) OF 31.0 TO 31.9 IN ADULT: ICD-10-CM

## 2023-10-22 DIAGNOSIS — E66.09 CLASS 1 OBESITY DUE TO EXCESS CALORIES WITHOUT SERIOUS COMORBIDITY WITH BODY MASS INDEX (BMI) OF 31.0 TO 31.9 IN ADULT: ICD-10-CM

## 2023-11-07 ENCOUNTER — OFFICE VISIT (OUTPATIENT)
Dept: FAMILY MEDICINE | Facility: CLINIC | Age: 48
End: 2023-11-07
Attending: FAMILY MEDICINE
Payer: COMMERCIAL

## 2023-11-07 VITALS
OXYGEN SATURATION: 99 % | RESPIRATION RATE: 14 BRPM | DIASTOLIC BLOOD PRESSURE: 83 MMHG | TEMPERATURE: 98.2 F | SYSTOLIC BLOOD PRESSURE: 119 MMHG | HEIGHT: 66 IN | WEIGHT: 232 LBS | BODY MASS INDEX: 37.28 KG/M2 | HEART RATE: 92 BPM

## 2023-11-07 DIAGNOSIS — F98.8 ATTENTION DEFICIT DISORDER (ADD) WITHOUT HYPERACTIVITY: ICD-10-CM

## 2023-11-07 DIAGNOSIS — G47.00 INSOMNIA, UNSPECIFIED TYPE: ICD-10-CM

## 2023-11-07 DIAGNOSIS — E66.812 CLASS 2 SEVERE OBESITY DUE TO EXCESS CALORIES WITH SERIOUS COMORBIDITY AND BODY MASS INDEX (BMI) OF 37.0 TO 37.9 IN ADULT (H): Primary | ICD-10-CM

## 2023-11-07 DIAGNOSIS — G43.909 MIGRAINE WITHOUT STATUS MIGRAINOSUS, NOT INTRACTABLE, UNSPECIFIED MIGRAINE TYPE: ICD-10-CM

## 2023-11-07 DIAGNOSIS — E66.01 CLASS 2 SEVERE OBESITY DUE TO EXCESS CALORIES WITH SERIOUS COMORBIDITY AND BODY MASS INDEX (BMI) OF 37.0 TO 37.9 IN ADULT (H): Primary | ICD-10-CM

## 2023-11-07 LAB
ANION GAP SERPL CALCULATED.3IONS-SCNC: 12 MMOL/L (ref 7–15)
BUN SERPL-MCNC: 12.2 MG/DL (ref 6–20)
CALCIUM SERPL-MCNC: 9.2 MG/DL (ref 8.6–10)
CHLORIDE SERPL-SCNC: 105 MMOL/L (ref 98–107)
CREAT SERPL-MCNC: 0.84 MG/DL (ref 0.51–0.95)
DEPRECATED HCO3 PLAS-SCNC: 22 MMOL/L (ref 22–29)
EGFRCR SERPLBLD CKD-EPI 2021: 86 ML/MIN/1.73M2
GLUCOSE SERPL-MCNC: 89 MG/DL (ref 70–99)
POTASSIUM SERPL-SCNC: 4.4 MMOL/L (ref 3.4–5.3)
SODIUM SERPL-SCNC: 139 MMOL/L (ref 135–145)

## 2023-11-07 PROCEDURE — 80048 BASIC METABOLIC PNL TOTAL CA: CPT | Performed by: FAMILY MEDICINE

## 2023-11-07 PROCEDURE — 36415 COLL VENOUS BLD VENIPUNCTURE: CPT | Performed by: FAMILY MEDICINE

## 2023-11-07 PROCEDURE — 99214 OFFICE O/P EST MOD 30 MIN: CPT | Performed by: FAMILY MEDICINE

## 2023-11-07 RX ORDER — ZOLPIDEM TARTRATE 10 MG/1
10 TABLET ORAL
Qty: 30 TABLET | Refills: 5 | Status: SHIPPED | OUTPATIENT
Start: 2023-11-07 | End: 2024-07-24

## 2023-11-07 RX ORDER — BUTALBITAL, ACETAMINOPHEN AND CAFFEINE 50; 325; 40 MG/1; MG/1; MG/1
TABLET ORAL
Qty: 30 TABLET | Refills: 2 | Status: SHIPPED | OUTPATIENT
Start: 2023-11-07

## 2023-11-07 RX ORDER — ESTRADIOL 1 MG/1
1 TABLET ORAL 2 TIMES DAILY
COMMUNITY
Start: 2023-11-07

## 2023-11-07 RX ORDER — DEXTROAMPHETAMINE SACCHARATE, AMPHETAMINE ASPARTATE, DEXTROAMPHETAMINE SULFATE AND AMPHETAMINE SULFATE 2.5; 2.5; 2.5; 2.5 MG/1; MG/1; MG/1; MG/1
10 TABLET ORAL 2 TIMES DAILY PRN
Qty: 60 TABLET | Refills: 0 | Status: SHIPPED | OUTPATIENT
Start: 2023-11-07

## 2023-11-07 ASSESSMENT — ENCOUNTER SYMPTOMS
MYALGIAS: 0
HEMATOCHEZIA: 0
NERVOUS/ANXIOUS: 0
HEARTBURN: 0
BREAST MASS: 0
WEAKNESS: 0
JOINT SWELLING: 0
PALPITATIONS: 0
DIARRHEA: 0
FEVER: 0
SHORTNESS OF BREATH: 0
SORE THROAT: 0
NAUSEA: 0
COUGH: 0
PARESTHESIAS: 0
HEMATURIA: 0
FREQUENCY: 0
ARTHRALGIAS: 0
DIZZINESS: 1
EYE PAIN: 0
ABDOMINAL PAIN: 0
CHILLS: 0
CONSTIPATION: 0
HEADACHES: 1
DYSURIA: 0

## 2023-11-07 ASSESSMENT — ANXIETY QUESTIONNAIRES
5. BEING SO RESTLESS THAT IT IS HARD TO SIT STILL: NOT AT ALL
GAD7 TOTAL SCORE: 4
7. FEELING AFRAID AS IF SOMETHING AWFUL MIGHT HAPPEN: NOT AT ALL
IF YOU CHECKED OFF ANY PROBLEMS ON THIS QUESTIONNAIRE, HOW DIFFICULT HAVE THESE PROBLEMS MADE IT FOR YOU TO DO YOUR WORK, TAKE CARE OF THINGS AT HOME, OR GET ALONG WITH OTHER PEOPLE: SOMEWHAT DIFFICULT
3. WORRYING TOO MUCH ABOUT DIFFERENT THINGS: SEVERAL DAYS
6. BECOMING EASILY ANNOYED OR IRRITABLE: SEVERAL DAYS
1. FEELING NERVOUS, ANXIOUS, OR ON EDGE: SEVERAL DAYS
2. NOT BEING ABLE TO STOP OR CONTROL WORRYING: SEVERAL DAYS
GAD7 TOTAL SCORE: 4
4. TROUBLE RELAXING: NOT AT ALL

## 2023-11-07 ASSESSMENT — PATIENT HEALTH QUESTIONNAIRE - PHQ9
SUM OF ALL RESPONSES TO PHQ QUESTIONS 1-9: 4
SUM OF ALL RESPONSES TO PHQ QUESTIONS 1-9: 4
10. IF YOU CHECKED OFF ANY PROBLEMS, HOW DIFFICULT HAVE THESE PROBLEMS MADE IT FOR YOU TO DO YOUR WORK, TAKE CARE OF THINGS AT HOME, OR GET ALONG WITH OTHER PEOPLE: SOMEWHAT DIFFICULT

## 2023-11-07 NOTE — LETTER
[facility]  11/07/23  Patient: Noa Mcgill  YOB: 1975  Medical Record Number: [id number]                                                                                  Non-Opioid Controlled Substance Agreement    This is an agreement between you and your provider regarding safe and appropriate use of controlled substances prescribed by your care team. Controlled substances are?medicines that can cause physical and mental dependence (abuse).     There are strict laws about having and using these medicines. We here at [facility] are  committed to working with you in your efforts to get better. To support you in this work, we'll help you schedule regular office appointments for medicine refills. If we must cancel or change your appointment for any reason, we'll make sure you have enough medicine to last until your next appointment.     As a Provider, I will:   Listen carefully to your concerns while treating you with respect.   Recommend a treatment plan that I believe is in your best interest and may involve therapies other than medicine.    Talk with you often about the possible benefits and the risk of harm of any medicine that we prescribe for you.  Assess the safety of this medicine and check how well it works.    Provide a plan on how to taper (discontinue or go off) using this medicine if the decision is made to stop its use.      ::  As a Patient, I understand controlled substances:     Are prescribed by my care provider to help me function or work and manage my condition(s).?  Are strong medicines and can cause serious side effects.     Need to be taken exactly as prescribed.?Combining controlled substances with certain medicines or chemicals (such as illegal drugs, alcohol, sedatives, sleeping pills, and benzodiazepines) can be dangerous or even fatal.? If I stop taking my medicines suddenly, I may have severe withdrawal symptoms.     The risks, benefits,  and side effects of these medicine(s) were explained to me. I agree that:    I will take part in other treatments as advised by my care team. This may be psychiatry or counseling, physical therapy, behavioral therapy, group treatment or a referral to specialist.    I will keep all my appointments and understand this is part of the monitoring of controlled substances.?My care team may require an office visit for EVERY controlled substance refill. If I miss appointments or don t follow instructions, my care team may stop my medicine    I will take my medicines as prescribed. I will not change the dose or schedule unless my care team tells me to. There will be no refills if I run out early.      I may be asked to come to the clinic and complete a urine drug test or complete a pill count. If I don t give a urine sample or participate in a pill count, the care team may stop my medicine.    I will only receive controlled substance prescriptions from this clinic. If I am treated by another provider, I will tell them that I am taking controlled substances and that I have a treatment agreement with this provider. I will inform my [facility] care team within one business day if I am given a prescription for any controlled substance by another healthcare provider. My [facility] care team can contact other providers and pharmacists about my use of any medicines.    It is up to me to make sure that I don't run out of my medicines on weekends or holidays.?If my care team is willing to refill my prescription without a visit, I must request refills only during office hours. Refills may take up to 3 business days to process. I will use one pharmacy to fill all my controlled substance prescriptions. I will notify the clinic about any changes to my insurance or medicine availability.    I am responsible for my prescriptions. If the medicine/prescription is lost, stolen or destroyed, it will not be replaced.?I also agree  not to share controlled substance medicines with anyone.     I am aware I should not use any illegal or recreational drugs. I agree not to drink alcohol unless my care team says I can.     If I enroll in the [place] [facility] Cannabis program, I will tell my care team before my next refill.    I will tell my care team right away if I become pregnant, have a new medical problem treated outside of my regular clinic, or have a change in my medicines.     I understand that this medicine can affect my thinking, judgment and reaction time.? Alcohol and drugs affect the brain and body, which can affect the safety of my driving. Being under the influence of alcohol or drugs can affect my decision-making, behaviors, personal safety and the safety of others. Driving while impaired (DWI) can occur if a person is driving, operating or in physical control of a car, motorcycle, boat, snowmobile, ATV, motorbike, off-road vehicle or any other motor vehicle ([place] Statute 169[place].20). I understand the risk if I choose to drive or operate any vehicle or machinery.    I understand that if I do not follow any of the conditions above, my prescriptions or treatment may be stopped or changed.   I agree that my provider, clinic care team and pharmacy may work with any city, state or federal law enforcement agency that investigates the misuse, sale or other diversion of my controlled medicine. I will allow my provider to discuss my care with, or share a copy of, this agreement with any other treating provider, pharmacy or emergency room where I receive care.     I have read this agreement and have asked questions about anything I did not understand.    ________________________________________________________  Patient Signature - Noa KARO Artem     ___________________                   Date     ________________________________________________________  Provider Signature - Sydnie Daley MD       ___________________                   Date      ________________________________________________________  Witness Signature (required if provider not present while patient signing)          ___________________                   Date

## 2023-11-07 NOTE — PROGRESS NOTES
Assessment & Plan     Insomnia, unspecified type  Stable - uses 1/2 tab rarely - last filled in July   - zolpidem (AMBIEN) 10 MG tablet  Dispense: 30 tablet; Refill: 5    Migraine without status migrainosus, not intractable, unspecified migraine type  Stable - has filled this rx 1 time in last year  - butalbital-acetaminophen-caffeine (ESGIC) -40 MG tablet  Dispense: 30 tablet; Refill: 2    Attention deficit disorder (ADD) without hyperactivity  Stable  Uses rarely  Refills per iva   Had her sign CSA due to ambien, adderall and esgic  - amphetamine-dextroamphetamine (ADDERALL) 10 MG tablet  Dispense: 60 tablet; Refill: 0    Class 2 severe obesity due to excess calories with serious comorbidity and body mass index (BMI) of 37.0 to 37.9 in adult (H)  Losing weight rapidly   Recheck virtual visit in 3 months   - Basic metabolic panel  (Ca, Cl, CO2, Creat, Gluc, K, Na, BUN)  - Basic metabolic panel  (Ca, Cl, CO2, Creat, Gluc, K, Na, BUN)        20 minutes spent by me on the date of the encounter doing chart review, history and exam, documentation and further activities per the note       See Patient Instructions    Sydnie Daley MD  [employer]    Cricket Latham is a 48 year old, presenting for the following health issues:   she is here fore recheck after beginning semaglutide for weight loss/obesity.    She has lost 20 lbs in 2 short months.   She says her appetite is less and her cravings are less.   She is optimistic.   She just went up to the 0.5 mg dose from 0.25 mg.  She also needs some refills on her migraine and ADHD meds which she tries to use very minimally.       Recheck Medication      11/7/2023     8:26 AM   Additional Questions   Roomed by Emmy       Healthy Habits:     Getting at least 3 servings of Calcium per day:  Yes    Bi-annual eye exam:  Yes    Dental care twice a year:  Yes    Sleep apnea or symptoms of sleep apnea:  None    Diet:  Regular (no  restrictions)    Frequency of exercise:  2-3 days/week    Duration of exercise:  15-30 minutes    Taking medications regularly:  Yes    Medication side effects:  Not applicable    Additional concerns today:  No     ADHD Follow-Up (Adult)  Concerns: none  Changes since last visit: Stable  Taking controlled (daily) medications as prescribed: Yes  Sleep: no problems  Adult ADHD Self-Reporting form given to patient?:  No  Currently in counseling: No    Medication Benefits:   Controlled symptoms: Attention span, Distractability, Finishing tasks, and Impulse control  Uncontrolled symptoms:  None    Medication Side Effects:  Reports:  none  Sleep Problems? no  ++++++++++++++++++++++++++++++++++++++++++++++++    Employer Concerns/Feedback: Stable  Coworker Concerns:   Stable  Home/Family Concerns: Stable    Medication Followup of Ozempic  Taking Medication as prescribed: yes  Side Effects:  None  Medication Helping Symptoms:  yes    Answers submitted by the patient for this visit:  Patient Health Questionnaire (Submitted on 11/7/2023)  If you checked off any problems, how difficult have these problems made it for you to do your work, take care of things at home, or get along with other people?: Somewhat difficult  PHQ9 TOTAL SCORE: 4  BASSAM-7 (Submitted on 11/7/2023)  BASSAM 7 TOTAL SCORE: 4    Past Medical History:   Diagnosis Date    Adrenal insufficiency (Hugh's disease) (H)     ACTH stim test failed to increase cortisol    Allergic rhinitis, cause unspecified     Antiplatelet or antithrombotic long-term use     Arthritis     ASD (atrial septal defect) 02/15/2013    repaired 6/10/21    Attention deficit disorder without mention of hyperactivity 07/02/2014    BRCA1 gene mutation positive 01/24/2018    Reported by patient, no report available at this time Records requested from [facility] 1/23/18    Cerebral infarction (H)     Clotting disorder (H24) birth    undefined clotting disorder - sees Dr. Sanchez Retinal artery  occlusion, R int mammary artery occlusion post breastCA surg and recd TPA    Congenital heart disease     Depressive disorder     Endometriosis of uterus     HERPES SIMPLEX NOS 2007    cold sores on remicade    Insomnia, unspecified 02/10/2005    Lung nodule < 6cm on CT 2019    consider repeat in 2020 - low risk but around a lot of second hand smoke growing up    Migraine headaches     Other chronic sinusitis     Postoperative urinary retention     every surgery    Psoriatic arthropathy (H) 2007    Recurrent UTI     Retinal artery occlusion 02/15/2013    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 in 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 pregnant       Past Surgical History:   Procedure Laterality Date    APPENDECTOMY  2006     SECTION  2013    Procedure:  SECTION;  Primary  Section;  Surgeon: Chad Hughes MD;  Location:  L+D    COLONOSCOPY N/A 01/10/2023    rpt 5 years;  Surgeon: Adama White MD;  Location:  GI    CV PFO CLOSURE N/A 06/10/2021    Procedure:30mm [place] CARDIOFORM- Patent Foramen Ovale Closure;  Surgeon: Victoriano Cornejo MD;  Location:  HEART CARDIAC CATH LAB    DILATION AND CURETTAGE SUCTION N/A 2014    Procedure: DILATION AND CURETTAGE SUCTION;  Surgeon: Srini Martinez MD;  Location:  OR    DILATION AND CURETTAGE SUCTION N/A 2014    Procedure: DILATION AND CURETTAGE SUCTION;  Surgeon: Connor Kang MD;  Location:  OR    GENITOURINARY SURGERY      bladder sling and complete historectomy    HC DILATION/CURETTAGE DIAG/THER NON OB      HC DILATION/CURETTAGE DIAG/THER NON OB      HERNIORRHAPHY INCISIONAL (LOCATION) N/A 2018    Procedure: Incisional Hernia repair with Biologic mesh;  Surgeon: Suraj Bergeron MD;  Location:  OR    HYSTERECTOMY, CARLOS  2016    does not need pap    MASTECTOMY SIMPLE BILATERAL Bilateral 2018    Procedure: MASTECTOMY SIMPLE BILATERAL;  PROPHYLACTIC BILATERAL MASTECTOMY (GLADIS) BILATERAL  BREAST RECONSTRUCTION [facility] TISSUE EXPANDERS (WILLOW)   ;  Surgeon: Suraj Bergeron MD;  Location:  OR    ORTHOPEDIC SURGERY  2007    (L) thumb fused    RECONSTRUCT BREAST, INSERT TISSUE EXPANDER BILATERAL, COMBINED Bilateral 05/25/2018    expander and tram flap with drains - had weekly surgery to have I & D and removal of infected tissue    SURGICAL HISTORY OF -   2006    left thumb fusion due to arthritis       MEDICATIONS:  Current Outpatient Medications   Medication    amphetamine-dextroamphetamine (ADDERALL) 10 MG tablet    ascorbic acid 250 MG TABS tablet    aspirin (ASA) 81 MG EC tablet    butalbital-acetaminophen-caffeine (ESGIC) -40 MG tablet    CRANBERRY CONCENTRATE PO    estradiol (ESTRACE) 1 MG tablet    methenamine (HIPREX) 1 g tablet    rizatriptan (MAXALT) 10 MG tablet    semaglutide (OZEMPIC) 2 MG/3ML pen    STATIN NOT PRESCRIBED (INTENTIONAL)    VITAMIN D PO    zolpidem (AMBIEN) 10 MG tablet     No current facility-administered medications for this visit.       SOCIAL HISTORY:  Social History     Tobacco Use    Smoking status: Never    Smokeless tobacco: Never   Substance Use Topics    Alcohol use: Not Currently     Alcohol/week: 0.0 standard drinks of alcohol     Comment: occassional       Family History   Problem Relation Age of Onset    Psychotic Disorder Mother         Depression, alcoholism    Diabetes Mother     Hereditary Breast and Ovarian Cancer Syndrome Mother         BRCA1+, no hx of cancer, s/p prophylactic surgery to remove breast tissue, ovaries, fallopian tubes    Hypertension Mother     Thyroid Disease Mother         thyroid nodules    Lung Cancer Mother     Substance Abuse Father     C.A.D. Maternal Grandmother     Hypertension Maternal Grandmother     Hereditary Breast and Ovarian Cancer Syndrome Sister         matrnal half sister, BRCA1+    Hereditary Breast and Ovarian Cancer Syndrome Daughter 23        BRCA1+    Breast Cancer Cousin 45    Hereditary Breast and Ovarian  Cancer Syndrome Cousin         BRCA1+    Breast Cancer Maternal Aunt 40        lumpectomy, then 2nd primary breast cancer later in 40s, treated with mastectomy    Hereditary Breast and Ovarian Cancer Syndrome Maternal Aunt         BRCA1+    Ovarian Cancer Maternal Aunt 70        surgry    Thyroid Cancer Maternal Aunt         medullary thyroid cancer    Hereditary Breast and Ovarian Cancer Syndrome Maternal Aunt         BRCA1+    Thyroid Cancer Maternal Uncle 66        papillary thyroid cancer    Breast Cancer Other 41        maternal great aunt    Ovarian Cancer Other 45         in 40s    Breast Cancer Other 43        maternal great aunt    Other Cancer Other         ovarian cancer    Colon Cancer No family hx of              Review of Systems   Constitutional:  Negative for chills and fever.   HENT:  Negative for congestion, ear pain, hearing loss and sore throat.    Eyes:  Negative for pain and visual disturbance.   Respiratory:  Negative for cough and shortness of breath.    Cardiovascular:  Negative for chest pain, palpitations and peripheral edema.   Gastrointestinal:  Negative for abdominal pain, constipation, diarrhea, heartburn, hematochezia and nausea.   Breasts:  Negative for tenderness, breast mass and discharge.   Genitourinary:  Negative for dysuria, frequency, genital sores, hematuria, pelvic pain, urgency, vaginal bleeding and vaginal discharge.   Musculoskeletal:  Negative for arthralgias, joint swelling and myalgias.   Skin:  Negative for rash.   Neurological:  Positive for dizziness and headaches. Negative for weakness and paresthesias.   Psychiatric/Behavioral:  Negative for mood changes. The patient is not nervous/anxious.       Constitutional, HEENT, cardiovascular, pulmonary, gi and gu systems are negative, except as otherwise noted.      Objective    /83 (BP Location: Right arm, Patient Position: Chair, Cuff Size: Adult Large)   Pulse 92   Temp 98.2  F (36.8  C) (Oral)   Resp 14   " Ht 1.676 m (5' 6\")   Wt 105.2 kg (232 lb)   LMP 02/08/2015 (Within Months)   SpO2 99%   BMI 37.45 kg/m    Body mass index is 37.45 kg/m .  Physical Exam   GENERAL: healthy, alert and no distress  EYES: Eyes grossly normal to inspection, PERRL and conjunctivae and sclerae normal  HENT: ear canals and TM's normal, nose and mouth without ulcers or lesions  NECK: no adenopathy, no asymmetry, masses, or scars and thyroid normal to palpation  RESP: lungs clear to auscultation - no rales, rhonchi or wheezes  CV: regular rate and rhythm, normal S1 S2, no S3 or S4, no murmur, click or rub, no peripheral edema and peripheral pulses strong  MS: no gross musculoskeletal defects noted, no edema  SKIN: no suspicious lesions or rashes  NEURO: Normal strength and tone, mentation intact and speech normal  PSYCH: mentation appears normal, affect normal/bright  LYMPH: no cervical, supraclavicular, axillary, or inguinal adenopathy    Office Visit on 08/14/2023   Component Date Value Ref Range Status    Color Urine 08/14/2023 Yellow  Colorless, Straw, Light Yellow, Yellow Final    Appearance Urine 08/14/2023 Clear  Clear Final    Glucose Urine 08/14/2023 Negative  Negative mg/dL Final    Bilirubin Urine 08/14/2023 Negative  Negative Final    Ketones Urine 08/14/2023 Negative  Negative mg/dL Final    Specific Gravity Urine 08/14/2023 1.025  1.003 - 1.035 Final    Blood Urine 08/14/2023 Negative  Negative Final    pH Urine 08/14/2023 5.5  5.0 - 7.0 Final    Protein Albumin Urine 08/14/2023 Negative  Negative mg/dL Final    Urobilinogen Urine 08/14/2023 1.0  0.2, 1.0 E.U./dL Final    Nitrite Urine 08/14/2023 Negative  Negative Final    Leukocyte Esterase Urine 08/14/2023 Negative  Negative Final    Trichomonas 08/14/2023 Absent  Absent Final    Yeast 08/14/2023 Absent  Absent Final    Clue Cells 08/14/2023 Absent  Absent Final    WBCs/high power field 08/14/2023 1+ (A)  None Final    Culture 08/14/2023 10,000-50,000 CFU/mL Mixture of " urogenital nicole   Final

## 2023-11-09 ENCOUNTER — TRANSFERRED RECORDS (OUTPATIENT)
Dept: HEALTH INFORMATION MANAGEMENT | Facility: CLINIC | Age: 48
End: 2023-11-09
Payer: COMMERCIAL

## 2024-01-02 ENCOUNTER — TRANSFERRED RECORDS (OUTPATIENT)
Dept: HEALTH INFORMATION MANAGEMENT | Facility: CLINIC | Age: 49
End: 2024-01-02
Payer: COMMERCIAL

## 2024-01-04 ENCOUNTER — TRANSFERRED RECORDS (OUTPATIENT)
Dept: HEALTH INFORMATION MANAGEMENT | Facility: CLINIC | Age: 49
End: 2024-01-04
Payer: COMMERCIAL

## 2024-01-08 ENCOUNTER — TRANSFERRED RECORDS (OUTPATIENT)
Dept: HEALTH INFORMATION MANAGEMENT | Facility: CLINIC | Age: 49
End: 2024-01-08
Payer: COMMERCIAL

## 2024-01-16 DIAGNOSIS — E66.09 CLASS 1 OBESITY DUE TO EXCESS CALORIES WITHOUT SERIOUS COMORBIDITY WITH BODY MASS INDEX (BMI) OF 31.0 TO 31.9 IN ADULT: ICD-10-CM

## 2024-01-16 DIAGNOSIS — E66.811 CLASS 1 OBESITY DUE TO EXCESS CALORIES WITHOUT SERIOUS COMORBIDITY WITH BODY MASS INDEX (BMI) OF 31.0 TO 31.9 IN ADULT: ICD-10-CM

## 2024-01-16 NOTE — TELEPHONE ENCOUNTER
Dr. He  Please see separate my chart encounter with response 1/16/24     Would like to increase dose and advised she will schedule appt     Thank you   Asia Castellano, Registered Nurse, PAL (Patient Advocate Liaison)   [facility]   [phone]

## 2024-01-16 NOTE — TELEPHONE ENCOUNTER
Can you check in with her and see how her weight loss in doing?   Does she want to continue on the 0.5 mg dose or go up?  I think due for med check in May if she wants to set this up

## 2024-01-17 RX ORDER — SEMAGLUTIDE 0.68 MG/ML
0.5 INJECTION, SOLUTION SUBCUTANEOUS
Refills: 2 | OUTPATIENT
Start: 2024-01-17

## 2024-04-23 ENCOUNTER — OFFICE VISIT (OUTPATIENT)
Dept: FAMILY MEDICINE | Facility: CLINIC | Age: 49
End: 2024-04-23
Attending: FAMILY MEDICINE
Payer: COMMERCIAL

## 2024-04-23 VITALS
TEMPERATURE: 97.4 F | SYSTOLIC BLOOD PRESSURE: 92 MMHG | BODY MASS INDEX: 32.4 KG/M2 | OXYGEN SATURATION: 99 % | WEIGHT: 201.6 LBS | HEIGHT: 66 IN | HEART RATE: 71 BPM | DIASTOLIC BLOOD PRESSURE: 65 MMHG | RESPIRATION RATE: 10 BRPM

## 2024-04-23 DIAGNOSIS — E66.811 CLASS 1 OBESITY DUE TO EXCESS CALORIES WITHOUT SERIOUS COMORBIDITY WITH BODY MASS INDEX (BMI) OF 31.0 TO 31.9 IN ADULT: ICD-10-CM

## 2024-04-23 DIAGNOSIS — G43.909 MIGRAINE WITHOUT STATUS MIGRAINOSUS, NOT INTRACTABLE, UNSPECIFIED MIGRAINE TYPE: ICD-10-CM

## 2024-04-23 DIAGNOSIS — Z13.6 CARDIOVASCULAR SCREENING; LDL GOAL LESS THAN 160: Primary | ICD-10-CM

## 2024-04-23 DIAGNOSIS — E66.09 CLASS 1 OBESITY DUE TO EXCESS CALORIES WITHOUT SERIOUS COMORBIDITY WITH BODY MASS INDEX (BMI) OF 31.0 TO 31.9 IN ADULT: ICD-10-CM

## 2024-04-23 PROBLEM — H34.11 CENTRAL RETINAL ARTERY OCCLUSION OF RIGHT EYE: Status: RESOLVED | Noted: 2018-06-30 | Resolved: 2024-04-23

## 2024-04-23 PROBLEM — D62 ANEMIA DUE TO BLOOD LOSS, ACUTE: Status: RESOLVED | Noted: 2018-07-11 | Resolved: 2024-04-23

## 2024-04-23 PROBLEM — T81.328A: Status: RESOLVED | Noted: 2018-07-15 | Resolved: 2024-04-23

## 2024-04-23 LAB
ALBUMIN SERPL BCG-MCNC: 4.2 G/DL (ref 3.5–5.2)
ALP SERPL-CCNC: 67 U/L (ref 40–150)
ALT SERPL W P-5'-P-CCNC: 27 U/L (ref 0–50)
ANION GAP SERPL CALCULATED.3IONS-SCNC: 9 MMOL/L (ref 7–15)
AST SERPL W P-5'-P-CCNC: 20 U/L (ref 0–45)
BILIRUB SERPL-MCNC: 0.4 MG/DL
BUN SERPL-MCNC: 11 MG/DL (ref 6–20)
CALCIUM SERPL-MCNC: 9.4 MG/DL (ref 8.6–10)
CHLORIDE SERPL-SCNC: 105 MMOL/L (ref 98–107)
CHOLEST SERPL-MCNC: 178 MG/DL
CREAT SERPL-MCNC: 0.88 MG/DL (ref 0.51–0.95)
DEPRECATED HCO3 PLAS-SCNC: 25 MMOL/L (ref 22–29)
EGFRCR SERPLBLD CKD-EPI 2021: 81 ML/MIN/1.73M2
FASTING STATUS PATIENT QL REPORTED: YES
GLUCOSE SERPL-MCNC: 89 MG/DL (ref 70–99)
HBA1C MFR BLD: 5.2 % (ref 0–5.6)
HDLC SERPL-MCNC: 59 MG/DL
LDLC SERPL CALC-MCNC: 107 MG/DL
NONHDLC SERPL-MCNC: 119 MG/DL
POTASSIUM SERPL-SCNC: 4.6 MMOL/L (ref 3.4–5.3)
PROT SERPL-MCNC: 6.7 G/DL (ref 6.4–8.3)
SODIUM SERPL-SCNC: 139 MMOL/L (ref 135–145)
TRIGL SERPL-MCNC: 58 MG/DL

## 2024-04-23 PROCEDURE — 83036 HEMOGLOBIN GLYCOSYLATED A1C: CPT | Performed by: FAMILY MEDICINE

## 2024-04-23 PROCEDURE — 99214 OFFICE O/P EST MOD 30 MIN: CPT | Performed by: FAMILY MEDICINE

## 2024-04-23 PROCEDURE — 36415 COLL VENOUS BLD VENIPUNCTURE: CPT | Performed by: FAMILY MEDICINE

## 2024-04-23 PROCEDURE — 80061 LIPID PANEL: CPT | Performed by: FAMILY MEDICINE

## 2024-04-23 PROCEDURE — 80053 COMPREHEN METABOLIC PANEL: CPT | Performed by: FAMILY MEDICINE

## 2024-04-23 RX ORDER — RIZATRIPTAN BENZOATE 10 MG/1
10 TABLET ORAL
Qty: 12 TABLET | Refills: 5 | Status: SHIPPED | OUTPATIENT
Start: 2024-04-23

## 2024-04-23 ASSESSMENT — PATIENT HEALTH QUESTIONNAIRE - PHQ9: SUM OF ALL RESPONSES TO PHQ QUESTIONS 1-9: 4

## 2024-04-23 NOTE — PROGRESS NOTES
"  Assessment & Plan     Class 1 obesity due to excess calories without serious comorbidity with body mass index (BMI) of 31.0 to 31.9 in adult  Has gone from BMI of 44 to 33.   She continues to lose  Continue same dose for now    - Comprehensive metabolic panel (BMP + Alb, Alk Phos, ALT, AST, Total. Bili, TP)  - Hemoglobin A1c  - Semaglutide, 1 MG/DOSE, (OZEMPIC) 4 MG/3ML pen  Dispense: 3 mL; Refill: 5  - Comprehensive metabolic panel (BMP + Alb, Alk Phos, ALT, AST, Total. Bili, TP)  - Hemoglobin A1c    Migraine without status migrainosus, not intractable, unspecified migraine type  Doing well  Added refills     - Comprehensive metabolic panel (BMP + Alb, Alk Phos, ALT, AST, Total. Bili, TP)  - rizatriptan (MAXALT) 10 MG tablet  Dispense: 12 tablet; Refill: 5  - Comprehensive metabolic panel (BMP + Alb, Alk Phos, ALT, AST, Total. Bili, TP)    CARDIOVASCULAR SCREENING; LDL GOAL LESS THAN 160  Will check lipids - they have been borderline and now with weight loss, may end up being better    - Lipid panel reflex to direct LDL Fasting  - Lipid panel reflex to direct LDL Fasting        16 minutes spent by me on the date of the encounter doing chart review, history and exam, documentation and further activities per the note      BMI  Estimated body mass index is 33.04 kg/m  as calculated from the following:    Height as of this encounter: 1.664 m (5' 5.5\").    Weight as of this encounter: 91.4 kg (201 lb 9.6 oz).   Weight management plan: Discussed healthy diet and exercise guidelines Specific weight management program called ozempic discussed      FUTURE APPOINTMENTS:       - Follow-up visit in 6 months - may message in meantime if she wishes to increase her dose of ozempic   Work on weight loss  Regular exercise  See Patient Instructions    Subjective   Noa is a 49 year old, presenting for the following health issues:  Recheck Medication (Ozempic ) and Weight Check  She has been on ozempic for about 6 months.   She " is now on 1 mg per week.   She is exercising at least 5 days per week.   She has lost over 50 lbs.   She is feeling good.   She continues to lose weight on this dose so would like to stay at this dose for now.   She is not having much for side effects other than some stomach upset initially when taking.         4/23/2024     8:04 AM   Additional Questions   Roomed by AT     A.D.H.D    History of Present Illness       Reason for visit:  Medication check    She eats 2-3 servings of fruits and vegetables daily.She consumes 0 sweetened beverage(s) daily.She exercises with enough effort to increase her heart rate 60 or more minutes per day.  She exercises with enough effort to increase her heart rate 4 days per week. She is missing 1 dose(s) of medications per week.         Medication Followup of Ozempic   Taking Medication as prescribed: yes  Side Effects:  nausea, constipation   Medication Helping Symptoms:  yes    Past Medical History:   Diagnosis Date    Adrenal insufficiency (Hugh's disease) (H)     ACTH stim test failed to increase cortisol    Allergic rhinitis, cause unspecified     Antiplatelet or antithrombotic long-term use     Arthritis     ASD (atrial septal defect) 02/15/2013    repaired 6/10/21    Attention deficit disorder without mention of hyperactivity 07/02/2014    BRCA1 gene mutation positive 01/24/2018    Reported by patient, no report available at this time Records requested from [facility] 1/23/18    Cerebral infarction (H)     Clotting disorder (H24) birth    undefined clotting disorder - sees Dr. Sanchez Retinal artery occlusion, R int mammary artery occlusion post breastCA surg and recd TPA    Depressive disorder     Endometriosis of uterus     HERPES SIMPLEX NOS 07/13/2007    cold sores on remicade    Insomnia, unspecified 02/10/2005    Lung nodule < 6cm on CT 11/2019    consider repeat in 11/2020 - low risk but around a lot of second hand smoke growing up    Migraine headaches     Other  chronic sinusitis     Postoperative urinary retention     every surgery    Psoriatic arthropathy (H) 2007    Recurrent UTI 2008    Retinal artery occlusion 02/15/2013    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 in 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 pregnant       Past Surgical History:   Procedure Laterality Date    APPENDECTOMY  2006     SECTION  2013    Procedure:  SECTION;  Primary  Section;  Surgeon: Chad Hughes MD;  Location:  L+D    COLONOSCOPY N/A 01/10/2023    rpt 5 years;  Surgeon: Adama White MD;  Location:  GI    CV PFO CLOSURE N/A 06/10/2021    Procedure:30mm [place] CARDIOFORM- Patent Foramen [place]vale Closure;  Surgeon: Victoriano Cornejo MD;  Location:  HEART CARDIAC CATH LAB    DILATION AND CURETTAGE SUCTION N/A 2014    Procedure: DILATION AND CURETTAGE SUCTION;  Surgeon: Srini Martinez MD;  Location:  OR    DILATION AND CURETTAGE SUCTION N/A 2014    Procedure: DILATION AND CURETTAGE SUCTION;  Surgeon: Connor Kang MD;  Location:  OR    GENITOURINARY SURGERY      bladder sling and complete historectomy    HC DILATION/CURETTAGE DIAG/THER NON OB      HC DILATION/CURETTAGE DIAG/THER NON OB      HERNIORRHAPHY INCISIONAL (LOCATION) N/A 2018    Procedure: Incisional Hernia repair with Biologic mesh;  Surgeon: Suraj Bergeron MD;  Location:  OR    HYSTERECTOMY, [facility]  2016    does not need pap    MASTECTOMY SIMPLE BILATERAL Bilateral 2018    Procedure: MASTECTOMY SIMPLE BILATERAL;  PROPHYLACTIC BILATERAL MASTECTOMY (GLADIS) BILATERAL BREAST RECONSTRUCTION [facility] TISSUE EXPANDERS (WILLOW)   ;  Surgeon: Suraj Bergeron MD;  Location:  OR    ORTHOPEDIC SURGERY      (L) thumb fused    RECONSTRUCT BREAST, INSERT TISSUE EXPANDER BILATERAL, COMBINED Bilateral 2018    expander and tram flap with drains - had weekly surgery to have I & D and removal of infected tissue    SURGICAL HISTORY OF -       left thumb fusion due to arthritis        MEDICATIONS:  Current Outpatient Medications   Medication Sig Dispense Refill    amphetamine-dextroamphetamine (ADDERALL) 10 MG tablet Take 1 tablet (10 mg) by mouth 2 times daily as needed (inattention) 60 tablet 0    ascorbic acid 250 MG TABS tablet Take 250 mg by mouth daily       aspirin (ASA) 81 MG EC tablet Take 81 mg by mouth daily 325 mg daily 1 tablet 1    butalbital-acetaminophen-caffeine (ESGIC) -40 MG tablet TAKE ONE TABLET BY MOUTH EVERY 4 HOURS AS NEEDED 30 tablet 2    CRANBERRY CONCENTRATE PO Take 1 Dose by mouth daily       estradiol (ESTRACE) 1 MG tablet Take 1 tablet (1 mg) by mouth 2 times daily      methenamine (HIPREX) 1 g tablet Take 1 tablet (1 g) by mouth 2 times daily 60 tablet 3    rizatriptan (MAXALT) 10 MG tablet Take 1 tablet (10 mg) by mouth at onset of headache for migraine Take 10 mg by mouth at onset of headache for migraine 12 tablet 5    Semaglutide, 1 MG/DOSE, (OZEMPIC) 4 MG/3ML pen Inject 1 mg Subcutaneous every 7 days 3 mL 5    STATIN NOT PRESCRIBED (INTENTIONAL) Please choose reason not prescribed, below      VITAMIN D PO Take 2,000 Units by mouth daily.      zolpidem (AMBIEN) 10 MG tablet Take 1 tablet (10 mg) by mouth nightly as needed for sleep 30 tablet 5     No current facility-administered medications for this visit.       SOCIAL HISTORY:  Social History     Tobacco Use    Smoking status: Never    Smokeless tobacco: Never   Substance Use Topics    Alcohol use: Not Currently     Alcohol/week: 0.0 standard drinks of alcohol     Comment: occassional       Family History   Problem Relation Age of Onset    Psychotic Disorder Mother         Depression, alcoholism    Diabetes Mother     Hereditary Breast and Ovarian Cancer Syndrome Mother         BRCA1+, no hx of cancer, s/p prophylactic surgery to remove breast tissue, ovaries, fallopian tubes    Hypertension Mother     Thyroid Disease Mother         thyroid nodules    Lung Cancer Mother     Substance Abuse Father   "   C.A.D. Maternal Grandmother     Hypertension Maternal Grandmother     Hereditary Breast and Ovarian Cancer Syndrome Sister         matrnal half sister, BRCA1+    Hereditary Breast and Ovarian Cancer Syndrome Daughter 23        BRCA1+    Breast Cancer Cousin 45    Hereditary Breast and Ovarian Cancer Syndrome Cousin         BRCA1+    Breast Cancer Maternal Aunt 40        lumpectomy, then 2nd primary breast cancer later in 40s, treated with mastectomy    Hereditary Breast and Ovarian Cancer Syndrome Maternal Aunt         BRCA1+    Ovarian Cancer Maternal Aunt 70        surgry    Thyroid Cancer Maternal Aunt         medullary thyroid cancer    Hereditary Breast and Ovarian Cancer Syndrome Maternal Aunt         BRCA1+    Thyroid Cancer Maternal Uncle 66        papillary thyroid cancer    Breast Cancer Other 41        maternal great aunt    Ovarian Cancer Other 45         in 40s    Breast Cancer Other 43        maternal great aunt    Other Cancer Other         ovarian cancer    Colon Cancer No family hx of                Review of Systems  Constitutional, HEENT, cardiovascular, pulmonary, gi and gu systems are negative, except as otherwise noted.      Objective    BP 92/65 (BP Location: Left arm, Patient Position: Chair, Cuff Size: Adult Large)   Pulse 71   Temp 97.4  F (36.3  C) (Oral)   Resp 10   Ht 1.664 m (5' 5.5\")   Wt 91.4 kg (201 lb 9.6 oz)   LMP 2015 (Within Months)   SpO2 99%   BMI 33.04 kg/m    Body mass index is 33.04 kg/m .  Physical Exam   GENERAL: alert and no distress  EYES: Eyes grossly normal to inspection, PERRL and conjunctivae and sclerae normal  NECK: no adenopathy, no asymmetry, masses, or scars  RESP: lungs clear to auscultation - no rales, rhonchi or wheezes  CV: regular rate and rhythm, normal S1 S2, no S3 or S4, no murmur, click or rub, no peripheral edema  MS: no gross musculoskeletal defects noted, no edema  SKIN: no suspicious lesions or rashes  NEURO: Normal strength and " tone, mentation intact and speech normal  PSYCH: mentation appears normal, affect normal/bright  LYMPH: no cervical, supraclavicular, axillary, or inguinal adenopathy    Office Visit on 11/07/2023   Component Date Value Ref Range Status    Sodium 11/07/2023 139  135 - 145 mmol/L Final    Reference intervals for this test were updated on 09/26/2023 to more accurately reflect our healthy population. There may be differences in the flagging of prior results with similar values performed with this method. Interpretation of those prior results can be made in the context of the updated reference intervals.     Potassium 11/07/2023 4.4  3.4 - 5.3 mmol/L Final    Chloride 11/07/2023 105  98 - 107 mmol/L Final    Carbon Dioxide (CO2) 11/07/2023 22  22 - 29 mmol/L Final    Anion Gap 11/07/2023 12  7 - 15 mmol/L Final    Urea Nitrogen 11/07/2023 12.2  6.0 - 20.0 mg/dL Final    Creatinine 11/07/2023 0.84  0.51 - 0.95 mg/dL Final    GFR Estimate 11/07/2023 86  >60 mL/min/1.73m2 Final    Calcium 11/07/2023 9.2  8.6 - 10.0 mg/dL Final    Glucose 11/07/2023 89  70 - 99 mg/dL Final           Signed Electronically by: Sydnie Daley MD

## 2024-06-15 ENCOUNTER — OFFICE VISIT (OUTPATIENT)
Dept: URGENT CARE | Facility: URGENT CARE | Age: 49
End: 2024-06-15
Payer: COMMERCIAL

## 2024-06-15 VITALS
SYSTOLIC BLOOD PRESSURE: 103 MMHG | RESPIRATION RATE: 14 BRPM | OXYGEN SATURATION: 99 % | DIASTOLIC BLOOD PRESSURE: 70 MMHG | HEART RATE: 83 BPM | TEMPERATURE: 98 F

## 2024-06-15 DIAGNOSIS — R35.0 URINARY FREQUENCY: ICD-10-CM

## 2024-06-15 DIAGNOSIS — N30.00 ACUTE CYSTITIS WITHOUT HEMATURIA: Primary | ICD-10-CM

## 2024-06-15 LAB
ALBUMIN UR-MCNC: NEGATIVE MG/DL
APPEARANCE UR: CLEAR
BACTERIA #/AREA URNS HPF: ABNORMAL /HPF
BILIRUB UR QL STRIP: NEGATIVE
COLOR UR AUTO: YELLOW
GLUCOSE UR STRIP-MCNC: NEGATIVE MG/DL
HGB UR QL STRIP: ABNORMAL
KETONES UR STRIP-MCNC: NEGATIVE MG/DL
LEUKOCYTE ESTERASE UR QL STRIP: ABNORMAL
NITRATE UR QL: NEGATIVE
PH UR STRIP: 7 [PH] (ref 5–7)
RBC #/AREA URNS AUTO: ABNORMAL /HPF
SP GR UR STRIP: 1.01 (ref 1–1.03)
SQUAMOUS #/AREA URNS AUTO: ABNORMAL /LPF
UROBILINOGEN UR STRIP-ACNC: 0.2 E.U./DL
WBC #/AREA URNS AUTO: ABNORMAL /HPF

## 2024-06-15 PROCEDURE — 87186 SC STD MICRODIL/AGAR DIL: CPT | Performed by: PHYSICIAN ASSISTANT

## 2024-06-15 PROCEDURE — 99213 OFFICE O/P EST LOW 20 MIN: CPT | Performed by: PHYSICIAN ASSISTANT

## 2024-06-15 PROCEDURE — 81001 URINALYSIS AUTO W/SCOPE: CPT | Performed by: PHYSICIAN ASSISTANT

## 2024-06-15 PROCEDURE — 87086 URINE CULTURE/COLONY COUNT: CPT | Performed by: PHYSICIAN ASSISTANT

## 2024-06-15 RX ORDER — GRANULES FOR ORAL 3 G/1
POWDER ORAL
Qty: 2 PACKET | Refills: 0 | Status: SHIPPED | OUTPATIENT
Start: 2024-06-15 | End: 2024-07-24

## 2024-06-15 ASSESSMENT — ENCOUNTER SYMPTOMS
ABDOMINAL PAIN: 0
HEMATURIA: 0
CONSTIPATION: 0
DIARRHEA: 0
FEVER: 0
FREQUENCY: 1
DYSURIA: 1
VOMITING: 0

## 2024-06-15 NOTE — PROGRESS NOTES
Assessment & Plan:        ICD-10-CM    1. Acute cystitis without hematuria  N30.00 fosfomycin (MONUROL) 3 g Packet      2. Urinary frequency  R35.0 UA Macroscopic with reflex to Microscopic and Culture - Lab Collect     Urine Microscopic Exam     Urine Culture            Plan/Clinical Decision Making:    Patient with history of recurrent UTIs, followed by urologist. Having acute urinary symptoms.   UA consistent with infection. She responds well to fosfomycin 2 doses.   Prescribed today.       Return if symptoms worsen or fail to improve, for in 2-3 days.     At the end of the encounter, I discussed results, diagnosis, medications. Discussed red flags for immediate return to clinic/ER, as well as indications for follow up if no improvement. Patient understood and agreed to plan. Patient was stable for discharge.        Pliy Lyons PA-C on 6/15/2024 at 9:28 AM          Subjective:     HPI:    Noa is a 49 year old female who presents to clinic today for the following health issues:  Chief Complaint   Patient presents with    UTI     Pain after finishing urination and frequency, she gets UTI's alot     HPI    Patient with acute urination and frequency. Symptoms started yesterday. Burning at end of urination. Recurrent, followed by urology.    Review of Systems   Constitutional:  Negative for fever.   Gastrointestinal:  Negative for abdominal pain, constipation, diarrhea and vomiting.   Genitourinary:  Positive for dysuria, frequency and urgency. Negative for hematuria, vaginal discharge and vaginal pain.         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    Vision loss of right eye    Bleeding    Attention deficit disorder    Anxiety    Mild major  depression (H24)    S/P mastectomy, bilateral    Postoperative urinary retention    Incisional hernia    History of endometriosis    Personal history of venous thrombosis and embolus    S/P complete hysterectomy    BRCA gene mutation positive    Psoriatic arthritis, destructive type (H)    Acquired absence of breast and absent nipple, bilateral        Past Medical History:   Diagnosis Date    Adrenal insufficiency ([place]'s disease) (H)     ACTH stim test failed to increase cortisol    Allergic rhinitis, cause unspecified     Antiplatelet or antithrombotic long-term use     Arthritis     ASD (atrial septal defect) 02/15/2013    repaired 6/10/21    Attention deficit disorder without mention of hyperactivity 07/02/2014    BRCA1 gene mutation positive 01/24/2018    Reported by patient, no report available at this time Records requested from [employer] 1/23/18    Cerebral infarction (H)     Clotting disorder (H24) birth    undefined clotting disorder - sees Dr. Sanchez Retinal artery occlusion, R int mammary artery occlusion post breastCA surg and recd TPA    Depressive disorder     Endometriosis of uterus     HERPES SIMPLEX NOS 07/13/2007    cold sores on remicade    Insomnia, unspecified 02/10/2005    Lung nodule < 6cm on CT 11/2019    consider repeat in 11/2020 - low risk but around a lot of second hand smoke growing up    Migraine headaches     Other chronic sinusitis     Postoperative urinary retention     every surgery    Psoriatic arthropathy (H) 01/26/2007    Recurrent UTI 2008    Retinal artery occlusion 02/15/2013    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 in 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 pregnant       Social History     Tobacco Use    Smoking status: Never    Smokeless tobacco: Never   Substance Use Topics    Alcohol use: Not Currently     Alcohol/week: 0.0 standard drinks of alcohol     Comment: occassional             Objective:     Vitals:    06/15/24 0923   BP: 103/70   Pulse: 83   Resp: 14   Temp: 98  F (36.7  C)   SpO2: 99%          Physical Exam   EXAM:   Pleasant, alert, appropriate appearance. NAD.  Head Exam: Normocephalic, atraumatic.  No CVA tenderness  Abdomen: soft, NT.   Neuro: CN II-XII intact grossly intact.  Skin: no rash or lesion.      Results:  Results for orders placed or performed in visit on 06/15/24   UA Macroscopic with reflex to Microscopic and Culture - Lab Collect     Status: Abnormal    Specimen: Urine, Clean Catch   Result Value Ref Range    Color Urine Yellow Colorless, Straw, Light Yellow, Yellow    Appearance Urine Clear Clear    Glucose Urine Negative Negative mg/dL    Bilirubin Urine Negative Negative    Ketones Urine Negative Negative mg/dL    Specific Gravity Urine 1.010 1.003 - 1.035    Blood Urine Trace (A) Negative    pH Urine 7.0 5.0 - 7.0    Protein Albumin Urine Negative Negative mg/dL    Urobilinogen Urine 0.2 0.2, 1.0 E.U./dL    Nitrite Urine Negative Negative    Leukocyte Esterase Urine Moderate (A) Negative   Urine Microscopic Exam     Status: Abnormal   Result Value Ref Range    Bacteria Urine Few (A) None Seen /HPF    RBC Urine 0-2 0-2 /HPF /HPF    WBC Urine  (A) 0-5 /HPF /HPF    Squamous Epithelials Urine Many (A) None Seen /LPF

## 2024-06-16 LAB — BACTERIA UR CULT: ABNORMAL

## 2024-07-17 ENCOUNTER — OFFICE VISIT (OUTPATIENT)
Dept: URGENT CARE | Facility: URGENT CARE | Age: 49
End: 2024-07-17
Payer: COMMERCIAL

## 2024-07-17 VITALS
HEART RATE: 78 BPM | RESPIRATION RATE: 16 BRPM | SYSTOLIC BLOOD PRESSURE: 104 MMHG | HEIGHT: 66 IN | OXYGEN SATURATION: 98 % | WEIGHT: 197 LBS | BODY MASS INDEX: 31.66 KG/M2 | TEMPERATURE: 97.6 F | DIASTOLIC BLOOD PRESSURE: 72 MMHG

## 2024-07-17 DIAGNOSIS — R39.89 URINARY PROBLEM: ICD-10-CM

## 2024-07-17 DIAGNOSIS — N39.0 ACUTE UTI: Primary | ICD-10-CM

## 2024-07-17 LAB
ALBUMIN UR-MCNC: NEGATIVE MG/DL
APPEARANCE UR: CLEAR
BACTERIA #/AREA URNS HPF: ABNORMAL /HPF
BILIRUB UR QL STRIP: NEGATIVE
COLOR UR AUTO: YELLOW
GLUCOSE UR STRIP-MCNC: NEGATIVE MG/DL
HGB UR QL STRIP: NEGATIVE
KETONES UR STRIP-MCNC: NEGATIVE MG/DL
LEUKOCYTE ESTERASE UR QL STRIP: ABNORMAL
MUCOUS THREADS #/AREA URNS LPF: PRESENT /LPF
NITRATE UR QL: NEGATIVE
PH UR STRIP: 6 [PH] (ref 5–7)
RBC #/AREA URNS AUTO: ABNORMAL /HPF
SP GR UR STRIP: 1.02 (ref 1–1.03)
SQUAMOUS #/AREA URNS AUTO: ABNORMAL /LPF
UROBILINOGEN UR STRIP-ACNC: 0.2 E.U./DL
WBC #/AREA URNS AUTO: ABNORMAL /HPF

## 2024-07-17 PROCEDURE — 81001 URINALYSIS AUTO W/SCOPE: CPT | Performed by: PHYSICIAN ASSISTANT

## 2024-07-17 PROCEDURE — 87186 SC STD MICRODIL/AGAR DIL: CPT | Performed by: PHYSICIAN ASSISTANT

## 2024-07-17 PROCEDURE — 87086 URINE CULTURE/COLONY COUNT: CPT | Performed by: PHYSICIAN ASSISTANT

## 2024-07-17 PROCEDURE — 99213 OFFICE O/P EST LOW 20 MIN: CPT | Performed by: PHYSICIAN ASSISTANT

## 2024-07-17 RX ORDER — SULFAMETHOXAZOLE/TRIMETHOPRIM 800-160 MG
1 TABLET ORAL 2 TIMES DAILY
Qty: 14 TABLET | Refills: 0 | Status: SHIPPED | OUTPATIENT
Start: 2024-07-17 | End: 2024-07-24

## 2024-07-17 NOTE — PROGRESS NOTES
"Assessment & Plan     Acute UTI  UA suggestive of infection today. Bactrim Rx. Urine culture is pending. Push fluids. We will communicate any changes to the antibiotic if need be based on the urine culture result. Follow up if any worsening symptoms. Patient agrees with the plan.     - sulfamethoxazole-trimethoprim (BACTRIM DS) 800-160 MG tablet  Dispense: 14 tablet; Refill: 0    Urinary problem  - UA with Microscopic reflex to Culture - Clinic Collect  - UA Microscopic with Reflex to Culture  - Urine Culture       Return in about 5 days (around 7/22/2024) for Symptoms failing to improve.    Yamila Washington PA-C  [facility] LENI Latham is a 49 year old female who presents to clinic today for the following health issues:  Chief Complaint   Patient presents with    Urgent Care     Frequency, pain, and odor x 2 days.        HPI      UTI    Onset of symptoms was yesterday  Course of illness is same  Severity moderate  Current and associated symptoms dysuria and frequency, strong urine odor  Treatment and measures tried: Hipprex, D-mannose  Predisposing factors include history of  recurrent UTI's, she is followed by [facility]. Last UTI was a month ago 6/15, urine culture revealed more than 100,000 colonies of E. coli pan-sensitive  Patient denies rigors, flank pain, temperature > 101 degrees F., vomiting, taking Coumadin, vaginal discharge, vaginal odor, and vaginal itching        Review of Systems  Constitutional, HEENT, cardiovascular, pulmonary, GI, , musculoskeletal, neuro, skin, endocrine and psych systems are negative, except as otherwise noted.      Objective    /72   Pulse 78   Temp 97.6  F (36.4  C) (Oral)   Resp 16   Ht 1.676 m (5' 6\")   Wt 89.4 kg (197 lb)   LMP 02/08/2015 (Within Months)   SpO2 98%   BMI 31.80 kg/m    Physical Exam   GENERAL: alert and no distress  RESP: lungs clear to auscultation - no rales, rhonchi or wheezes  CV: regular " rate and rhythm, normal S1 S2  ABDOMEN: soft, nontender, no hepatosplenomegaly, no masses and bowel sounds normal    Results for orders placed or performed in visit on 07/17/24 (from the past 24 hour(s))   UA with Microscopic reflex to Culture - Clinic Collect    Specimen: Urine, NOS   Result Value Ref Range    Color Urine Yellow Colorless, Straw, Light Yellow, Yellow    Appearance Urine Clear Clear    Glucose Urine Negative Negative mg/dL    Bilirubin Urine Negative Negative    Ketones Urine Negative Negative mg/dL    Specific Gravity Urine 1.020 1.003 - 1.035    Blood Urine Negative Negative    pH Urine 6.0 5.0 - 7.0    Protein Albumin Urine Negative Negative mg/dL    Urobilinogen Urine 0.2 0.2, 1.0 E.U./dL    Nitrite Urine Negative Negative    Leukocyte Esterase Urine Trace (A) Negative   UA Microscopic with Reflex to Culture   Result Value Ref Range    Bacteria Urine Few (A) None Seen /HPF    RBC Urine 0-2 0-2 /HPF /HPF    WBC Urine 10-25 (A) 0-5 /HPF /HPF    Squamous Epithelials Urine Moderate (A) None Seen /LPF    Mucus Urine Present (A) None Seen /LPF

## 2024-07-19 LAB — BACTERIA UR CULT: ABNORMAL

## 2024-07-23 DIAGNOSIS — G47.00 INSOMNIA, UNSPECIFIED TYPE: ICD-10-CM

## 2024-07-24 RX ORDER — ZOLPIDEM TARTRATE 10 MG/1
10 TABLET ORAL
Qty: 15 TABLET | Refills: 1 | Status: SHIPPED | OUTPATIENT
Start: 2024-07-24

## 2024-09-21 DIAGNOSIS — E66.811 CLASS 1 OBESITY DUE TO EXCESS CALORIES WITHOUT SERIOUS COMORBIDITY WITH BODY MASS INDEX (BMI) OF 31.0 TO 31.9 IN ADULT: ICD-10-CM

## 2024-09-21 DIAGNOSIS — E66.09 CLASS 1 OBESITY DUE TO EXCESS CALORIES WITHOUT SERIOUS COMORBIDITY WITH BODY MASS INDEX (BMI) OF 31.0 TO 31.9 IN ADULT: ICD-10-CM

## 2024-09-22 ENCOUNTER — OFFICE VISIT (OUTPATIENT)
Dept: URGENT CARE | Facility: URGENT CARE | Age: 49
End: 2024-09-22
Payer: COMMERCIAL

## 2024-09-22 VITALS
OXYGEN SATURATION: 98 % | HEART RATE: 77 BPM | RESPIRATION RATE: 14 BRPM | TEMPERATURE: 97.8 F | DIASTOLIC BLOOD PRESSURE: 75 MMHG | SYSTOLIC BLOOD PRESSURE: 109 MMHG

## 2024-09-22 DIAGNOSIS — N39.0 URINARY TRACT INFECTION WITH HEMATURIA, SITE UNSPECIFIED: Primary | ICD-10-CM

## 2024-09-22 DIAGNOSIS — R31.9 URINARY TRACT INFECTION WITH HEMATURIA, SITE UNSPECIFIED: Primary | ICD-10-CM

## 2024-09-22 LAB
ALBUMIN UR-MCNC: NEGATIVE MG/DL
APPEARANCE UR: ABNORMAL
BACTERIA #/AREA URNS HPF: ABNORMAL /HPF
BILIRUB UR QL STRIP: NEGATIVE
COLOR UR AUTO: YELLOW
GLUCOSE UR STRIP-MCNC: NEGATIVE MG/DL
HGB UR QL STRIP: ABNORMAL
KETONES UR STRIP-MCNC: NEGATIVE MG/DL
LEUKOCYTE ESTERASE UR QL STRIP: ABNORMAL
NITRATE UR QL: POSITIVE
PH UR STRIP: 5.5 [PH] (ref 5–7)
RBC #/AREA URNS AUTO: ABNORMAL /HPF
SP GR UR STRIP: 1.02 (ref 1–1.03)
UROBILINOGEN UR STRIP-ACNC: 0.2 E.U./DL
WBC #/AREA URNS AUTO: ABNORMAL /HPF

## 2024-09-22 PROCEDURE — 99213 OFFICE O/P EST LOW 20 MIN: CPT | Performed by: FAMILY MEDICINE

## 2024-09-22 PROCEDURE — 87186 SC STD MICRODIL/AGAR DIL: CPT | Performed by: FAMILY MEDICINE

## 2024-09-22 PROCEDURE — 81001 URINALYSIS AUTO W/SCOPE: CPT | Performed by: FAMILY MEDICINE

## 2024-09-22 PROCEDURE — 87086 URINE CULTURE/COLONY COUNT: CPT | Performed by: FAMILY MEDICINE

## 2024-09-22 RX ORDER — CEPHALEXIN 500 MG/1
500 CAPSULE ORAL 2 TIMES DAILY
Qty: 28 CAPSULE | Refills: 0 | Status: SHIPPED | OUTPATIENT
Start: 2024-09-22 | End: 2024-10-06

## 2024-09-22 RX ORDER — ONDANSETRON 4 MG/1
4 TABLET, FILM COATED ORAL EVERY 8 HOURS PRN
Qty: 10 TABLET | Refills: 0 | Status: SHIPPED | OUTPATIENT
Start: 2024-09-22

## 2024-09-22 NOTE — PROGRESS NOTES
Assessment & Plan     Urinary tract infection with hematuria, site unspecified  Acute problem, patient with usual longer course of antibiotic therapy, last culture pansensitive, increased Keflex to 2-week of therapy, does follow with urology.  Start Zofran for nausea, inform patient having flank pain or fever she will go to the ER.  Follow cultures and sensitivity.  - UA Macroscopic with reflex to Microscopic and Culture - Lab Collect  - UA Macroscopic with reflex to Microscopic and Culture - Lab Collect  - Urine Microscopic Exam  - Urine Culture  - cephALEXin (KEFLEX) 500 MG capsule  Dispense: 28 capsule; Refill: 0  - ondansetron (ZOFRAN) 4 MG tablet  Dispense: 10 tablet; Refill: 0             Return in about 1 week (around 9/29/2024) for If symptoms do not improve or gets worse..    Donovan Feldman MD  [facility] is a 49 year [facility] female who presents to clinic today for the following health issues:  Chief Complaint   Patient presents with    UTI     Pain and nauseated       HPI    Patient is a pleasant 49-year-old female with history of UTI, psoriatic arthritis on remission no longer on Biologics who presents with symptoms of UTI.  Frequency urgency pain and nausea, no flank pain or fevers.  Does have a urologist.      Review of Systems        Objective    /75   Pulse 77   Temp 97.8  F (36.6  C)   Resp 14   LMP 02/08/2015 (Within Months)   SpO2 98%   Physical Exam  Vitals reviewed.   Constitutional:       General: She is not in acute distress.  Abdominal:      General: Abdomen is flat. There is no distension.      Palpations: Abdomen is soft.      Tenderness: There is no abdominal tenderness. There is no right CVA tenderness or left CVA tenderness.   Neurological:      Mental Status: She is alert.            Results for orders placed or performed in visit on 09/22/24 (from the past 24 hour(s))   UA Macroscopic with reflex to Microscopic and Culture -  Lab Collect    Specimen: Urine, Midstream   Result Value Ref Range    Color Urine Yellow Colorless, Straw, Light Yellow, Yellow    Appearance Urine Slightly Cloudy (A) Clear    Glucose Urine Negative Negative mg/dL    Bilirubin Urine Negative Negative    Ketones Urine Negative Negative mg/dL    Specific Gravity Urine 1.020 1.003 - 1.035    Blood Urine Trace (A) Negative    pH Urine 5.5 5.0 - 7.0    Protein Albumin Urine Negative Negative mg/dL    Urobilinogen Urine 0.2 0.2, 1.0 E.U./dL    Nitrite Urine Positive (A) Negative    Leukocyte Esterase Urine Moderate (A) Negative   Urine Microscopic Exam   Result Value Ref Range    Bacteria Urine Many (A) None Seen /HPF    RBC Urine 2-5 (A) 0-2 /HPF /HPF    WBC Urine  (A) 0-5 /HPF /HPF

## 2024-09-23 ENCOUNTER — TELEPHONE (OUTPATIENT)
Dept: FAMILY MEDICINE | Facility: CLINIC | Age: 49
End: 2024-09-23
Payer: COMMERCIAL

## 2024-09-23 DIAGNOSIS — E66.09 CLASS 1 OBESITY DUE TO EXCESS CALORIES WITHOUT SERIOUS COMORBIDITY WITH BODY MASS INDEX (BMI) OF 31.0 TO 31.9 IN ADULT: ICD-10-CM

## 2024-09-23 DIAGNOSIS — E66.811 CLASS 1 OBESITY DUE TO EXCESS CALORIES WITHOUT SERIOUS COMORBIDITY WITH BODY MASS INDEX (BMI) OF 31.0 TO 31.9 IN ADULT: ICD-10-CM

## 2024-09-23 LAB — BACTERIA UR CULT: ABNORMAL

## 2024-09-23 RX ORDER — SEMAGLUTIDE 1.34 MG/ML
1 INJECTION, SOLUTION SUBCUTANEOUS
Refills: 5 | OUTPATIENT
Start: 2024-09-23

## 2024-09-23 NOTE — TELEPHONE ENCOUNTER
Clinic RN: Please contact patient because  see pharmacy comment: PA needed.  Please create PA encounter and route to PA pool.  Suellen Santos RN, BSN  [facility]

## 2024-09-23 NOTE — TELEPHONE ENCOUNTER
Pt notified of message that PA is required. Telephone encounter has already been started.    Charlee Medina RN on 9/23/2024 at 4:15 PM

## 2024-09-23 NOTE — TELEPHONE ENCOUNTER
RN attempted to reach patient to inform her prior authorization is needed and has been started. See telephone encounter 9/23/24    Routing back to refill team to refuse medication request.     Elida Hansen RN       admitted for GIB and Syncope

## 2024-09-23 NOTE — TELEPHONE ENCOUNTER
Per pharmacy Pt needing PA for    Requested Prescriptions   Pending Prescriptions Disp Refills    Semaglutide (1 MG/DOSE) (OZEMPIC) 4 MG/3ML pen 3 mL 5     Sig: Inject 1 mg subcutaneously every 7 days.       There is no refill protocol information for this order           Rose Marie Sears RN  [facility]

## 2024-09-25 RX ORDER — SEMAGLUTIDE 1.34 MG/ML
1 INJECTION, SOLUTION SUBCUTANEOUS
Refills: 5 | OUTPATIENT
Start: 2024-09-25

## 2024-09-25 NOTE — TELEPHONE ENCOUNTER
RN chart review     PA is being worked on for semaglutide, pharmacy sent more than once     RN left  for patient advising that we are processing a PA for her and if any questions to return call     Asia Castellano Registered Nurse  [facility]

## 2024-09-25 NOTE — TELEPHONE ENCOUNTER
Retail Pharmacy Prior Authorization Team   Phone: [phone]    PA Initiation    Medication: OZEMPIC (1 MG/DOSE) 4 MG/3ML SC SOPN  Insurance Company: [employer] - Phone [phone] Fax [phone]  Pharmacy Filling the Rx: [id number] - [place], [place] - [address] [address]  Filling Pharmacy Phone: [phone]  Filling Pharmacy Fax:    Start Date: 9/25/2024

## 2024-09-25 NOTE — TELEPHONE ENCOUNTER
It appears prior authorization is being under taken for this medication that was just sent for refill.

## 2024-09-28 ENCOUNTER — IMMUNIZATION (OUTPATIENT)
Dept: FAMILY MEDICINE | Facility: CLINIC | Age: 49
End: 2024-09-28
Payer: COMMERCIAL

## 2024-09-28 PROCEDURE — 90471 IMMUNIZATION ADMIN: CPT

## 2024-09-28 PROCEDURE — 90656 IIV3 VACC NO PRSV 0.5 ML IM: CPT

## 2024-10-01 NOTE — TELEPHONE ENCOUNTER
PRIOR AUTHORIZATION DENIED    Medication: OZEMPIC (1 MG/DOSE) 4 MG/3ML SC SOPN  Insurance Company: [employer] - Phone [phone] Fax [phone]  Denial Date: 9/28/2024  Denial Reason(s):           Appeal Information:         Patient Notified: No

## 2024-10-02 NOTE — TELEPHONE ENCOUNTER
Called and spoke with pt,     Relayed message from provider below.     Pt verbalizes understanding and is agreeable with plan. Pt denies any further questions or concerns at this time.     Bridget HUDDLESTON, RN   Clinic RN  [facility]

## 2024-10-13 ENCOUNTER — HEALTH MAINTENANCE LETTER (OUTPATIENT)
Age: 49
End: 2024-10-13

## 2024-12-19 NOTE — NURSING NOTE
"Chief Complaint   Patient presents with     A.D.H.D     refill adderall - discontinued due to conconcussion and is ready to re-start     Insomnia     refill Ambien [place]       Initial /78 (BP Location: Left arm, Patient Position: Chair, Cuff Size: Adult Large)  Pulse 93  Temp 98.2  F (36.8  C) (Oral)  Resp 20  Wt 199 lb 4.8 oz (90.4 kg)  LMP   Breastfeeding? No  BMI 32.17 kg/m2 Estimated body mass index is 32.17 kg/(m^2) as calculated from the following:    Height as of 2/27/17: 5' 6\" (1.676 m).    Weight as of this encounter: 199 lb 4.8 oz (90.4 kg).  Medication Reconciliation: complete Dorothea Valencia M.A.  "
Detail Level: Detailed
Detail Level: Simple

## 2025-01-20 ENCOUNTER — OFFICE VISIT (OUTPATIENT)
Dept: FAMILY MEDICINE | Facility: CLINIC | Age: 50
End: 2025-01-20
Attending: FAMILY MEDICINE
Payer: COMMERCIAL

## 2025-01-20 VITALS
TEMPERATURE: 98.1 F | WEIGHT: 203 LBS | RESPIRATION RATE: 12 BRPM | DIASTOLIC BLOOD PRESSURE: 72 MMHG | OXYGEN SATURATION: 100 % | HEIGHT: 66 IN | HEART RATE: 93 BPM | SYSTOLIC BLOOD PRESSURE: 103 MMHG | BODY MASS INDEX: 32.62 KG/M2

## 2025-01-20 DIAGNOSIS — G47.00 INSOMNIA, UNSPECIFIED TYPE: ICD-10-CM

## 2025-01-20 DIAGNOSIS — G43.909 MIGRAINE WITHOUT STATUS MIGRAINOSUS, NOT INTRACTABLE, UNSPECIFIED MIGRAINE TYPE: ICD-10-CM

## 2025-01-20 DIAGNOSIS — E66.811 CLASS 1 OBESITY DUE TO EXCESS CALORIES WITHOUT SERIOUS COMORBIDITY WITH BODY MASS INDEX (BMI) OF 31.0 TO 31.9 IN ADULT: ICD-10-CM

## 2025-01-20 DIAGNOSIS — E66.09 CLASS 1 OBESITY DUE TO EXCESS CALORIES WITHOUT SERIOUS COMORBIDITY WITH BODY MASS INDEX (BMI) OF 31.0 TO 31.9 IN ADULT: ICD-10-CM

## 2025-01-20 DIAGNOSIS — F98.8 ATTENTION DEFICIT DISORDER (ADD) WITHOUT HYPERACTIVITY: ICD-10-CM

## 2025-01-20 DIAGNOSIS — E66.811 CLASS 1 OBESITY WITHOUT SERIOUS COMORBIDITY WITH BODY MASS INDEX (BMI) OF 33.0 TO 33.9 IN ADULT, UNSPECIFIED OBESITY TYPE: Primary | ICD-10-CM

## 2025-01-20 DIAGNOSIS — L40.52 PSORIATIC ARTHRITIS, DESTRUCTIVE TYPE (H): ICD-10-CM

## 2025-01-20 PROCEDURE — G2211 COMPLEX E/M VISIT ADD ON: HCPCS | Performed by: FAMILY MEDICINE

## 2025-01-20 PROCEDURE — 99214 OFFICE O/P EST MOD 30 MIN: CPT | Performed by: FAMILY MEDICINE

## 2025-01-20 RX ORDER — BUTALBITAL, ACETAMINOPHEN AND CAFFEINE 50; 325; 40 MG/1; MG/1; MG/1
TABLET ORAL
Qty: 30 TABLET | Refills: 2 | Status: SHIPPED | OUTPATIENT
Start: 2025-01-20

## 2025-01-20 RX ORDER — DEXTROAMPHETAMINE SACCHARATE, AMPHETAMINE ASPARTATE, DEXTROAMPHETAMINE SULFATE AND AMPHETAMINE SULFATE 2.5; 2.5; 2.5; 2.5 MG/1; MG/1; MG/1; MG/1
10 TABLET ORAL 2 TIMES DAILY PRN
Qty: 60 TABLET | Refills: 0 | Status: SHIPPED | OUTPATIENT
Start: 2025-01-20

## 2025-01-20 RX ORDER — ZOLPIDEM TARTRATE 10 MG/1
10 TABLET ORAL
Qty: 15 TABLET | Refills: 4 | Status: SHIPPED | OUTPATIENT
Start: 2025-01-20

## 2025-01-20 ASSESSMENT — PATIENT HEALTH QUESTIONNAIRE - PHQ9
10. IF YOU CHECKED OFF ANY PROBLEMS, HOW DIFFICULT HAVE THESE PROBLEMS MADE IT FOR YOU TO DO YOUR WORK, TAKE CARE OF THINGS AT HOME, OR GET ALONG WITH OTHER PEOPLE: NOT DIFFICULT AT ALL
SUM OF ALL RESPONSES TO PHQ QUESTIONS 1-9: 0
SUM OF ALL RESPONSES TO PHQ QUESTIONS 1-9: 0

## 2025-01-20 ASSESSMENT — PAIN SCALES - GENERAL: PAINLEVEL_OUTOF10: MILD PAIN (1)

## 2025-01-20 ASSESSMENT — ANXIETY QUESTIONNAIRES
GAD7 TOTAL SCORE: 0
8. IF YOU CHECKED OFF ANY PROBLEMS, HOW DIFFICULT HAVE THESE MADE IT FOR YOU TO DO YOUR WORK, TAKE CARE OF THINGS AT HOME, OR GET ALONG WITH OTHER PEOPLE?: NOT DIFFICULT AT ALL
3. WORRYING TOO MUCH ABOUT DIFFERENT THINGS: NOT AT ALL
4. TROUBLE RELAXING: NOT AT ALL
IF YOU CHECKED OFF ANY PROBLEMS ON THIS QUESTIONNAIRE, HOW DIFFICULT HAVE THESE PROBLEMS MADE IT FOR YOU TO DO YOUR WORK, TAKE CARE OF THINGS AT HOME, OR GET ALONG WITH OTHER PEOPLE: NOT DIFFICULT AT ALL
7. FEELING AFRAID AS IF SOMETHING AWFUL MIGHT HAPPEN: NOT AT ALL
GAD7 TOTAL SCORE: 0
1. FEELING NERVOUS, ANXIOUS, OR ON EDGE: NOT AT ALL
6. BECOMING EASILY ANNOYED OR IRRITABLE: NOT AT ALL
2. NOT BEING ABLE TO STOP OR CONTROL WORRYING: NOT AT ALL
GAD7 TOTAL SCORE: 0
5. BEING SO RESTLESS THAT IT IS HARD TO SIT STILL: NOT AT ALL
7. FEELING AFRAID AS IF SOMETHING AWFUL MIGHT HAPPEN: NOT AT ALL

## 2025-01-20 NOTE — PROGRESS NOTES
"  Assessment & Plan     Attention deficit disorder (ADD) without hyperactivity  Doing well  - very rarely uses this at work  Will do refill - first one  in the last year   - amphetamine-dextroamphetamine (ADDERALL) 10 MG tablet  Dispense: 60 tablet; Refill: 0    Migraine without status migrainosus, not intractable, unspecified migraine type  Under control and getting less and less  Refills per [facility]    - butalbital-acetaminophen-caffeine (ESGIC) -40 MG tablet  Dispense: 30 tablet; Refill: 2    Psoriatic arthritis, destructive type (H)  Stable  No worsening at this time     Class 1 obesity without serious comorbidity with body mass index (BMI) of 33.0 to 33.9 in adult, unspecified obesity type  Going [facility] and now back on low dose wegovy - this was not covered by her insurance and she was off it for 3 months and gained about 10 lbs and now back on it.     - semaglutide-weight management (WEGOVY) 0.25 MG/0.5ML pen    Insomnia, unspecified type  Stable  Add refills - takes about 2 1/2 tabs per month    - zolpidem (AMBIEN) 10 MG tablet  Dispense: 15 tablet; Refill: 4    Class 1 obesity due to excess calories without serious comorbidity with body mass index (BMI) of 31.0 to 31.9 in adult  Losing weight  Discussed diet and exercise       The longitudinal plan of care for the diagnosis(es)/condition(s) as documented were addressed during this visit. Due to the added complexity in care, I will continue to support Noa in the subsequent management and with ongoing continuity of care.      BMI  Estimated body mass index is 33.27 kg/m  as calculated from the following:    Height as of this encounter: 1.664 m (5' 5.5\").    Weight as of this encounter: 92.1 kg (203 lb).   Weight management plan: Discussed healthy diet and exercise guidelines      FUTURE APPOINTMENTS:       - Follow-up visit in one year   Work on weight loss  Regular exercise  See Patient Instructions    Subjective   Noa is a 49 year old, " presenting for the following health issues:   she has yearly visits with OB and pelvic checks.   She is on hormone replacement and her GYN is not worried as she has had mastectomy and ovaries are out.   Yearly labs are due in the spring    Recheck Medication (Medication working and stable just checking in and refill)        1/20/2025     9:30 AM   Additional Questions   Roomed by king plascencia     History of Present Illness       Reason for visit:  ADHD    She eats 2-3 servings of fruits and vegetables daily.She consumes 0 sweetened beverage(s) daily.She exercises with enough effort to increase her heart rate 60 or more minutes per day.  She exercises with enough effort to increase her heart rate 4 days per week.   She is taking medications regularly.       Past Medical History:   Diagnosis Date    Adrenal insufficiency ([place]'s disease) (H)     ACTH stim test failed to increase cortisol    Allergic rhinitis, cause unspecified     Antiplatelet or antithrombotic long-term use     Arthritis     ASD (atrial septal defect) 02/15/2013    repaired 6/10/21    Attention deficit disorder without mention of hyperactivity 07/02/2014    BRCA1 gene mutation positive 01/24/2018    Reported by patient, no report available at this time Records requested from [employer] 1/23/18    Cerebral infarction (H)     Clotting disorder birth    undefined clotting disorder - sees Dr. 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 pregnant       Past Surgical History:   Procedure Laterality Date    APPENDECTOMY  2006     SECTION  2013    Procedure:  SECTION;  Primary  Section;  Surgeon: Chad Hughes MD;  Location:  L+D    COLONOSCOPY N/A 01/10/2023    rpt 5 years;  Surgeon: Adama White MD;  Location:  GI    CV PFO CLOSURE N/A 06/10/2021    Procedure:30mm [place] CARDIOFORM- Patent Foramen Ovale Closure;  Surgeon: Victoriano Cornejo MD;  Location:  HEART CARDIAC CATH LAB    DILATION AND CURETTAGE SUCTION N/A 2014    Procedure: DILATION AND CURETTAGE SUCTION;  Surgeon: Srini Martinez MD;  Location:  OR    DILATION AND CURETTAGE SUCTION N/A 2014    Procedure: DILATION AND CURETTAGE SUCTION;  Surgeon: Connor Kang MD;  Location:  OR    GENITOURINARY SURGERY      bladder sling and complete historectomy    HC DILATION/CURETTAGE DIAG/THER NON OB      HC DILATION/CURETTAGE DIAG/THER NON OB      HERNIORRHAPHY INCISIONAL (LOCATION) N/A 2018    Procedure: Incisional Hernia repair with Biologic mesh;  Surgeon: Suraj Bergeron MD;  Location:  OR    HYSTERECTOMY, [facility]  2016    does not need pap    MASTECTOMY SIMPLE BILATERAL Bilateral 2018    Procedure: MASTECTOMY SIMPLE BILATERAL;  PROPHYLACTIC BILATERAL MASTECTOMY (GLADIS) BILATERAL BREAST RECONSTRUCTION [facility] TISSUE EXPANDERS (WILLOW)   ;  Surgeon: Suraj Bergeron MD;  Location:  OR    ORTHOPEDIC SURGERY      (L) thumb fused    RECONSTRUCT BREAST, INSERT TISSUE EXPANDER BILATERAL, COMBINED Bilateral 2018    expander and tram flap with drains - had weekly surgery to have I & D and removal of infected tissue    SURGICAL HISTORY OF -       left thumb fusion due to arthritis       MEDICATIONS:  Current Outpatient Medications   Medication Sig Dispense Refill    amphetamine-dextroamphetamine (ADDERALL) 10 MG tablet Take 1  tablet (10 mg) by mouth 2 times daily as needed (inattention). 60 tablet 0    ascorbic acid 250 MG TABS tablet Take 250 mg by mouth daily       aspirin (ASA) 81 MG EC tablet Take 81 mg by mouth daily 325 mg daily 1 tablet 1    butalbital-acetaminophen-caffeine (ESGIC) -40 MG tablet TAKE ONE TABLET BY MOUTH EVERY 4 HOURS AS NEEDED 30 tablet 2    CRANBERRY CONCENTRATE PO Take 1 Dose by mouth daily       estradiol (ESTRACE) 1 MG tablet Take 1 tablet (1 mg) by mouth 2 times daily      methenamine (HIPREX) 1 g tablet Take 1 tablet (1 g) by mouth 2 times daily 60 tablet 3    ondansetron (ZOFRAN) 4 MG tablet Take 1 tablet (4 mg) by mouth every 8 hours as needed for nausea. 10 tablet 0    rizatriptan (MAXALT) 10 MG tablet Take 1 tablet (10 mg) by mouth at onset of headache for migraine Take 10 mg by mouth at onset of headache for migraine 12 tablet 5    semaglutide-weight management (WEGOVY) 0.25 MG/0.5ML pen Inject 0.5 mLs (0.25 mg) subcutaneously once a week.      VITAMIN D PO Take 2,000 Units by mouth daily.      zolpidem (AMBIEN) 10 MG tablet Take 1 tablet (10 mg) by mouth nightly as needed for sleep. 15 tablet 4     No current facility-administered medications for this visit.       SOCIAL HISTORY:  Social History     Tobacco Use    Smoking status: Never    Smokeless tobacco: Never   Substance Use Topics    Alcohol use: Not Currently     Alcohol/week: 0.0 standard drinks of alcohol     Comment: occassional       Family History   Problem Relation Age of Onset    Psychotic Disorder Mother         Depression, alcoholism    Diabetes Mother     Hereditary Breast and Ovarian Cancer Syndrome Mother         BRCA1+, no hx of cancer, s/p prophylactic surgery to remove breast tissue, ovaries, fallopian tubes    Hypertension Mother     Thyroid Disease Mother         thyroid nodules    Lung Cancer Mother     Substance Abuse Father     C.A.D. Maternal Grandmother     Hypertension Maternal Grandmother     Hereditary Breast and  "Ovarian Cancer Syndrome Sister         matrnal half sister, BRCA1+    Hereditary Breast and Ovarian Cancer Syndrome Daughter 23        BRCA1+    Breast Cancer Cousin 45    Hereditary Breast and Ovarian Cancer Syndrome Cousin         BRCA1+    Breast Cancer Maternal Aunt 40        lumpectomy, then 2nd primary breast cancer later in 40s, treated with mastectomy    Hereditary Breast and Ovarian Cancer Syndrome Maternal Aunt         BRCA1+    Ovarian Cancer Maternal Aunt 70        surgry    Thyroid Cancer Maternal Aunt         medullary thyroid cancer    Hereditary Breast and Ovarian Cancer Syndrome Maternal Aunt         BRCA1+    Thyroid Cancer Maternal Uncle 66        papillary thyroid cancer    Breast Cancer Other 41        maternal great aunt    Ovarian Cancer Other 45         in 40s    Breast Cancer Other 43        maternal great aunt    Other Cancer Other         ovarian cancer    Colon Cancer No family hx of                Review of Systems  Constitutional, HEENT, cardiovascular, pulmonary, gi and gu systems are negative, except as otherwise noted.      Objective    /72 (BP Location: Left arm, Patient Position: Sitting, Cuff Size: Adult Large)   Pulse 93   Temp 98.1  F (36.7  C) (Oral)   Resp 12   Ht 1.664 m (5' 5.5\")   Wt 92.1 kg (203 lb)   LMP 2015 (Within Months)   SpO2 100%   BMI 33.27 kg/m    Body mass index is 33.27 kg/m .  Physical Exam   GENERAL: alert and no distress  EYES: Eyes grossly normal to inspection, PERRL and conjunctivae and sclerae normal  NECK: no adenopathy, no asymmetry, masses, or scars  RESP: lungs clear to auscultation - no rales, rhonchi or wheezes  CV: regular rate and rhythm, normal S1 S2, no S3 or S4, no murmur, click or rub, no peripheral edema  MS: no gross musculoskeletal defects noted, no edema  SKIN: no suspicious lesions or rashes  NEURO: Normal strength and tone, mentation intact and speech normal  PSYCH: mentation appears normal, affect " normal/bright  LYMPH: no cervical, supraclavicular, axillary, or inguinal adenopathy    Office Visit on 09/22/2024   Component Date Value Ref Range Status    Color Urine 09/22/2024 Yellow  Colorless, Straw, Light Yellow, Yellow Final    Appearance Urine 09/22/2024 Slightly Cloudy (A)  Clear Final    Glucose Urine 09/22/2024 Negative  Negative mg/dL Final    Bilirubin Urine 09/22/2024 Negative  Negative Final    Ketones Urine 09/22/2024 Negative  Negative mg/dL Final    Specific Gravity Urine 09/22/2024 1.020  1.003 - 1.035 Final    Blood Urine 09/22/2024 Trace (A)  Negative Final    pH Urine 09/22/2024 5.5  5.0 - 7.0 Final    Protein Albumin Urine 09/22/2024 Negative  Negative mg/dL Final    Urobilinogen Urine 09/22/2024 0.2  0.2, 1.0 E.U./dL Final    Nitrite Urine 09/22/2024 Positive (A)  Negative Final    Leukocyte Esterase Urine 09/22/2024 Moderate (A)  Negative Final    Bacteria Urine 09/22/2024 Many (A)  None Seen /HPF Final    RBC Urine 09/22/2024 2-5 (A)  0-2 /HPF /HPF Final    WBC Urine 09/22/2024  (A)  0-5 /HPF /HPF Final    Culture 09/22/2024 50,000-100,000 CFU/mL Escherichia coli (A)   Final           Signed Electronically by: Sydnie Daley MD

## 2025-01-29 ENCOUNTER — MYC MEDICAL ADVICE (OUTPATIENT)
Dept: FAMILY MEDICINE | Facility: CLINIC | Age: 50
End: 2025-01-29
Payer: COMMERCIAL

## 2025-01-29 DIAGNOSIS — E66.811 CLASS 1 OBESITY WITHOUT SERIOUS COMORBIDITY WITH BODY MASS INDEX (BMI) OF 33.0 TO 33.9 IN ADULT, UNSPECIFIED OBESITY TYPE: Primary | ICD-10-CM

## 2025-01-29 NOTE — TELEPHONE ENCOUNTER
We would not be able to get that information without doing PA.  She states she checked with insurance and they cover Wegovy and Zepbound with PA.  SERG Gustafson, Sydnie ALDRIDGE MD  [facility]  Phone Number: [phone]     Can you see what the requirement for the PA is?  Does she need certain  underlying condition?   Check with her.   I do not want to do the work of PA if they are not going to do it

## 2025-01-29 NOTE — TELEPHONE ENCOUNTER
Dr. Daley- see [email or web address] message below.  Please advise.  Juanita Wallace, RN    1/20/2025  [facility]    Class 1 obesity without serious comorbidity with body mass index (BMI) of 33.0 to 33.9 in adult, unspecified obesity type  Going [facility] and now back on low dose wegovy - this was not covered by her insurance and she was off it for 3 months and gained about 10 lbs and now back on it.      - semaglutide-weight management (WEGOVY) 0.25 MG/0.5ML pen    Signed Electronically by: Sydnie Daley MD

## 2025-02-24 ENCOUNTER — MYC MEDICAL ADVICE (OUTPATIENT)
Dept: FAMILY MEDICINE | Facility: CLINIC | Age: 50
End: 2025-02-24
Payer: COMMERCIAL

## 2025-02-24 DIAGNOSIS — G43.909 MIGRAINE WITHOUT STATUS MIGRAINOSUS, NOT INTRACTABLE, UNSPECIFIED MIGRAINE TYPE: ICD-10-CM

## 2025-02-24 DIAGNOSIS — E66.811 CLASS 1 OBESITY WITHOUT SERIOUS COMORBIDITY WITH BODY MASS INDEX (BMI) OF 33.0 TO 33.9 IN ADULT, UNSPECIFIED OBESITY TYPE: Primary | ICD-10-CM

## 2025-06-30 ENCOUNTER — OFFICE VISIT (OUTPATIENT)
Dept: FAMILY MEDICINE | Facility: CLINIC | Age: 50
End: 2025-06-30
Payer: COMMERCIAL

## 2025-06-30 ENCOUNTER — RESULTS FOLLOW-UP (OUTPATIENT)
Dept: FAMILY MEDICINE | Facility: CLINIC | Age: 50
End: 2025-06-30

## 2025-06-30 VITALS
RESPIRATION RATE: 17 BRPM | OXYGEN SATURATION: 99 % | BODY MASS INDEX: 34.3 KG/M2 | TEMPERATURE: 97.9 F | HEIGHT: 66 IN | SYSTOLIC BLOOD PRESSURE: 98 MMHG | WEIGHT: 213.4 LBS | HEART RATE: 68 BPM | DIASTOLIC BLOOD PRESSURE: 63 MMHG

## 2025-06-30 DIAGNOSIS — Z15.01 BRCA GENE MUTATION POSITIVE: ICD-10-CM

## 2025-06-30 DIAGNOSIS — E66.811 CLASS 1 OBESITY WITHOUT SERIOUS COMORBIDITY WITH BODY MASS INDEX (BMI) OF 33.0 TO 33.9 IN ADULT, UNSPECIFIED OBESITY TYPE: Primary | ICD-10-CM

## 2025-06-30 DIAGNOSIS — R91.1 LUNG NODULE: ICD-10-CM

## 2025-06-30 DIAGNOSIS — Z15.09 BRCA GENE MUTATION POSITIVE: ICD-10-CM

## 2025-06-30 DIAGNOSIS — G47.00 INSOMNIA, UNSPECIFIED TYPE: ICD-10-CM

## 2025-06-30 LAB
ALBUMIN SERPL BCG-MCNC: 3.9 G/DL (ref 3.5–5.2)
ALP SERPL-CCNC: 51 U/L (ref 40–150)
ALT SERPL W P-5'-P-CCNC: 14 U/L (ref 0–50)
ANION GAP SERPL CALCULATED.3IONS-SCNC: 10 MMOL/L (ref 7–15)
AST SERPL W P-5'-P-CCNC: 16 U/L (ref 0–45)
BILIRUB SERPL-MCNC: 0.3 MG/DL
BUN SERPL-MCNC: 13.2 MG/DL (ref 6–20)
CALCIUM SERPL-MCNC: 9.1 MG/DL (ref 8.8–10.4)
CHLORIDE SERPL-SCNC: 107 MMOL/L (ref 98–107)
CHOLEST SERPL-MCNC: 196 MG/DL
CREAT SERPL-MCNC: 0.88 MG/DL (ref 0.51–0.95)
EGFRCR SERPLBLD CKD-EPI 2021: 81 ML/MIN/1.73M2
EST. AVERAGE GLUCOSE BLD GHB EST-MCNC: 97 MG/DL
FASTING STATUS PATIENT QL REPORTED: YES
FASTING STATUS PATIENT QL REPORTED: YES
GLUCOSE SERPL-MCNC: 93 MG/DL (ref 70–99)
HBA1C MFR BLD: 5 % (ref 0–5.6)
HCO3 SERPL-SCNC: 23 MMOL/L (ref 22–29)
HDLC SERPL-MCNC: 65 MG/DL
LDLC SERPL CALC-MCNC: 117 MG/DL
NONHDLC SERPL-MCNC: 131 MG/DL
POTASSIUM SERPL-SCNC: 5 MMOL/L (ref 3.4–5.3)
PROT SERPL-MCNC: 6.3 G/DL (ref 6.4–8.3)
SODIUM SERPL-SCNC: 140 MMOL/L (ref 135–145)
TRIGL SERPL-MCNC: 70 MG/DL
TSH SERPL DL<=0.005 MIU/L-ACNC: 3.78 UIU/ML (ref 0.3–4.2)

## 2025-06-30 PROCEDURE — 36415 COLL VENOUS BLD VENIPUNCTURE: CPT | Performed by: FAMILY MEDICINE

## 2025-06-30 PROCEDURE — 3044F HG A1C LEVEL LT 7.0%: CPT | Performed by: FAMILY MEDICINE

## 2025-06-30 PROCEDURE — 3078F DIAST BP <80 MM HG: CPT | Performed by: FAMILY MEDICINE

## 2025-06-30 PROCEDURE — 84443 ASSAY THYROID STIM HORMONE: CPT | Performed by: FAMILY MEDICINE

## 2025-06-30 PROCEDURE — 80061 LIPID PANEL: CPT | Performed by: FAMILY MEDICINE

## 2025-06-30 PROCEDURE — 3074F SYST BP LT 130 MM HG: CPT | Performed by: FAMILY MEDICINE

## 2025-06-30 PROCEDURE — 99214 OFFICE O/P EST MOD 30 MIN: CPT | Performed by: FAMILY MEDICINE

## 2025-06-30 PROCEDURE — 80053 COMPREHEN METABOLIC PANEL: CPT | Performed by: FAMILY MEDICINE

## 2025-06-30 PROCEDURE — 83036 HEMOGLOBIN GLYCOSYLATED A1C: CPT | Performed by: FAMILY MEDICINE

## 2025-06-30 RX ORDER — ZOLPIDEM TARTRATE 10 MG/1
10 TABLET ORAL
Qty: 15 TABLET | Refills: 5 | Status: SHIPPED | OUTPATIENT
Start: 2025-06-30

## 2025-06-30 RX ORDER — IBUPROFEN 200 MG
800 TABLET ORAL
COMMUNITY

## 2025-06-30 ASSESSMENT — PATIENT HEALTH QUESTIONNAIRE - PHQ9: SUM OF ALL RESPONSES TO PHQ QUESTIONS 1-9: 4

## 2025-06-30 NOTE — PROGRESS NOTES
Assessment & Plan     Class 1 obesity without serious comorbidity with body mass index (BMI) of 33.0 to 33.9 in adult, unspecified obesity type  Doing well but feels like wegovy has not worked as well for weight  Will increase the dose  Recheck in 6 months for well visit  - Semaglutide-Weight Management (WEGOVY) 1.7 MG/0.75ML pen  Dispense: 3 mL; Refill: 5    Lung nodule  Recheck   - CT Chest w/o Contrast    Insomnia  Refills per [employer]  She  uses this rarely    BRCA positive  Had yearly physical in last few months with Dr. Martinez  Takes estrace and it works well         Follow-up  No follow-ups on file.    Cricket Latham is a 50 year old, presenting for the following health issues:  Recheck Medication and Refill Request (ambien)        6/30/2025     9:16 AM   Additional Questions   Roomed by Erendira SIMS   Accompanied by Self     History of Present Illness       Reason for visit:  Med follow up    She eats 2-3 servings of fruits and vegetables daily.She consumes 0 sweetened beverage(s) daily.She exercises with enough effort to increase her heart rate 30 to 60 minutes per day.  She exercises with enough effort to increase her heart rate 4 days per week. She is missing 1 dose(s) of medications per week.  She is not taking prescribed medications regularly due to remembering to take.        Past Medical History:   Diagnosis Date    Adrenal insufficiency ([place]'s disease) (H)     ACTH stim test failed to increase cortisol    Allergic rhinitis, cause unspecified     Antiplatelet or antithrombotic long-term use     Arthritis     ASD (atrial septal defect) 02/15/2013    repaired 6/10/21    Attention deficit disorder without mention of hyperactivity 07/02/2014    BRCA1 gene mutation positive 01/24/2018    Reported by patient, no report available at this time Records requested from [employer] 1/23/18    Cerebral infarction (H)     Clotting disorder birth    undefined clotting disorder - sees Dr. Sanchez Retinal artery  occlusion, R int mammary artery occlusion post breastCA surg and recd TPA    Depressive disorder     Endometriosis of uterus     HERPES SIMPLEX NOS 2007    cold sores on remicade    Insomnia, unspecified 02/10/2005    Lung nodule < 6cm on CT 2019    consider repeat in 2020 - low risk but around a lot of second hand smoke growing up    Migraine headaches     Other chronic sinusitis     Postoperative urinary retention     every surgery    Psoriatic arthropathy (H) 2007    Recurrent UTI     Retinal artery occlusion 02/15/2013    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 in 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 pregnant       Past Surgical History:   Procedure Laterality Date    APPENDECTOMY  2006     SECTION  2013    Procedure:  SECTION;  Primary  Section;  Surgeon: Chad Hughes MD;  Location:  L+D    COLONOSCOPY N/A 01/10/2023    rpt 5 years;  Surgeon: Adama White MD;  Location:  GI    CV PFO CLOSURE N/A 06/10/2021    Procedure:30mm [place] CARDIOFORM- Patent Foramen Ovale Closure;  Surgeon: Victoriano Cornejo MD;  Location:  HEART CARDIAC CATH LAB    DILATION AND CURETTAGE SUCTION N/A 2014    Procedure: DILATION AND CURETTAGE SUCTION;  Surgeon: Srini Martinez MD;  Location:  OR    DILATION AND CURETTAGE SUCTION N/A 2014    Procedure: DILATION AND CURETTAGE SUCTION;  Surgeon: Connor Kang MD;  Location:  OR    GENITOURINARY SURGERY  2016    bladder sling and complete historectomy    HC DILATION/CURETTAGE DIAG/THER NON OB      HC DILATION/CURETTAGE DIAG/THER NON OB      HERNIORRHAPHY INCISIONAL (LOCATION) N/A 2018    Procedure: Incisional Hernia repair with Biologic mesh;  Surgeon: Suraj Bergeron MD;  Location:  OR    HYST[facility]CTOMY, [facility]  2016    does not need pap    MASTECTOMY SIMPLE BILATERAL Bilateral 2018    Procedure: MASTECTOMY SIMPLE BILATERAL;  PROPHYLACTIC BILATERAL MASTECTOMY (GLADIS) BILATERAL BREAST RECONSTRUCTION [facility] TISSUE  EXPANDERS (WILLOW)   ;  Surgeon: Suraj Bergeron MD;  Location:  OR    ORTHOPEDIC SURGERY  2007    (L) thumb fused    RECONSTRUCT BREAST, INSERT TISSUE EXPANDER BILATERAL, COMBINED Bilateral 05/25/2018    expander and tram flap with drains - had weekly surgery to have I & D and removal of infected tissue    SURGICAL HISTORY OF -   2006    left thumb fusion due to arthritis       MEDICATIONS:  Current Outpatient Medications   Medication Sig Dispense Refill    amphetamine-dextroamphetamine (ADDERALL) 10 MG tablet Take 1 tablet (10 mg) by mouth 2 times daily as needed (inattention). 60 tablet 0    ascorbic acid 250 MG TABS tablet Take 250 mg by mouth daily       aspirin (ASA) 81 MG EC tablet Take 81 mg by mouth daily 325 mg daily 1 tablet 1    butalbital-acetaminophen-caffeine (ESGIC) -40 MG tablet TAKE ONE TABLET BY MOUTH EVERY 4 HOURS AS NEEDED 30 tablet 2    CRANBERRY CONCENTRATE PO Take 1 Dose by mouth daily       estradiol (ESTRACE) 1 MG tablet Take 1 tablet (1 mg) by mouth 2 times daily      methenamine (HIPREX) 1 g tablet Take 1 tablet (1 g) by mouth 2 times daily 60 tablet 3    rizatriptan (MAXALT) 10 MG tablet Take 1 tablet (10 mg) by mouth at onset of headache for migraine Take 10 mg by mouth at onset of headache for migraine 12 tablet 5    Semaglutide-Weight Management (WEGOVY) 1 MG/0.5ML pen Inject 1 mg subcutaneously once a week. 2 mL 1    Semaglutide-Weight Management (WEGOVY) 1.7 MG/0.75ML pen Inject 1.7 mg subcutaneously once a week. 3 mL 5    VITAMIN D PO Take 2,000 Units by mouth daily.      zolpidem (AMBIEN) 10 MG tablet Take 1 tablet (10 mg) by mouth nightly as needed for sleep. 15 tablet 4    ibuprofen (ADVIL/MOTRIN) 200 MG tablet Take 800 mg by mouth.       No current facility-administered medications for this visit.       SOCIAL HISTORY:  Social History     Tobacco Use    Smoking status: Never    Smokeless tobacco: Never   Substance Use Topics    Alcohol use: Not Currently      "Alcohol/week: 0.0 standard drinks of alcohol     Comment: occassional       Family History   Problem Relation Age of Onset    Psychotic Disorder Mother         Depression, alcoholism    Diabetes Mother     Hereditary Breast and Ovarian Cancer Syndrome Mother         BRCA1+, no hx of cancer, s/p prophylactic surgery to remove breast tissue, ovaries, fallopian tubes    Hypertension Mother     Thyroid Disease Mother         thyroid nodules    Lung Cancer Mother     Substance Abuse Father     C.A.D. Maternal Grandmother     Hypertension Maternal Grandmother     Hereditary Breast and Ovarian Cancer Syndrome Sister         matrnal half sister, BRCA1+    Hereditary Breast and Ovarian Cancer Syndrome Daughter 23        BRCA1+    Breast Cancer Cousin 45    Hereditary Breast and Ovarian Cancer Syndrome Cousin         BRCA1+    Breast Cancer Maternal Aunt 40        lumpectomy, then 2nd primary breast cancer later in 40s, treated with mastectomy    Hereditary Breast and Ovarian Cancer Syndrome Maternal Aunt         BRCA1+    Ovarian Cancer Maternal Aunt 70        surgry    Thyroid Cancer Maternal Aunt         medullary thyroid cancer    Hereditary Breast and Ovarian Cancer Syndrome Maternal Aunt         BRCA1+    Thyroid Cancer Maternal Uncle 66        papillary thyroid cancer    Breast Cancer Other 41        maternal great aunt    Ovarian Cancer Other 45         in 40s    Breast Cancer Other 43        maternal great aunt    Other Cancer Other         ovarian cancer    Colon Cancer No family hx of            Review of Systems  Constitutional, HEENT, cardiovascular, pulmonary, gi and gu systems are negative, except as otherwise noted.      Objective    BP 98/63 (BP Location: Left arm, Patient Position: Chair, Cuff Size: Adult Large)   Pulse 68   Temp 97.9  F (36.6  C) (Oral)   Resp 17   Ht 1.664 m (5' 5.5\")   Wt 96.8 kg (213 lb 6.4 oz)   LMP 2015 (Within Months)   SpO2 99%   BMI 34.97 kg/m    Body mass index is " 34.97 kg/m .  Physical Exam   GENERAL: alert and no distress  EYES: Eyes grossly normal to inspection, PERRL and conjunctivae and sclerae normal  HENT: ear canals and TM's normal, nose and mouth without ulcers or lesions  NECK: no adenopathy, no asymmetry, masses, or scars  RESP: lungs clear to auscultation - no rales, rhonchi or wheezes  CV: regular rate and rhythm, normal S1 S2, no S3 or S4, no murmur, click or rub, no peripheral edema  MS: no gross musculoskeletal defects noted, no edema  SKIN: no suspicious lesions or rashes  NEURO: Normal strength and tone, mentation intact and speech normal  PSYCH: mentation appears normal, affect normal/bright  LYMPH: no cervical, supraclavicular, axillary, or inguinal adenopathy    Office Visit on 09/22/2024   Component Date Value Ref Range Status    Color Urine 09/22/2024 Yellow  Colorless, Straw, Light Yellow, Yellow Final    Appearance Urine 09/22/2024 Slightly Cloudy (A)  Clear Final    Glucose Urine 09/22/2024 Negative  Negative mg/dL Final    Bilirubin Urine 09/22/2024 Negative  Negative Final    Ketones Urine 09/22/2024 Negative  Negative mg/dL Final    Specific Gravity Urine 09/22/2024 1.020  1.003 - 1.035 Final    Blood Urine 09/22/2024 Trace (A)  Negative Final    pH Urine 09/22/2024 5.5  5.0 - 7.0 Final    Protein Albumin Urine 09/22/2024 Negative  Negative mg/dL Final    Urobilinogen Urine 09/22/2024 0.2  0.2, 1.0 E.U./dL Final    Nitrite Urine 09/22/2024 Positive (A)  Negative Final    Leukocyte Esterase Urine 09/22/2024 Moderate (A)  Negative Final    Bacteria Urine 09/22/2024 Many (A)  None Seen /HPF Final    RBC Urine 09/22/2024 2-5 (A)  0-2 /HPF /HPF Final    WBC Urine 09/22/2024  (A)  0-5 /HPF /HPF Final    Culture 09/22/2024 50,000-100,000 CFU/mL Escherichia coli (A)   Final           Signed Electronically by: Sydnie Daley MD

## 2025-07-01 ENCOUNTER — TRANSFERRED RECORDS (OUTPATIENT)
Dept: HEALTH INFORMATION MANAGEMENT | Facility: CLINIC | Age: 50
End: 2025-07-01
Payer: COMMERCIAL

## 2025-07-20 ENCOUNTER — HOSPITAL ENCOUNTER (OUTPATIENT)
Dept: CT IMAGING | Facility: CLINIC | Age: 50
Discharge: HOME | End: 2025-07-20
Attending: FAMILY MEDICINE | Admitting: FAMILY MEDICINE
Payer: COMMERCIAL

## 2025-07-20 DIAGNOSIS — R91.1 LUNG NODULE: ICD-10-CM

## 2025-07-20 PROCEDURE — 71250 CT THORAX DX C-: CPT

## (undated) DEVICE — SU ETHILON 3-0 FS-1 18" 669H

## (undated) DEVICE — SU VICRYL 3-0 SH 27" UND J416H

## (undated) DEVICE — ESU ELEC BLADE 2.75" COATED/INSULATED E1455

## (undated) DEVICE — SYR ANGIOGRAPHY MULTIUSE KIT ACIST 014612

## (undated) DEVICE — SYR 05ML SLIP TIP W/O NDL 309647

## (undated) DEVICE — CLOSURE DEVICE 6FR VASC PROGLIDE MEDICATED SUTURE 12673-03

## (undated) DEVICE — SU ETHILON 2-0 PS 18" 585H

## (undated) DEVICE — DILATOR VASC W/INTRO GW 8FRX19CM .038"

## (undated) DEVICE — LINEN HALF SHEET 5512

## (undated) DEVICE — SPONGE LAP 18X18" X8435

## (undated) DEVICE — CATH TRAY FOLEY SURESTEP 16FR DRAIN BAG STATOCK A899916

## (undated) DEVICE — SU ETHILON 5-0 PC-3 18" 1865G

## (undated) DEVICE — GLOVE PROTEXIS W/NEU-THERA 7.5  2D73TE75

## (undated) DEVICE — SU MONOCRYL 5-0 PS-2 18" UND Y495G

## (undated) DEVICE — SUCTION CANISTER MEDIVAC LINER 3000ML W/LID 65651-530

## (undated) DEVICE — SU PDS II 2-0 SH 27" Z317H

## (undated) DEVICE — LINEN FULL SHEET 5511

## (undated) DEVICE — DRSG GAUZE 4X4" TRAY

## (undated) DEVICE — PREP CHLORAPREP 26ML TINTED ORANGE  260815

## (undated) DEVICE — KIT FILL MCGHAN TISSUE EXPANDER LATEX-SAFE 7M2804

## (undated) DEVICE — DRAIN JACKSON PRATT RESERVOIR 100ML SU130-1305

## (undated) DEVICE — PREP SCRUB SOL EXIDINE 4% CHG 4OZ 29002-404

## (undated) DEVICE — GOWN XLG DISP 9545

## (undated) DEVICE — DRAIN JACKSON PRATT 15FR ROUND SU130-1323

## (undated) DEVICE — GLOVE PROTEXIS BLUE W/NEU-THERA 8.0  2D73EB80

## (undated) DEVICE — BNDG ELASTIC 6" DBL LENGTH UNSTERILE 6611-16

## (undated) DEVICE — PACK MINOR CUSTOM RIDGES SBA32RMRMA

## (undated) DEVICE — KIT ENDO TURNOVER/PROCEDURE W/CLEAN A SCOPE LINERS 103888

## (undated) DEVICE — DRAPE IOBAN INCISE 23X17" 6650EZ

## (undated) DEVICE — DRAPE LAP W/ARMBOARD 29410

## (undated) DEVICE — INTRODUCER SHEATH GREEN 6.5FRX11CM .038IN PSI-6F-11-038ACT

## (undated) DEVICE — SU VICRYL 2-0 TIE 12X18" J905T

## (undated) DEVICE — NDL 27GA 1.25" 305136

## (undated) DEVICE — INTRODUCER CATH VASC 5FRX10CM  MPIS-501-NT-U-SST

## (undated) DEVICE — LINEN TOWEL PACK X5 5464

## (undated) DEVICE — NDL 19GA 1.5"

## (undated) DEVICE — PAD CHUX UNDERPAD 23X24" 7136

## (undated) DEVICE — DEFIB PRO-PADZ LVP LQD GEL ADULT 8900-2105-01

## (undated) DEVICE — ESU GROUND PAD UNIVERSAL W/O CORD

## (undated) DEVICE — TOTE ANGIO CORP PC15AT SAN32CC83O

## (undated) DEVICE — PREP SKIN SCRUB TRAY 4461A

## (undated) DEVICE — ESU PENCIL W/SMOKE EVAC NEPTUNE STRYKER 0703-046-000

## (undated) DEVICE — KIT HAND CONTROL ANGIOTOUCH ACIST 65CM AT-P65

## (undated) DEVICE — DRSG KERLIX 4 1/2"X4YDS ROLL 6715

## (undated) DEVICE — SOL ADH LIQUID BENZOIN SWAB 0.6ML C1544

## (undated) DEVICE — DRSG TEGADERM 4X4 3/4" 1626W

## (undated) DEVICE — SU PDS II 1 CT MONOFIL Z353H

## (undated) DEVICE — GLOVE PROTEXIS BLUE W/NEU-THERA 7.5  2D73EB75

## (undated) DEVICE — MANIFOLD KIT ANGIO AUTOMATED 014613

## (undated) DEVICE — SU VICRYL 0 TIE 12X18" J906G

## (undated) DEVICE — WIRE GUIDE 0.035"X260CM AMPTLAZ XSTIFF CVD THSCF-35-260-3-A

## (undated) DEVICE — SOL NACL 0.9% INJ 1000ML BAG 2B1324X

## (undated) DEVICE — SOL WATER IRRIG 1000ML BOTTLE 2F7114

## (undated) DEVICE — SU VICRYL 3-0 SH 27" J316H

## (undated) DEVICE — DRSG ABDOMINAL 07 1/2X8" 7197D

## (undated) DEVICE — SU VICRYL 4-0 PS-2 18" UND J496H

## (undated) DEVICE — DRAPE SLEEVE 599

## (undated) DEVICE — DRSG KERLIX FLUFFS X5

## (undated) DEVICE — SYR 10ML LL W/O NDL

## (undated) DEVICE — SOL NACL 0.9% IRRIG 1000ML BOTTLE 07138-09

## (undated) DEVICE — SU VICRYL 3-0 PS-2 27" UND J427H

## (undated) DEVICE — BAG CLEAR TRASH 1.3M 39X33" P4040C

## (undated) DEVICE — INTRODUCER SHEATH 12FRX12CM FAST-CATH 406128

## (undated) DEVICE — SU SILK 3-0 SH CR 8X18" C013D

## (undated) DEVICE — BNDG ABDOMINAL BINDER 9X62-84" 79-89210

## (undated) DEVICE — DRAPE BREAST/CHEST 29420

## (undated) DEVICE — CATH ANGIO 2 SH THNWL 6FR

## (undated) DEVICE — LINEN TOWEL PACK X10 5473

## (undated) DEVICE — GOWN IMPERVIOUS ZONED XLG 9041

## (undated) DEVICE — TUBING SUCTION 12"X1/4" N612

## (undated) DEVICE — DRSG STERI STRIP 1/2X4" R1547

## (undated) DEVICE — GLOVE PROTEXIS W/NEU-THERA 7.0  2D73TE70

## (undated) DEVICE — SU ETHIBOND 2-0 V-5 30" X937H

## (undated) DEVICE — NDL SPINAL 22GA 3.5" QUINCKE 405181

## (undated) DEVICE — PACK MAJOR SBA15MAFSI

## (undated) DEVICE — BARRIER SEPRAFILM 3X5" X6 SHEETS 5086-02

## (undated) DEVICE — SLEEVE PROTECTIVE BREAST BIOPSY  GMSLV001-10

## (undated) DEVICE — TUBING SMOKE EVAC ATTACHMENT E3590

## (undated) DEVICE — SU PDS II 1 CTX 36" Z371T

## (undated) DEVICE — SYR 03ML LL W/O NDL 309657

## (undated) DEVICE — BLADE KNIFE SURG 10 371110

## (undated) DEVICE — DRAIN JACKSON PRATT 15FR ROUND SIL LF JP-2229

## (undated) DEVICE — BLADE KNIFE SURG 15 371115

## (undated) DEVICE — ESU GROUND PAD ADULT W/CORD E7507

## (undated) DEVICE — CLEANSER WOUND IRRISEPT 0.05% CHG IRRISEPT-403

## (undated) DEVICE — TUBING SMOKE EVAC 3/8"X10' E3645

## (undated) RX ORDER — HYDROMORPHONE HYDROCHLORIDE 1 MG/ML
INJECTION, SOLUTION INTRAMUSCULAR; INTRAVENOUS; SUBCUTANEOUS
Status: DISPENSED
Start: 2018-05-25

## (undated) RX ORDER — FENTANYL CITRATE 50 UG/ML
INJECTION, SOLUTION INTRAMUSCULAR; INTRAVENOUS
Status: DISPENSED
Start: 2018-05-25

## (undated) RX ORDER — GLYCOPYRROLATE 0.2 MG/ML
INJECTION, SOLUTION INTRAMUSCULAR; INTRAVENOUS
Status: DISPENSED
Start: 2018-10-30

## (undated) RX ORDER — DEXAMETHASONE SODIUM PHOSPHATE 4 MG/ML
INJECTION, SOLUTION INTRA-ARTICULAR; INTRALESIONAL; INTRAMUSCULAR; INTRAVENOUS; SOFT TISSUE
Status: DISPENSED
Start: 2018-05-25

## (undated) RX ORDER — HEPARIN SODIUM 200 [USP'U]/100ML
INJECTION, SOLUTION INTRAVENOUS
Status: DISPENSED
Start: 2021-06-10

## (undated) RX ORDER — FENTANYL CITRATE 50 UG/ML
INJECTION, SOLUTION INTRAMUSCULAR; INTRAVENOUS
Status: DISPENSED
Start: 2018-12-07

## (undated) RX ORDER — ONDANSETRON 2 MG/ML
INJECTION INTRAMUSCULAR; INTRAVENOUS
Status: DISPENSED
Start: 2018-05-25

## (undated) RX ORDER — KETAMINE HCL IN 0.9 % NACL 50 MG/5 ML
SYRINGE (ML) INTRAVENOUS
Status: DISPENSED
Start: 2018-12-07

## (undated) RX ORDER — BUPIVACAINE HYDROCHLORIDE AND EPINEPHRINE 2.5; 5 MG/ML; UG/ML
INJECTION, SOLUTION EPIDURAL; INFILTRATION; INTRACAUDAL; PERINEURAL
Status: DISPENSED
Start: 2018-12-07

## (undated) RX ORDER — FENTANYL CITRATE 50 UG/ML
INJECTION, SOLUTION INTRAMUSCULAR; INTRAVENOUS
Status: DISPENSED
Start: 2021-06-10

## (undated) RX ORDER — EPHEDRINE SULFATE 50 MG/ML
INJECTION, SOLUTION INTRAVENOUS
Status: DISPENSED
Start: 2018-12-07

## (undated) RX ORDER — LIDOCAINE HYDROCHLORIDE 40 MG/ML
SOLUTION TOPICAL
Status: DISPENSED
Start: 2018-10-30

## (undated) RX ORDER — FENTANYL CITRATE 50 UG/ML
INJECTION, SOLUTION INTRAMUSCULAR; INTRAVENOUS
Status: DISPENSED
Start: 2023-01-10

## (undated) RX ORDER — VANCOMYCIN HYDROCHLORIDE 1 G/200ML
INJECTION, SOLUTION INTRAVENOUS
Status: DISPENSED
Start: 2018-12-07

## (undated) RX ORDER — GINSENG 100 MG
CAPSULE ORAL
Status: DISPENSED
Start: 2018-05-25

## (undated) RX ORDER — LIDOCAINE HYDROCHLORIDE 10 MG/ML
INJECTION, SOLUTION EPIDURAL; INFILTRATION; INTRACAUDAL; PERINEURAL
Status: DISPENSED
Start: 2021-06-10

## (undated) RX ORDER — CEFAZOLIN SODIUM 2 G/100ML
INJECTION, SOLUTION INTRAVENOUS
Status: DISPENSED
Start: 2018-05-25

## (undated) RX ORDER — HEPARIN SODIUM 1000 [USP'U]/ML
INJECTION, SOLUTION INTRAVENOUS; SUBCUTANEOUS
Status: DISPENSED
Start: 2021-06-10

## (undated) RX ORDER — GLYCOPYRROLATE 0.2 MG/ML
INJECTION, SOLUTION INTRAMUSCULAR; INTRAVENOUS
Status: DISPENSED
Start: 2018-05-25

## (undated) RX ORDER — CEFAZOLIN SODIUM 2 G/100ML
INJECTION, SOLUTION INTRAVENOUS
Status: DISPENSED
Start: 2018-12-07

## (undated) RX ORDER — KETOROLAC TROMETHAMINE 30 MG/ML
INJECTION, SOLUTION INTRAMUSCULAR; INTRAVENOUS
Status: DISPENSED
Start: 2018-05-25

## (undated) RX ORDER — CEFAZOLIN SODIUM 1 G/3ML
INJECTION, POWDER, FOR SOLUTION INTRAMUSCULAR; INTRAVENOUS
Status: DISPENSED
Start: 2018-05-25

## (undated) RX ORDER — FENTANYL CITRATE 50 UG/ML
INJECTION, SOLUTION INTRAMUSCULAR; INTRAVENOUS
Status: DISPENSED
Start: 2018-10-30

## (undated) RX ORDER — SCOLOPAMINE TRANSDERMAL SYSTEM 1 MG/1
PATCH, EXTENDED RELEASE TRANSDERMAL
Status: DISPENSED
Start: 2018-12-07

## (undated) RX ORDER — PROPOFOL 10 MG/ML
INJECTION, EMULSION INTRAVENOUS
Status: DISPENSED
Start: 2018-05-25

## (undated) RX ORDER — NALOXONE HYDROCHLORIDE 0.4 MG/ML
INJECTION, SOLUTION INTRAMUSCULAR; INTRAVENOUS; SUBCUTANEOUS
Status: DISPENSED
Start: 2018-10-30

## (undated) RX ORDER — PROPOFOL 10 MG/ML
INJECTION, EMULSION INTRAVENOUS
Status: DISPENSED
Start: 2018-12-07

## (undated) RX ORDER — LIDOCAINE HYDROCHLORIDE 20 MG/ML
INJECTION, SOLUTION EPIDURAL; INFILTRATION; INTRACAUDAL; PERINEURAL
Status: DISPENSED
Start: 2018-05-25

## (undated) RX ORDER — ONDANSETRON 2 MG/ML
INJECTION INTRAMUSCULAR; INTRAVENOUS
Status: DISPENSED
Start: 2018-12-07

## (undated) RX ORDER — PROMETHAZINE HYDROCHLORIDE 25 MG/ML
INJECTION, SOLUTION INTRAMUSCULAR; INTRAVENOUS
Status: DISPENSED
Start: 2018-12-07

## (undated) RX ORDER — ACYCLOVIR 200 MG/1
CAPSULE ORAL
Status: DISPENSED
Start: 2018-05-25

## (undated) RX ORDER — BUPIVACAINE HYDROCHLORIDE 2.5 MG/ML
INJECTION, SOLUTION EPIDURAL; INFILTRATION; INTRACAUDAL
Status: DISPENSED
Start: 2021-06-10

## (undated) RX ORDER — FLUMAZENIL 0.1 MG/ML
INJECTION, SOLUTION INTRAVENOUS
Status: DISPENSED
Start: 2018-10-30